# Patient Record
Sex: MALE | Race: WHITE | NOT HISPANIC OR LATINO | Employment: OTHER | URBAN - METROPOLITAN AREA
[De-identification: names, ages, dates, MRNs, and addresses within clinical notes are randomized per-mention and may not be internally consistent; named-entity substitution may affect disease eponyms.]

---

## 2017-03-08 RX ORDER — LISINOPRIL AND HYDROCHLOROTHIAZIDE 20; 12.5 MG/1; MG/1
TABLET ORAL
Qty: 90 TABLET | Refills: 0 | Status: SHIPPED | OUTPATIENT
Start: 2017-03-08 | End: 2017-06-04 | Stop reason: SDUPTHER

## 2017-03-28 DIAGNOSIS — E78.5 HYPERLIPIDEMIA: ICD-10-CM

## 2017-03-28 RX ORDER — PRAVASTATIN SODIUM 40 MG
TABLET ORAL
Qty: 60 TABLET | Refills: 2 | Status: SHIPPED | OUTPATIENT
Start: 2017-03-28 | End: 2017-06-30 | Stop reason: SDUPTHER

## 2017-04-07 DIAGNOSIS — R73.9 HYPERGLYCEMIA: ICD-10-CM

## 2017-04-07 DIAGNOSIS — Z11.59 NEED FOR HEPATITIS C SCREENING TEST: Primary | ICD-10-CM

## 2017-04-07 DIAGNOSIS — E78.5 HYPERLIPIDEMIA, UNSPECIFIED HYPERLIPIDEMIA TYPE: ICD-10-CM

## 2017-04-13 LAB
ALBUMIN SERPL-MCNC: 4.3 G/DL (ref 3.5–5.2)
ALBUMIN/GLOB SERPL: 1.4 G/DL
ALP SERPL-CCNC: 91 U/L (ref 39–117)
ALT SERPL-CCNC: 35 U/L (ref 1–41)
AST SERPL-CCNC: 30 U/L (ref 1–40)
BILIRUB SERPL-MCNC: 1 MG/DL (ref 0.1–1.2)
BUN SERPL-MCNC: 14 MG/DL (ref 8–23)
BUN/CREAT SERPL: 13.9 (ref 7–25)
CALCIUM SERPL-MCNC: 9.7 MG/DL (ref 8.6–10.5)
CHLORIDE SERPL-SCNC: 100 MMOL/L (ref 98–107)
CHOLEST SERPL-MCNC: 142 MG/DL (ref 100–199)
CO2 SERPL-SCNC: 26.1 MMOL/L (ref 22–29)
CREAT SERPL-MCNC: 1.01 MG/DL (ref 0.76–1.27)
GLOBULIN SER CALC-MCNC: 3.1 GM/DL
GLUCOSE SERPL-MCNC: 102 MG/DL (ref 65–99)
HBA1C MFR BLD: 5.7 % (ref 4.8–5.6)
HCV AB S/CO SERPL IA: <0.1 S/CO RATIO (ref 0–0.9)
HCV AB SERPL QL IA: NORMAL
HDL SERPL-SCNC: 29.5 UMOL/L
HDLC SERPL-MCNC: 44 MG/DL
LDL SERPL QN: 20.3 NM
LDL SERPL-SCNC: 1025 NMOL/L
LDL SMALL SERPL-SCNC: 585 NMOL/L
LDLC SERPL CALC-MCNC: 67 MG/DL (ref 0–99)
POTASSIUM SERPL-SCNC: 4.3 MMOL/L (ref 3.5–5.2)
PROT SERPL-MCNC: 7.4 G/DL (ref 6–8.5)
SODIUM SERPL-SCNC: 140 MMOL/L (ref 136–145)
TRIGL SERPL-MCNC: 154 MG/DL (ref 0–149)

## 2017-04-19 ENCOUNTER — OFFICE VISIT (OUTPATIENT)
Dept: FAMILY MEDICINE CLINIC | Facility: CLINIC | Age: 67
End: 2017-04-19

## 2017-04-19 VITALS
HEART RATE: 64 BPM | DIASTOLIC BLOOD PRESSURE: 72 MMHG | SYSTOLIC BLOOD PRESSURE: 130 MMHG | RESPIRATION RATE: 16 BRPM | HEIGHT: 69 IN | WEIGHT: 178 LBS | BODY MASS INDEX: 26.36 KG/M2 | OXYGEN SATURATION: 97 % | TEMPERATURE: 98.5 F

## 2017-04-19 DIAGNOSIS — Z12.11 ENCOUNTER FOR SCREENING COLONOSCOPY: ICD-10-CM

## 2017-04-19 DIAGNOSIS — I10 ESSENTIAL HYPERTENSION: Primary | ICD-10-CM

## 2017-04-19 DIAGNOSIS — R73.9 HYPERGLYCEMIA: ICD-10-CM

## 2017-04-19 DIAGNOSIS — E78.5 HYPERLIPIDEMIA, UNSPECIFIED HYPERLIPIDEMIA TYPE: ICD-10-CM

## 2017-04-19 PROCEDURE — 99214 OFFICE O/P EST MOD 30 MIN: CPT | Performed by: INTERNAL MEDICINE

## 2017-04-19 NOTE — PROGRESS NOTES
Subjective   North Carcamo is a 66 y.o. male. Patient is here today for follow-up on his hyperlipidemia, hypertension and hyperglycemia.  He generally is feeling fine and is had no chest pain, shortness of breath, edema or significant myalgias.    Chief Complaint   Patient presents with   • Hypertension     LAB F/U   • Hyperlipidemia   • Hyperglycemia          Vitals:    04/19/17 0911   BP: 130/72   Pulse: 64   Resp: 16   Temp: 98.5 °F (36.9 °C)   SpO2: 97%     The following portions of the patient's history were reviewed and updated as appropriate: allergies, current medications, past family history, past medical history, past social history, past surgical history and problem list.    Past Medical History:   Diagnosis Date   • Cancer     PROSTATE   • Hyperglycemia    • Hyperlipidemia    • Hypertension       Allergies   Allergen Reactions   • Penicillins       Social History     Social History   • Marital status:      Spouse name: N/A   • Number of children: N/A   • Years of education: N/A     Occupational History   • Not on file.     Social History Main Topics   • Smoking status: Never Smoker   • Smokeless tobacco: Never Used   • Alcohol use Yes      Comment: MINIMUM CONSUMPTION   • Drug use: Defer   • Sexual activity: Defer     Other Topics Concern   • Not on file     Social History Narrative        Current Outpatient Prescriptions:   •  aspirin 81 MG tablet, Take 1 tablet by mouth daily., Disp: , Rfl:   •  lisinopril-hydrochlorothiazide (PRINZIDE,ZESTORETIC) 20-12.5 MG per tablet, TAKE 1 TABLET BY MOUTH EVERY DAY, Disp: 90 tablet, Rfl: 0  •  pravastatin (PRAVACHOL) 40 MG tablet, TAKE 1 TABLET BY MOUTH TWICE DAILY, Disp: 60 tablet, Rfl: 2     Objective     History of Present Illness     Review of Systems   Constitutional: Negative.    HENT: Negative.    Eyes: Negative.    Respiratory: Negative.    Cardiovascular: Negative.    Gastrointestinal: Negative.    Genitourinary: Negative.    Musculoskeletal:  Negative.    Skin: Negative.    Neurological: Negative.    Psychiatric/Behavioral: Negative.        Physical Exam   Constitutional: He appears well-developed and well-nourished.   Pleasant, cooperative and in no distress with a blood pressure of 120/80   HENT:   Head: Normocephalic and atraumatic.   Eyes: Conjunctivae are normal.   Neck: Normal range of motion. Neck supple.   Cardiovascular: Normal rate, regular rhythm and normal heart sounds.    Pulmonary/Chest: Effort normal and breath sounds normal. No respiratory distress. He has no wheezes. He has no rales.   Musculoskeletal: Normal range of motion. He exhibits no edema.   Neurological: He is alert.   Skin: Skin is warm and dry.   Psychiatric: He has a normal mood and affect. His behavior is normal.   Nursing note and vitals reviewed.      ASSESSMENT  overall the patient seems stable with a good blood pressure and heart and lungs sound fine.  His CMP was normal aside from a sugar of 103 and hemoglobin A1c is stable at 5.7.  Lipid panel is stable with a total cholesterol of 142 HDL of 44 LDL of 67 with the particle number of 1025.  Hepatitis C screen was negative.  The patient is due for a routine colonoscopy.     Problem List Items Addressed This Visit        Cardiovascular and Mediastinum    Hyperlipidemia    Hypertension - Primary       Other    Hyperglycemia      Other Visit Diagnoses     Encounter for screening colonoscopy        Relevant Orders    Ambulatory Referral to General Surgery          PLAN  the patient will continue current medicines and I plan on rechecking him in about 4 months with a CMP, NMR lipid panel, hemoglobin A1c.  I have referred him to the general surgery group for routine colonoscopy.      There are no Patient Instructions on file for this visit.  Return in about 4 months (around 8/19/2017) for with labs.

## 2017-06-05 RX ORDER — LISINOPRIL AND HYDROCHLOROTHIAZIDE 20; 12.5 MG/1; MG/1
TABLET ORAL
Qty: 90 TABLET | Refills: 0 | Status: SHIPPED | OUTPATIENT
Start: 2017-06-05 | End: 2017-08-23 | Stop reason: SDUPTHER

## 2017-06-30 DIAGNOSIS — E78.5 HYPERLIPIDEMIA: ICD-10-CM

## 2017-06-30 RX ORDER — PRAVASTATIN SODIUM 40 MG
TABLET ORAL
Qty: 60 TABLET | Refills: 0 | Status: SHIPPED | OUTPATIENT
Start: 2017-06-30 | End: 2017-07-31 | Stop reason: SDUPTHER

## 2017-07-31 DIAGNOSIS — E78.5 HYPERLIPIDEMIA: ICD-10-CM

## 2017-08-01 RX ORDER — PRAVASTATIN SODIUM 40 MG
TABLET ORAL
Qty: 60 TABLET | Refills: 3 | Status: SHIPPED | OUTPATIENT
Start: 2017-08-01 | End: 2017-08-23 | Stop reason: SDUPTHER

## 2017-08-04 DIAGNOSIS — E78.5 HYPERLIPIDEMIA, UNSPECIFIED HYPERLIPIDEMIA TYPE: ICD-10-CM

## 2017-08-04 DIAGNOSIS — R73.9 HYPERGLYCEMIA: ICD-10-CM

## 2017-08-13 LAB
ALBUMIN SERPL-MCNC: 4.1 G/DL (ref 3.5–5.2)
ALBUMIN/GLOB SERPL: 1.3 G/DL
ALP SERPL-CCNC: 91 U/L (ref 39–117)
ALT SERPL-CCNC: 24 U/L (ref 1–41)
AST SERPL-CCNC: 21 U/L (ref 1–40)
BILIRUB SERPL-MCNC: 1.1 MG/DL (ref 0.1–1.2)
BUN SERPL-MCNC: 13 MG/DL (ref 8–23)
BUN/CREAT SERPL: 11.3 (ref 7–25)
CALCIUM SERPL-MCNC: 9.4 MG/DL (ref 8.6–10.5)
CHLORIDE SERPL-SCNC: 100 MMOL/L (ref 98–107)
CHOLEST SERPL-MCNC: 127 MG/DL (ref 100–199)
CO2 SERPL-SCNC: 28.2 MMOL/L (ref 22–29)
CREAT SERPL-MCNC: 1.15 MG/DL (ref 0.76–1.27)
GLOBULIN SER CALC-MCNC: 3.1 GM/DL
GLUCOSE SERPL-MCNC: 93 MG/DL (ref 65–99)
HBA1C MFR BLD: 5.46 % (ref 4.8–5.6)
HDL SERPL-SCNC: 26.4 UMOL/L
HDLC SERPL-MCNC: 36 MG/DL
LDL SERPL QN: 20.1 NM
LDL SERPL-SCNC: 1018 NMOL/L
LDL SMALL SERPL-SCNC: 642 NMOL/L
LDLC SERPL CALC-MCNC: 58 MG/DL (ref 0–99)
POTASSIUM SERPL-SCNC: 4.1 MMOL/L (ref 3.5–5.2)
PROT SERPL-MCNC: 7.2 G/DL (ref 6–8.5)
SODIUM SERPL-SCNC: 141 MMOL/L (ref 136–145)
TRIGL SERPL-MCNC: 166 MG/DL (ref 0–149)

## 2017-08-23 ENCOUNTER — OFFICE VISIT (OUTPATIENT)
Dept: FAMILY MEDICINE CLINIC | Facility: CLINIC | Age: 67
End: 2017-08-23

## 2017-08-23 VITALS
HEART RATE: 62 BPM | BODY MASS INDEX: 25.92 KG/M2 | DIASTOLIC BLOOD PRESSURE: 70 MMHG | OXYGEN SATURATION: 98 % | SYSTOLIC BLOOD PRESSURE: 120 MMHG | WEIGHT: 175 LBS | HEIGHT: 69 IN | TEMPERATURE: 97.9 F

## 2017-08-23 DIAGNOSIS — I10 ESSENTIAL HYPERTENSION: Primary | ICD-10-CM

## 2017-08-23 DIAGNOSIS — E78.5 HYPERLIPIDEMIA, UNSPECIFIED HYPERLIPIDEMIA TYPE: ICD-10-CM

## 2017-08-23 DIAGNOSIS — R73.9 HYPERGLYCEMIA: ICD-10-CM

## 2017-08-23 PROCEDURE — 99214 OFFICE O/P EST MOD 30 MIN: CPT | Performed by: INTERNAL MEDICINE

## 2017-08-23 RX ORDER — LISINOPRIL AND HYDROCHLOROTHIAZIDE 20; 12.5 MG/1; MG/1
1 TABLET ORAL DAILY
Qty: 90 TABLET | Refills: 3 | Status: SHIPPED | OUTPATIENT
Start: 2017-08-23 | End: 2018-05-17 | Stop reason: SDUPTHER

## 2017-08-23 RX ORDER — PRAVASTATIN SODIUM 40 MG
40 TABLET ORAL NIGHTLY
Qty: 90 TABLET | Refills: 3 | Status: SHIPPED | OUTPATIENT
Start: 2017-08-23 | End: 2018-05-17 | Stop reason: SDUPTHER

## 2017-08-23 NOTE — PROGRESS NOTES
Subjective   North Carcamo is a 67 y.o. male. Patient is here today for follow-up on his hypertension, hyperlipidemia and hyperglycemia.  He's feeling well and denies any chest pain, shortness of breath, edema or myalgias.  He's had no new acute problems.    Chief Complaint   Patient presents with   • Hyperlipidemia     HYPERGLYCEMIA, HTN- FOLLOW UP LABS          Vitals:    08/23/17 1028   BP: 120/70   Pulse: 62   Temp: 97.9 °F (36.6 °C)   SpO2: 98%     The following portions of the patient's history were reviewed and updated as appropriate: allergies, current medications, past family history, past medical history, past social history, past surgical history and problem list.    Past Medical History:   Diagnosis Date   • Cancer     PROSTATE   • Hyperglycemia    • Hyperlipidemia    • Hypertension       Allergies   Allergen Reactions   • Penicillins       Social History     Social History   • Marital status:      Spouse name: N/A   • Number of children: N/A   • Years of education: N/A     Occupational History   • Not on file.     Social History Main Topics   • Smoking status: Never Smoker   • Smokeless tobacco: Never Used   • Alcohol use Yes      Comment: MINIMUM CONSUMPTION   • Drug use: Defer   • Sexual activity: Defer     Other Topics Concern   • Not on file     Social History Narrative        Current Outpatient Prescriptions:   •  aspirin 81 MG tablet, Take 1 tablet by mouth daily., Disp: , Rfl:   •  lisinopril-hydrochlorothiazide (PRINZIDE,ZESTORETIC) 20-12.5 MG per tablet, Take 1 tablet by mouth Daily., Disp: 90 tablet, Rfl: 3  •  pravastatin (PRAVACHOL) 40 MG tablet, Take 1 tablet by mouth Every Night., Disp: 90 tablet, Rfl: 3     Objective     History of Present Illness     Review of Systems   Constitutional: Negative.    HENT: Negative.    Eyes: Negative.    Respiratory: Negative.    Cardiovascular: Negative.    Gastrointestinal: Negative.    Genitourinary: Negative.    Musculoskeletal: Negative.     Skin: Negative.    Neurological: Negative.    Psychiatric/Behavioral: Negative.        Physical Exam   Constitutional: He appears well-developed and well-nourished.   Pleasant, cooperative and in no distress with a blood pressure of 120/80   HENT:   Head: Normocephalic and atraumatic.   Eyes: Conjunctivae are normal.   Neck: Normal range of motion. Neck supple.   Cardiovascular: Normal rate, regular rhythm and normal heart sounds.    Pulmonary/Chest: Effort normal and breath sounds normal. No respiratory distress. He has no wheezes. He has no rales.   Musculoskeletal: Normal range of motion.   Neurological: He is alert.   Skin: Skin is warm and dry.   Psychiatric: He has a normal mood and affect. His behavior is normal.   Nursing note and vitals reviewed.      ASSESSMENT  overall the patient generally seems stable.  His CMP was completely normal as well as his hemoglobin A1c.  His NMR lipid panel is stable with a total cholesterol 127, minimally low HDL of 36, LDL of 58 with the particle number of 1018.     Problem List Items Addressed This Visit        Cardiovascular and Mediastinum    Hyperlipidemia    Relevant Medications    pravastatin (PRAVACHOL) 40 MG tablet    Hypertension - Primary    Relevant Medications    lisinopril-hydrochlorothiazide (PRINZIDE,ZESTORETIC) 20-12.5 MG per tablet       Other    Hyperglycemia          PLAN  I'm going to decrease the patient's pravastatin to 40 mg daily and he will continue his other medicines.  I like to recheck him in about 4 months with a CBC, CMP, NMR lipid panel and PSA    There are no Patient Instructions on file for this visit.  Return in about 4 months (around 12/23/2017) for with labs.

## 2017-12-20 DIAGNOSIS — Z12.5 SCREENING PSA (PROSTATE SPECIFIC ANTIGEN): Primary | ICD-10-CM

## 2017-12-20 DIAGNOSIS — E78.5 HYPERLIPIDEMIA, UNSPECIFIED HYPERLIPIDEMIA TYPE: ICD-10-CM

## 2018-01-04 LAB
ALBUMIN SERPL-MCNC: 4 G/DL (ref 3.5–5.2)
ALBUMIN/GLOB SERPL: 1.3 G/DL
ALP SERPL-CCNC: 101 U/L (ref 39–117)
ALT SERPL-CCNC: 30 U/L (ref 1–41)
AST SERPL-CCNC: 23 U/L (ref 1–40)
BASOPHILS # BLD AUTO: 0.04 10*3/MM3 (ref 0–0.2)
BASOPHILS NFR BLD AUTO: 0.4 % (ref 0–1.5)
BILIRUB SERPL-MCNC: 0.6 MG/DL (ref 0.1–1.2)
BUN SERPL-MCNC: 19 MG/DL (ref 8–23)
BUN/CREAT SERPL: 16.1 (ref 7–25)
CALCIUM SERPL-MCNC: 9.1 MG/DL (ref 8.6–10.5)
CHLORIDE SERPL-SCNC: 102 MMOL/L (ref 98–107)
CHOLEST SERPL-MCNC: 151 MG/DL (ref 100–199)
CO2 SERPL-SCNC: 29.3 MMOL/L (ref 22–29)
CREAT SERPL-MCNC: 1.18 MG/DL (ref 0.76–1.27)
EOSINOPHIL # BLD AUTO: 0.34 10*3/MM3 (ref 0–0.7)
EOSINOPHIL NFR BLD AUTO: 3.6 % (ref 0.3–6.2)
ERYTHROCYTE [DISTWIDTH] IN BLOOD BY AUTOMATED COUNT: 12.9 % (ref 11.5–14.5)
GLOBULIN SER CALC-MCNC: 3.2 GM/DL
GLUCOSE SERPL-MCNC: 124 MG/DL (ref 65–99)
HCT VFR BLD AUTO: 45.3 % (ref 40.4–52.2)
HDL SERPL-SCNC: 29.5 UMOL/L
HDLC SERPL-MCNC: 41 MG/DL
HGB BLD-MCNC: 15.6 G/DL (ref 13.7–17.6)
IMM GRANULOCYTES # BLD: 0.03 10*3/MM3 (ref 0–0.03)
IMM GRANULOCYTES NFR BLD: 0.3 % (ref 0–0.5)
LDL SERPL QN: 20.1 NM
LDL SERPL-SCNC: 846 NMOL/L
LDL SMALL SERPL-SCNC: 638 NMOL/L
LDLC SERPL CALC-MCNC: 65 MG/DL (ref 0–99)
LYMPHOCYTES # BLD AUTO: 2.18 10*3/MM3 (ref 0.9–4.8)
LYMPHOCYTES NFR BLD AUTO: 23.3 % (ref 19.6–45.3)
MCH RBC QN AUTO: 32.1 PG (ref 27–32.7)
MCHC RBC AUTO-ENTMCNC: 34.4 G/DL (ref 32.6–36.4)
MCV RBC AUTO: 93.2 FL (ref 79.8–96.2)
MONOCYTES # BLD AUTO: 1.36 10*3/MM3 (ref 0.2–1.2)
MONOCYTES NFR BLD AUTO: 14.6 % (ref 5–12)
NEUTROPHILS # BLD AUTO: 5.39 10*3/MM3 (ref 1.9–8.1)
NEUTROPHILS NFR BLD AUTO: 57.8 % (ref 42.7–76)
PLATELET # BLD AUTO: 214 10*3/MM3 (ref 140–500)
POTASSIUM SERPL-SCNC: 4.2 MMOL/L (ref 3.5–5.2)
PROT SERPL-MCNC: 7.2 G/DL (ref 6–8.5)
PSA SERPL-MCNC: 1.12 NG/ML (ref 0–4)
RBC # BLD AUTO: 4.86 10*6/MM3 (ref 4.6–6)
SODIUM SERPL-SCNC: 143 MMOL/L (ref 136–145)
TRIGL SERPL-MCNC: 226 MG/DL (ref 0–149)
WBC # BLD AUTO: 9.34 10*3/MM3 (ref 4.5–10.7)

## 2018-01-09 ENCOUNTER — OFFICE VISIT (OUTPATIENT)
Dept: FAMILY MEDICINE CLINIC | Facility: CLINIC | Age: 68
End: 2018-01-09

## 2018-01-09 VITALS
DIASTOLIC BLOOD PRESSURE: 74 MMHG | BODY MASS INDEX: 26.48 KG/M2 | OXYGEN SATURATION: 97 % | WEIGHT: 178.8 LBS | TEMPERATURE: 97.9 F | HEIGHT: 69 IN | SYSTOLIC BLOOD PRESSURE: 120 MMHG | HEART RATE: 65 BPM

## 2018-01-09 DIAGNOSIS — E78.5 HYPERLIPIDEMIA, UNSPECIFIED HYPERLIPIDEMIA TYPE: ICD-10-CM

## 2018-01-09 DIAGNOSIS — I10 ESSENTIAL HYPERTENSION: Primary | ICD-10-CM

## 2018-01-09 DIAGNOSIS — R73.9 HYPERGLYCEMIA: ICD-10-CM

## 2018-01-09 PROCEDURE — 99214 OFFICE O/P EST MOD 30 MIN: CPT | Performed by: INTERNAL MEDICINE

## 2018-01-09 NOTE — PROGRESS NOTES
Subjective   North Carcamo is a 67 y.o. male. Patient is here today for follow-up on his hypertension, hyperlipidemia and hyperglycemia.  He's feeling fine and is had no new acute problems and no chest pain, shortness of breath, edema or myalgias.  His weights up 3 pounds.  Chief Complaint   Patient presents with   • Hypertension     lab follow up          Vitals:    01/09/18 1020   BP: 120/74   Pulse: 65   Temp: 97.9 °F (36.6 °C)   SpO2: 97%     The following portions of the patient's history were reviewed and updated as appropriate: allergies, current medications, past family history, past medical history, past social history, past surgical history and problem list.    Past Medical History:   Diagnosis Date   • Cancer     PROSTATE   • Hyperglycemia    • Hyperlipidemia    • Hypertension       Allergies   Allergen Reactions   • Penicillins       Social History     Social History   • Marital status:      Spouse name: N/A   • Number of children: N/A   • Years of education: N/A     Occupational History   • Not on file.     Social History Main Topics   • Smoking status: Never Smoker   • Smokeless tobacco: Never Used   • Alcohol use Yes      Comment: MINIMUM CONSUMPTION   • Drug use: Defer   • Sexual activity: Defer     Other Topics Concern   • Not on file     Social History Narrative        Current Outpatient Prescriptions:   •  aspirin 81 MG tablet, Take 1 tablet by mouth daily., Disp: , Rfl:   •  lisinopril-hydrochlorothiazide (PRINZIDE,ZESTORETIC) 20-12.5 MG per tablet, Take 1 tablet by mouth Daily., Disp: 90 tablet, Rfl: 3  •  pravastatin (PRAVACHOL) 40 MG tablet, Take 1 tablet by mouth Every Night., Disp: 90 tablet, Rfl: 3     Objective     History of Present Illness     Review of Systems   Constitutional: Negative.    HENT: Negative.    Eyes: Negative.    Respiratory: Negative.    Cardiovascular: Negative.    Gastrointestinal: Negative.    Endocrine: Negative.    Genitourinary: Negative.     Musculoskeletal: Negative.    Skin: Negative.    Neurological: Negative.    Psychiatric/Behavioral: Negative.        Physical Exam   Constitutional: He is oriented to person, place, and time. He appears well-developed and well-nourished.   Pleasant, cooperative no distress with a blood pressure 110/70   HENT:   Head: Normocephalic and atraumatic.   Eyes: Conjunctivae are normal. Pupils are equal, round, and reactive to light. No scleral icterus.   Neck: Normal range of motion. Neck supple.   Cardiovascular: Normal rate, regular rhythm and normal heart sounds.    Pulmonary/Chest: Effort normal and breath sounds normal. No respiratory distress. He has no wheezes. He has no rales.   Musculoskeletal: Normal range of motion. He exhibits no edema.   Neurological: He is alert and oriented to person, place, and time.   Skin: Skin is warm and dry.   Psychiatric: He has a normal mood and affect. His behavior is normal.   Nursing note and vitals reviewed.      ASSESSMENT  CBC was essentially normal.  CMP has an elevated but stable sugar of 124 and otherwise was normal.  PSA was quite low normal.  NMR lipid panel was generally good with a total cholesterol 151, HDL of 41, LDL 65 with the particle number in the low range.  #1-hypertension, excellent control  #2-hyperlipidemia, adequate control on medication  #3-hyperglycemia, diet controlled     Problem List Items Addressed This Visit        Cardiovascular and Mediastinum    Hyperlipidemia    Hypertension - Primary       Other    Hyperglycemia          PLAN  The patient will continue his current medicines and a plan on rechecking him in 4 months with a CMP, NMR lipid panel, hemoglobin A1c and TSH    There are no Patient Instructions on file for this visit.  Return in about 4 months (around 5/9/2018) for with labs.

## 2018-05-03 DIAGNOSIS — R73.9 HYPERGLYCEMIA: ICD-10-CM

## 2018-05-03 DIAGNOSIS — I10 ESSENTIAL HYPERTENSION: ICD-10-CM

## 2018-05-03 DIAGNOSIS — E78.5 HYPERLIPIDEMIA, UNSPECIFIED HYPERLIPIDEMIA TYPE: ICD-10-CM

## 2018-05-10 LAB
ALBUMIN SERPL-MCNC: 4.5 G/DL (ref 3.5–5.2)
ALBUMIN/GLOB SERPL: 1.7 G/DL
ALP SERPL-CCNC: 90 U/L (ref 39–117)
ALT SERPL-CCNC: 23 U/L (ref 1–41)
AST SERPL-CCNC: 24 U/L (ref 1–40)
BILIRUB SERPL-MCNC: 0.6 MG/DL (ref 0.1–1.2)
BUN SERPL-MCNC: 18 MG/DL (ref 8–23)
BUN/CREAT SERPL: 17.8 (ref 7–25)
CALCIUM SERPL-MCNC: 9.6 MG/DL (ref 8.6–10.5)
CHLORIDE SERPL-SCNC: 101 MMOL/L (ref 98–107)
CHOLEST SERPL-MCNC: 141 MG/DL (ref 100–199)
CO2 SERPL-SCNC: 26.9 MMOL/L (ref 22–29)
CREAT SERPL-MCNC: 1.01 MG/DL (ref 0.76–1.27)
GFR SERPLBLD CREATININE-BSD FMLA CKD-EPI: 73 ML/MIN/1.73
GFR SERPLBLD CREATININE-BSD FMLA CKD-EPI: 89 ML/MIN/1.73
GLOBULIN SER CALC-MCNC: 2.6 GM/DL
GLUCOSE SERPL-MCNC: 116 MG/DL (ref 65–99)
HBA1C MFR BLD: 5.97 % (ref 4.8–5.6)
HDL SERPL-SCNC: 33 UMOL/L
HDLC SERPL-MCNC: 44 MG/DL
LDL SERPL QN: 20.2 NM
LDL SERPL-SCNC: 1080 NMOL/L
LDL SMALL SERPL-SCNC: 672 NMOL/L
LDLC SERPL CALC-MCNC: 68 MG/DL (ref 0–99)
POTASSIUM SERPL-SCNC: 4.7 MMOL/L (ref 3.5–5.2)
PROT SERPL-MCNC: 7.1 G/DL (ref 6–8.5)
SODIUM SERPL-SCNC: 139 MMOL/L (ref 136–145)
TRIGL SERPL-MCNC: 144 MG/DL (ref 0–149)
TSH SERPL DL<=0.005 MIU/L-ACNC: 1.92 MIU/ML (ref 0.27–4.2)

## 2018-05-17 ENCOUNTER — OFFICE VISIT (OUTPATIENT)
Dept: FAMILY MEDICINE CLINIC | Facility: CLINIC | Age: 68
End: 2018-05-17

## 2018-05-17 VITALS
HEART RATE: 80 BPM | SYSTOLIC BLOOD PRESSURE: 112 MMHG | DIASTOLIC BLOOD PRESSURE: 68 MMHG | OXYGEN SATURATION: 98 % | TEMPERATURE: 98 F | WEIGHT: 180.8 LBS | BODY MASS INDEX: 26.78 KG/M2 | HEIGHT: 69 IN

## 2018-05-17 DIAGNOSIS — R73.9 HYPERGLYCEMIA: ICD-10-CM

## 2018-05-17 DIAGNOSIS — E78.5 HYPERLIPIDEMIA, UNSPECIFIED HYPERLIPIDEMIA TYPE: ICD-10-CM

## 2018-05-17 DIAGNOSIS — I10 ESSENTIAL HYPERTENSION: Primary | ICD-10-CM

## 2018-05-17 PROCEDURE — 99214 OFFICE O/P EST MOD 30 MIN: CPT | Performed by: INTERNAL MEDICINE

## 2018-05-17 RX ORDER — PRAVASTATIN SODIUM 40 MG
40 TABLET ORAL NIGHTLY
Qty: 90 TABLET | Refills: 3 | Status: SHIPPED | OUTPATIENT
Start: 2018-05-17 | End: 2019-05-18 | Stop reason: SDUPTHER

## 2018-05-17 RX ORDER — LISINOPRIL AND HYDROCHLOROTHIAZIDE 20; 12.5 MG/1; MG/1
1 TABLET ORAL DAILY
Qty: 90 TABLET | Refills: 3 | Status: SHIPPED | OUTPATIENT
Start: 2018-05-17 | End: 2019-05-18 | Stop reason: SDUPTHER

## 2018-05-17 NOTE — PROGRESS NOTES
Subjective   North Carcamo is a 68 y.o. male. Patient is here today for follow-up on his hypertension, hyperlipidemia and hyperglycemia.  He generally is feeling well and is had no chest pain, shortness of breath, edema or myalgias.  Since I saw him last he has joined the gym and has been exercising regularly with good results.  He feels fine.  He is scheduling a colonoscopy  Chief Complaint   Patient presents with   • Hyperlipidemia     HYPERGLYCEMIA, HTN- FOLLOW UP LABS          Vitals:    05/17/18 1257   BP: 112/68   Pulse: 80   Temp: 98 °F (36.7 °C)   SpO2: 98%     The following portions of the patient's history were reviewed and updated as appropriate: allergies, current medications, past family history, past medical history, past social history, past surgical history and problem list.    Past Medical History:   Diagnosis Date   • Cancer     PROSTATE   • Hyperglycemia    • Hyperlipidemia    • Hypertension       Allergies   Allergen Reactions   • Penicillins       Social History     Social History   • Marital status:      Spouse name: N/A   • Number of children: N/A   • Years of education: N/A     Occupational History   • Not on file.     Social History Main Topics   • Smoking status: Never Smoker   • Smokeless tobacco: Never Used   • Alcohol use Yes      Comment: MINIMUM CONSUMPTION   • Drug use: Unknown   • Sexual activity: Defer     Other Topics Concern   • Not on file     Social History Narrative   • No narrative on file        Current Outpatient Prescriptions:   •  aspirin 81 MG tablet, Take 1 tablet by mouth daily., Disp: , Rfl:   •  lisinopril-hydrochlorothiazide (PRINZIDE,ZESTORETIC) 20-12.5 MG per tablet, Take 1 tablet by mouth Daily., Disp: 90 tablet, Rfl: 3  •  pravastatin (PRAVACHOL) 40 MG tablet, Take 1 tablet by mouth Every Night., Disp: 90 tablet, Rfl: 3     Objective     History of Present Illness     Review of Systems   Constitutional: Negative.    HENT: Negative.    Eyes: Negative.     Respiratory: Negative.    Cardiovascular: Negative.    Gastrointestinal: Negative.    Endocrine: Negative.    Genitourinary: Negative.    Musculoskeletal: Negative.    Skin: Negative.    Neurological: Negative.    Psychiatric/Behavioral: Negative.        Physical Exam   Constitutional: He is oriented to person, place, and time. He appears well-developed and well-nourished.   Pleasant, cooperative no distress with a blood pressure 100/80   HENT:   Head: Normocephalic and atraumatic.   Eyes: Conjunctivae are normal. Pupils are equal, round, and reactive to light. No scleral icterus.   Neck: Normal range of motion. Neck supple. No thyromegaly present.   Cardiovascular: Normal rate, regular rhythm and normal heart sounds.    Pulmonary/Chest: Effort normal and breath sounds normal. No respiratory distress. He has no wheezes. He has no rales.   Musculoskeletal: Normal range of motion. He exhibits no edema.   Neurological: He is alert and oriented to person, place, and time.   Skin: Skin is warm and dry.   Psychiatric: He has a normal mood and affect. His behavior is normal.   Nursing note and vitals reviewed.      ASSESSMENT  CMP has a sugar of 116 and otherwise is normal and hemoglobin A1c was reasonable at 5.97.  TSH was normal.  NMR lipid panel is stable with a total cholesterol 141, HDL 44 and LDL 68 with the particle number just into the moderate range.  #1-hypertension, well controlled  #2-hyperlipidemia, stable and generally well controlled  #3-hyperglycemia, mild, asymptomatic and diet controlled     Problem List Items Addressed This Visit        Cardiovascular and Mediastinum    Hyperlipidemia    Relevant Medications    pravastatin (PRAVACHOL) 40 MG tablet    Hypertension - Primary    Relevant Medications    lisinopril-hydrochlorothiazide (PRINZIDE,ZESTORETIC) 20-12.5 MG per tablet       Other    Hyperglycemia          PLAN  The patient is scheduling a colonoscopy.  He will continue current medicines as now and  I like to recheck him in about 4 months with a CMP, NMR lipid panel, hemoglobin A1c    There are no Patient Instructions on file for this visit.  Return in about 4 months (around 9/17/2018) for with labs.

## 2018-10-09 DIAGNOSIS — E78.5 HYPERLIPIDEMIA, UNSPECIFIED HYPERLIPIDEMIA TYPE: ICD-10-CM

## 2018-10-09 DIAGNOSIS — R73.9 HYPERGLYCEMIA: ICD-10-CM

## 2018-10-10 LAB
ALBUMIN SERPL-MCNC: 4.4 G/DL (ref 3.5–5.2)
ALBUMIN/GLOB SERPL: 1.5 G/DL
ALP SERPL-CCNC: 97 U/L (ref 39–117)
ALT SERPL-CCNC: 22 U/L (ref 1–41)
AST SERPL-CCNC: 18 U/L (ref 1–40)
BILIRUB SERPL-MCNC: 0.7 MG/DL (ref 0.1–1.2)
BUN SERPL-MCNC: 16 MG/DL (ref 8–23)
BUN/CREAT SERPL: 13.9 (ref 7–25)
CALCIUM SERPL-MCNC: 9.7 MG/DL (ref 8.6–10.5)
CHLORIDE SERPL-SCNC: 102 MMOL/L (ref 98–107)
CHOLEST SERPL-MCNC: 146 MG/DL (ref 0–200)
CO2 SERPL-SCNC: 25.7 MMOL/L (ref 22–29)
CREAT SERPL-MCNC: 1.15 MG/DL (ref 0.76–1.27)
GLOBULIN SER CALC-MCNC: 3 GM/DL
GLUCOSE SERPL-MCNC: 114 MG/DL (ref 65–99)
HBA1C MFR BLD: 5.7 % (ref 4.8–5.6)
HDLC SERPL-MCNC: 46 MG/DL (ref 40–60)
LDLC SERPL CALC-MCNC: 67 MG/DL (ref 0–100)
LDLC/HDLC SERPL: 1.46 {RATIO}
POTASSIUM SERPL-SCNC: 4.5 MMOL/L (ref 3.5–5.2)
PROT SERPL-MCNC: 7.4 G/DL (ref 6–8.5)
SODIUM SERPL-SCNC: 140 MMOL/L (ref 136–145)
TRIGL SERPL-MCNC: 164 MG/DL (ref 0–150)
VLDLC SERPL CALC-MCNC: 32.8 MG/DL (ref 5–40)

## 2018-10-17 ENCOUNTER — OFFICE VISIT (OUTPATIENT)
Dept: FAMILY MEDICINE CLINIC | Facility: CLINIC | Age: 68
End: 2018-10-17

## 2018-10-17 VITALS
TEMPERATURE: 98 F | WEIGHT: 181.8 LBS | DIASTOLIC BLOOD PRESSURE: 74 MMHG | HEIGHT: 69 IN | HEART RATE: 61 BPM | SYSTOLIC BLOOD PRESSURE: 120 MMHG | BODY MASS INDEX: 26.93 KG/M2 | OXYGEN SATURATION: 98 %

## 2018-10-17 DIAGNOSIS — I10 ESSENTIAL HYPERTENSION: Primary | ICD-10-CM

## 2018-10-17 DIAGNOSIS — E78.5 HYPERLIPIDEMIA, UNSPECIFIED HYPERLIPIDEMIA TYPE: ICD-10-CM

## 2018-10-17 DIAGNOSIS — R73.9 HYPERGLYCEMIA: ICD-10-CM

## 2018-10-17 PROCEDURE — 99214 OFFICE O/P EST MOD 30 MIN: CPT | Performed by: INTERNAL MEDICINE

## 2018-10-17 NOTE — PROGRESS NOTES
Subjective   North Carcamo is a 68 y.o. male. Patient is here today for follow-up on his hypertension, hyperlipidemia and hyperglycemia.  He generally is feeling fine and has no new acute complaints.  He has been exercising some.  He did get the shingles shot and a flu shot.  Chief Complaint   Patient presents with   • Hyperlipidemia     lab follow up           Vitals:    10/17/18 0939   BP: 120/74   Pulse: 61   Temp: 98 °F (36.7 °C)   SpO2: 98%     The following portions of the patient's history were reviewed and updated as appropriate: allergies, current medications, past family history, past medical history, past social history, past surgical history and problem list.    Past Medical History:   Diagnosis Date   • Cancer (CMS/HCC)     PROSTATE   • Hyperglycemia    • Hyperlipidemia    • Hypertension       Allergies   Allergen Reactions   • Penicillins       Social History     Social History   • Marital status:      Spouse name: N/A   • Number of children: N/A   • Years of education: N/A     Occupational History   • Not on file.     Social History Main Topics   • Smoking status: Never Smoker   • Smokeless tobacco: Never Used   • Alcohol use Yes      Comment: MINIMUM CONSUMPTION   • Drug use: Unknown   • Sexual activity: Defer     Other Topics Concern   • Not on file     Social History Narrative   • No narrative on file        Current Outpatient Prescriptions:   •  aspirin 81 MG tablet, Take 1 tablet by mouth daily., Disp: , Rfl:   •  lisinopril-hydrochlorothiazide (PRINZIDE,ZESTORETIC) 20-12.5 MG per tablet, Take 1 tablet by mouth Daily., Disp: 90 tablet, Rfl: 3  •  pravastatin (PRAVACHOL) 40 MG tablet, Take 1 tablet by mouth Every Night., Disp: 90 tablet, Rfl: 3     Objective     History of Present Illness     Review of Systems   Constitutional: Negative.    HENT: Negative.    Eyes: Negative.    Respiratory: Negative.    Cardiovascular: Negative.    Gastrointestinal: Negative.    Genitourinary: Negative.     Musculoskeletal: Negative.    Skin: Negative.    Neurological: Negative.    Psychiatric/Behavioral: Negative.        Physical Exam   Constitutional: He is oriented to person, place, and time. He appears well-developed and well-nourished.   Pleasant, cooperative no distress blood pressure 120/80   HENT:   Head: Normocephalic and atraumatic.   Eyes: Pupils are equal, round, and reactive to light. Conjunctivae are normal. No scleral icterus.   Neck: Normal range of motion. Neck supple.   Cardiovascular: Normal rate, regular rhythm and normal heart sounds.    Pulmonary/Chest: Effort normal and breath sounds normal. No respiratory distress. He has no wheezes. He has no rales.   Musculoskeletal: Normal range of motion. He exhibits no edema.   Neurological: He is alert and oriented to person, place, and time.   Skin: Skin is warm and dry.   Psychiatric: He has a normal mood and affect. His behavior is normal.   Nursing note and vitals reviewed.      ASSESSMENT  lipid panels well controlled with total cholesterol 146, HDL 46 and LDL 67.  CMP is stable with a sugar of 114 and otherwise is normal and hemoglobin A1c is improved and acceptable at 5.7.  #1-hypertension, well controlled on medication  #2-hyperlipidemia, excellent control on medication  #3-hyperglycemia, mild with minimally elevated, stable and acceptable hemoglobin A1c, diet controlled     Problem List Items Addressed This Visit        Cardiovascular and Mediastinum    Hyperlipidemia    Hypertension - Primary       Other    Hyperglycemia          PLAN  the patient will continue current medications as now.  I recommended the hepatitis A immunizations.  I plan on rechecking him in 4 months with a CBC, CMP, PSA, lipid panel, hemoglobin A1c    There are no Patient Instructions on file for this visit.  Return in about 4 months (around 2/17/2019) for with labs.

## 2018-12-11 ENCOUNTER — HOSPITAL ENCOUNTER (EMERGENCY)
Facility: HOSPITAL | Age: 68
Discharge: HOME | End: 2018-12-12
Attending: EMERGENCY MEDICINE | Admitting: EMERGENCY MEDICINE

## 2018-12-11 ENCOUNTER — APPOINTMENT (OUTPATIENT)
Dept: GENERAL RADIOLOGY | Facility: HOSPITAL | Age: 68
End: 2018-12-11

## 2018-12-11 DIAGNOSIS — R05.9 COUGH: Primary | ICD-10-CM

## 2018-12-11 DIAGNOSIS — R55 SYNCOPE, UNSPECIFIED SYNCOPE TYPE: ICD-10-CM

## 2018-12-11 PROCEDURE — 93005 ELECTROCARDIOGRAM TRACING: CPT | Performed by: EMERGENCY MEDICINE

## 2018-12-11 PROCEDURE — 93010 ELECTROCARDIOGRAM REPORT: CPT | Performed by: INTERNAL MEDICINE

## 2018-12-11 PROCEDURE — 99284 EMERGENCY DEPT VISIT MOD MDM: CPT

## 2018-12-11 PROCEDURE — 71046 X-RAY EXAM CHEST 2 VIEWS: CPT

## 2018-12-12 VITALS
RESPIRATION RATE: 16 BRPM | SYSTOLIC BLOOD PRESSURE: 118 MMHG | HEART RATE: 70 BPM | HEIGHT: 69 IN | DIASTOLIC BLOOD PRESSURE: 70 MMHG | BODY MASS INDEX: 25.42 KG/M2 | WEIGHT: 171.6 LBS | OXYGEN SATURATION: 95 % | TEMPERATURE: 98.4 F

## 2018-12-12 LAB
ANION GAP SERPL CALCULATED.3IONS-SCNC: 14.4 MMOL/L
BASOPHILS # BLD AUTO: 0.05 10*3/MM3 (ref 0–0.2)
BASOPHILS NFR BLD AUTO: 0.5 % (ref 0–1.5)
BUN BLD-MCNC: 23 MG/DL (ref 8–23)
BUN/CREAT SERPL: 21.3 (ref 7–25)
CALCIUM SPEC-SCNC: 9.1 MG/DL (ref 8.6–10.5)
CHLORIDE SERPL-SCNC: 101 MMOL/L (ref 98–107)
CO2 SERPL-SCNC: 26.6 MMOL/L (ref 22–29)
CREAT BLD-MCNC: 1.08 MG/DL (ref 0.76–1.27)
DEPRECATED RDW RBC AUTO: 43.1 FL (ref 37–54)
EOSINOPHIL # BLD AUTO: 0.46 10*3/MM3 (ref 0–0.7)
EOSINOPHIL NFR BLD AUTO: 4.3 % (ref 0.3–6.2)
ERYTHROCYTE [DISTWIDTH] IN BLOOD BY AUTOMATED COUNT: 12.5 % (ref 11.5–14.5)
GFR SERPL CREATININE-BSD FRML MDRD: 68 ML/MIN/1.73
GLUCOSE BLD-MCNC: 114 MG/DL (ref 65–99)
HCT VFR BLD AUTO: 45.3 % (ref 40.4–52.2)
HGB BLD-MCNC: 15.2 G/DL (ref 13.7–17.6)
IMM GRANULOCYTES # BLD: 0.06 10*3/MM3 (ref 0–0.03)
IMM GRANULOCYTES NFR BLD: 0.6 % (ref 0–0.5)
LYMPHOCYTES # BLD AUTO: 2.34 10*3/MM3 (ref 0.9–4.8)
LYMPHOCYTES NFR BLD AUTO: 21.9 % (ref 19.6–45.3)
MCH RBC QN AUTO: 31.9 PG (ref 27–32.7)
MCHC RBC AUTO-ENTMCNC: 33.6 G/DL (ref 32.6–36.4)
MCV RBC AUTO: 95.2 FL (ref 79.8–96.2)
MONOCYTES # BLD AUTO: 1.45 10*3/MM3 (ref 0.2–1.2)
MONOCYTES NFR BLD AUTO: 13.6 % (ref 5–12)
NEUTROPHILS # BLD AUTO: 6.32 10*3/MM3 (ref 1.9–8.1)
NEUTROPHILS NFR BLD AUTO: 59.1 % (ref 42.7–76)
PLATELET # BLD AUTO: 280 10*3/MM3 (ref 140–500)
PMV BLD AUTO: 11.1 FL (ref 6–12)
POTASSIUM BLD-SCNC: 3.5 MMOL/L (ref 3.5–5.2)
RBC # BLD AUTO: 4.76 10*6/MM3 (ref 4.6–6)
SODIUM BLD-SCNC: 142 MMOL/L (ref 136–145)
WBC NRBC COR # BLD: 10.68 10*3/MM3 (ref 4.5–10.7)

## 2018-12-12 PROCEDURE — 80048 BASIC METABOLIC PNL TOTAL CA: CPT | Performed by: EMERGENCY MEDICINE

## 2018-12-12 PROCEDURE — 85025 COMPLETE CBC W/AUTO DIFF WBC: CPT | Performed by: EMERGENCY MEDICINE

## 2018-12-12 RX ORDER — GUAIFENESIN AND CODEINE PHOSPHATE 100; 10 MG/5ML; MG/5ML
5 SOLUTION ORAL 3 TIMES DAILY PRN
Qty: 118 ML | Refills: 0 | Status: SHIPPED | OUTPATIENT
Start: 2018-12-12 | End: 2019-02-27

## 2018-12-12 RX ORDER — FENTANYL CITRATE 50 UG/ML
100 INJECTION, SOLUTION INTRAMUSCULAR; INTRAVENOUS ONCE
Status: DISCONTINUED | OUTPATIENT
Start: 2018-12-12 | End: 2018-12-12

## 2018-12-12 NOTE — ED PROVIDER NOTES
EMERGENCY DEPARTMENT ENCOUNTER    Room Number:  18/18  Date seen:  12/12/2018  Time seen: 12:13 AM  PCP: Pranav Guzman MD  Historian: patient      HPI:  Chief Complaint: syncope  A complete HPI/ROS/PMH/PSH/SH/FH are unobtainable due to: none  Context: [place] is a 68 y.o. male who presents to the ED c/o severe cough that has resulted in two brief episodes of syncope (one episode yesterday, one earlier today). Pt also has sinus drainage for the past week and has been taking OTC for sx. He denies SOA, CP, black/tarry stool, known fever. Pt has hx of HTN, but reports he is on low dose BP mediations and denies any other medical hx.     Pain Location: n/a  Radiation: n/a  Quality: tussive syncope  Intensity/Severity: moderate  Duration: pta  Onset quality: sudden  Timing: brief  Progression: resolved  Aggravating Factors: cough  Alleviating Factors: none  Previous Episodes: none  Treatment before arrival: EMS care and intervention  Associated Symptoms: cough    PAST MEDICAL HISTORY  Active Ambulatory Problems     Diagnosis Date Noted   • Hyperglycemia 01/20/2016   • Hyperlipidemia 01/20/2016   • Hypertension 01/20/2016     Resolved Ambulatory Problems     Diagnosis Date Noted   • No Resolved Ambulatory Problems     Past Medical History:   Diagnosis Date   • Cancer (CMS/HCC)    • Hyperglycemia    • Hyperlipidemia    • Hypertension          PAST SURGICAL HISTORY  Past Surgical History:   Procedure Laterality Date   • PROSTATE SURGERY     • TONSILLECTOMY     • VASECTOMY           FAMILY HISTORY  Family History   Problem Relation Age of Onset   • Hypertension Mother    • Hypertension Father    • Prostate cancer Father          SOCIAL HISTORY  Social History     Socioeconomic History   • Marital status:      Spouse name: Not on file   • Number of children: Not on file   • Years of education: Not on file   • Highest education level: Not on file   Social Needs   • Financial resource strain: Not on file    • Food insecurity - worry: Not on file   • Food insecurity - inability: Not on file   • Transportation needs - medical: Not on file   • Transportation needs - non-medical: Not on file   Occupational History   • Not on file   Tobacco Use   • Smoking status: Never Smoker   • Smokeless tobacco: Never Used   Substance and Sexual Activity   • Alcohol use: Yes     Comment: MINIMUM CONSUMPTION   • Drug use: Defer   • Sexual activity: Defer   Other Topics Concern   • Not on file   Social History Narrative   • Not on file         ALLERGIES  Penicillins        REVIEW OF SYSTEMS  Review of Systems   Constitutional: Negative for diaphoresis and fever.   HENT: Positive for congestion.    Eyes: Negative for visual disturbance.   Respiratory: Positive for cough. Negative for shortness of breath.    Cardiovascular: Negative for palpitations.   Gastrointestinal: Negative for blood in stool and vomiting.   Endocrine: Negative for polyuria.   Genitourinary: Negative for flank pain.   Musculoskeletal: Negative for joint swelling.   Skin: Negative for wound.   Neurological: Positive for syncope. Negative for seizures.   Hematological: Negative for adenopathy.   Psychiatric/Behavioral: Negative for sleep disturbance.            PHYSICAL EXAM  ED Triage Vitals   Temp Heart Rate Resp BP SpO2   12/11/18 2251 12/11/18 2251 12/11/18 2251 12/11/18 2301 12/11/18 2251   98.4 °F (36.9 °C) 91 16 133/73 96 %      Temp src Heart Rate Source Patient Position BP Location FiO2 (%)   12/11/18 2251 12/11/18 2251 12/11/18 2301 12/11/18 2301 --   Tympanic Monitor Lying Left arm          GENERAL: no acute distress  HENT: nares patent, oropharynx clear, no maxillary sinus tenderness  EYES: no scleral icterus  CV: regular rhythm, normal rate, no murmur  RESPIRATORY: normal effort, CTAB  ABDOMEN: soft, nontender  MUSCULOSKELETAL: no deformity  NEURO: alert, moves all extremities, follows commands  SKIN: warm, dry    Vital signs and nursing notes  reviewed.          LAB RESULTS  Recent Results (from the past 24 hour(s))   Basic Metabolic Panel    Collection Time: 12/12/18 12:23 AM   Result Value Ref Range    Glucose 114 (H) 65 - 99 mg/dL    BUN 23 8 - 23 mg/dL    Creatinine 1.08 0.76 - 1.27 mg/dL    Sodium 142 136 - 145 mmol/L    Potassium 3.5 3.5 - 5.2 mmol/L    Chloride 101 98 - 107 mmol/L    CO2 26.6 22.0 - 29.0 mmol/L    Calcium 9.1 8.6 - 10.5 mg/dL    eGFR Non African Amer 68 >60 mL/min/1.73    BUN/Creatinine Ratio 21.3 7.0 - 25.0    Anion Gap 14.4 mmol/L   CBC Auto Differential    Collection Time: 12/12/18 12:23 AM   Result Value Ref Range    WBC 10.68 4.50 - 10.70 10*3/mm3    RBC 4.76 4.60 - 6.00 10*6/mm3    Hemoglobin 15.2 13.7 - 17.6 g/dL    Hematocrit 45.3 40.4 - 52.2 %    MCV 95.2 79.8 - 96.2 fL    MCH 31.9 27.0 - 32.7 pg    MCHC 33.6 32.6 - 36.4 g/dL    RDW 12.5 11.5 - 14.5 %    RDW-SD 43.1 37.0 - 54.0 fl    MPV 11.1 6.0 - 12.0 fL    Platelets 280 140 - 500 10*3/mm3    Neutrophil % 59.1 42.7 - 76.0 %    Lymphocyte % 21.9 19.6 - 45.3 %    Monocyte % 13.6 (H) 5.0 - 12.0 %    Eosinophil % 4.3 0.3 - 6.2 %    Basophil % 0.5 0.0 - 1.5 %    Immature Grans % 0.6 (H) 0.0 - 0.5 %    Neutrophils, Absolute 6.32 1.90 - 8.10 10*3/mm3    Lymphocytes, Absolute 2.34 0.90 - 4.80 10*3/mm3    Monocytes, Absolute 1.45 (H) 0.20 - 1.20 10*3/mm3    Eosinophils, Absolute 0.46 0.00 - 0.70 10*3/mm3    Basophils, Absolute 0.05 0.00 - 0.20 10*3/mm3    Immature Grans, Absolute 0.06 (H) 0.00 - 0.03 10*3/mm3       Ordered the above labs and reviewed the results.        RADIOLOGY  Xr Chest 2 View    Result Date: 12/11/2018  Narrative: PA AND LATERAL CHEST RADIOGRAPH  HISTORY: Coughing for the past week  COMPARISON: None available.  FINDINGS: The heart size is within normal limits. Lungs appear clear. No pneumothorax or pleural effusion is seen. No acute infiltrates are identified.      Impression: No acute findings.  This report was finalized on 12/11/2018 11:51 PM by Dr. Chris  JOE Choudhury        Ordered the above noted radiological studies. Reviewed by me in PACS.    PROCEDURES  Procedures        EKG:           EKG time: 2320  Rhythm/Rate: NSR 67  P waves and RI: nml  QRS, axis: borderline LAD   ST and T waves: no acute ischemic changes   No Brugada, no delta wave, no QT prolongation.      Interpreted Contemporaneously by me, independently viewed  unchanged compared to prior 06/13/18    MEDICATIONS GIVEN IN ER  Medications - No data to display                PROGRESS AND CONSULTS     0018  Discussed PEx findings, EKG, and CXR being unremarkable and the plan to do labs. Pt understands and agrees with the plan, all questions answered.    0108  BP- 133/73 HR- 91 Temp- 98.4 °F (36.9 °C) (Tympanic) O2 sat- 97%  Rechecked the patient who is in NAD and is resting comfortably. Discussed labs being unremarkable. Offered to write rx for cough medications. Pt agreed. Pt told the plan to d/c w/ him to f/u w/ his PCP. Pt given return to ED instructions. Pt understands and agrees with the plan, all questions answered.    MEDICAL DECISION MAKING    Cough induced syncope in setting of URI.  CXR clear.  EKG, labs reassuring.  Home with antitussives, return precautions, PCP follow up.     MDM  Number of Diagnoses or Management Options     Amount and/or Complexity of Data Reviewed  Clinical lab tests: reviewed and ordered (Unremarkable)  Tests in the radiology section of CPT®: reviewed and ordered  Tests in the medicine section of CPT®: reviewed and ordered (See EKG procedure note.)  Independent visualization of images, tracings, or specimens: yes    Patient Progress  Patient progress: stable             DIAGNOSIS  Final diagnoses:   Cough   Syncope, unspecified syncope type         DISPOSITION  DISCHARGE    Patient discharged in stable condition.    Reviewed implications of results, diagnosis, meds, responsibility to follow up, warning signs and symptoms of possible worsening, potential complications  and reasons to return to ER.    Patient/Family voiced understanding of above instructions.    Discussed plan for discharge, as there is no emergent indication for admission. Patient referred to primary care provider for BP management due to today's BP. Pt/family is agreeable and understands need for follow up and repeat testing.  Pt is aware that discharge does not mean that nothing is wrong but it indicates no emergency is present that requires admission and they must continue care with follow-up as given below or physician of their choice.     FOLLOW-UP  Pranav Guzman MD  [id number] [id number] [phone]               Medication List      New Prescriptions    guaifenesin-codeine 100-10 MG/5ML liquid  Commonly known as:  GUAIFENESIN AC  Take 5 mL by mouth 3 (Three) Times a Day As Needed for Cough.                      Latest Documented Vital Signs:  As of 1:30 AM  BP- 133/73 HR- 91 Temp- 98.4 °F (36.9 °C) (Tympanic) O2 sat- 97%        --  Documentation assistance provided by hans Bennett for Dr. JANAE Scott MD.  Information recorded by the scribe was done at my direction and has been verified and validated by me.               Abby Bennett  12/12/18 [id number]       Rashad Scott MD  12/12/18 [id number]

## 2018-12-12 NOTE — ED NOTES
Pt reports cold and productive cough x 1 week. Pt states there were two times where he was trying to cough and passed out. The first time was yesterday that lasted about 30 seconds the second time was today that lasted only a couple seconds per wife report. Denies hitting hit.      Beth Lomeli, RN  12/11/18 [id number]

## 2019-02-13 DIAGNOSIS — Z12.5 ENCOUNTER FOR SCREENING FOR MALIGNANT NEOPLASM OF PROSTATE: ICD-10-CM

## 2019-02-13 DIAGNOSIS — E78.5 HYPERLIPIDEMIA, UNSPECIFIED HYPERLIPIDEMIA TYPE: ICD-10-CM

## 2019-02-13 DIAGNOSIS — R73.9 HYPERGLYCEMIA: ICD-10-CM

## 2019-02-13 DIAGNOSIS — I10 ESSENTIAL HYPERTENSION: Primary | ICD-10-CM

## 2019-02-21 LAB
ALBUMIN SERPL-MCNC: 4.3 G/DL (ref 3.5–4.8)
ALBUMIN/GLOB SERPL: 1.5 {RATIO} (ref 1.2–2.2)
ALP SERPL-CCNC: 99 IU/L (ref 39–117)
ALT SERPL-CCNC: 30 IU/L (ref 0–44)
AST SERPL-CCNC: 24 IU/L (ref 0–40)
BASOPHILS # BLD AUTO: 0 X10E3/UL (ref 0–0.2)
BASOPHILS NFR BLD AUTO: 0 %
BILIRUB SERPL-MCNC: 0.7 MG/DL (ref 0–1.2)
BUN SERPL-MCNC: 18 MG/DL (ref 8–27)
BUN/CREAT SERPL: 15 (ref 10–24)
CALCIUM SERPL-MCNC: 9.5 MG/DL (ref 8.6–10.2)
CHLORIDE SERPL-SCNC: 102 MMOL/L (ref 96–106)
CHOLEST SERPL-MCNC: 140 MG/DL (ref 100–199)
CO2 SERPL-SCNC: 22 MMOL/L (ref 20–29)
CREAT SERPL-MCNC: 1.23 MG/DL (ref 0.76–1.27)
EOSINOPHIL # BLD AUTO: 0.1 X10E3/UL (ref 0–0.4)
EOSINOPHIL NFR BLD AUTO: 1 %
ERYTHROCYTE [DISTWIDTH] IN BLOOD BY AUTOMATED COUNT: 13.6 % (ref 12.3–15.4)
GLOBULIN SER CALC-MCNC: 2.8 G/DL (ref 1.5–4.5)
GLUCOSE SERPL-MCNC: 119 MG/DL (ref 65–99)
HBA1C MFR BLD: 5.7 % (ref 4.8–5.6)
HCT VFR BLD AUTO: 44.5 % (ref 37.5–51)
HDLC SERPL-MCNC: 40 MG/DL
HGB BLD-MCNC: 15.4 G/DL (ref 13–17.7)
IMM GRANULOCYTES # BLD AUTO: 0 X10E3/UL (ref 0–0.1)
IMM GRANULOCYTES NFR BLD AUTO: 0 %
LDLC SERPL CALC-MCNC: 69 MG/DL (ref 0–99)
LDLC/HDLC SERPL: 1.7 RATIO (ref 0–3.6)
LYMPHOCYTES # BLD AUTO: 1.4 X10E3/UL (ref 0.7–3.1)
LYMPHOCYTES NFR BLD AUTO: 16 %
MCH RBC QN AUTO: 31.6 PG (ref 26.6–33)
MCHC RBC AUTO-ENTMCNC: 34.6 G/DL (ref 31.5–35.7)
MCV RBC AUTO: 91 FL (ref 79–97)
MONOCYTES # BLD AUTO: 1.6 X10E3/UL (ref 0.1–0.9)
MONOCYTES NFR BLD AUTO: 18 %
NEUTROPHILS # BLD AUTO: 5.7 X10E3/UL (ref 1.4–7)
NEUTROPHILS NFR BLD AUTO: 65 %
PLATELET # BLD AUTO: 238 X10E3/UL (ref 150–379)
POTASSIUM SERPL-SCNC: 4.7 MMOL/L (ref 3.5–5.2)
PROT SERPL-MCNC: 7.1 G/DL (ref 6–8.5)
PSA SERPL-MCNC: 2.6 NG/ML (ref 0–4)
RBC # BLD AUTO: 4.88 X10E6/UL (ref 4.14–5.8)
SODIUM SERPL-SCNC: 139 MMOL/L (ref 134–144)
TRIGL SERPL-MCNC: 153 MG/DL (ref 0–149)
VLDLC SERPL CALC-MCNC: 31 MG/DL (ref 5–40)
WBC # BLD AUTO: 8.8 X10E3/UL (ref 3.4–10.8)

## 2019-02-27 ENCOUNTER — OFFICE VISIT (OUTPATIENT)
Dept: FAMILY MEDICINE CLINIC | Facility: CLINIC | Age: 69
End: 2019-02-27

## 2019-02-27 VITALS
DIASTOLIC BLOOD PRESSURE: 70 MMHG | TEMPERATURE: 97 F | WEIGHT: 184 LBS | SYSTOLIC BLOOD PRESSURE: 130 MMHG | HEART RATE: 72 BPM | RESPIRATION RATE: 16 BRPM | HEIGHT: 69 IN | BODY MASS INDEX: 27.25 KG/M2 | OXYGEN SATURATION: 98 %

## 2019-02-27 DIAGNOSIS — I10 ESSENTIAL HYPERTENSION: Primary | ICD-10-CM

## 2019-02-27 DIAGNOSIS — E78.5 HYPERLIPIDEMIA, UNSPECIFIED HYPERLIPIDEMIA TYPE: ICD-10-CM

## 2019-02-27 DIAGNOSIS — C61 PROSTATE CANCER (HCC): ICD-10-CM

## 2019-02-27 DIAGNOSIS — R73.9 HYPERGLYCEMIA: ICD-10-CM

## 2019-02-27 PROBLEM — C80.1 CANCER (HCC): Status: ACTIVE | Noted: 2019-02-27

## 2019-02-27 PROCEDURE — 99214 OFFICE O/P EST MOD 30 MIN: CPT | Performed by: INTERNAL MEDICINE

## 2019-02-27 NOTE — PROGRESS NOTES
Subjective   [place] is a 68 y.o. male. Patient is here today for follow-up on his hypertension, hyperlipidemia and hyperglycemia.  He is generally feeling well and has no acute complaints.  He has had no chest pain, shortness of breath, edema or myalgias.  He does have a history of prostate cancer.  Chief Complaint   Patient presents with   • Hyperlipidemia   • Hypertension          Vitals:    02/27/19 0934   BP: 130/70   Pulse: 72   Resp: 16   Temp: 97 °F (36.1 °C)   SpO2: 98%     The following portions of the patient's history were reviewed and updated as appropriate: allergies, current medications, past family history, past medical history, past social history, past surgical history and problem list.    Past Medical History:   Diagnosis Date   • Cancer (CMS/[facility])     PROSTATE   • Hyperglycemia    • Hyperlipidemia    • Hypertension       Allergies   Allergen Reactions   • Penicillins       Social History     Socioeconomic History   • Marital status:      Spouse name: Not on file   • Number of children: Not on file   • Years of education: Not on file   • Highest education level: Not on file   Social Needs   • Financial resource strain: Not on file   • Food insecurity - worry: Not on file   • Food insecurity - inability: Not on file   • Transportation needs - medical: Not on file   • Transportation needs - non-medical: Not on file   Occupational History   • Not on file   Tobacco Use   • Smoking status: Never Smoker   • Smokeless tobacco: Never Used   Substance and Sexual Activity   • Alcohol use: Yes     Comment: MINIMUM CONSUMPTION   • Drug use: Defer   • Sexual activity: Defer   Other Topics Concern   • Not on file   Social History Narrative   • Not on file        Current Outpatient Medications:   •  aspirin 81 MG tablet, Take 1 tablet by mouth daily., Disp: , Rfl:   •  lisinopril-hydrochlorothiazide (PRINZIDE,ZESTORETIC) 20-12.5 MG per tablet, Take 1 tablet by mouth Daily., Disp: 90 tablet, Rfl:  3  •  pravastatin (PRAVACHOL) 40 MG tablet, Take 1 tablet by mouth Every Night., Disp: 90 tablet, Rfl: 3     Objective     History of Present Illness     Review of Systems   Constitutional: Negative.    HENT: Negative.    Eyes: Negative.    Respiratory: Negative.    Cardiovascular: Negative.    Gastrointestinal: Negative.    Genitourinary: Negative.    Musculoskeletal: Negative.    Skin: Negative.    Neurological: Negative.    Psychiatric/Behavioral: Negative.        Physical Exam   Constitutional: He is oriented to person, place, and time. He appears well-developed and well-nourished.   Pleasant, cooperative no distress   HENT:   Head: Normocephalic and atraumatic.   Eyes: Conjunctivae are normal. Pupils are equal, round, and reactive to light. No scleral icterus.   Neck: Normal range of motion. Neck supple.   Cardiovascular: Normal rate, regular rhythm and normal heart sounds.   Pulmonary/Chest: Effort normal and breath sounds normal. No respiratory distress. He has no wheezes. He has no rales.   Musculoskeletal: Normal range of motion. He exhibits no edema.   Neurological: He is alert and oriented to person, place, and time.   Skin: Skin is warm and dry.   Psychiatric: He has a normal mood and affect. His behavior is normal.   Nursing note and vitals reviewed.      ASSESSMENT CBC is completely normal.  CMP has an elevated but stable sugar of 119 and is otherwise normal and hemoglobin A1c is stable and acceptable at 5.7.  Lipid panel is generally fine with total cholesterol 140, HDL 40 and LDL 69.  PSA was 0.17 in August 2015, 1.12 in January 2018 and on February 20, 2019 had increased to 2.6.  Patient does have a history of prostate cancer  #1-hypertension, adequate control on medication  #2-hyperlipidemia, excellent control on medication  #3-hyperglycemia, mild, asymptomatic and diet controlled  #4-history of prostate cancer with increasing PSA level     Problem List Items Addressed This Visit         Cardiovascular and Mediastinum    Hyperlipidemia    Hypertension - Primary       Other    Hyperglycemia          PLAN the patient will continue current medicines as now.  I am referring him back to the urology group with Dr. Russell that he is seen before because of his increasing PSA.  I would like to recheck him in about 6 months with a CBC, CMP, lipid panel, hemoglobin A1c and TSH    There are no Patient Instructions on file for this visit.  Return in about 6 months (around 8/27/2019) for with labs.

## 2019-05-18 DIAGNOSIS — E78.5 HYPERLIPIDEMIA, UNSPECIFIED HYPERLIPIDEMIA TYPE: ICD-10-CM

## 2019-05-20 RX ORDER — LISINOPRIL AND HYDROCHLOROTHIAZIDE 20; 12.5 MG/1; MG/1
1 TABLET ORAL DAILY
Qty: 90 TABLET | Refills: 0 | Status: SHIPPED | OUTPATIENT
Start: 2019-05-20 | End: 2019-08-14 | Stop reason: SDUPTHER

## 2019-05-20 RX ORDER — PRAVASTATIN SODIUM 40 MG
40 TABLET ORAL NIGHTLY
Qty: 90 TABLET | Refills: 0 | Status: SHIPPED | OUTPATIENT
Start: 2019-05-20 | End: 2019-08-14 | Stop reason: SDUPTHER

## 2019-06-05 ENCOUNTER — APPOINTMENT (OUTPATIENT)
Dept: PREADMISSION TESTING | Facility: HOSPITAL | Age: 69
End: 2019-06-05

## 2019-06-05 VITALS
SYSTOLIC BLOOD PRESSURE: 131 MMHG | HEART RATE: 58 BPM | TEMPERATURE: 97.1 F | DIASTOLIC BLOOD PRESSURE: 73 MMHG | OXYGEN SATURATION: 96 % | HEIGHT: 69 IN | WEIGHT: 180 LBS | RESPIRATION RATE: 18 BRPM | BODY MASS INDEX: 26.66 KG/M2

## 2019-06-05 LAB
ANION GAP SERPL CALCULATED.3IONS-SCNC: 12.5 MMOL/L
BUN BLD-MCNC: 12 MG/DL (ref 8–23)
BUN/CREAT SERPL: 11.8 (ref 7–25)
CALCIUM SPEC-SCNC: 10.2 MG/DL (ref 8.6–10.5)
CHLORIDE SERPL-SCNC: 99 MMOL/L (ref 98–107)
CO2 SERPL-SCNC: 24.5 MMOL/L (ref 22–29)
CREAT BLD-MCNC: 1.02 MG/DL (ref 0.76–1.27)
DEPRECATED RDW RBC AUTO: 40.6 FL (ref 37–54)
ERYTHROCYTE [DISTWIDTH] IN BLOOD BY AUTOMATED COUNT: 12.2 % (ref 12.3–15.4)
GFR SERPL CREATININE-BSD FRML MDRD: 72 ML/MIN/1.73
GLUCOSE BLD-MCNC: 103 MG/DL (ref 65–99)
HCT VFR BLD AUTO: 46.8 % (ref 37.5–51)
HGB BLD-MCNC: 15.8 G/DL (ref 13–17.7)
MCH RBC QN AUTO: 31.2 PG (ref 26.6–33)
MCHC RBC AUTO-ENTMCNC: 33.8 G/DL (ref 31.5–35.7)
MCV RBC AUTO: 92.3 FL (ref 79–97)
PLATELET # BLD AUTO: 265 10*3/MM3 (ref 140–450)
PMV BLD AUTO: 11.5 FL (ref 6–12)
POTASSIUM BLD-SCNC: 4.1 MMOL/L (ref 3.5–5.2)
RBC # BLD AUTO: 5.07 10*6/MM3 (ref 4.14–5.8)
SODIUM BLD-SCNC: 136 MMOL/L (ref 136–145)
WBC NRBC COR # BLD: 9.03 10*3/MM3 (ref 3.4–10.8)

## 2019-06-05 PROCEDURE — 36415 COLL VENOUS BLD VENIPUNCTURE: CPT

## 2019-06-05 PROCEDURE — 80048 BASIC METABOLIC PNL TOTAL CA: CPT | Performed by: UROLOGY

## 2019-06-05 PROCEDURE — 93005 ELECTROCARDIOGRAM TRACING: CPT

## 2019-06-05 PROCEDURE — 85027 COMPLETE CBC AUTOMATED: CPT | Performed by: UROLOGY

## 2019-06-05 PROCEDURE — 93010 ELECTROCARDIOGRAM REPORT: CPT | Performed by: INTERNAL MEDICINE

## 2019-06-05 NOTE — DISCHARGE INSTRUCTIONS
Take the following medications the morning of surgery with a small sip of water: NONE    ARRIVE     General Instructions:  • Do not eat or drink anything after midnight the night before surgery.  • Infants may have breast milk up to four hours before surgery.  • Infants drinking formula may drink formula up to six hours before surgery.   • Patients who avoid smoking, chewing tobacco and alcohol for 4 weeks prior to surgery have a reduced risk of post-operative complications.  Quit smoking as many days before surgery as you can.  • Do not smoke, use chewing tobacco or drink alcohol the day of surgery.   • If applicable bring your C-PAP/ BI-PAP machine.  • Bring any papers given to you in the doctor’s office.  • Wear clean comfortable clothes and socks.  • Do not wear contact lenses, false eyelashes or make-up.  Bring a case for your glasses.   • Bring crutches or walker if applicable.  • Remove all piercings.  Leave jewelry and any other valuables at home.  • Hair extensions with metal clips must be removed prior to surgery.  • The Pre-Admission Testing nurse will instruct you to bring medications if unable to obtain an accurate list in Pre-Admission Testing.          Preventing a Surgical Site Infection:  • For 2 to 3 days before surgery, avoid shaving with a razor because the razor can irritate skin and make it easier to develop an infection.    • Any areas of open skin can increase the risk of a post-operative wound infection by allowing bacteria to enter and travel throughout the body.  Notify your surgeon if you have any skin wounds / rashes even if it is not near the expected surgical site.  The area will need assessed to determine if surgery should be delayed until it is healed.  • The night prior to surgery sleep in a clean bed with clean clothing.  Do not allow pets to sleep with you.  • Shower on the morning of surgery using a fresh bar of anti-bacterial soap (such as Dial) and clean washcloth.  Dry  with a clean towel and dress in clean clothing.  • Ask your surgeon if you will be receiving antibiotics prior to surgery.  • Make sure you, your family, and all healthcare providers clean their hands with soap and water or an alcohol based hand  before caring for you or your wound.    Day of surgery:  Upon arrival, a Pre-op nurse and Anesthesiologist will review your health history, obtain vital signs, and answer questions you may have.  The only belongings needed at this time will be your home medications and if applicable your C-PAP/BI-PAP machine.  If you are staying overnight your family can leave the rest of your belongings in the car and bring them to your room later.  A Pre-op nurse will start an IV and you may receive medication in preparation for surgery, including something to help you relax.  Your family will be able to see you in the Pre-op area.  While you are in surgery your family should notify the waiting room  if they leave the waiting room area and provide a contact phone number.    Please be aware that surgery does come with discomfort.  We want to make every effort to control your discomfort so please discuss any uncontrolled symptoms with your nurse.   Your doctor will most likely have prescribed pain medications.      If you are going home after surgery you will receive individualized written care instructions before being discharged.  A responsible adult must drive you to and from the hospital on the day of your surgery and stay with you for 24 hours.    If you are staying overnight following surgery, you will be transported to your hospital room following the recovery period.  [facility] has all private rooms.    You have received a list of surgical assistants for your reference.  If you have any questions please call Pre-Admission Testing at [phone].  Deductibles and co-payments are collected on the day of service. Please be prepared to pay the required  co-pay, deductible or deposit on the day of service as defined by your plan.

## 2019-06-10 NOTE — H&P
[facility] History and Physical    Patient Care Team:  Pranav Guzman MD as PCP - General  Pranav Guzman MD as PCP - Claims Attributed    Chief complaint BLADDER TUMOR     Subjective     Patient is a 69 y.o. male presents with HX TCC BLADDER CA PROSTATE   POST BT RESECTION CONTINENT URINE CLEAR SURVEILLANCE REVEALED SUPERFICIAL APPEARING RECURRENCE       Review of Systems   Pertinent items are noted in HPI    Past Medical History:   Diagnosis Date   • Cancer (CMS/HCC)     PROSTATE   • Hyperglycemia    • Hyperlipidemia    • Hypertension      Past Surgical History:   Procedure Laterality Date   • ELBOW PROCEDURE     • PROSTATE SURGERY     • TONSILLECTOMY     • VASECTOMY       Family History   Problem Relation Age of Onset   • Hypertension Mother    • Hypertension Father    • Prostate cancer Father    • Malig Hyperthermia Neg Hx      Social History     Tobacco Use   • Smoking status: Never Smoker   • Smokeless tobacco: Never Used   Substance Use Topics   • Alcohol use: Yes     Comment: MINIMUM CONSUMPTION   • Drug use: No       Meds:  No medications prior to admission.       Allergies:  Penicillins    Debilities:  NONE    Objective     Vital Signs     No intake or output data in the 24 hours ending 06/09/19 2128       Physical Exam:      General Appearance:    Alert, cooperative, in no acute distress   Head:    Normocephalic, without obvious abnormality, atraumatic   Eyes:            Lids and lashes normal, conjunctivae and sclerae normal, no   icterus, no pallor, corneas clear,   Ears:    Ears appear intact with no abnormalities noted   Throat:   No oral lesions, no thrush, oral mucosa moist   Neck:   No adenopathy, supple, trachea midline, no thyromegaly, no   no JVD   Back:     No kyphosis present, no scoliosis present, no skin lesions,      erythema or scars, no tenderness to percussion or                   palpation,   range of motion normal   Lungs:   ,respirations regular, even and                   unlabored    Heart:    Regular rhythm and normal rate,k   Chest Wall:    No abnormalities observed   Abdomen:     Normal bowel sounds, no masses, no organomegaly, soft        non-tender, non-distended, no guarding, no rebound                tenderness   Rectal:     Deferred   Extremities:   Moves all extremities well, no edema, no cyanosis, no             redness   Pulses:   Pulses palpable and equal bilaterally   Skin:   No bleeding, bruising or rash   Lymph nodes:   No palpable adenopathy         Results Review:    I reviewed the patient's new clinical results.  Results for orders placed or performed in visit on 06/05/19   Basic Metabolic Panel   Result Value Ref Range    Glucose 103 (H) 65 - 99 mg/dL    BUN 12 8 - 23 mg/dL    Creatinine 1.02 0.76 - 1.27 mg/dL    Sodium 136 136 - 145 mmol/L    Potassium 4.1 3.5 - 5.2 mmol/L    Chloride 99 98 - 107 mmol/L    CO2 24.5 22.0 - 29.0 mmol/L    Calcium 10.2 8.6 - 10.5 mg/dL    eGFR Non African Amer 72 >60 mL/min/1.73    BUN/Creatinine Ratio 11.8 7.0 - 25.0    Anion Gap 12.5 mmol/L   CBC (No Diff)   Result Value Ref Range    WBC 9.03 3.40 - 10.80 10*3/mm3    RBC 5.07 4.14 - 5.80 10*6/mm3    Hemoglobin 15.8 13.0 - 17.7 g/dL    Hematocrit 46.8 37.5 - 51.0 %    MCV 92.3 79.0 - 97.0 fL    MCH 31.2 26.6 - 33.0 pg    MCHC 33.8 31.5 - 35.7 g/dL    RDW 12.2 (L) 12.3 - 15.4 %    RDW-SD 40.6 37.0 - 54.0 fl    MPV 11.5 6.0 - 12.0 fL    Platelets 265 140 - 450 10*3/mm3        Assessment:   REC TCC BLADDER  Plan:   CYSTO TUR BLADDER TUMOR  PROCEDURE R/B/O  D/W PT NOT LIMITESD TO INFECTION BLEEDING PERFORATION RECURRENCE [place] MITOMYCIN C POST OP  PT UNDERSTANDS AGREES TO PROCEED     I discussed the patients findings and my recommendations with patient.     Joel Russell MD  06/09/19  9:28 PM

## 2019-06-12 ENCOUNTER — HOSPITAL ENCOUNTER (OUTPATIENT)
Facility: HOSPITAL | Age: 69
Setting detail: HOSPITAL OUTPATIENT SURGERY
Discharge: HOME | End: 2019-06-12
Attending: UROLOGY | Admitting: UROLOGY

## 2019-06-12 ENCOUNTER — ANESTHESIA (OUTPATIENT)
Dept: PERIOP | Facility: HOSPITAL | Age: 69
End: 2019-06-12

## 2019-06-12 ENCOUNTER — ANESTHESIA EVENT (OUTPATIENT)
Dept: PERIOP | Facility: HOSPITAL | Age: 69
End: 2019-06-12

## 2019-06-12 VITALS
BODY MASS INDEX: 27.41 KG/M2 | TEMPERATURE: 97.8 F | WEIGHT: 185.06 LBS | HEART RATE: 65 BPM | DIASTOLIC BLOOD PRESSURE: 84 MMHG | HEIGHT: 69 IN | RESPIRATION RATE: 16 BRPM | SYSTOLIC BLOOD PRESSURE: 120 MMHG | OXYGEN SATURATION: 96 %

## 2019-06-12 DIAGNOSIS — D49.4 BLADDER TUMOR: ICD-10-CM

## 2019-06-12 PROBLEM — C67.9 BLADDER CANCER (HCC): Status: ACTIVE | Noted: 2019-06-12

## 2019-06-12 PROCEDURE — 25010000002 PROPOFOL 10 MG/ML EMULSION: Performed by: NURSE ANESTHETIST, CERTIFIED REGISTERED

## 2019-06-12 PROCEDURE — 25010000002 LEVOFLOXACIN PER 250 MG: Performed by: UROLOGY

## 2019-06-12 PROCEDURE — 25010000002 ONDANSETRON PER 1 MG: Performed by: NURSE ANESTHETIST, CERTIFIED REGISTERED

## 2019-06-12 PROCEDURE — 25010000002 FENTANYL CITRATE (PF) 100 MCG/2ML SOLUTION: Performed by: NURSE ANESTHETIST, CERTIFIED REGISTERED

## 2019-06-12 PROCEDURE — 25010000002 MIDAZOLAM PER 1 MG

## 2019-06-12 PROCEDURE — 25010000002 DEXAMETHASONE PER 1 MG: Performed by: NURSE ANESTHETIST, CERTIFIED REGISTERED

## 2019-06-12 PROCEDURE — 88305 TISSUE EXAM BY PATHOLOGIST: CPT | Performed by: UROLOGY

## 2019-06-12 RX ORDER — MAGNESIUM HYDROXIDE 1200 MG/15ML
LIQUID ORAL AS NEEDED
Status: DISCONTINUED | OUTPATIENT
Start: 2019-06-12 | End: 2019-06-12 | Stop reason: HOSPADM

## 2019-06-12 RX ORDER — MIDAZOLAM HYDROCHLORIDE 1 MG/ML
INJECTION INTRAMUSCULAR; INTRAVENOUS
Status: COMPLETED
Start: 2019-06-12 | End: 2019-06-12

## 2019-06-12 RX ORDER — HYDRALAZINE HYDROCHLORIDE 20 MG/ML
5 INJECTION INTRAMUSCULAR; INTRAVENOUS
Status: DISCONTINUED | OUTPATIENT
Start: 2019-06-12 | End: 2019-06-12 | Stop reason: HOSPADM

## 2019-06-12 RX ORDER — SODIUM CHLORIDE, SODIUM LACTATE, POTASSIUM CHLORIDE, CALCIUM CHLORIDE 600; 310; 30; 20 MG/100ML; MG/100ML; MG/100ML; MG/100ML
9 INJECTION, SOLUTION INTRAVENOUS CONTINUOUS
Status: DISCONTINUED | OUTPATIENT
Start: 2019-06-12 | End: 2019-06-12 | Stop reason: HOSPADM

## 2019-06-12 RX ORDER — EPHEDRINE SULFATE 50 MG/ML
5 INJECTION, SOLUTION INTRAVENOUS ONCE AS NEEDED
Status: DISCONTINUED | OUTPATIENT
Start: 2019-06-12 | End: 2019-06-12 | Stop reason: HOSPADM

## 2019-06-12 RX ORDER — PROMETHAZINE HYDROCHLORIDE 25 MG/1
25 TABLET ORAL ONCE AS NEEDED
Status: DISCONTINUED | OUTPATIENT
Start: 2019-06-12 | End: 2019-06-12 | Stop reason: HOSPADM

## 2019-06-12 RX ORDER — SODIUM CHLORIDE 0.9 % (FLUSH) 0.9 %
1-10 SYRINGE (ML) INJECTION AS NEEDED
Status: DISCONTINUED | OUTPATIENT
Start: 2019-06-12 | End: 2019-06-12 | Stop reason: HOSPADM

## 2019-06-12 RX ORDER — ONDANSETRON 2 MG/ML
4 INJECTION INTRAMUSCULAR; INTRAVENOUS ONCE AS NEEDED
Status: DISCONTINUED | OUTPATIENT
Start: 2019-06-12 | End: 2019-06-12 | Stop reason: HOSPADM

## 2019-06-12 RX ORDER — FLUMAZENIL 0.1 MG/ML
0.2 INJECTION INTRAVENOUS AS NEEDED
Status: DISCONTINUED | OUTPATIENT
Start: 2019-06-12 | End: 2019-06-12 | Stop reason: HOSPADM

## 2019-06-12 RX ORDER — ONDANSETRON 2 MG/ML
INJECTION INTRAMUSCULAR; INTRAVENOUS AS NEEDED
Status: DISCONTINUED | OUTPATIENT
Start: 2019-06-12 | End: 2019-06-12 | Stop reason: SURG

## 2019-06-12 RX ORDER — DEXAMETHASONE SODIUM PHOSPHATE 10 MG/ML
INJECTION INTRAMUSCULAR; INTRAVENOUS AS NEEDED
Status: DISCONTINUED | OUTPATIENT
Start: 2019-06-12 | End: 2019-06-12 | Stop reason: SURG

## 2019-06-12 RX ORDER — LABETALOL HYDROCHLORIDE 5 MG/ML
5 INJECTION, SOLUTION INTRAVENOUS
Status: DISCONTINUED | OUTPATIENT
Start: 2019-06-12 | End: 2019-06-12 | Stop reason: HOSPADM

## 2019-06-12 RX ORDER — HYDROMORPHONE HYDROCHLORIDE 1 MG/ML
0.5 INJECTION, SOLUTION INTRAMUSCULAR; INTRAVENOUS; SUBCUTANEOUS
Status: DISCONTINUED | OUTPATIENT
Start: 2019-06-12 | End: 2019-06-12 | Stop reason: HOSPADM

## 2019-06-12 RX ORDER — ACETAMINOPHEN 325 MG/1
650 TABLET ORAL ONCE AS NEEDED
Status: DISCONTINUED | OUTPATIENT
Start: 2019-06-12 | End: 2019-06-12 | Stop reason: HOSPADM

## 2019-06-12 RX ORDER — FENTANYL CITRATE 50 UG/ML
50 INJECTION, SOLUTION INTRAMUSCULAR; INTRAVENOUS
Status: DISCONTINUED | OUTPATIENT
Start: 2019-06-12 | End: 2019-06-12 | Stop reason: HOSPADM

## 2019-06-12 RX ORDER — FENTANYL CITRATE 50 UG/ML
INJECTION, SOLUTION INTRAMUSCULAR; INTRAVENOUS AS NEEDED
Status: DISCONTINUED | OUTPATIENT
Start: 2019-06-12 | End: 2019-06-12 | Stop reason: SURG

## 2019-06-12 RX ORDER — LIDOCAINE HYDROCHLORIDE 10 MG/ML
0.5 INJECTION, SOLUTION EPIDURAL; INFILTRATION; INTRACAUDAL; PERINEURAL ONCE AS NEEDED
Status: DISCONTINUED | OUTPATIENT
Start: 2019-06-12 | End: 2019-06-12 | Stop reason: HOSPADM

## 2019-06-12 RX ORDER — OXYCODONE AND ACETAMINOPHEN 7.5; 325 MG/1; MG/1
1 TABLET ORAL ONCE AS NEEDED
Status: DISCONTINUED | OUTPATIENT
Start: 2019-06-12 | End: 2019-06-12 | Stop reason: HOSPADM

## 2019-06-12 RX ORDER — FAMOTIDINE 10 MG/ML
20 INJECTION, SOLUTION INTRAVENOUS ONCE
Status: COMPLETED | OUTPATIENT
Start: 2019-06-12 | End: 2019-06-12

## 2019-06-12 RX ORDER — METHENAMINE, SODIUM PHOSPHATE, MONOBASIC, MONOHYDRATE, PHENYL SALICYLATE, METHYLENE BLUE, AND HYOSCYAMINE SULFATE 120; 40.8; 36; 10; .12 MG/1; MG/1; MG/1; MG/1; MG/1
1 CAPSULE ORAL ONCE
Status: COMPLETED | OUTPATIENT
Start: 2019-06-12 | End: 2019-06-12

## 2019-06-12 RX ORDER — NALOXONE HCL 0.4 MG/ML
0.2 VIAL (ML) INJECTION AS NEEDED
Status: DISCONTINUED | OUTPATIENT
Start: 2019-06-12 | End: 2019-06-12 | Stop reason: HOSPADM

## 2019-06-12 RX ORDER — FAMOTIDINE 10 MG/ML
INJECTION, SOLUTION INTRAVENOUS
Status: COMPLETED
Start: 2019-06-12 | End: 2019-06-12

## 2019-06-12 RX ORDER — LEVOFLOXACIN 5 MG/ML
500 INJECTION, SOLUTION INTRAVENOUS ONCE
Status: COMPLETED | OUTPATIENT
Start: 2019-06-12 | End: 2019-06-12

## 2019-06-12 RX ORDER — PROMETHAZINE HYDROCHLORIDE 25 MG/ML
6.25 INJECTION, SOLUTION INTRAMUSCULAR; INTRAVENOUS
Status: DISCONTINUED | OUTPATIENT
Start: 2019-06-12 | End: 2019-06-12 | Stop reason: HOSPADM

## 2019-06-12 RX ORDER — DIPHENHYDRAMINE HCL 25 MG
25 CAPSULE ORAL
Status: DISCONTINUED | OUTPATIENT
Start: 2019-06-12 | End: 2019-06-12 | Stop reason: HOSPADM

## 2019-06-12 RX ORDER — PROMETHAZINE HYDROCHLORIDE 25 MG/ML
12.5 INJECTION, SOLUTION INTRAMUSCULAR; INTRAVENOUS ONCE AS NEEDED
Status: DISCONTINUED | OUTPATIENT
Start: 2019-06-12 | End: 2019-06-12 | Stop reason: HOSPADM

## 2019-06-12 RX ORDER — DIPHENHYDRAMINE HYDROCHLORIDE 50 MG/ML
12.5 INJECTION INTRAMUSCULAR; INTRAVENOUS
Status: DISCONTINUED | OUTPATIENT
Start: 2019-06-12 | End: 2019-06-12 | Stop reason: HOSPADM

## 2019-06-12 RX ORDER — MIDAZOLAM HYDROCHLORIDE 1 MG/ML
1 INJECTION INTRAMUSCULAR; INTRAVENOUS
Status: DISCONTINUED | OUTPATIENT
Start: 2019-06-12 | End: 2019-06-12 | Stop reason: HOSPADM

## 2019-06-12 RX ORDER — MIDAZOLAM HYDROCHLORIDE 1 MG/ML
2 INJECTION INTRAMUSCULAR; INTRAVENOUS
Status: DISCONTINUED | OUTPATIENT
Start: 2019-06-12 | End: 2019-06-12 | Stop reason: HOSPADM

## 2019-06-12 RX ORDER — LIDOCAINE HYDROCHLORIDE 20 MG/ML
INJECTION, SOLUTION INFILTRATION; PERINEURAL AS NEEDED
Status: DISCONTINUED | OUTPATIENT
Start: 2019-06-12 | End: 2019-06-12 | Stop reason: SURG

## 2019-06-12 RX ORDER — METHENAMINE, SODIUM PHOSPHATE, MONOBASIC, MONOHYDRATE, PHENYL SALICYLATE, METHYLENE BLUE, AND HYOSCYAMINE SULFATE 120; 40.8; 36; 10; .12 MG/1; MG/1; MG/1; MG/1; MG/1
1 CAPSULE ORAL 4 TIMES DAILY
Status: DISCONTINUED | OUTPATIENT
Start: 2019-06-12 | End: 2019-06-12 | Stop reason: HOSPADM

## 2019-06-12 RX ORDER — HYDROCODONE BITARTRATE AND ACETAMINOPHEN 7.5; 325 MG/1; MG/1
1 TABLET ORAL ONCE AS NEEDED
Status: DISCONTINUED | OUTPATIENT
Start: 2019-06-12 | End: 2019-06-12 | Stop reason: HOSPADM

## 2019-06-12 RX ORDER — PROPOFOL 10 MG/ML
VIAL (ML) INTRAVENOUS AS NEEDED
Status: DISCONTINUED | OUTPATIENT
Start: 2019-06-12 | End: 2019-06-12 | Stop reason: SURG

## 2019-06-12 RX ORDER — PROMETHAZINE HYDROCHLORIDE 25 MG/1
25 SUPPOSITORY RECTAL ONCE AS NEEDED
Status: DISCONTINUED | OUTPATIENT
Start: 2019-06-12 | End: 2019-06-12 | Stop reason: HOSPADM

## 2019-06-12 RX ADMIN — FENTANYL CITRATE 100 MCG: 50 INJECTION INTRAMUSCULAR; INTRAVENOUS at 09:46

## 2019-06-12 RX ADMIN — LEVOFLOXACIN 500 MG: 5 INJECTION, SOLUTION INTRAVENOUS at 08:55

## 2019-06-12 RX ADMIN — METHENAMINE, SODIUM PHOSPHATE, MONOBASIC, MONOHYDRATE, PHENYL SALICYLATE, METHYLENE BLUE, AND HYOSCYAMINE SULFATE 118 MG: 120; 40.8; 36; 10; .12 CAPSULE ORAL at 10:48

## 2019-06-12 RX ADMIN — FAMOTIDINE 20 MG: 10 INJECTION INTRAVENOUS at 08:53

## 2019-06-12 RX ADMIN — FAMOTIDINE 20 MG: 10 INJECTION, SOLUTION INTRAVENOUS at 08:53

## 2019-06-12 RX ADMIN — MIDAZOLAM HYDROCHLORIDE 2 MG: 1 INJECTION INTRAMUSCULAR; INTRAVENOUS at 08:54

## 2019-06-12 RX ADMIN — PROPOFOL 150 MG: 10 INJECTION, EMULSION INTRAVENOUS at 09:51

## 2019-06-12 RX ADMIN — LIDOCAINE HYDROCHLORIDE 100 MG: 20 INJECTION, SOLUTION INFILTRATION; PERINEURAL at 09:51

## 2019-06-12 RX ADMIN — ONDANSETRON 4 MG: 2 INJECTION INTRAMUSCULAR; INTRAVENOUS at 10:02

## 2019-06-12 RX ADMIN — DEXAMETHASONE SODIUM PHOSPHATE 8 MG: 10 INJECTION INTRAMUSCULAR; INTRAVENOUS at 09:57

## 2019-06-12 RX ADMIN — MIDAZOLAM 2 MG: 1 INJECTION INTRAMUSCULAR; INTRAVENOUS at 08:54

## 2019-06-12 RX ADMIN — SODIUM CHLORIDE, POTASSIUM CHLORIDE, SODIUM LACTATE AND CALCIUM CHLORIDE 9 ML/HR: 600; 310; 30; 20 INJECTION, SOLUTION INTRAVENOUS at 08:21

## 2019-06-12 RX ADMIN — SODIUM CHLORIDE, POTASSIUM CHLORIDE, SODIUM LACTATE AND CALCIUM CHLORIDE: 600; 310; 30; 20 INJECTION, SOLUTION INTRAVENOUS at 09:45

## 2019-06-12 NOTE — ANESTHESIA PROCEDURE NOTES
Airway  Urgency: elective    Date/Time: 6/12/2019 9:52 AM  Airway not difficult    General Information and Staff    Patient location during procedure: OR  Anesthesiologist: Robbi Hanks MD  CRNA: Destiney Appiah CRNA    Indications and Patient Condition  Indications for airway management: airway protection    Preoxygenated: yes  MILS not maintained throughout  Mask difficulty assessment: 0 - not attempted    Final Airway Details  Final airway type: supraglottic airway      Successful airway: unique  Size 5    Number of attempts at approach: 1    Additional Comments  LMA inserted with ease, assist to SV

## 2019-06-12 NOTE — OP NOTE
Preop diagnosis recurrent superficial bladder cancer right posterior bladder wall    Postop diagnosis same small superficial appearing adjacent to previous resection in 2008    Operative procedure cystoscopy TUR small bladder tumor and fulguration    Surgeon Drew    Anesthesia General    Procedure note below presented himself with a 2008 with hematuria and findings of a superficial bladder tumor with no clinical recurrence until over the past last visit a small superficial recurrence adjacent to the previous resection site and right posterior bladder wall is undergo the above-mentioned procedure    Site was marked antibodies were given timeout was taken after general anesthesia he was then cystoscoped with 2% intraurethral Xylocaine jelly after prepped and draped in sterile fashion his urethra was normal prostate was absent the bladder neck anastomosis was nicely patent trigone was normal orifices were patent with clear reflux normal position configuration the bladder was normal except for the area of superficial appearing bladder tumor Dale dilation of this area and then placement of a recess scope in the bladder with resection of superficial muscle taken no perforations there is been cauterized hemostasis checked and verified curettings was sent to pathology bladder is a partially filled patient's procedure well was sent to the recovery room in satisfactory condition.  Findings discussed with his wife Nanette    Jose no prescriptions written for me my office will call for follow-up in 7 to 10 days progressive diet and activities as outlined on the cystoscopy instruction sheet was given concerns or questions to contact us accordingly.

## 2019-06-12 NOTE — INTERVAL H&P NOTE
"H&P reviewed. The patient was examined and there are no changes to the H&P.      /79 (BP Location: Right arm, Patient Position: Lying)   Pulse 62   Temp 98.2 °F (36.8 °C) (Oral)   Resp 16   Ht 175.3 cm (69\")   Wt 83.9 kg (185 lb 1 oz)   SpO2 95%   BMI 27.33 kg/m²   "

## 2019-06-12 NOTE — DISCHARGE INSTRUCTIONS
Outpatient Surgery Guidelines, Adult  Outpatient procedures are those for which the person having the procedure is allowed to go home the same day as the procedure. Various procedures are done on an outpatient basis. You should follow some general guidelines if you will be having an outpatient procedure.  AFTER THE  PROCEDURE  After surgery, you will be taken to a recovery area, where your progress will be monitored. If there are no complications, you will be allowed to go home when you are awake, stable, and taking fluids well. You may have numbness around the surgical site. Healing will take some time. You will have tenderness at the surgical site and may have some swelling and bruising. You may also have some nausea.  HOME CARE INSTRUCTIONS  · Do not drive for 24 hours, or as directed by your health care provider. Do not drive while taking prescription pain medicines.  · Do not drink alcohol for 24 hours.  · Do not make important decisions or sign legal documents for 24 hours.  · Plan on having a responsible adult stay with your for 24 hours following your procedure.  · You may resume a normal diet and activities as directed.  · Do not lift anything heavier than 10 pounds (4.5 kg) or play contact sports until your health care provider says it is okay.  · Only take over-the-counter or prescription medicines as directed by your health care provider.  · Follow up with your health care provider as directed.  · If you have sleep apnea, surgery and certain medicines can increase your risk for breathing problems. Follow instructions from your health care provider about wearing your sleep device:  ? Anytime you are sleeping, including during daytime naps.  ? While taking prescription pain medicines, sleeping medicines, or medicines that make you drowsy.  ·   SEEK MEDICAL CARE IF:  · You have increased bleeding (more than a small spot) from the surgical site.  · You have redness, swelling, or increasing pain in the  wound.  · You see pus coming from the wound.  · You have a fever > 101.  · You notice a bad smell coming from the wound or dressing.  · You feel lightheaded or faint.  · You develop a rash.  · You have trouble breathing.  · You develop allergies.  MAKE SURE YOU:  · Understand these instructions.  · Will watch your condition.  Will get help right away if you are not doing well or get worse.See Dr Russell's instruction sheet

## 2019-06-12 NOTE — ANESTHESIA PREPROCEDURE EVALUATION
Anesthesia Evaluation     Patient summary reviewed and Nursing notes reviewed                Airway   Mallampati: II  Dental      Pulmonary - negative pulmonary ROS   Cardiovascular     ECG reviewed  Rhythm: regular  Rate: normal    (+) hypertension, hyperlipidemia,       Neuro/Psych- negative ROS  GI/Hepatic/Renal/Endo - negative ROS     Musculoskeletal (-) negative ROS    Abdominal    Substance History - negative use     OB/GYN negative ob/gyn ROS         Other      history of cancer                    Anesthesia Plan    ASA 2     general     intravenous induction   Anesthetic plan, all risks, benefits, and alternatives have been provided, discussed and informed consent has been obtained with: patient.

## 2019-06-13 LAB
CYTO UR: NORMAL
LAB AP CASE REPORT: NORMAL
LAB AP DIAGNOSIS COMMENT: NORMAL
PATH REPORT.FINAL DX SPEC: NORMAL
PATH REPORT.GROSS SPEC: NORMAL

## 2019-08-14 DIAGNOSIS — E78.5 HYPERLIPIDEMIA, UNSPECIFIED HYPERLIPIDEMIA TYPE: ICD-10-CM

## 2019-08-14 RX ORDER — LISINOPRIL AND HYDROCHLOROTHIAZIDE 20; 12.5 MG/1; MG/1
1 TABLET ORAL DAILY
Qty: 90 TABLET | Refills: 1 | Status: SHIPPED | OUTPATIENT
Start: 2019-08-14 | End: 2019-09-05 | Stop reason: SDUPTHER

## 2019-08-14 RX ORDER — PRAVASTATIN SODIUM 40 MG
40 TABLET ORAL NIGHTLY
Qty: 90 TABLET | Refills: 1 | Status: SHIPPED | OUTPATIENT
Start: 2019-08-14 | End: 2019-09-05 | Stop reason: SDUPTHER

## 2019-08-23 ENCOUNTER — TELEPHONE (OUTPATIENT)
Dept: FAMILY MEDICINE CLINIC | Facility: CLINIC | Age: 69
End: 2019-08-23

## 2019-08-26 DIAGNOSIS — E78.5 HYPERLIPIDEMIA, UNSPECIFIED HYPERLIPIDEMIA TYPE: ICD-10-CM

## 2019-08-26 DIAGNOSIS — R73.9 HYPERGLYCEMIA: ICD-10-CM

## 2019-08-26 DIAGNOSIS — I10 ESSENTIAL HYPERTENSION: ICD-10-CM

## 2019-08-28 LAB
ALBUMIN SERPL-MCNC: 4.3 G/DL (ref 3.5–5.2)
ALBUMIN/GLOB SERPL: 1.6 G/DL
ALP SERPL-CCNC: 94 U/L (ref 39–117)
ALT SERPL-CCNC: 36 U/L (ref 1–41)
AST SERPL-CCNC: 27 U/L (ref 1–40)
BASOPHILS # BLD AUTO: 0.03 10*3/MM3 (ref 0–0.2)
BASOPHILS NFR BLD AUTO: 0.5 % (ref 0–1.5)
BILIRUB SERPL-MCNC: 0.6 MG/DL (ref 0.2–1.2)
BUN SERPL-MCNC: 16 MG/DL (ref 8–23)
BUN/CREAT SERPL: 15.5 (ref 7–25)
CALCIUM SERPL-MCNC: 9.6 MG/DL (ref 8.6–10.5)
CHLORIDE SERPL-SCNC: 100 MMOL/L (ref 98–107)
CHOLEST SERPL-MCNC: 153 MG/DL (ref 0–200)
CO2 SERPL-SCNC: 27.1 MMOL/L (ref 22–29)
CREAT SERPL-MCNC: 1.03 MG/DL (ref 0.76–1.27)
EOSINOPHIL # BLD AUTO: 0.17 10*3/MM3 (ref 0–0.4)
EOSINOPHIL NFR BLD AUTO: 3 % (ref 0.3–6.2)
ERYTHROCYTE [DISTWIDTH] IN BLOOD BY AUTOMATED COUNT: 13.2 % (ref 12.3–15.4)
GLOBULIN SER CALC-MCNC: 2.7 GM/DL
GLUCOSE SERPL-MCNC: 109 MG/DL (ref 65–99)
HBA1C MFR BLD: 6.1 % (ref 4.8–5.6)
HCT VFR BLD AUTO: 39.6 % (ref 37.5–51)
HDLC SERPL-MCNC: 48 MG/DL (ref 40–60)
HGB BLD-MCNC: 13.2 G/DL (ref 13–17.7)
IMM GRANULOCYTES # BLD AUTO: 0.03 10*3/MM3 (ref 0–0.05)
IMM GRANULOCYTES NFR BLD AUTO: 0.5 % (ref 0–0.5)
LDLC SERPL CALC-MCNC: 64 MG/DL (ref 0–100)
LDLC/HDLC SERPL: 1.33 {RATIO}
LYMPHOCYTES # BLD AUTO: 0.53 10*3/MM3 (ref 0.7–3.1)
LYMPHOCYTES NFR BLD AUTO: 9.4 % (ref 19.6–45.3)
MCH RBC QN AUTO: 31.4 PG (ref 26.6–33)
MCHC RBC AUTO-ENTMCNC: 33.3 G/DL (ref 31.5–35.7)
MCV RBC AUTO: 94.1 FL (ref 79–97)
MONOCYTES # BLD AUTO: 1 10*3/MM3 (ref 0.1–0.9)
MONOCYTES NFR BLD AUTO: 17.7 % (ref 5–12)
NEUTROPHILS # BLD AUTO: 3.89 10*3/MM3 (ref 1.7–7)
NEUTROPHILS NFR BLD AUTO: 68.9 % (ref 42.7–76)
NRBC BLD AUTO-RTO: 0 /100 WBC (ref 0–0.2)
PLATELET # BLD AUTO: 204 10*3/MM3 (ref 140–450)
POTASSIUM SERPL-SCNC: 4.2 MMOL/L (ref 3.5–5.2)
PROT SERPL-MCNC: 7 G/DL (ref 6–8.5)
RBC # BLD AUTO: 4.21 10*6/MM3 (ref 4.14–5.8)
SODIUM SERPL-SCNC: 139 MMOL/L (ref 136–145)
TRIGL SERPL-MCNC: 205 MG/DL (ref 0–150)
TSH SERPL DL<=0.005 MIU/L-ACNC: 2.41 UIU/ML (ref 0.27–4.2)
VLDLC SERPL CALC-MCNC: 41 MG/DL
WBC # BLD AUTO: 5.65 10*3/MM3 (ref 3.4–10.8)

## 2019-09-05 ENCOUNTER — OFFICE VISIT (OUTPATIENT)
Dept: FAMILY MEDICINE CLINIC | Facility: CLINIC | Age: 69
End: 2019-09-05

## 2019-09-05 VITALS
BODY MASS INDEX: 26.66 KG/M2 | HEIGHT: 69 IN | DIASTOLIC BLOOD PRESSURE: 72 MMHG | SYSTOLIC BLOOD PRESSURE: 110 MMHG | OXYGEN SATURATION: 98 % | HEART RATE: 55 BPM | WEIGHT: 180 LBS | TEMPERATURE: 98.1 F | RESPIRATION RATE: 16 BRPM

## 2019-09-05 DIAGNOSIS — I10 ESSENTIAL HYPERTENSION: ICD-10-CM

## 2019-09-05 DIAGNOSIS — E78.5 HYPERLIPIDEMIA, UNSPECIFIED HYPERLIPIDEMIA TYPE: ICD-10-CM

## 2019-09-05 DIAGNOSIS — R73.9 HYPERGLYCEMIA: Primary | ICD-10-CM

## 2019-09-05 PROCEDURE — 99214 OFFICE O/P EST MOD 30 MIN: CPT | Performed by: INTERNAL MEDICINE

## 2019-09-05 RX ORDER — LISINOPRIL AND HYDROCHLOROTHIAZIDE 20; 12.5 MG/1; MG/1
1 TABLET ORAL DAILY
Qty: 90 TABLET | Refills: 3 | Status: SHIPPED | OUTPATIENT
Start: 2019-09-05 | End: 2020-11-06

## 2019-09-05 RX ORDER — PRAVASTATIN SODIUM 40 MG
40 TABLET ORAL NIGHTLY
Qty: 90 TABLET | Refills: 3 | Status: SHIPPED | OUTPATIENT
Start: 2019-09-05 | End: 2020-11-06

## 2019-09-05 NOTE — PROGRESS NOTES
Subjective   North Carcamo is a 69 y.o. male. Patient is here today for follow-up on his hypertension, hyperlipidemia and hyperglycemia.  Patient also has a history of prostate cancer and bladder cancer and is seeing the urologist regularly.  He just completed a course of radiation therapy.  He denies any chest pain, shortness of breath, edema or myalgias and generally feels well.  He had no side effects with the radiation.  Chief Complaint   Patient presents with   • Hypertension     HLD, HYPERGLYCEMIA- FOLLOW UP LABS          Vitals:    09/05/19 0945   BP: 110/72   Pulse: 55   Resp: 16   Temp: 98.1 °F (36.7 °C)   SpO2: 98%     The following portions of the patient's history were reviewed and updated as appropriate: allergies, current medications, past family history, past medical history, past social history, past surgical history and problem list.    Past Medical History:   Diagnosis Date   • Cancer (CMS/HCC)     PROSTATE   • Hyperglycemia    • Hyperlipidemia    • Hypertension       Allergies   Allergen Reactions   • Penicillins Rash      Social History     Socioeconomic History   • Marital status:      Spouse name: Not on file   • Number of children: Not on file   • Years of education: Not on file   • Highest education level: Not on file   Tobacco Use   • Smoking status: Never Smoker   • Smokeless tobacco: Never Used   Substance and Sexual Activity   • Alcohol use: Yes     Comment: MINIMUM CONSUMPTION   • Drug use: No   • Sexual activity: Defer        Current Outpatient Medications:   •  Calcium Carbonate-Vitamin D (CALCIUM 600+D PO), Take 1 tablet by mouth Daily., Disp: , Rfl:   •  lisinopril-hydrochlorothiazide (PRINZIDE,ZESTORETIC) 20-12.5 MG per tablet, Take 1 tablet by mouth Daily., Disp: 90 tablet, Rfl: 3  •  pravastatin (PRAVACHOL) 40 MG tablet, Take 1 tablet by mouth Every Night., Disp: 90 tablet, Rfl: 3     Objective     History of Present Illness     Review of Systems   Constitutional:  Negative.    HENT: Negative.    Respiratory: Negative.    Cardiovascular: Negative.    Gastrointestinal: Negative.    Genitourinary: Negative.    Musculoskeletal: Negative.    Skin: Negative.    Neurological: Negative.    Psychiatric/Behavioral: Negative.        Physical Exam   Constitutional: He is oriented to person, place, and time. He appears well-developed and well-nourished.   Pleasant, cooperative no acute distress, blood pressure 120/80   HENT:   Head: Normocephalic and atraumatic.   Eyes: Conjunctivae are normal. Pupils are equal, round, and reactive to light. No scleral icterus.   Neck: Normal range of motion. Neck supple.   Cardiovascular: Normal rate, regular rhythm and normal heart sounds.   Pulmonary/Chest: Effort normal and breath sounds normal. No respiratory distress. He has no wheezes. He has no rales.   Musculoskeletal: Normal range of motion. He exhibits no edema.   Neurological: He is alert and oriented to person, place, and time.   Skin: Skin is warm and dry.   Psychiatric: He has a normal mood and affect. His behavior is normal.   Nursing note and vitals reviewed.      ASSESSMENT CMP has a sugar of 109 and is otherwise completely normal and hemoglobin A1c is acceptable at 6.1.  TSH was quite normal.  CBC is normal.  Lipid panel remains well controlled with total cholesterol 153, HDL 48 and LDL 64.  #1-hypertension, well controlled on medication  #2-hyperlipidemia, excellent control on medication  #3-hyperglycemia, mild and asymptomatic with acceptable hemoglobin A1c, diet controlled  #4-history of prostate and bladder cancer, status post recent radiation treatments with upcoming urology follow-up     Problem List Items Addressed This Visit        Cardiovascular and Mediastinum    Hyperlipidemia    Relevant Medications    pravastatin (PRAVACHOL) 40 MG tablet    Hypertension    Relevant Medications    lisinopril-hydrochlorothiazide (PRINZIDE,ZESTORETIC) 20-12.5 MG per tablet       Other     Hyperglycemia - Primary          PLAN I recommended the flu shot in October for the patient and he will continue current medicines as now.  I would like to recheck him in 6 months with a CMP, lipid panel, hemoglobin A1c    There are no Patient Instructions on file for this visit.  Return in about 6 months (around 3/5/2020) for with labs.

## 2020-03-06 DIAGNOSIS — E78.5 HYPERLIPIDEMIA, UNSPECIFIED HYPERLIPIDEMIA TYPE: ICD-10-CM

## 2020-03-06 DIAGNOSIS — R73.9 HYPERGLYCEMIA: ICD-10-CM

## 2020-03-12 ENCOUNTER — RESULTS ENCOUNTER (OUTPATIENT)
Dept: FAMILY MEDICINE CLINIC | Facility: CLINIC | Age: 70
End: 2020-03-12

## 2020-03-12 DIAGNOSIS — R73.9 HYPERGLYCEMIA: ICD-10-CM

## 2020-03-12 DIAGNOSIS — E78.5 HYPERLIPIDEMIA, UNSPECIFIED HYPERLIPIDEMIA TYPE: ICD-10-CM

## 2020-03-13 LAB
ALBUMIN SERPL-MCNC: 4.1 G/DL (ref 3.5–5.2)
ALBUMIN/GLOB SERPL: 1.4 G/DL
ALP SERPL-CCNC: 87 U/L (ref 39–117)
ALT SERPL-CCNC: 20 U/L (ref 1–41)
AST SERPL-CCNC: 19 U/L (ref 1–40)
BILIRUB SERPL-MCNC: 0.9 MG/DL (ref 0.2–1.2)
BUN SERPL-MCNC: 21 MG/DL (ref 8–23)
BUN/CREAT SERPL: 18.8 (ref 7–25)
CALCIUM SERPL-MCNC: 9.2 MG/DL (ref 8.6–10.5)
CHLORIDE SERPL-SCNC: 100 MMOL/L (ref 98–107)
CHOLEST SERPL-MCNC: 137 MG/DL (ref 0–200)
CO2 SERPL-SCNC: 26.1 MMOL/L (ref 22–29)
CREAT SERPL-MCNC: 1.12 MG/DL (ref 0.76–1.27)
GLOBULIN SER CALC-MCNC: 2.9 GM/DL
GLUCOSE SERPL-MCNC: 116 MG/DL (ref 65–99)
HBA1C MFR BLD: 5.8 % (ref 4.8–5.6)
HDLC SERPL-MCNC: 44 MG/DL (ref 40–60)
LDLC SERPL CALC-MCNC: 67 MG/DL (ref 0–100)
LDLC/HDLC SERPL: 1.53 {RATIO}
POTASSIUM SERPL-SCNC: 4.3 MMOL/L (ref 3.5–5.2)
PROT SERPL-MCNC: 7 G/DL (ref 6–8.5)
SODIUM SERPL-SCNC: 139 MMOL/L (ref 136–145)
TRIGL SERPL-MCNC: 129 MG/DL (ref 0–150)
VLDLC SERPL CALC-MCNC: 25.8 MG/DL

## 2020-04-04 NOTE — TELEPHONE ENCOUNTER
NOTED.       ----- Message from Denton Zuniga MA sent at 8/23/2019  1:33 PM EDT -----  Contact: please be advised   Please be advised do not do PSA should not be done when has labs done due to urology is  is doing it.       
walking/toileting/standing

## 2020-06-11 ENCOUNTER — OFFICE VISIT (OUTPATIENT)
Dept: FAMILY MEDICINE CLINIC | Facility: CLINIC | Age: 70
End: 2020-06-11

## 2020-06-11 VITALS
TEMPERATURE: 97.5 F | SYSTOLIC BLOOD PRESSURE: 120 MMHG | RESPIRATION RATE: 16 BRPM | OXYGEN SATURATION: 98 % | WEIGHT: 181 LBS | HEART RATE: 75 BPM | DIASTOLIC BLOOD PRESSURE: 76 MMHG | HEIGHT: 69 IN | BODY MASS INDEX: 26.81 KG/M2

## 2020-06-11 DIAGNOSIS — R73.9 HYPERGLYCEMIA: ICD-10-CM

## 2020-06-11 DIAGNOSIS — I10 ESSENTIAL HYPERTENSION: Primary | ICD-10-CM

## 2020-06-11 DIAGNOSIS — E78.5 HYPERLIPIDEMIA, UNSPECIFIED HYPERLIPIDEMIA TYPE: ICD-10-CM

## 2020-06-11 PROCEDURE — 99214 OFFICE O/P EST MOD 30 MIN: CPT | Performed by: INTERNAL MEDICINE

## 2020-06-11 NOTE — PROGRESS NOTES
Subjective   North Carcamo is a 70 y.o. male. Patient is here today for follow-up on his hypertension, hyperlipidemia, hyperglycemia.  He also has a history of prostate cancer and is finished treatments and just saw the urologist with a good report.  He has had no chest pain, shortness of breath, edema or myalgias and has no real acute complaints  Chief Complaint   Patient presents with   • Hypertension   • Hyperlipidemia          Vitals:    06/11/20 1436   BP: 120/76   Pulse: 75   Resp: 16   Temp: 97.5 °F (36.4 °C)   SpO2: 98%     Body mass index is 26.73 kg/m².  The following portions of the patient's history were reviewed and updated as appropriate: allergies, current medications, past family history, past medical history, past social history, past surgical history and problem list.    Past Medical History:   Diagnosis Date   • Cancer (CMS/HCC)     PROSTATE   • Hyperglycemia    • Hyperlipidemia    • Hypertension       Allergies   Allergen Reactions   • Penicillins Rash      Social History     Socioeconomic History   • Marital status:      Spouse name: Not on file   • Number of children: Not on file   • Years of education: Not on file   • Highest education level: Not on file   Tobacco Use   • Smoking status: Never Smoker   • Smokeless tobacco: Never Used   Substance and Sexual Activity   • Alcohol use: Yes     Comment: MINIMUM CONSUMPTION   • Drug use: No   • Sexual activity: Defer        Current Outpatient Medications:   •  Calcium Carbonate-Vitamin D (CALCIUM 600+D PO), Take 1 tablet by mouth Daily., Disp: , Rfl:   •  lisinopril-hydrochlorothiazide (PRINZIDE,ZESTORETIC) 20-12.5 MG per tablet, Take 1 tablet by mouth Daily., Disp: 90 tablet, Rfl: 3  •  pravastatin (PRAVACHOL) 40 MG tablet, Take 1 tablet by mouth Every Night., Disp: 90 tablet, Rfl: 3     Objective     History of Present Illness     Review of Systems   Constitutional: Negative.    HENT: Negative.    Respiratory: Negative.     Cardiovascular: Negative.    Gastrointestinal: Negative.    Genitourinary: Negative.    Musculoskeletal: Negative.    Skin: Negative.    Neurological: Negative.    Psychiatric/Behavioral: Negative.        Physical Exam   Constitutional: He is oriented to person, place, and time. He appears well-developed and well-nourished.   Pleasant, cooperative no acute distress, blood pressure 120/80   HENT:   Head: Normocephalic and atraumatic.   Eyes: Conjunctivae are normal. No scleral icterus.   Neck: Normal range of motion. Neck supple.   Cardiovascular: Normal rate, regular rhythm and normal heart sounds.   Pulmonary/Chest: Effort normal and breath sounds normal. No respiratory distress. He has no wheezes. He has no rales.   Musculoskeletal: Normal range of motion. He exhibits no edema.   Neurological: He is alert and oriented to person, place, and time.   Skin: Skin is warm and dry.   Psychiatric: He has a normal mood and affect. His behavior is normal.   Nursing note and vitals reviewed.      ASSESSMENT CMP had a sugar of 116 and hemoglobin A1c was improved at 5.8.  Lipid panel is stable with total cholesterol 137, HDL 44, LDL 67  #1-hypertension, controlled on medication  #2-hyperlipidemia, well controlled on medication  #3-hyperglycemia, mild stable and asymptomatic with acceptable hemoglobin A1c  #4-history of prostate cancer, in remission     Problem List Items Addressed This Visit        Cardiovascular and Mediastinum    Hyperlipidemia    Hypertension - Primary       Other    Hyperglycemia          PLAN the patient will continue current medicines as now and I would like to recheck him in about 6 months with a CBC, CMP, lipid panel, hemoglobin A1c and TSH    There are no Patient Instructions on file for this visit.  Return in about 6 months (around 12/11/2020) for with labs.

## 2020-11-06 DIAGNOSIS — E78.5 HYPERLIPIDEMIA, UNSPECIFIED HYPERLIPIDEMIA TYPE: ICD-10-CM

## 2020-11-06 RX ORDER — LISINOPRIL AND HYDROCHLOROTHIAZIDE 20; 12.5 MG/1; MG/1
1 TABLET ORAL DAILY
Qty: 90 TABLET | Refills: 3 | Status: SHIPPED | OUTPATIENT
Start: 2020-11-06 | End: 2021-11-01

## 2020-11-06 RX ORDER — PRAVASTATIN SODIUM 40 MG
40 TABLET ORAL NIGHTLY
Qty: 90 TABLET | Refills: 3 | Status: SHIPPED | OUTPATIENT
Start: 2020-11-06 | End: 2021-11-01

## 2020-12-03 DIAGNOSIS — R73.9 HYPERGLYCEMIA: ICD-10-CM

## 2020-12-03 DIAGNOSIS — E78.5 HYPERLIPIDEMIA, UNSPECIFIED HYPERLIPIDEMIA TYPE: ICD-10-CM

## 2020-12-07 ENCOUNTER — RESULTS ENCOUNTER (OUTPATIENT)
Dept: FAMILY MEDICINE CLINIC | Facility: CLINIC | Age: 70
End: 2020-12-07

## 2020-12-07 DIAGNOSIS — E78.5 HYPERLIPIDEMIA, UNSPECIFIED HYPERLIPIDEMIA TYPE: ICD-10-CM

## 2020-12-07 DIAGNOSIS — R73.9 HYPERGLYCEMIA: ICD-10-CM

## 2020-12-08 LAB
ALBUMIN SERPL-MCNC: 4.3 G/DL (ref 3.5–5.2)
ALBUMIN/GLOB SERPL: 1.5 G/DL
ALP SERPL-CCNC: 92 U/L (ref 39–117)
ALT SERPL-CCNC: 21 U/L (ref 1–41)
AST SERPL-CCNC: 19 U/L (ref 1–40)
BASOPHILS # BLD AUTO: 0.04 10*3/MM3 (ref 0–0.2)
BASOPHILS NFR BLD AUTO: 0.6 % (ref 0–1.5)
BILIRUB SERPL-MCNC: 0.4 MG/DL (ref 0–1.2)
BUN SERPL-MCNC: 19 MG/DL (ref 8–23)
BUN/CREAT SERPL: 17.8 (ref 7–25)
CALCIUM SERPL-MCNC: 9.8 MG/DL (ref 8.6–10.5)
CHLORIDE SERPL-SCNC: 99 MMOL/L (ref 98–107)
CHOLEST SERPL-MCNC: 168 MG/DL (ref 0–200)
CO2 SERPL-SCNC: 29.3 MMOL/L (ref 22–29)
CREAT SERPL-MCNC: 1.07 MG/DL (ref 0.76–1.27)
EOSINOPHIL # BLD AUTO: 0.19 10*3/MM3 (ref 0–0.4)
EOSINOPHIL NFR BLD AUTO: 2.9 % (ref 0.3–6.2)
ERYTHROCYTE [DISTWIDTH] IN BLOOD BY AUTOMATED COUNT: 12.8 % (ref 12.3–15.4)
GLOBULIN SER CALC-MCNC: 2.8 GM/DL
GLUCOSE SERPL-MCNC: 120 MG/DL (ref 65–99)
HBA1C MFR BLD: 6 % (ref 4.8–5.6)
HCT VFR BLD AUTO: 40.1 % (ref 37.5–51)
HDLC SERPL-MCNC: 48 MG/DL (ref 40–60)
HGB BLD-MCNC: 13.9 G/DL (ref 13–17.7)
IMM GRANULOCYTES # BLD AUTO: 0.02 10*3/MM3 (ref 0–0.05)
IMM GRANULOCYTES NFR BLD AUTO: 0.3 % (ref 0–0.5)
LDLC SERPL CALC-MCNC: 77 MG/DL (ref 0–100)
LDLC/HDLC SERPL: 1.39 {RATIO}
LYMPHOCYTES # BLD AUTO: 1.29 10*3/MM3 (ref 0.7–3.1)
LYMPHOCYTES NFR BLD AUTO: 19.5 % (ref 19.6–45.3)
MCH RBC QN AUTO: 31.7 PG (ref 26.6–33)
MCHC RBC AUTO-ENTMCNC: 34.7 G/DL (ref 31.5–35.7)
MCV RBC AUTO: 91.3 FL (ref 79–97)
MONOCYTES # BLD AUTO: 0.83 10*3/MM3 (ref 0.1–0.9)
MONOCYTES NFR BLD AUTO: 12.5 % (ref 5–12)
NEUTROPHILS # BLD AUTO: 4.26 10*3/MM3 (ref 1.7–7)
NEUTROPHILS NFR BLD AUTO: 64.2 % (ref 42.7–76)
NRBC BLD AUTO-RTO: 0 /100 WBC (ref 0–0.2)
PLATELET # BLD AUTO: 245 10*3/MM3 (ref 140–450)
POTASSIUM SERPL-SCNC: 4.5 MMOL/L (ref 3.5–5.2)
PROT SERPL-MCNC: 7.1 G/DL (ref 6–8.5)
RBC # BLD AUTO: 4.39 10*6/MM3 (ref 4.14–5.8)
SODIUM SERPL-SCNC: 137 MMOL/L (ref 136–145)
TRIGL SERPL-MCNC: 267 MG/DL (ref 0–150)
TSH SERPL DL<=0.005 MIU/L-ACNC: 3.01 UIU/ML (ref 0.27–4.2)
VLDLC SERPL CALC-MCNC: 43 MG/DL (ref 5–40)
WBC # BLD AUTO: 6.63 10*3/MM3 (ref 3.4–10.8)

## 2020-12-11 ENCOUNTER — OFFICE VISIT (OUTPATIENT)
Dept: FAMILY MEDICINE CLINIC | Facility: CLINIC | Age: 70
End: 2020-12-11

## 2020-12-11 VITALS
HEIGHT: 69 IN | OXYGEN SATURATION: 96 % | RESPIRATION RATE: 16 BRPM | HEART RATE: 53 BPM | WEIGHT: 185.4 LBS | TEMPERATURE: 96.9 F | SYSTOLIC BLOOD PRESSURE: 131 MMHG | BODY MASS INDEX: 27.46 KG/M2 | DIASTOLIC BLOOD PRESSURE: 75 MMHG

## 2020-12-11 DIAGNOSIS — C61 PROSTATE CANCER (HCC): ICD-10-CM

## 2020-12-11 DIAGNOSIS — C67.9 MALIGNANT NEOPLASM OF URINARY BLADDER, UNSPECIFIED SITE (HCC): ICD-10-CM

## 2020-12-11 DIAGNOSIS — E78.5 HYPERLIPIDEMIA, UNSPECIFIED HYPERLIPIDEMIA TYPE: ICD-10-CM

## 2020-12-11 DIAGNOSIS — R73.9 HYPERGLYCEMIA: ICD-10-CM

## 2020-12-11 DIAGNOSIS — I10 ESSENTIAL HYPERTENSION: Primary | ICD-10-CM

## 2020-12-11 PROCEDURE — 90732 PPSV23 VACC 2 YRS+ SUBQ/IM: CPT | Performed by: INTERNAL MEDICINE

## 2020-12-11 PROCEDURE — 99214 OFFICE O/P EST MOD 30 MIN: CPT | Performed by: INTERNAL MEDICINE

## 2020-12-11 PROCEDURE — G0009 ADMIN PNEUMOCOCCAL VACCINE: HCPCS | Performed by: INTERNAL MEDICINE

## 2020-12-11 NOTE — PROGRESS NOTES
Subjective   North Carcamo is a 70 y.o. male. Patient is here today for follow-up on his hypertension, hyperlipidemia and hyperglycemia.  He is followed by urology for bladder cancer and prostate cancer.  He is feeling well and denies any chest pain, shortness of breath, edema or myalgias.  Chief Complaint   Patient presents with   • Hyperlipidemia     Lab F/U   • Hypertension   • Hyperglycemia          Vitals:    12/11/20 1032   BP: 131/75   Pulse: 53   Resp: 16   Temp: 96.9 °F (36.1 °C)   SpO2: 96%     Body mass index is 27.38 kg/m².  The following portions of the patient's history were reviewed and updated as appropriate: allergies, current medications, past family history, past medical history, past social history, past surgical history and problem list.    Past Medical History:   Diagnosis Date   • Cancer (CMS/HCC)     PROSTATE   • Hyperglycemia    • Hyperlipidemia    • Hypertension       Allergies   Allergen Reactions   • Penicillins Rash      Social History     Socioeconomic History   • Marital status:      Spouse name: Not on file   • Number of children: Not on file   • Years of education: Not on file   • Highest education level: Not on file   Tobacco Use   • Smoking status: Never Smoker   • Smokeless tobacco: Never Used   Substance and Sexual Activity   • Alcohol use: Yes     Comment: MINIMUM CONSUMPTION   • Drug use: No   • Sexual activity: Defer        Current Outpatient Medications:   •  Calcium Carbonate-Vitamin D (CALCIUM 600+D PO), Take 1 tablet by mouth Daily., Disp: , Rfl:   •  lisinopril-hydrochlorothiazide (PRINZIDE,ZESTORETIC) 20-12.5 MG per tablet, TAKE 1 TABLET BY MOUTH DAILY, Disp: 90 tablet, Rfl: 3  •  pravastatin (PRAVACHOL) 40 MG tablet, TAKE 1 TABLET BY MOUTH EVERY NIGHT, Disp: 90 tablet, Rfl: 3     Objective     History of Present Illness     Review of Systems   Constitutional: Negative.    HENT: Negative.    Respiratory: Negative.    Cardiovascular: Negative.     Gastrointestinal: Negative.    Genitourinary: Negative.    Musculoskeletal: Negative.    Skin: Negative.    Neurological: Negative.    Hematological: Negative.    Psychiatric/Behavioral: Negative.        Physical Exam  Vitals signs (130/80) and nursing note reviewed.   Constitutional:       General: He is not in acute distress.     Appearance: Normal appearance. He is not ill-appearing.   HENT:      Head: Normocephalic and atraumatic.   Eyes:      General: No scleral icterus.     Conjunctiva/sclera: Conjunctivae normal.   Neck:      Musculoskeletal: Normal range of motion and neck supple.   Cardiovascular:      Rate and Rhythm: Normal rate and regular rhythm.      Heart sounds: Normal heart sounds.   Pulmonary:      Effort: Pulmonary effort is normal. No respiratory distress.      Breath sounds: Normal breath sounds. No wheezing, rhonchi or rales.   Musculoskeletal: Normal range of motion.      Right lower leg: No edema.      Left lower leg: No edema.   Skin:     General: Skin is warm and dry.   Neurological:      General: No focal deficit present.      Mental Status: He is alert and oriented to person, place, and time.   Psychiatric:         Mood and Affect: Mood normal.         Behavior: Behavior normal.         ASSESSMENT CBC is normal.  CMP has a sugar of 120 and is otherwise normal and hemoglobin A1c is stable at 6.0.  TSH was normal.  Lipid panel is stable and controlled with total cholesterol 168, HDL 48 and LDL 77  #1-hypertension controlled on medication  #2-hyperlipidemia, controlled on medication  #3-hyperglycemia, stable with acceptable stable hemoglobin A1c, diet controlled  #4-prostate cancer and bladder cancer, followed by urology     Problems Addressed this Visit        Cardiovascular and Mediastinum    Hyperlipidemia    Hypertension - Primary       Genitourinary    Prostate cancer (CMS/HCC)    Bladder cancer (CMS/HCC)       Other    Hyperglycemia      Diagnoses       Codes Comments    Essential  hypertension    -  Primary ICD-10-CM: I10  ICD-9-CM: 401.9     Hyperlipidemia, unspecified hyperlipidemia type     ICD-10-CM: E78.5  ICD-9-CM: 272.4     Malignant neoplasm of urinary bladder, unspecified site (CMS/HCC)     ICD-10-CM: C67.9  ICD-9-CM: 188.9     Prostate cancer (CMS/HCC)     ICD-10-CM: C61  ICD-9-CM: 185     Hyperglycemia     ICD-10-CM: R73.9  ICD-9-CM: 790.29           PLAN the patient will continue current medicines as now and is doing a Cologuard test at home.  I plan on rechecking him in 6 months with a CMP, lipid panel, hemoglobin A1c    There are no Patient Instructions on file for this visit.  No follow-ups on file.

## 2021-06-09 DIAGNOSIS — E78.5 HYPERLIPIDEMIA, UNSPECIFIED HYPERLIPIDEMIA TYPE: ICD-10-CM

## 2021-06-09 DIAGNOSIS — R73.9 HYPERGLYCEMIA: ICD-10-CM

## 2021-06-18 ENCOUNTER — OFFICE VISIT (OUTPATIENT)
Dept: FAMILY MEDICINE CLINIC | Facility: CLINIC | Age: 71
End: 2021-06-18

## 2021-06-18 VITALS
HEIGHT: 69 IN | HEART RATE: 60 BPM | SYSTOLIC BLOOD PRESSURE: 120 MMHG | WEIGHT: 181 LBS | TEMPERATURE: 97.5 F | DIASTOLIC BLOOD PRESSURE: 80 MMHG | OXYGEN SATURATION: 97 % | BODY MASS INDEX: 26.81 KG/M2 | RESPIRATION RATE: 16 BRPM

## 2021-06-18 DIAGNOSIS — I10 ESSENTIAL HYPERTENSION: Primary | ICD-10-CM

## 2021-06-18 DIAGNOSIS — R73.9 HYPERGLYCEMIA: ICD-10-CM

## 2021-06-18 DIAGNOSIS — E78.5 HYPERLIPIDEMIA, UNSPECIFIED HYPERLIPIDEMIA TYPE: ICD-10-CM

## 2021-06-18 PROCEDURE — 99214 OFFICE O/P EST MOD 30 MIN: CPT | Performed by: INTERNAL MEDICINE

## 2021-06-18 NOTE — PROGRESS NOTES
Subjective   North Carcamo is a 71 y.o. male. Patient is here today for follow-up on his hypertension, hyperlipidemia and hyperglycemia.  He is feeling well and has no acute complaints and has had no chest pain, shortness of breath, edema or myalgias  Chief Complaint   Patient presents with   • Hypertension     HYPERLIPIDEMIA, HYPERGLYCEMIA- F/U LABS          Vitals:    06/18/21 1024   BP: 120/80   Pulse: 60   Resp: 16   Temp: 97.5 °F (36.4 °C)   SpO2: 97%     Body mass index is 26.72 kg/m².  The following portions of the patient's history were reviewed and updated as appropriate: allergies, current medications, past family history, past medical history, past social history, past surgical history and problem list.    Past Medical History:   Diagnosis Date   • Cancer (CMS/[facility])     PROSTATE   • Hyperglycemia    • Hyperlipidemia    • Hypertension       Allergies   Allergen Reactions   • Penicillins Rash      Social History     Socioeconomic History   • Marital status:      Spouse name: Not on file   • Number of children: Not on file   • Years of education: Not on file   • Highest education level: Not on file   Tobacco Use   • Smoking status: Never Smoker   • Smokeless tobacco: Never Used   Substance and Sexual Activity   • Alcohol use: Yes     Comment: MINIMUM CONSUMPTION   • Drug use: No   • Sexual activity: Defer        Current Outpatient Medications:   •  Calcium Carbonate-Vitamin D (CALCIUM 600+D PO), Take 1 tablet by mouth Daily., Disp: , Rfl:   •  lisinopril-hydrochlorothiazide (PRINZIDE,ZESTORETIC) 20-12.5 MG per tablet, TAKE 1 TABLET BY MOUTH DAILY, Disp: 90 tablet, Rfl: 3  •  pravastatin (PRAVACHOL) 40 MG tablet, TAKE 1 TABLET BY MOUTH EVERY NIGHT, Disp: 90 tablet, Rfl: 3     Objective     History of Present Illness     Review of Systems   Constitutional: Negative.    HENT: Negative.    Respiratory: Negative.    Cardiovascular: Negative.    Gastrointestinal: Negative.    Genitourinary: Negative.     Musculoskeletal: Negative.    Skin: Negative.    Allergic/Immunologic: Positive for environmental allergies.   Neurological: Negative.    Hematological: Negative.    Psychiatric/Behavioral: Negative.        Physical Exam  Vitals and nursing note reviewed.   Constitutional:       General: He is not in acute distress.     Appearance: Normal appearance. He is not ill-appearing.   HENT:      Head: Normocephalic and atraumatic.   Eyes:      General: No scleral icterus.     Conjunctiva/sclera: Conjunctivae normal.   Cardiovascular:      Rate and Rhythm: Normal rate and regular rhythm.      Heart sounds: Normal heart sounds.   Pulmonary:      Effort: Pulmonary effort is normal. No respiratory distress.      Breath sounds: Normal breath sounds. No wheezing or rales.   Musculoskeletal:         General: Normal range of motion.      Cervical back: Normal range of motion and neck supple.   Skin:     General: Skin is warm and dry.   Neurological:      General: No focal deficit present.      Mental Status: He is alert and oriented to person, place, and time.   Psychiatric:         Mood and Affect: Mood normal.         Behavior: Behavior normal.         ASSESSMENT CMP had a mildly elevated but stable sugar was otherwise normal and hemoglobin A1c was acceptable at 6.0.  Lipid panel is well controlled  #1-hypertension, stable on current medication  #2-hyperlipidemia, controlled on medication  #3-hyperglycemia with stable acceptable hemoglobin A1c, diet controlled     Problems Addressed this Visit        Cardiac and Vasculature    Hyperlipidemia    Hypertension - Primary       Endocrine and Metabolic    Hyperglycemia      Diagnoses       Codes Comments    Essential hypertension    -  Primary ICD-10-CM: I10  ICD-9-CM: 401.9     Hyperlipidemia, unspecified hyperlipidemia type     ICD-10-CM: E78.5  ICD-9-CM: 272.4     Hyperglycemia     ICD-10-CM: R73.9  ICD-9-CM: 790.29           PLAN the patient will be doing a Cologuard test at  home.  He will continue current medicines as now and I plan on rechecking him in 6 months with a CBC, CMP, lipid panel, hemoglobin A1c and TSH and free T4    There are no Patient Instructions on file for this visit.  Return in about 6 months (around 12/18/2021) for with labs.

## 2021-06-25 LAB
ALBUMIN SERPL-MCNC: 4.2 G/DL (ref 3.7–4.7)
ALBUMIN/GLOB SERPL: 1.5 {RATIO} (ref 1.2–2.2)
ALP SERPL-CCNC: 102 IU/L (ref 48–121)
ALT SERPL-CCNC: 17 IU/L (ref 0–44)
AST SERPL-CCNC: 18 IU/L (ref 0–40)
BILIRUB SERPL-MCNC: 0.6 MG/DL (ref 0–1.2)
BUN SERPL-MCNC: 23 MG/DL (ref 8–27)
BUN/CREAT SERPL: 22 (ref 10–24)
CALCIUM SERPL-MCNC: 9.6 MG/DL (ref 8.6–10.2)
CHLORIDE SERPL-SCNC: 103 MMOL/L (ref 96–106)
CHOLEST SERPL-MCNC: 158 MG/DL (ref 100–199)
CO2 SERPL-SCNC: 25 MMOL/L (ref 20–29)
CREAT SERPL-MCNC: 1.05 MG/DL (ref 0.76–1.27)
GLOBULIN SER CALC-MCNC: 2.8 G/DL (ref 1.5–4.5)
GLUCOSE SERPL-MCNC: 112 MG/DL (ref 65–99)
HBA1C MFR BLD: 6 % (ref 4.8–5.6)
HDLC SERPL-MCNC: 45 MG/DL
LDLC SERPL CALC-MCNC: 81 MG/DL (ref 0–99)
LDLC/HDLC SERPL: 1.8 RATIO (ref 0–3.6)
POTASSIUM SERPL-SCNC: 4.4 MMOL/L (ref 3.5–5.2)
PROT SERPL-MCNC: 7 G/DL (ref 6–8.5)
SODIUM SERPL-SCNC: 142 MMOL/L (ref 134–144)
TRIGL SERPL-MCNC: 189 MG/DL (ref 0–149)
VLDLC SERPL CALC-MCNC: 32 MG/DL (ref 5–40)

## 2021-11-01 DIAGNOSIS — E78.5 HYPERLIPIDEMIA, UNSPECIFIED HYPERLIPIDEMIA TYPE: ICD-10-CM

## 2021-11-01 RX ORDER — LISINOPRIL AND HYDROCHLOROTHIAZIDE 20; 12.5 MG/1; MG/1
1 TABLET ORAL DAILY
Qty: 90 TABLET | Refills: 3 | Status: SHIPPED | OUTPATIENT
Start: 2021-11-01 | End: 2022-10-27

## 2021-11-01 RX ORDER — PRAVASTATIN SODIUM 40 MG
40 TABLET ORAL NIGHTLY
Qty: 90 TABLET | Refills: 3 | Status: SHIPPED | OUTPATIENT
Start: 2021-11-01 | End: 2022-10-27

## 2021-12-29 DIAGNOSIS — E78.5 HYPERLIPIDEMIA, UNSPECIFIED HYPERLIPIDEMIA TYPE: ICD-10-CM

## 2021-12-29 DIAGNOSIS — R73.9 HYPERGLYCEMIA: ICD-10-CM

## 2022-01-13 ENCOUNTER — OFFICE VISIT (OUTPATIENT)
Dept: FAMILY MEDICINE CLINIC | Facility: CLINIC | Age: 72
End: 2022-01-13

## 2022-01-13 VITALS
RESPIRATION RATE: 20 BRPM | HEART RATE: 88 BPM | TEMPERATURE: 97.5 F | WEIGHT: 189.6 LBS | SYSTOLIC BLOOD PRESSURE: 142 MMHG | DIASTOLIC BLOOD PRESSURE: 71 MMHG | OXYGEN SATURATION: 98 % | HEIGHT: 69 IN | BODY MASS INDEX: 28.08 KG/M2

## 2022-01-13 DIAGNOSIS — E78.5 HYPERLIPIDEMIA, UNSPECIFIED HYPERLIPIDEMIA TYPE: ICD-10-CM

## 2022-01-13 DIAGNOSIS — I10 PRIMARY HYPERTENSION: Primary | ICD-10-CM

## 2022-01-13 DIAGNOSIS — R73.9 HYPERGLYCEMIA: ICD-10-CM

## 2022-01-13 PROCEDURE — 99214 OFFICE O/P EST MOD 30 MIN: CPT | Performed by: INTERNAL MEDICINE

## 2022-01-13 PROCEDURE — 1159F MED LIST DOCD IN RCRD: CPT | Performed by: INTERNAL MEDICINE

## 2022-01-13 PROCEDURE — 96160 PT-FOCUSED HLTH RISK ASSMT: CPT | Performed by: INTERNAL MEDICINE

## 2022-01-13 PROCEDURE — G0439 PPPS, SUBSEQ VISIT: HCPCS | Performed by: INTERNAL MEDICINE

## 2022-01-13 PROCEDURE — 1170F FXNL STATUS ASSESSED: CPT | Performed by: INTERNAL MEDICINE

## 2022-01-13 NOTE — PROGRESS NOTES
The ABCs of the Annual Wellness Visit  Subsequent Medicare Wellness Visit    Chief Complaint   Patient presents with   • Medicare Wellness-subsequent     yearly wellness review labs c/o rt ear hearing issues   • Hypertension   • Hearing Problem      Subjective    History of Present Illness:  North Carcamo is a 71 y.o. male who presents for a Subsequent Medicare Wellness Visit.  He is also here for follow-up on his hypertension, hyperlipidemia and hyperglycemia.  He is also having some mild decrease in hearing and fullness in his right ear but no pain.    The following portions of the patient's history were reviewed and   updated as appropriate: allergies, current medications, past family history, past medical history, past social history, past surgical history and problem list.    Compared to one year ago, the patient feels his physical   health is better.    Compared to one year ago, the patient feels his mental   health is the same.    Recent Hospitalizations:  He was not admitted to the hospital during the last year.       Current Medical Providers:  Patient Care Team:  Pranav Guzman MD as PCP - General    Outpatient Medications Prior to Visit   Medication Sig Dispense Refill   • Calcium Carbonate-Vitamin D (CALCIUM 600+D PO) Take 1 tablet by mouth Daily.     • lisinopril-hydrochlorothiazide (PRINZIDE,ZESTORETIC) 20-12.5 MG per tablet TAKE 1 TABLET BY MOUTH DAILY 90 tablet 3   • pravastatin (PRAVACHOL) 40 MG tablet TAKE 1 TABLET BY MOUTH EVERY NIGHT 90 tablet 3     No facility-administered medications prior to visit.       No opioid medication identified on active medication list. I have reviewed chart for other potential  high risk medication/s and harmful drug interactions in the elderly.          Aspirin is not on active medication list.  Aspirin use is not indicated based on review of current medical condition/s. Risk of harm outweighs potential benefits.  .    Patient Active Problem List   Diagnosis  "  • Hyperglycemia   • Hyperlipidemia   • Hypertension   • Prostate cancer (HCC)   • Bladder cancer (HCC)     Advance Care Planning  Advance Directive is on file.  ACP discussion was held with the patient during this visit. Patient has an advance directive in EMR which is still valid.     Review of Systems   HENT: Positive for hearing loss.    All other systems reviewed and are negative.       Objective    Vitals:    01/13/22 1026   BP: 142/71   BP Location: Left arm   Patient Position: Sitting   Cuff Size: Adult   Pulse: 88   Resp: 20   Temp: 97.5 °F (36.4 °C)   TempSrc: Temporal   SpO2: 98%   Weight: 86 kg (189 lb 9.6 oz)   Height: 175.3 cm (69\")   PainSc: 0-No pain     BMI Readings from Last 1 Encounters:   01/13/22 28.00 kg/m²   BMI is above normal parameters. Recommendations include: exercise counseling    Does the patient have evidence of cognitive impairment? No    Physical Exam  Vitals (130/80) and nursing note reviewed.   Constitutional:       General: He is not in acute distress.     Appearance: Normal appearance. He is not ill-appearing.   HENT:      Head: Normocephalic and atraumatic.      Right Ear: Tympanic membrane, ear canal and external ear normal. There is no impacted cerumen.      Left Ear: Tympanic membrane, ear canal and external ear normal. There is no impacted cerumen.   Eyes:      General: No scleral icterus.     Conjunctiva/sclera: Conjunctivae normal.   Cardiovascular:      Rate and Rhythm: Normal rate and regular rhythm.      Heart sounds: Normal heart sounds.   Pulmonary:      Effort: Pulmonary effort is normal. No respiratory distress.      Breath sounds: Normal breath sounds. No wheezing or rales.   Musculoskeletal:         General: Normal range of motion.   Skin:     General: Skin is warm and dry.   Neurological:      General: No focal deficit present.      Mental Status: He is alert and oriented to person, place, and time.   Psychiatric:         Mood and Affect: Mood normal.         " Behavior: Behavior normal.       Lab Results   Component Value Date    CHLPL 150 01/06/2022    TRIG 199 (H) 01/06/2022    HDL 40 01/06/2022    LDL 77 01/06/2022    VLDL 33 01/06/2022    HGBA1C 6.0 (H) 01/06/2022            HEALTH RISK ASSESSMENT    Smoking Status:  Social History     Tobacco Use   Smoking Status Never Smoker   Smokeless Tobacco Never Used     Alcohol Consumption:  Social History     Substance and Sexual Activity   Alcohol Use Yes    Comment: MINIMUM CONSUMPTION     Fall Risk Screen:    CANDICE Fall Risk Assessment was completed, and patient is at LOW risk for falls.Assessment completed on:1/13/2022    Depression Screening:  PHQ-2/PHQ-9 Depression Screening 1/13/2022   Little interest or pleasure in doing things 0   Feeling down, depressed, or hopeless 0   Trouble falling or staying asleep, or sleeping too much 0   Feeling tired or having little energy 0   Poor appetite or overeating 0   Feeling bad about yourself - or that you are a failure or have let yourself or your family down 0   Trouble concentrating on things, such as reading the newspaper or watching television 0   Moving or speaking so slowly that other people could have noticed. Or the opposite - being so fidgety or restless that you have been moving around a lot more than usual 0   Thoughts that you would be better off dead, or of hurting yourself in some way 0   Total Score 0   If you checked off any problems, how difficult have these problems made it for you to do your work, take care of things at home, or get along with other people? Not difficult at all       Health Habits and Functional and Cognitive Screening:  Functional & Cognitive Status 1/13/2022   Do you have difficulty preparing food and eating? No   Do you have difficulty bathing yourself, getting dressed or grooming yourself? No   Do you have difficulty using the toilet? No   Do you have difficulty moving around from place to place? No   Do you have trouble with steps or  getting out of a bed or a chair? No   Current Diet Well Balanced Diet   Dental Exam Up to date   Eye Exam Up to date   Exercise (times per week) 4 times per week   Current Exercises Include Home Fitness Gym;Walking   Do you need help using the phone?  No   Are you deaf or do you have serious difficulty hearing?  No   Do you need help with transportation? No   Do you need help shopping? No   Do you need help preparing meals?  No   Do you need help with housework?  No   Do you need help with laundry? No   Do you need help taking your medications? No   Do you need help managing money? No   Do you ever drive or ride in a car without wearing a seat belt? No   Have you felt unusual stress, anger or loneliness in the last month? No   Who do you live with? Spouse   If you need help, do you have trouble finding someone available to you? No   Have you been bothered in the last four weeks by sexual problems? No   Do you have difficulty concentrating, remembering or making decisions? No       Age-appropriate Screening Schedule:  Refer to the list below for future screening recommendations based on patient's age, sex and/or medical conditions. Orders for these recommended tests are listed in the plan section. The patient has been provided with a written plan.    Health Maintenance   Topic Date Due   • LIPID PANEL  01/06/2023   • TDAP/TD VACCINES (2 - Td or Tdap) 08/26/2025   • INFLUENZA VACCINE  Completed   • ZOSTER VACCINE  Completed              Assessment/Plan CBC is normal.  CMP has an elevated but stable sugar of 107 and is otherwise normal and hemoglobin A1c remains stable and acceptable at 6.0.  Lipid panel is controlled with total cholesterol 150, HDL 40 and LDL 77.  TSH and free T4 were normal.  #1-hypertension controlled on medication  #2-hyperlipidemia controlled on medication  #3-hyperglycemia, mild and asymptomatic with stable acceptable hemoglobin A1c, diet controlled  #4-history of prostate and bladder cancer,  asymptomatic  #5-probable mild eustachian tube dysfunction in the right ear    CMS Preventative Services Quick Reference  Risk Factors Identified During Encounter  Obesity/Overweight   The above risks/problems have been discussed with the patient.  Follow up actions/plans if indicated are seen below in the Assessment/Plan Section.  Pertinent information has been shared with the patient in the After Visit Summary.    There are no diagnoses linked to this encounter.    Follow Up: The patient can try some Flonase spray and an allergy pill if necessary for his eustachian tube dysfunction.  He is otherwise stable and will continue current medications as now and I will plan on rechecking him in 6 months with a CMP, lipid panel, hemoglobin A1c    No follow-ups on file.     An After Visit Summary and PPPS were made available to the patient.

## 2022-07-11 DIAGNOSIS — R73.9 HYPERGLYCEMIA: ICD-10-CM

## 2022-07-11 DIAGNOSIS — E78.5 HYPERLIPIDEMIA, UNSPECIFIED HYPERLIPIDEMIA TYPE: Primary | ICD-10-CM

## 2022-07-15 LAB
ALBUMIN SERPL-MCNC: 4.1 G/DL (ref 3.7–4.7)
ALBUMIN/GLOB SERPL: 1.5 {RATIO} (ref 1.2–2.2)
ALP SERPL-CCNC: 97 IU/L (ref 44–121)
ALT SERPL-CCNC: 20 IU/L (ref 0–44)
AST SERPL-CCNC: 25 IU/L (ref 0–40)
BILIRUB SERPL-MCNC: 0.6 MG/DL (ref 0–1.2)
BUN SERPL-MCNC: 17 MG/DL (ref 8–27)
BUN/CREAT SERPL: 15 (ref 10–24)
CALCIUM SERPL-MCNC: 9.3 MG/DL (ref 8.6–10.2)
CHLORIDE SERPL-SCNC: 102 MMOL/L (ref 96–106)
CHOLEST SERPL-MCNC: 134 MG/DL (ref 100–199)
CO2 SERPL-SCNC: 24 MMOL/L (ref 20–29)
CREAT SERPL-MCNC: 1.1 MG/DL (ref 0.76–1.27)
EGFRCR SERPLBLD CKD-EPI 2021: 70 ML/MIN/1.73
GLOBULIN SER CALC-MCNC: 2.8 G/DL (ref 1.5–4.5)
GLUCOSE SERPL-MCNC: 107 MG/DL (ref 65–99)
HBA1C MFR BLD: 6.1 % (ref 4.8–5.6)
HDLC SERPL-MCNC: 38 MG/DL
LDLC SERPL CALC-MCNC: 71 MG/DL (ref 0–99)
LDLC/HDLC SERPL: 1.9 RATIO (ref 0–3.6)
POTASSIUM SERPL-SCNC: 4.5 MMOL/L (ref 3.5–5.2)
PROT SERPL-MCNC: 6.9 G/DL (ref 6–8.5)
SODIUM SERPL-SCNC: 139 MMOL/L (ref 134–144)
TRIGL SERPL-MCNC: 141 MG/DL (ref 0–149)
VLDLC SERPL CALC-MCNC: 25 MG/DL (ref 5–40)

## 2022-07-19 ENCOUNTER — OFFICE VISIT (OUTPATIENT)
Dept: FAMILY MEDICINE CLINIC | Facility: CLINIC | Age: 72
End: 2022-07-19

## 2022-07-19 VITALS
HEART RATE: 48 BPM | WEIGHT: 186.4 LBS | BODY MASS INDEX: 27.61 KG/M2 | OXYGEN SATURATION: 99 % | DIASTOLIC BLOOD PRESSURE: 68 MMHG | TEMPERATURE: 97.5 F | HEIGHT: 69 IN | SYSTOLIC BLOOD PRESSURE: 126 MMHG

## 2022-07-19 DIAGNOSIS — I10 PRIMARY HYPERTENSION: ICD-10-CM

## 2022-07-19 DIAGNOSIS — R73.9 HYPERGLYCEMIA: ICD-10-CM

## 2022-07-19 DIAGNOSIS — E78.5 HYPERLIPIDEMIA, UNSPECIFIED HYPERLIPIDEMIA TYPE: Primary | ICD-10-CM

## 2022-07-19 PROCEDURE — 99214 OFFICE O/P EST MOD 30 MIN: CPT | Performed by: INTERNAL MEDICINE

## 2022-07-19 NOTE — PROGRESS NOTES
Subjective   North Carcamo is a 72 y.o. male. Patient is here today for follow-up on his hypertension, hyperlipidemia, hyperglycemia and history of prostate and bladder cancer.  He is followed by urology and has been stable.  He feels well and has no acute complaints.  He does get some intermittent noises in his right ear but no pain.  Hearing overall is okay.    Chief Complaint   Patient presents with   • Hypertension          Vitals:    07/19/22 1004   BP: 126/68   Pulse: (!) 48   Temp: 97.5 °F (36.4 °C)   SpO2: 99%     Body mass index is 27.53 kg/m².  The following portions of the patient's history were reviewed and updated as appropriate: allergies, current medications, past family history, past medical history, past social history, past surgical history and problem list.    Past Medical History:   Diagnosis Date   • Cancer (HCC)     PROSTATE   • Hyperglycemia    • Hyperlipidemia    • Hypertension       Allergies   Allergen Reactions   • Penicillins Rash      Social History     Socioeconomic History   • Marital status:    Tobacco Use   • Smoking status: Never Smoker   • Smokeless tobacco: Never Used   Vaping Use   • Vaping Use: Never used   Substance and Sexual Activity   • Alcohol use: Yes     Comment: MINIMUM CONSUMPTION   • Drug use: No   • Sexual activity: Defer        Current Outpatient Medications:   •  Calcium Carbonate-Vitamin D (CALCIUM 600+D PO), Take 1 tablet by mouth Daily., Disp: , Rfl:   •  lisinopril-hydrochlorothiazide (PRINZIDE,ZESTORETIC) 20-12.5 MG per tablet, TAKE 1 TABLET BY MOUTH DAILY, Disp: 90 tablet, Rfl: 3  •  pravastatin (PRAVACHOL) 40 MG tablet, TAKE 1 TABLET BY MOUTH EVERY NIGHT, Disp: 90 tablet, Rfl: 3     Objective     History of Present Illness     Review of Systems    Physical Exam  Vitals and nursing note reviewed.   Constitutional:       General: He is not in acute distress.     Appearance: Normal appearance. He is not ill-appearing.   HENT:      Head:      Comments:  Some cerumen deep in the right auditory canal     Right Ear: Ear canal and external ear normal.   Cardiovascular:      Rate and Rhythm: Normal rate and regular rhythm.      Heart sounds: Normal heart sounds.   Pulmonary:      Effort: Pulmonary effort is normal. No respiratory distress.      Breath sounds: Normal breath sounds. No wheezing or rales.   Musculoskeletal:         General: Normal range of motion.   Skin:     General: Skin is warm and dry.   Neurological:      General: No focal deficit present.      Mental Status: He is alert and oriented to person, place, and time.   Psychiatric:         Mood and Affect: Mood normal.         Behavior: Behavior normal.         ASSESSMENT CMP has a sugar of 107 and is otherwise normal and hemoglobin A1c continues stable at 6.1.  Lipid panel has total cholesterol 134, HDL 38, LDL 71.  #1-hypertension controlled on medication  #2-hyperlipidemia, controlled on medication  #3-hyperglycemia, stable with stable acceptable hemoglobin A1c, diet controlled  #4-mild cerumen in the right auditory canal we will try over-the-counter earwax kit  #5-history of prostate and bladder cancer, followed by urology and stable       Problems Addressed this Visit        Cardiac and Vasculature    Hyperlipidemia - Primary    Hypertension       Endocrine and Metabolic    Hyperglycemia      Diagnoses       Codes Comments    Hyperlipidemia, unspecified hyperlipidemia type    -  Primary ICD-10-CM: E78.5  ICD-9-CM: 272.4     Primary hypertension     ICD-10-CM: I10  ICD-9-CM: 401.9     Hyperglycemia     ICD-10-CM: R73.9  ICD-9-CM: 790.29           PLAN patient will continue current medicines as now.  I plan on rechecking him in 6 months with laboratory studies    There are no Patient Instructions on file for this visit.  No follow-ups on file.

## 2022-10-27 DIAGNOSIS — E78.5 HYPERLIPIDEMIA, UNSPECIFIED HYPERLIPIDEMIA TYPE: ICD-10-CM

## 2022-10-27 RX ORDER — PRAVASTATIN SODIUM 40 MG
40 TABLET ORAL NIGHTLY
Qty: 90 TABLET | Refills: 3 | Status: SHIPPED | OUTPATIENT
Start: 2022-10-27

## 2022-10-27 RX ORDER — LISINOPRIL AND HYDROCHLOROTHIAZIDE 20; 12.5 MG/1; MG/1
1 TABLET ORAL DAILY
Qty: 90 TABLET | Refills: 3 | Status: SHIPPED | OUTPATIENT
Start: 2022-10-27

## 2023-01-31 ENCOUNTER — OFFICE VISIT (OUTPATIENT)
Dept: FAMILY MEDICINE CLINIC | Facility: CLINIC | Age: 73
End: 2023-01-31
Payer: MEDICARE

## 2023-01-31 VITALS
SYSTOLIC BLOOD PRESSURE: 112 MMHG | DIASTOLIC BLOOD PRESSURE: 78 MMHG | WEIGHT: 188 LBS | TEMPERATURE: 97.5 F | BODY MASS INDEX: 27.85 KG/M2 | OXYGEN SATURATION: 97 % | RESPIRATION RATE: 16 BRPM | HEIGHT: 69 IN | HEART RATE: 105 BPM

## 2023-01-31 DIAGNOSIS — E78.5 HYPERLIPIDEMIA, UNSPECIFIED HYPERLIPIDEMIA TYPE: Primary | ICD-10-CM

## 2023-01-31 DIAGNOSIS — I10 PRIMARY HYPERTENSION: ICD-10-CM

## 2023-01-31 DIAGNOSIS — R73.9 HYPERGLYCEMIA: ICD-10-CM

## 2023-01-31 PROCEDURE — 99214 OFFICE O/P EST MOD 30 MIN: CPT | Performed by: INTERNAL MEDICINE

## 2023-01-31 RX ORDER — A/SINGAPORE/GP1908/2015 IVR-180 (AN A/MICHIGAN/45/2015 (H1N1)PDM09-LIKE VIRUS, A/HONG KONG/4801/2014, NYMC X-263B (H3N2) (AN A/HONG KONG/4801/2014-LIKE VIRUS), AND B/BRISBANE/60/2008, WILD TYPE (A B/BRISBANE/60/2008-LIKE VIRUS) 15; 15; 15 UG/.5ML; UG/.5ML; UG/.5ML
INJECTION, SUSPENSION INTRAMUSCULAR
COMMUNITY
Start: 2022-12-16

## 2023-01-31 RX ORDER — BNT162B2 ORIGINAL AND OMICRON BA.4/BA.5 .1125; .1125 MG/2.25ML; MG/2.25ML
INJECTION, SUSPENSION INTRAMUSCULAR
COMMUNITY
Start: 2022-12-16

## 2023-01-31 NOTE — PROGRESS NOTES
Subjective   North Carcamo is a 72 y.o. male. Patient is here today for follow-up on his hypertension, hyperlipidemia, hyperglycemia and history of prostate and bladder cancer.  He is stable and recently saw the urologist.  He is generally feeling well  Chief Complaint   Patient presents with   • Hyperlipidemia          Vitals:    01/31/23 0855   BP: 112/78   Pulse: 105   Resp: 16   Temp: 97.5 °F (36.4 °C)   SpO2: 97%     Body mass index is 27.75 kg/m².  The following portions of the patient's history were reviewed and updated as appropriate: allergies, current medications, past family history, past medical history, past social history, past surgical history and problem list.    Past Medical History:   Diagnosis Date   • Cancer (HCC)     PROSTATE   • Hyperglycemia    • Hyperlipidemia    • Hypertension       Allergies   Allergen Reactions   • Penicillins Rash      Social History     Socioeconomic History   • Marital status:    Tobacco Use   • Smoking status: Never   • Smokeless tobacco: Never   Vaping Use   • Vaping Use: Never used   Substance and Sexual Activity   • Alcohol use: Yes     Comment: MINIMUM CONSUMPTION   • Drug use: No   • Sexual activity: Defer        Current Outpatient Medications:   •  Calcium Carbonate-Vitamin D (CALCIUM 600+D PO), Take 1 tablet by mouth Daily., Disp: , Rfl:   •  lisinopril-hydrochlorothiazide (PRINZIDE,ZESTORETIC) 20-12.5 MG per tablet, TAKE 1 TABLET BY MOUTH DAILY, Disp: 90 tablet, Rfl: 3  •  pravastatin (PRAVACHOL) 40 MG tablet, TAKE 1 TABLET BY MOUTH EVERY NIGHT, Disp: 90 tablet, Rfl: 3  •  Fluad Quadrivalent 0.5 ML prefilled syringe injection, , Disp: , Rfl:   •  Pfizer COVID-19 Vac Bivalent 30 MCG/0.3ML suspension, , Disp: , Rfl:      Objective     History of Present Illness     Review of Systems    Physical Exam  Vitals and nursing note reviewed.   Constitutional:       General: He is not in acute distress.     Appearance: Normal appearance. He is not ill-appearing.    HENT:      Head: Normocephalic and atraumatic.   Cardiovascular:      Rate and Rhythm: Normal rate and regular rhythm.      Heart sounds: Normal heart sounds.   Pulmonary:      Effort: Pulmonary effort is normal. No respiratory distress.      Breath sounds: Normal breath sounds. No wheezing or rales.   Skin:     General: Skin is warm and dry.   Neurological:      General: No focal deficit present.      Mental Status: He is alert and oriented to person, place, and time.   Psychiatric:         Mood and Affect: Mood normal.         Behavior: Behavior normal.         ASSESSMENT CBC is normal.  CMP had a sugar of 108 and was otherwise normal and hemoglobin A1c continues stable at 6.0.  Lipid panel has total cholesterol 154, HDL 50, LDL 81 and TSH is normal.  #1-hypertension controlled on medication  #2-hyperlipidemia controlled on medication  #3-hyperglycemia with stable acceptable hemoglobin A1c, diet controlled  #4-history of prostate and bladder cancer, asymptomatic and followed by urology         Problems Addressed this Visit        Cardiac and Vasculature    Hyperlipidemia - Primary    Hypertension       Endocrine and Metabolic    Hyperglycemia   Diagnoses       Codes Comments    Hyperlipidemia, unspecified hyperlipidemia type    -  Primary ICD-10-CM: E78.5  ICD-9-CM: 272.4     Primary hypertension     ICD-10-CM: I10  ICD-9-CM: 401.9     Hyperglycemia     ICD-10-CM: R73.9  ICD-9-CM: 790.29           PLAN I want the patient to complete the Cologuard test that he has.  He will continue current medicines as now.  I plan on rechecking him in 6 months with a wellness visit and a CMP, lipid panel, hemoglobin A1c    There are no Patient Instructions on file for this visit.  Return in about 6 months (around 7/31/2023) for with labs.

## 2023-08-01 ENCOUNTER — APPOINTMENT (OUTPATIENT)
Dept: GENERAL RADIOLOGY | Facility: HOSPITAL | Age: 73
DRG: 871 | End: 2023-08-01
Payer: MEDICARE

## 2023-08-01 ENCOUNTER — HOSPITAL ENCOUNTER (EMERGENCY)
Facility: HOSPITAL | Age: 73
Discharge: HOME | DRG: 871 | End: 2023-08-01
Attending: EMERGENCY MEDICINE | Admitting: EMERGENCY MEDICINE
Payer: MEDICARE

## 2023-08-01 VITALS
TEMPERATURE: 99.3 F | SYSTOLIC BLOOD PRESSURE: 101 MMHG | WEIGHT: 180 LBS | HEART RATE: 60 BPM | HEIGHT: 69 IN | BODY MASS INDEX: 26.66 KG/M2 | RESPIRATION RATE: 18 BRPM | OXYGEN SATURATION: 93 % | DIASTOLIC BLOOD PRESSURE: 59 MMHG

## 2023-08-01 DIAGNOSIS — R50.9 FEVER AND CHILLS: Primary | ICD-10-CM

## 2023-08-01 DIAGNOSIS — D72.829 LEUKOCYTOSIS, UNSPECIFIED TYPE: ICD-10-CM

## 2023-08-01 LAB
ALBUMIN SERPL-MCNC: 4.1 G/DL (ref 3.5–5.2)
ALBUMIN/GLOB SERPL: 1.8 G/DL
ALP SERPL-CCNC: 81 U/L (ref 39–117)
ALT SERPL W P-5'-P-CCNC: 17 U/L (ref 1–41)
ANION GAP SERPL CALCULATED.3IONS-SCNC: 11 MMOL/L (ref 5–15)
AST SERPL-CCNC: 16 U/L (ref 1–40)
B PARAPERT DNA SPEC QL NAA+PROBE: NOT DETECTED
B PERT DNA SPEC QL NAA+PROBE: NOT DETECTED
BASOPHILS # BLD AUTO: 0.02 10*3/MM3 (ref 0–0.2)
BASOPHILS # BLD AUTO: 0.03 10*3/MM3 (ref 0–0.2)
BASOPHILS NFR BLD AUTO: 0.1 % (ref 0–1.5)
BASOPHILS NFR BLD AUTO: 0.2 % (ref 0–1.5)
BILIRUB SERPL-MCNC: 1 MG/DL (ref 0–1.2)
BILIRUB UR QL STRIP: NEGATIVE
BUN SERPL-MCNC: 13 MG/DL (ref 8–23)
BUN/CREAT SERPL: 10.7 (ref 7–25)
C PNEUM DNA NPH QL NAA+NON-PROBE: NOT DETECTED
CALCIUM SPEC-SCNC: 9.3 MG/DL (ref 8.6–10.5)
CHLORIDE SERPL-SCNC: 101 MMOL/L (ref 98–107)
CK SERPL-CCNC: 64 U/L (ref 20–200)
CLARITY UR: ABNORMAL
CO2 SERPL-SCNC: 25 MMOL/L (ref 22–29)
COLOR UR: YELLOW
CREAT SERPL-MCNC: 1.21 MG/DL (ref 0.76–1.27)
D-LACTATE SERPL-SCNC: 1.1 MMOL/L (ref 0.5–2)
D-LACTATE SERPL-SCNC: 2.3 MMOL/L (ref 0.5–2)
DEPRECATED RDW RBC AUTO: 42.4 FL (ref 37–54)
DEPRECATED RDW RBC AUTO: 43.2 FL (ref 37–54)
EGFRCR SERPLBLD CKD-EPI 2021: 62.4 ML/MIN/1.73
EOSINOPHIL # BLD AUTO: 0.01 10*3/MM3 (ref 0–0.4)
EOSINOPHIL # BLD AUTO: 0.03 10*3/MM3 (ref 0–0.4)
EOSINOPHIL NFR BLD AUTO: 0.1 % (ref 0.3–6.2)
EOSINOPHIL NFR BLD AUTO: 0.2 % (ref 0.3–6.2)
ERYTHROCYTE [DISTWIDTH] IN BLOOD BY AUTOMATED COUNT: 12.8 % (ref 12.3–15.4)
ERYTHROCYTE [DISTWIDTH] IN BLOOD BY AUTOMATED COUNT: 13 % (ref 12.3–15.4)
FLUAV SUBTYP SPEC NAA+PROBE: NOT DETECTED
FLUBV RNA ISLT QL NAA+PROBE: NOT DETECTED
GLOBULIN UR ELPH-MCNC: 2.3 GM/DL
GLUCOSE SERPL-MCNC: 142 MG/DL (ref 65–99)
GLUCOSE UR STRIP-MCNC: NEGATIVE MG/DL
HADV DNA SPEC NAA+PROBE: NOT DETECTED
HCOV 229E RNA SPEC QL NAA+PROBE: NOT DETECTED
HCOV HKU1 RNA SPEC QL NAA+PROBE: NOT DETECTED
HCOV NL63 RNA SPEC QL NAA+PROBE: NOT DETECTED
HCOV OC43 RNA SPEC QL NAA+PROBE: NOT DETECTED
HCT VFR BLD AUTO: 40.6 % (ref 37.5–51)
HCT VFR BLD AUTO: 40.7 % (ref 37.5–51)
HGB BLD-MCNC: 14.3 G/DL (ref 13–17.7)
HGB BLD-MCNC: 14.3 G/DL (ref 13–17.7)
HGB UR QL STRIP.AUTO: NEGATIVE
HMPV RNA NPH QL NAA+NON-PROBE: NOT DETECTED
HPIV1 RNA ISLT QL NAA+PROBE: NOT DETECTED
HPIV2 RNA SPEC QL NAA+PROBE: NOT DETECTED
HPIV3 RNA NPH QL NAA+PROBE: NOT DETECTED
HPIV4 P GENE NPH QL NAA+PROBE: NOT DETECTED
IMM GRANULOCYTES # BLD AUTO: 0.08 10*3/MM3 (ref 0–0.05)
IMM GRANULOCYTES # BLD AUTO: 0.09 10*3/MM3 (ref 0–0.05)
IMM GRANULOCYTES NFR BLD AUTO: 0.5 % (ref 0–0.5)
IMM GRANULOCYTES NFR BLD AUTO: 0.5 % (ref 0–0.5)
KETONES UR QL STRIP: NEGATIVE
LEUKOCYTE ESTERASE UR QL STRIP.AUTO: NEGATIVE
LYMPHOCYTES # BLD AUTO: 0.32 10*3/MM3 (ref 0.7–3.1)
LYMPHOCYTES # BLD AUTO: 0.53 10*3/MM3 (ref 0.7–3.1)
LYMPHOCYTES NFR BLD AUTO: 1.9 % (ref 19.6–45.3)
LYMPHOCYTES NFR BLD AUTO: 3.1 % (ref 19.6–45.3)
M PNEUMO IGG SER IA-ACNC: NOT DETECTED
MCH RBC QN AUTO: 32.1 PG (ref 26.6–33)
MCH RBC QN AUTO: 32.1 PG (ref 26.6–33)
MCHC RBC AUTO-ENTMCNC: 35.1 G/DL (ref 31.5–35.7)
MCHC RBC AUTO-ENTMCNC: 35.2 G/DL (ref 31.5–35.7)
MCV RBC AUTO: 91.2 FL (ref 79–97)
MCV RBC AUTO: 91.5 FL (ref 79–97)
MONOCYTES # BLD AUTO: 0.87 10*3/MM3 (ref 0.1–0.9)
MONOCYTES # BLD AUTO: 1.39 10*3/MM3 (ref 0.1–0.9)
MONOCYTES NFR BLD AUTO: 5 % (ref 5–12)
MONOCYTES NFR BLD AUTO: 8.2 % (ref 5–12)
NEUTROPHILS NFR BLD AUTO: 14.91 10*3/MM3 (ref 1.7–7)
NEUTROPHILS NFR BLD AUTO: 15.94 10*3/MM3 (ref 1.7–7)
NEUTROPHILS NFR BLD AUTO: 87.9 % (ref 42.7–76)
NEUTROPHILS NFR BLD AUTO: 92.3 % (ref 42.7–76)
NITRITE UR QL STRIP: NEGATIVE
NRBC BLD AUTO-RTO: 0 /100 WBC (ref 0–0.2)
NRBC BLD AUTO-RTO: 0 /100 WBC (ref 0–0.2)
PH UR STRIP.AUTO: 6 [PH] (ref 5–8)
PLATELET # BLD AUTO: 211 10*3/MM3 (ref 140–450)
PLATELET # BLD AUTO: 213 10*3/MM3 (ref 140–450)
PMV BLD AUTO: 10.5 FL (ref 6–12)
PMV BLD AUTO: 10.8 FL (ref 6–12)
POTASSIUM SERPL-SCNC: 3.9 MMOL/L (ref 3.5–5.2)
PROCALCITONIN SERPL-MCNC: 0.29 NG/ML (ref 0–0.25)
PROT SERPL-MCNC: 6.4 G/DL (ref 6–8.5)
PROT UR QL STRIP: ABNORMAL
RBC # BLD AUTO: 4.45 10*6/MM3 (ref 4.14–5.8)
RBC # BLD AUTO: 4.45 10*6/MM3 (ref 4.14–5.8)
RHINOVIRUS RNA SPEC NAA+PROBE: NOT DETECTED
RSV RNA NPH QL NAA+NON-PROBE: NOT DETECTED
SARS-COV-2 RNA NPH QL NAA+NON-PROBE: NOT DETECTED
SODIUM SERPL-SCNC: 137 MMOL/L (ref 136–145)
SP GR UR STRIP: 1.02 (ref 1–1.03)
UROBILINOGEN UR QL STRIP: ABNORMAL
WBC NRBC COR # BLD: 16.95 10*3/MM3 (ref 3.4–10.8)
WBC NRBC COR # BLD: 17.27 10*3/MM3 (ref 3.4–10.8)

## 2023-08-01 PROCEDURE — 25010000002 ONDANSETRON PER 1 MG: Performed by: EMERGENCY MEDICINE

## 2023-08-01 PROCEDURE — 99283 EMERGENCY DEPT VISIT LOW MDM: CPT

## 2023-08-01 PROCEDURE — 87186 SC STD MICRODIL/AGAR DIL: CPT | Performed by: EMERGENCY MEDICINE

## 2023-08-01 PROCEDURE — 36415 COLL VENOUS BLD VENIPUNCTURE: CPT

## 2023-08-01 PROCEDURE — 71045 X-RAY EXAM CHEST 1 VIEW: CPT

## 2023-08-01 PROCEDURE — 87076 CULTURE ANAEROBE IDENT EACH: CPT | Performed by: EMERGENCY MEDICINE

## 2023-08-01 PROCEDURE — 0202U NFCT DS 22 TRGT SARS-COV-2: CPT | Performed by: EMERGENCY MEDICINE

## 2023-08-01 PROCEDURE — 87185 SC STD ENZYME DETCJ PER NZM: CPT | Performed by: EMERGENCY MEDICINE

## 2023-08-01 PROCEDURE — 81003 URINALYSIS AUTO W/O SCOPE: CPT | Performed by: EMERGENCY MEDICINE

## 2023-08-01 PROCEDURE — 96374 THER/PROPH/DIAG INJ IV PUSH: CPT

## 2023-08-01 PROCEDURE — 87150 DNA/RNA AMPLIFIED PROBE: CPT | Performed by: EMERGENCY MEDICINE

## 2023-08-01 PROCEDURE — 85025 COMPLETE CBC W/AUTO DIFF WBC: CPT | Performed by: EMERGENCY MEDICINE

## 2023-08-01 PROCEDURE — 84145 PROCALCITONIN (PCT): CPT | Performed by: EMERGENCY MEDICINE

## 2023-08-01 PROCEDURE — 87077 CULTURE AEROBIC IDENTIFY: CPT | Performed by: EMERGENCY MEDICINE

## 2023-08-01 PROCEDURE — 83605 ASSAY OF LACTIC ACID: CPT | Performed by: EMERGENCY MEDICINE

## 2023-08-01 PROCEDURE — 82550 ASSAY OF CK (CPK): CPT | Performed by: EMERGENCY MEDICINE

## 2023-08-01 PROCEDURE — 87040 BLOOD CULTURE FOR BACTERIA: CPT | Performed by: EMERGENCY MEDICINE

## 2023-08-01 PROCEDURE — 80053 COMPREHEN METABOLIC PANEL: CPT | Performed by: EMERGENCY MEDICINE

## 2023-08-01 RX ORDER — ONDANSETRON 2 MG/ML
4 INJECTION INTRAMUSCULAR; INTRAVENOUS ONCE
Status: COMPLETED | OUTPATIENT
Start: 2023-08-01 | End: 2023-08-01

## 2023-08-01 RX ORDER — LEVOFLOXACIN 750 MG/1
750 TABLET ORAL DAILY
Qty: 7 TABLET | Refills: 0 | Status: ON HOLD | OUTPATIENT
Start: 2023-08-01 | End: 2023-08-02

## 2023-08-01 RX ORDER — ACETAMINOPHEN 500 MG
1000 TABLET ORAL ONCE
Status: COMPLETED | OUTPATIENT
Start: 2023-08-01 | End: 2023-08-01

## 2023-08-01 RX ADMIN — SODIUM CHLORIDE 1000 ML: 9 INJECTION, SOLUTION INTRAVENOUS at 21:19

## 2023-08-01 RX ADMIN — ONDANSETRON 4 MG: 2 INJECTION INTRAMUSCULAR; INTRAVENOUS at 17:59

## 2023-08-01 RX ADMIN — SODIUM CHLORIDE 1000 ML: 9 INJECTION, SOLUTION INTRAVENOUS at 17:54

## 2023-08-01 RX ADMIN — ACETAMINOPHEN 1000 MG: 500 TABLET, FILM COATED ORAL at 17:58

## 2023-08-01 NOTE — ED PROVIDER NOTES
EMERGENCY DEPARTMENT ENCOUNTER    Room Number:  32/32  Date seen:  8/2/2023  PCP: Pranav Guzman MD  Historian: Patient  Relevant information provided by sources other than the patient, review of external records, and social determinants of health may be included in the HPI section.      HPI:  Chief Complaint: Fever and chills  Additional historical features obtained directly from: No one  Context: [place] is a 73 y.o. male who presents to the ED c/o fever and chills today with a temperature of 103 along with shaking chills.  Patient had been outside playing top golf, but it was climate controlled and not particularly hot.  There was some discussion that perhaps heat exposure was part of this, but I really do not think it was.  Patient was also around someone about 3 days ago that had 103 fever and upper respiratory symptoms.  He does have a little bit of a cough, but otherwise no symptoms.  He did vomit 1        Initial impression including social determinants which will impact the assessment patient is well-appearing, and is not clear if this was a primary fever or if that was heat related or possibly combination of the 2.  He already feels much better after sitting down          PAST MEDICAL HISTORY  Active Ambulatory Problems     Diagnosis Date Noted    Hyperglycemia 01/20/2016    Hyperlipidemia 01/20/2016    Hypertension 01/20/2016    Prostate cancer 02/27/2019    Bladder cancer 06/12/2019     Resolved Ambulatory Problems     Diagnosis Date Noted    No Resolved Ambulatory Problems     Past Medical History:   Diagnosis Date    Cancer          PAST SURGICAL HISTORY  Past Surgical History:   Procedure Laterality Date    CYSTOSCOPY BLADDER BIOPSY N/A 6/12/2019    Procedure: CYSTOSCOPY TUR BLADDER TUMOR;  Surgeon: Joel Russell MD;  Location: [facility];  Service: Urology    ELBOW PROCEDURE      PROSTATE SURGERY      TONSILLECTOMY      VASECTOMY           FAMILY HISTORY  Family History    Problem Relation Age of Onset    Hypertension Mother     Hypertension Father     Prostate cancer Father     Malig Hyperthermia Neg Hx          SOCIAL HISTORY  Social History     Socioeconomic History    Marital status:    Tobacco Use    Smoking status: Never    Smokeless tobacco: Never   Vaping Use    Vaping Use: Never used   Substance and Sexual Activity    Alcohol use: Yes     Comment: MINIMUM CONSUMPTION    Drug use: No    Sexual activity: Defer         ALLERGIES  Penicillins          PHYSICAL EXAM  ED Triage Vitals [08/01/23 1638]   Temp Heart Rate Resp BP SpO2   (!) 101.3 øF (38.5 øC) 82 18 127/64 96 %      Temp src Heart Rate Source Patient Position BP Location FiO2 (%)   Oral Monitor -- -- --         Physical Exam      GENERAL: Awake and alert, nontoxic-appearing  HENT: nares patent  EYES: no scleral icterus, PERRL, EOMI  CV: regular rhythm, normal rate, distal pulses symmetric and palpable  RESPIRATORY: normal effort, CTA bilaterally with no distress  ABDOMEN: soft, nondistended nontender with normal bowel sound  MUSCULOSKELETAL: no deformity  NEURO: alert, moves all extremities, follows commands  PSYCH:  calm, cooperative  SKIN: warm, dry with no rash        LAB RESULTS  Recent Results (from the past 24 hour(s))   Comprehensive Metabolic Panel    Collection Time: 08/01/23  5:30 PM    Specimen: Arm, Right; Blood   Result Value Ref Range    Glucose 142 (H) 65 - 99 mg/dL    BUN 13 8 - 23 mg/dL    Creatinine 1.21 0.76 - 1.27 mg/dL    Sodium 137 136 - 145 mmol/L    Potassium 3.9 3.5 - 5.2 mmol/L    Chloride 101 98 - 107 mmol/L    CO2 25.0 22.0 - 29.0 mmol/L    Calcium 9.3 8.6 - 10.5 mg/dL    Total Protein 6.4 6.0 - 8.5 g/dL    Albumin 4.1 3.5 - 5.2 g/dL    ALT (SGPT) 17 1 - 41 U/L    AST (SGOT) 16 1 - 40 U/L    Alkaline Phosphatase 81 39 - 117 U/L    Total Bilirubin 1.0 0.0 - 1.2 mg/dL    Globulin 2.3 gm/dL    A/G Ratio 1.8 g/dL    BUN/Creatinine Ratio 10.7 7.0 - 25.0    Anion Gap 11.0 5.0 - 15.0 mmol/L     eGFR 62.4 >60.0 mL/min/1.73   CK    Collection Time: 08/01/23  5:30 PM    Specimen: Arm, Right; Blood   Result Value Ref Range    Creatine Kinase 64 20 - 200 U/L   Lactic Acid, Plasma    Collection Time: 08/01/23  5:30 PM    Specimen: Arm, Right; Blood   Result Value Ref Range    Lactate 2.3 (C) 0.5 - 2.0 mmol/L   Procalcitonin    Collection Time: 08/01/23  5:30 PM    Specimen: Arm, Right; Blood   Result Value Ref Range    Procalcitonin 0.29 (H) 0.00 - 0.25 ng/mL   CBC Auto Differential    Collection Time: 08/01/23  5:30 PM    Specimen: Arm, Right; Blood   Result Value Ref Range    WBC 17.27 (H) 3.40 - 10.80 10*3/mm3    RBC 4.45 4.14 - 5.80 10*6/mm3    Hemoglobin 14.3 13.0 - 17.7 g/dL    Hematocrit 40.7 37.5 - 51.0 %    MCV 91.5 79.0 - 97.0 fL    MCH 32.1 26.6 - 33.0 pg    MCHC 35.1 31.5 - 35.7 g/dL    RDW 12.8 12.3 - 15.4 %    RDW-SD 42.4 37.0 - 54.0 fl    MPV 10.5 6.0 - 12.0 fL    Platelets 213 140 - 450 10*3/mm3    Neutrophil % 92.3 (H) 42.7 - 76.0 %    Lymphocyte % 1.9 (L) 19.6 - 45.3 %    Monocyte % 5.0 5.0 - 12.0 %    Eosinophil % 0.2 (L) 0.3 - 6.2 %    Basophil % 0.1 0.0 - 1.5 %    Immature Grans % 0.5 0.0 - 0.5 %    Neutrophils, Absolute 15.94 (H) 1.70 - 7.00 10*3/mm3    Lymphocytes, Absolute 0.32 (L) 0.70 - 3.10 10*3/mm3    Monocytes, Absolute 0.87 0.10 - 0.90 10*3/mm3    Eosinophils, Absolute 0.03 0.00 - 0.40 10*3/mm3    Basophils, Absolute 0.02 0.00 - 0.20 10*3/mm3    Immature Grans, Absolute 0.09 (H) 0.00 - 0.05 10*3/mm3    nRBC 0.0 0.0 - 0.2 /100 WBC   Urinalysis With Microscopic If Indicated (No Culture) - Urine, Clean Catch    Collection Time: 08/01/23  5:45 PM    Specimen: Urine, Clean Catch   Result Value Ref Range    Color, UA Yellow Yellow, Straw    Appearance, UA Cloudy (A) Clear    pH, UA 6.0 5.0 - 8.0    Specific Gravity, UA 1.016 1.005 - 1.030    Glucose, UA Negative Negative    Ketones, UA Negative Negative    Bilirubin, UA Negative Negative    Blood, UA Negative Negative    Protein, UA  Trace (A) Negative    Leuk Esterase, UA Negative Negative    Nitrite, UA Negative Negative    Urobilinogen, UA 0.2 E.U./dL 0.2 - 1.0 E.U./dL   Blood Culture - Blood, Arm, Right    Collection Time: 08/01/23  5:46 PM    Specimen: Arm, Right; Blood   Result Value Ref Range    Blood Culture Abnormal Stain (C)     Gram Stain Aerobic Bottle Gram negative bacilli (C)    Blood Culture ID, PCR - Blood, Arm, Right    Collection Time: 08/01/23  5:46 PM    Specimen: Arm, Right; Blood   Result Value Ref Range    BCID, PCR Escherichia coli. Identification by BCID2 PCR. (A) Negative by BCID PCR. Culture to Follow.    BOTTLE TYPE Aerobic Bottle    Respiratory Panel PCR w/COVID-19(SARS-CoV-2) ANUP/STEPHAN/YOSEPH/PAD/COR/MAD/ELEUTERIO In-House, NP Swab in UTM/VTM, 3-4 HR TAT - Swab, Nasopharynx    Collection Time: 08/01/23  6:27 PM    Specimen: Nasopharynx; Swab   Result Value Ref Range    ADENOVIRUS, PCR Not Detected Not Detected    Coronavirus 229E Not Detected Not Detected    Coronavirus HKU1 Not Detected Not Detected    Coronavirus NL63 Not Detected Not Detected    Coronavirus OC43 Not Detected Not Detected    COVID19 Not Detected Not Detected - Ref. Range    Human Metapneumovirus Not Detected Not Detected    Human Rhinovirus/Enterovirus Not Detected Not Detected    Influenza A PCR Not Detected Not Detected    Influenza B PCR Not Detected Not Detected    Parainfluenza Virus 1 Not Detected Not Detected    Parainfluenza Virus 2 Not Detected Not Detected    Parainfluenza Virus 3 Not Detected Not Detected    Parainfluenza Virus 4 Not Detected Not Detected    RSV, PCR Not Detected Not Detected    Bordetella pertussis pcr Not Detected Not Detected    Bordetella parapertussis PCR Not Detected Not Detected    Chlamydophila pneumoniae PCR Not Detected Not Detected    Mycoplasma pneumo by PCR Not Detected Not Detected   STAT Lactic Acid, Reflex    Collection Time: 08/01/23  8:36 PM    Specimen: Blood   Result Value Ref Range    Lactate 1.1 0.5 - 2.0  "mmol/L   CBC Auto Differential    Collection Time: 08/01/23  9:16 PM    Specimen: Blood   Result Value Ref Range    WBC 16.95 (H) 3.40 - 10.80 10*3/mm3    RBC 4.45 4.14 - 5.80 10*6/mm3    Hemoglobin 14.3 13.0 - 17.7 g/dL    Hematocrit 40.6 37.5 - 51.0 %    MCV 91.2 79.0 - 97.0 fL    MCH 32.1 26.6 - 33.0 pg    MCHC 35.2 31.5 - 35.7 g/dL    RDW 13.0 12.3 - 15.4 %    RDW-SD 43.2 37.0 - 54.0 fl    MPV 10.8 6.0 - 12.0 fL    Platelets 211 140 - 450 10*3/mm3    Neutrophil % 87.9 (H) 42.7 - 76.0 %    Lymphocyte % 3.1 (L) 19.6 - 45.3 %    Monocyte % 8.2 5.0 - 12.0 %    Eosinophil % 0.1 (L) 0.3 - 6.2 %    Basophil % 0.2 0.0 - 1.5 %    Immature Grans % 0.5 0.0 - 0.5 %    Neutrophils, Absolute 14.91 (H) 1.70 - 7.00 10*3/mm3    Lymphocytes, Absolute 0.53 (L) 0.70 - 3.10 10*3/mm3    Monocytes, Absolute 1.39 (H) 0.10 - 0.90 10*3/mm3    Eosinophils, Absolute 0.01 0.00 - 0.40 10*3/mm3    Basophils, Absolute 0.03 0.00 - 0.20 10*3/mm3    Immature Grans, Absolute 0.08 (H) 0.00 - 0.05 10*3/mm3    nRBC 0.0 0.0 - 0.2 /100 WBC       Ordered the above labs and my independent interpretation of these results are discussed in the section labeled \"ED course\" below        RADIOLOGY  XR Chest 1 View    Result Date: 8/1/2023  XR CHEST 1 VW-  08/01/2023  HISTORY: Fever.  Heart size is within normal limits. Lungs appear clear. There are minor degenerative changes in the spine. Bony structures appear otherwise unremarkable.      1. No acute process.  This report was finalized on 8/1/2023 5:52 PM by Dr. Ghassan Dobson M.D.       Ordered the above noted radiological studies.  Independently interpreted by me in PACS.  My independent interpretation of these results are discussed in the section below labeled \"ED course\"        PROCEDURES  Procedures          MEDICATIONS GIVEN IN ER  Medications   sodium chloride 0.9 % bolus 1,000 mL (0 mL Intravenous Stopped 8/1/23 1824)   acetaminophen (TYLENOL) tablet 1,000 mg (1,000 mg Oral Given 8/1/23 1758) " "  ondansetron (ZOFRAN) injection 4 mg (4 mg Intravenous Given 8/1/23 8579)   sodium chloride 0.9 % bolus 1,000 mL (0 mL Intravenous Stopped 8/1/23 2145)                   MEDICAL DECISION MAKING and INDEPENDENT INTERPRETATIONS      Everything documented below this statement is the \"ED course\" section which I have referred to above.  Everything discussed in the following section constitutes MY INDEPENDENT INTERPRETATIONS of the lab work, EKGs, and radiology studies, and all of my personal conversations with other providers, and all of my independent decision making regarding this patient are discussed below.      ED Course as of 08/02/23 1641   Wed Aug 02, 2023   1640 Moderate leukocytosis and chemistry is normal [DP]   1640 Lactate is initially 2.3 but is down to 1.1 on recollect after hydration [DP]   1641 Respiratory viral panel is negative [DP]   1641 Procalcitonin slightly elevated [DP]   1641 CK is normal and do not see rhabdomyolysis [DP]   1641 Chest x-ray was unremarkable [DP]   1641 Patient got some IV fluids and said that he felt much better afterwards.  His fever and tachycardia completely resolved, he was hemodynamically stable, eating and drinking is sitting up in bed saying he felt great [DP]   1641 I discussed with him that I do not have a source for the fever and he does still have leukocytosis after hydration but his lactic acidosis resolved and vital signs have normalized.  We agreed that he would be discharged home on antibiotics and follow-up closely with PCP [DP]      ED Course User Index  [DP] Tramaine Mancini MD         Everything documented above with this statement, in the ED course section constitutes my independent interpretation of lab work EKG and radiology studies along with my personal conversations with other providers and my independent medical decision making.  This statement will not be repeated before each data point, but they are all my independent interpretation needs contained " "within the \"ED course\" section.             All appropriate hygiene and PPE requirements were satisfied with this patient encounter      FINAL DIAGNOSES  Final diagnoses:   Fever and chills   Leukocytosis, unspecified type           DISPOSITION  Discharge          Latest Documented Vital Signs:  As of 16:41 EDT  BP- 101/59 HR- 60 Temp- 99.3 øF (37.4 øC) (Oral) O2 sat- 93%        --    Please note that portions of this were completed with a voice recognition program.       Note Disclaimer: At [facility], we believe that sharing information builds trust and better relationships. You are receiving this note because you are receiving care at [facility] or recently visited. It is possible you will see health information before a provider has talked with you about it. This kind of information can be easy to misunderstand. To help you fully understand what it      Tramaine Mancini MD  08/02/23 6873    "

## 2023-08-01 NOTE — ED NOTES
"Pt to ED from [facility] via EMS. Pt reports he had been in the shaded area [place] [employer] for about 2 hours. Pt states they went to leave when he suddenly felt very cold, developed \"the shakes\" and felt dizzy/nauseous. EMS reports on scene pt had temperature of 102.5, pt temp has gone down to 101.3 since arrival.     Pt family also told EMS that pt went to visit a family member a couple days ago who has an \"unknown infectious disease\" and the family member had an unexplained fever as well.     Pt reports all symptoms have resolved at this point and he feels \"much better.\"  "

## 2023-08-02 ENCOUNTER — HOSPITAL ENCOUNTER (INPATIENT)
Facility: HOSPITAL | Age: 73
LOS: 3 days | Discharge: HOME HEALTH | DRG: 871 | End: 2023-08-05
Attending: EMERGENCY MEDICINE | Admitting: INTERNAL MEDICINE
Payer: MEDICARE

## 2023-08-02 DIAGNOSIS — D80.4 IGM DEFICIENCY: ICD-10-CM

## 2023-08-02 DIAGNOSIS — A49.8 BACTEROIDES INFECTION: ICD-10-CM

## 2023-08-02 DIAGNOSIS — R78.81 BACTEREMIA: Primary | ICD-10-CM

## 2023-08-02 LAB
ALBUMIN SERPL-MCNC: 3.9 G/DL (ref 3.5–5.2)
ALBUMIN/GLOB SERPL: 1.4 G/DL
ALP SERPL-CCNC: 73 U/L (ref 39–117)
ALT SERPL W P-5'-P-CCNC: 24 U/L (ref 1–41)
ANION GAP SERPL CALCULATED.3IONS-SCNC: 11.6 MMOL/L (ref 5–15)
AST SERPL-CCNC: 21 U/L (ref 1–40)
BACTERIA BLD CULT: ABNORMAL
BASOPHILS # BLD AUTO: 0.02 10*3/MM3 (ref 0–0.2)
BASOPHILS NFR BLD AUTO: 0.2 % (ref 0–1.5)
BILIRUB SERPL-MCNC: 1 MG/DL (ref 0–1.2)
BOTTLE TYPE: ABNORMAL
BUN SERPL-MCNC: 13 MG/DL (ref 8–23)
BUN/CREAT SERPL: 9.8 (ref 7–25)
CALCIUM SPEC-SCNC: 8.9 MG/DL (ref 8.6–10.5)
CHLORIDE SERPL-SCNC: 100 MMOL/L (ref 98–107)
CO2 SERPL-SCNC: 26.4 MMOL/L (ref 22–29)
CREAT SERPL-MCNC: 1.32 MG/DL (ref 0.76–1.27)
D-LACTATE SERPL-SCNC: 1 MMOL/L (ref 0.5–2)
DEPRECATED RDW RBC AUTO: 41 FL (ref 37–54)
EGFRCR SERPLBLD CKD-EPI 2021: 56.2 ML/MIN/1.73
EOSINOPHIL # BLD AUTO: 0 10*3/MM3 (ref 0–0.4)
EOSINOPHIL NFR BLD AUTO: 0 % (ref 0.3–6.2)
ERYTHROCYTE [DISTWIDTH] IN BLOOD BY AUTOMATED COUNT: 12.6 % (ref 12.3–15.4)
GLOBULIN UR ELPH-MCNC: 2.7 GM/DL
GLUCOSE SERPL-MCNC: 108 MG/DL (ref 65–99)
HCT VFR BLD AUTO: 38.7 % (ref 37.5–51)
HGB BLD-MCNC: 13.7 G/DL (ref 13–17.7)
IMM GRANULOCYTES # BLD AUTO: 0.04 10*3/MM3 (ref 0–0.05)
IMM GRANULOCYTES NFR BLD AUTO: 0.5 % (ref 0–0.5)
LYMPHOCYTES # BLD AUTO: 0.54 10*3/MM3 (ref 0.7–3.1)
LYMPHOCYTES NFR BLD AUTO: 6.6 % (ref 19.6–45.3)
MAGNESIUM SERPL-MCNC: 1.7 MG/DL (ref 1.6–2.4)
MCH RBC QN AUTO: 32.1 PG (ref 26.6–33)
MCHC RBC AUTO-ENTMCNC: 35.4 G/DL (ref 31.5–35.7)
MCV RBC AUTO: 90.6 FL (ref 79–97)
MONOCYTES # BLD AUTO: 0.93 10*3/MM3 (ref 0.1–0.9)
MONOCYTES NFR BLD AUTO: 11.4 % (ref 5–12)
NEUTROPHILS NFR BLD AUTO: 6.62 10*3/MM3 (ref 1.7–7)
NEUTROPHILS NFR BLD AUTO: 81.3 % (ref 42.7–76)
NRBC BLD AUTO-RTO: 0 /100 WBC (ref 0–0.2)
PLATELET # BLD AUTO: 180 10*3/MM3 (ref 140–450)
PMV BLD AUTO: 10.6 FL (ref 6–12)
POTASSIUM SERPL-SCNC: 4 MMOL/L (ref 3.5–5.2)
PROCALCITONIN SERPL-MCNC: 3.38 NG/ML (ref 0–0.25)
PROT SERPL-MCNC: 6.6 G/DL (ref 6–8.5)
RBC # BLD AUTO: 4.27 10*6/MM3 (ref 4.14–5.8)
SODIUM SERPL-SCNC: 138 MMOL/L (ref 136–145)
WBC NRBC COR # BLD: 8.15 10*3/MM3 (ref 3.4–10.8)

## 2023-08-02 PROCEDURE — 80053 COMPREHEN METABOLIC PANEL: CPT | Performed by: EMERGENCY MEDICINE

## 2023-08-02 PROCEDURE — 25010000002 CEFTRIAXONE PER 250 MG: Performed by: EMERGENCY MEDICINE

## 2023-08-02 PROCEDURE — 99285 EMERGENCY DEPT VISIT HI MDM: CPT

## 2023-08-02 PROCEDURE — 83605 ASSAY OF LACTIC ACID: CPT | Performed by: EMERGENCY MEDICINE

## 2023-08-02 PROCEDURE — 83735 ASSAY OF MAGNESIUM: CPT | Performed by: EMERGENCY MEDICINE

## 2023-08-02 PROCEDURE — 84145 PROCALCITONIN (PCT): CPT | Performed by: EMERGENCY MEDICINE

## 2023-08-02 PROCEDURE — 85025 COMPLETE CBC W/AUTO DIFF WBC: CPT | Performed by: EMERGENCY MEDICINE

## 2023-08-02 PROCEDURE — 87040 BLOOD CULTURE FOR BACTERIA: CPT | Performed by: EMERGENCY MEDICINE

## 2023-08-02 RX ORDER — ACETAMINOPHEN 500 MG
1000 TABLET ORAL ONCE
Status: COMPLETED | OUTPATIENT
Start: 2023-08-02 | End: 2023-08-02

## 2023-08-02 RX ORDER — AMOXICILLIN 250 MG
2 CAPSULE ORAL 2 TIMES DAILY
Status: DISCONTINUED | OUTPATIENT
Start: 2023-08-02 | End: 2023-08-05 | Stop reason: HOSPADM

## 2023-08-02 RX ORDER — ONDANSETRON 4 MG/1
4 TABLET, FILM COATED ORAL EVERY 6 HOURS PRN
Status: DISCONTINUED | OUTPATIENT
Start: 2023-08-02 | End: 2023-08-05 | Stop reason: HOSPADM

## 2023-08-02 RX ORDER — HYDROCHLOROTHIAZIDE 12.5 MG/1
12.5 TABLET ORAL
Status: DISCONTINUED | OUTPATIENT
Start: 2023-08-03 | End: 2023-08-03

## 2023-08-02 RX ORDER — BISACODYL 10 MG
10 SUPPOSITORY, RECTAL RECTAL DAILY PRN
Status: DISCONTINUED | OUTPATIENT
Start: 2023-08-02 | End: 2023-08-05 | Stop reason: HOSPADM

## 2023-08-02 RX ORDER — BISACODYL 5 MG/1
5 TABLET, DELAYED RELEASE ORAL DAILY PRN
Status: DISCONTINUED | OUTPATIENT
Start: 2023-08-02 | End: 2023-08-05 | Stop reason: HOSPADM

## 2023-08-02 RX ORDER — ACETAMINOPHEN 325 MG/1
650 TABLET ORAL EVERY 4 HOURS PRN
Status: DISCONTINUED | OUTPATIENT
Start: 2023-08-02 | End: 2023-08-05 | Stop reason: HOSPADM

## 2023-08-02 RX ORDER — UREA 10 %
3 LOTION (ML) TOPICAL NIGHTLY PRN
Status: DISCONTINUED | OUTPATIENT
Start: 2023-08-02 | End: 2023-08-05 | Stop reason: HOSPADM

## 2023-08-02 RX ORDER — LISINOPRIL 20 MG/1
20 TABLET ORAL
Status: DISCONTINUED | OUTPATIENT
Start: 2023-08-03 | End: 2023-08-03

## 2023-08-02 RX ORDER — SODIUM CHLORIDE 0.9 % (FLUSH) 0.9 %
10 SYRINGE (ML) INJECTION AS NEEDED
Status: DISCONTINUED | OUTPATIENT
Start: 2023-08-02 | End: 2023-08-05 | Stop reason: HOSPADM

## 2023-08-02 RX ORDER — ONDANSETRON 2 MG/ML
4 INJECTION INTRAMUSCULAR; INTRAVENOUS EVERY 6 HOURS PRN
Status: DISCONTINUED | OUTPATIENT
Start: 2023-08-02 | End: 2023-08-05 | Stop reason: HOSPADM

## 2023-08-02 RX ORDER — PRAVASTATIN SODIUM 40 MG
40 TABLET ORAL NIGHTLY
Status: DISCONTINUED | OUTPATIENT
Start: 2023-08-03 | End: 2023-08-05 | Stop reason: HOSPADM

## 2023-08-02 RX ORDER — SODIUM CHLORIDE 9 MG/ML
125 INJECTION, SOLUTION INTRAVENOUS CONTINUOUS
Status: DISCONTINUED | OUTPATIENT
Start: 2023-08-02 | End: 2023-08-03

## 2023-08-02 RX ORDER — POLYETHYLENE GLYCOL 3350 17 G/17G
17 POWDER, FOR SOLUTION ORAL DAILY PRN
Status: DISCONTINUED | OUTPATIENT
Start: 2023-08-02 | End: 2023-08-05 | Stop reason: HOSPADM

## 2023-08-02 RX ADMIN — ACETAMINOPHEN 1000 MG: 500 TABLET ORAL at 18:39

## 2023-08-02 RX ADMIN — SODIUM CHLORIDE 2000 MG: 9 INJECTION, SOLUTION INTRAVENOUS at 18:36

## 2023-08-02 RX ADMIN — SODIUM CHLORIDE 500 ML: 9 INJECTION, SOLUTION INTRAVENOUS at 18:41

## 2023-08-02 RX ADMIN — SODIUM CHLORIDE 125 ML/HR: 9 INJECTION, SOLUTION INTRAVENOUS at 20:00

## 2023-08-02 NOTE — ED PROVIDER NOTES
EMERGENCY DEPARTMENT ENCOUNTER    Room Number:  18/18  Date of encounter:  8/2/2023  PCP: Pranav Guzman MD  Historian: Patient and spouse  Relevant information and history provided by sources other than the patient will be included below and in the ED Course.  Review of pertinent past medical records may also be included in record below and ED Course.    HPI:  Chief Complaint: Sent back to the emergency department because of a positive blood culture  A complete HPI/ROS/PMH/PSH/SH/FH are unobtainable due to: Not applicable  Context: [place] is a 73 y.o. male who presents to the ED c/o symptoms started yesterday when the patient had fevers and chills.  It started yesterday afternoon.  He came to the emergency department and was seen.  He denies any coughs or colds.  Still has felt like he has had a mild low-grade temperature since that time.  He denies any pain.  No headache, chest pain.  He denies any coughs or colds.  Denies any burning with urination or frequent urination.  He did have 1 episode of vomiting yesterday when the first episode started.  He has had no vomiting since that he has had no diarrhea.  He is not immunocompromise.  He is does not have cancer, liver disease or kidney disease and is not on any chemotherapeutic medicine.  He overall is feeling better today than when he fell yesterday.  He does have a generalized weakness and decreased energy level and decreased appetite        Previous Episodes: No  Current Symptoms: See above    MEDICAL HISTORY REVIEWED  Patient was sent to the emergency department because he had a positive blood culture that grew out E. coli this blood culture was on 8/1/2023.  I do not see the sensitivity of this blood culture.  It is very likely sensitive to Rocephin.  He also had a white blood cell count on the first of 17,000 with a left shift.  He had chemistry panel on the first which was essentially unremarkable.  He did have a lactic of 2.3 on 1 August.   ICU also a repeat lactate of 1.1.  Patient was admitted to the observation unit and discharged on 1 August came in with fever and chills.  He was discharged home.      PAST MEDICAL HISTORY  Active Ambulatory Problems     Diagnosis Date Noted    Hyperglycemia 01/20/2016    Hyperlipidemia 01/20/2016    Hypertension 01/20/2016    Prostate cancer 02/27/2019    Bladder cancer 06/12/2019     Resolved Ambulatory Problems     Diagnosis Date Noted    No Resolved Ambulatory Problems     Past Medical History:   Diagnosis Date    Cancer          PAST SURGICAL HISTORY  Past Surgical History:   Procedure Laterality Date    CYSTOSCOPY BLADDER BIOPSY N/A 6/12/2019    Procedure: CYSTOSCOPY TUR BLADDER TUMOR;  Surgeon: Joel Russell MD;  Location: [facility];  Service: Urology    ELBOW PROCEDURE      PROSTATE SURGERY      TONSILLECTOMY      VASECTOMY           FAMILY HISTORY  Family History   Problem Relation Age of Onset    Hypertension Mother     Hypertension Father     Prostate cancer Father     Malig Hyperthermia Neg Hx          SOCIAL HISTORY  Social History     Socioeconomic History    Marital status:    Tobacco Use    Smoking status: Never    Smokeless tobacco: Never   Vaping Use    Vaping Use: Never used   Substance and Sexual Activity    Alcohol use: Yes     Comment: MINIMUM CONSUMPTION    Drug use: No    Sexual activity: Defer         ALLERGIES  Penicillins        REVIEW OF SYSTEMS  Review of Systems     All systems reviewed and negative except for those discussed in HPI.       PHYSICAL EXAM    I have reviewed the triage vital signs and nursing notes.    ED Triage Vitals   Temp Heart Rate Resp BP SpO2   08/02/23 1625 08/02/23 1625 08/02/23 1625 08/02/23 1659 08/02/23 1625   99.9 øF (37.7 øC) 70 16 117/68 96 %      Temp src Heart Rate Source Patient Position BP Location FiO2 (%)   -- -- -- -- --              GENERAL: Elderly male.  Does not appear septic or toxic no acute distress.Vital signs on my initial  evaluation temperature is 99.9 I his blood pressure is normal and his heart rate is normal  HENT: nares patent  Head/neck/ face are symmetric without gross deformity, signs of trauma, or swelling  EYES: no scleral icterus, no conjunctival pallor.  NECK: Supple, no meningismus  CV: regular rhythm, regular rate with intact distal pulses.  No murmur rub  RESPIRATORY: normal effort and no respiratory distress.  Clear to auscultation bilateral  ABDOMEN: soft and nontender.  MUSCULOSKELETAL: no deformity.  No rash.  Intact distal pulses to upper and lower extremities are equal strong and symmetric  NEURO: alert and appropriate, moves all extremities, follows commands.  No focal motor or sensory changes  SKIN: warm, dry    Vital signs and nursing notes reviewed.        LAB RESULTS  Recent Results (from the past 24 hour(s))   STAT Lactic Acid, Reflex    Collection Time: 08/01/23  8:36 PM    Specimen: Blood   Result Value Ref Range    Lactate 1.1 0.5 - 2.0 mmol/L   CBC Auto Differential    Collection Time: 08/01/23  9:16 PM    Specimen: Blood   Result Value Ref Range    WBC 16.95 (H) 3.40 - 10.80 10*3/mm3    RBC 4.45 4.14 - 5.80 10*6/mm3    Hemoglobin 14.3 13.0 - 17.7 g/dL    Hematocrit 40.6 37.5 - 51.0 %    MCV 91.2 79.0 - 97.0 fL    MCH 32.1 26.6 - 33.0 pg    MCHC 35.2 31.5 - 35.7 g/dL    RDW 13.0 12.3 - 15.4 %    RDW-SD 43.2 37.0 - 54.0 fl    MPV 10.8 6.0 - 12.0 fL    Platelets 211 140 - 450 10*3/mm3    Neutrophil % 87.9 (H) 42.7 - 76.0 %    Lymphocyte % 3.1 (L) 19.6 - 45.3 %    Monocyte % 8.2 5.0 - 12.0 %    Eosinophil % 0.1 (L) 0.3 - 6.2 %    Basophil % 0.2 0.0 - 1.5 %    Immature Grans % 0.5 0.0 - 0.5 %    Neutrophils, Absolute 14.91 (H) 1.70 - 7.00 10*3/mm3    Lymphocytes, Absolute 0.53 (L) 0.70 - 3.10 10*3/mm3    Monocytes, Absolute 1.39 (H) 0.10 - 0.90 10*3/mm3    Eosinophils, Absolute 0.01 0.00 - 0.40 10*3/mm3    Basophils, Absolute 0.03 0.00 - 0.20 10*3/mm3    Immature Grans, Absolute 0.08 (H) 0.00 - 0.05 10*3/mm3     nRBC 0.0 0.0 - 0.2 /100 WBC       Ordered the above labs and independently reviewed the results.        RADIOLOGY  No Radiology Exams Resulted Within Past 24 Hours    I ordered the above noted radiological studies. Reviewed by me and discussed with radiologist.  See dictation for official radiology interpretation.      PROCEDURES    Procedures      MEDICATIONS GIVEN IN ER    Medications   cefTRIAXone (ROCEPHIN) 2,000 mg in sodium chloride 0.9 % 100 mL IVPB-VTB (2,000 mg Intravenous New Bag 8/2/23 1836)   sodium chloride 0.9 % flush 10 mL (has no administration in time range)   sodium chloride 0.9 % bolus 500 mL (500 mL Intravenous New Bag 8/2/23 1841)   sodium chloride 0.9 % infusion (has no administration in time range)   acetaminophen (TYLENOL) tablet 650 mg (has no administration in time range)   sennosides-docusate (PERICOLACE) 8.6-50 MG per tablet 2 tablet (has no administration in time range)     And   polyethylene glycol (MIRALAX) packet 17 g (has no administration in time range)     And   bisacodyl (DULCOLAX) EC tablet 5 mg (has no administration in time range)     And   bisacodyl (DULCOLAX) suppository 10 mg (has no administration in time range)   ondansetron (ZOFRAN) tablet 4 mg (has no administration in time range)     Or   ondansetron (ZOFRAN) injection 4 mg (has no administration in time range)   melatonin tablet 3 mg (has no administration in time range)   acetaminophen (TYLENOL) tablet 1,000 mg (1,000 mg Oral Given 8/2/23 1839)         All labs have been independently reviewed by me.  All radiology studies have been reviewed by me and I discussed with radiologist dictating the report when indicated below.  All EKG's independently viewed and interpreted by me.  Discussion below represents my analysis of pertinent findings related to patient's condition, differential diagnosis, treatment plan and final disposition.        PROGRESS, DATA ANALYSIS, CONSULTS, AND MEDICAL DECISION MAKING    This is a  gentleman that has a positive blood culture for E. coli.  This will was drawn yesterday.  He will need to be admitted and we will start him on 2 g of Rocephin.  I did consult with the clinical pharmacist Argenis here in the emergency department and agrees with that plan.  I talked with the patient and spouse at length.  I will recheck blood cultures.  I reviewed lab work from yesterday.  All questions answered      ED Course as of 08/02/23 1846   Wed Aug 02, 2023   1748 I did discuss the case with Dr. Dumont.  I informed her of the patient's presenting symptoms and the fact that this patient has a positive blood culture for E. coli.  I reviewed the lab work from yesterday.  I have recollected some lab work here.  We will get a give him some IV Rocephin.  She agrees to admit him to the hospital.  She will follow-up with repeat lab work.  He is hemodynamically stable and in no distress. [MM]      ED Course User Index  [MM] Jose Vogel MD       AS OF 18:46 EDT VITALS:    BP - 117/68  HR - 70  TEMP - 99.9 øF (37.7 øC)  02 SATS - 96%    SOCIAL DETERMINANTS OF HEALTH THAT IMPACT OR LIMIT CARE (For example..Homelessness,safe discharge, inability to obtain care, follow up, or prescriptions):      DIAGNOSIS  Final diagnoses:   Bacteremia         DISPOSITION  I have reviewed the test results with my patient and explained the current treatment plan.  I answered all of the patient's questions.  The patient will be admitted to monitor bed at this time.  The patient is not hypotensive and is tolerating their current disease condition well enough for a monitored bed at this time.  The patient's current condition does not require intensive care treatment at this time.            DICTATED UTILIZING DRAGON DICTATION    Note Disclaimer: At [facility], we believe that sharing information builds trust and better relationships. You are receiving this note because you recently visited [facility]. It is possible you will see  health information before a provider has talked with you about it. This kind of information can be easy to misunderstand. To help you fully understand what it means for your health, we urge you to discuss this note with your provider.       Jose Vogel MD  08/02/23 2706

## 2023-08-02 NOTE — ED NOTES
"Nursing report ED to floor  North Carcamo  73 y.o.  male    HPI :   Chief Complaint   Patient presents with    Abnormal Lab       Admitting doctor:   Eunice Dumont MD    Admitting diagnosis:   The encounter diagnosis was Bacteremia.    Code status:   Current Code Status       Date Active Code Status Order ID Comments User Context       8/2/2023 1825 CPR (Attempt to Resuscitate) [id number]  Eunice Dumont MD ED        Question Answer    Code Status (Patient has no pulse and is not breathing) CPR (Attempt to Resuscitate)    Medical Interventions (Patient has pulse or is breathing) Full    Release to patient Routine Release                    Allergies:   Penicillins    Isolation:   No active isolations    Intake and Output  No intake or output data in the 24 hours ending 08/02/23 1830    Weight:       08/02/23  1659   Weight: 81.6 kg (180 lb)       Most recent vitals:   Vitals:    08/02/23 1625 08/02/23 1659   BP:  117/68   Pulse: 70    Resp: 16    Temp: 99.9 øF (37.7 øC)    SpO2: 96%    Weight:  81.6 kg (180 lb)   Height:  175.3 cm (69\")       Active LDAs/IV Access:   Lines, Drains & Airways       Active LDAs       None                    Labs (abnormal labs have a star):   Labs Reviewed   BLOOD CULTURE   BLOOD CULTURE   COMPREHENSIVE METABOLIC PANEL   URINALYSIS W/ MICROSCOPIC IF INDICATED (NO CULTURE)   PROCALCITONIN   LACTIC ACID, PLASMA   MAGNESIUM   CBC WITH AUTO DIFFERENTIAL   CBC AND DIFFERENTIAL    Narrative:     The following orders were created for panel order CBC & Differential.  Procedure                               Abnormality         Status                     ---------                               -----------         ------                     CBC Auto Differential[[id number]]                                                         Please view results for these tests on the individual orders.       EKG:   No orders to display       Meds given in ED:   Medications   cefTRIAXone " (ROCEPHIN) 2,000 mg in sodium chloride 0.9 % 100 mL IVPB-VTB (has no administration in time range)   sodium chloride 0.9 % flush 10 mL (has no administration in time range)   sodium chloride 0.9 % bolus 500 mL (has no administration in time range)   sodium chloride 0.9 % infusion (has no administration in time range)   acetaminophen (TYLENOL) tablet 1,000 mg (has no administration in time range)   acetaminophen (TYLENOL) tablet 650 mg (has no administration in time range)   sennosides-docusate (PERICOLACE) 8.6-50 MG per tablet 2 tablet (has no administration in time range)     And   polyethylene glycol (MIRALAX) packet 17 g (has no administration in time range)     And   bisacodyl (DULCOLAX) EC tablet 5 mg (has no administration in time range)     And   bisacodyl (DULCOLAX) suppository 10 mg (has no administration in time range)   ondansetron (ZOFRAN) tablet 4 mg (has no administration in time range)     Or   ondansetron (ZOFRAN) injection 4 mg (has no administration in time range)   melatonin tablet 3 mg (has no administration in time range)       Imaging results:  XR Chest 1 View    Result Date: 8/1/2023  1. No acute process.  This report was finalized on 8/1/2023 5:52 PM by Dr. Ghassan Dobson M.D.       Ambulatory status:   - independent     Social issues:   Social History     Socioeconomic History    Marital status:    Tobacco Use    Smoking status: Never    Smokeless tobacco: Never   Vaping Use    Vaping Use: Never used   Substance and Sexual Activity    Alcohol use: Yes     Comment: MINIMUM CONSUMPTION    Drug use: No    Sexual activity: Defer       NIH Stroke Scale:       Ashley Elias RN  08/02/23 18:30 EDT

## 2023-08-02 NOTE — ED NOTES
Pt states that he was seen here in the ED last night and got a call today that he might have a blood infection and that he should come back to get abx.

## 2023-08-03 ENCOUNTER — APPOINTMENT (OUTPATIENT)
Dept: CT IMAGING | Facility: HOSPITAL | Age: 73
DRG: 871 | End: 2023-08-03
Payer: MEDICARE

## 2023-08-03 PROBLEM — B96.20 E COLI BACTEREMIA: Status: ACTIVE | Noted: 2023-08-02

## 2023-08-03 LAB
ANION GAP SERPL CALCULATED.3IONS-SCNC: 8 MMOL/L (ref 5–15)
BILIRUB UR QL STRIP: NEGATIVE
BUN SERPL-MCNC: 12 MG/DL (ref 8–23)
BUN/CREAT SERPL: 11.7 (ref 7–25)
CALCIUM SPEC-SCNC: 8.3 MG/DL (ref 8.6–10.5)
CHLORIDE SERPL-SCNC: 104 MMOL/L (ref 98–107)
CLARITY UR: CLEAR
CO2 SERPL-SCNC: 26 MMOL/L (ref 22–29)
COLOR UR: YELLOW
CREAT SERPL-MCNC: 1.03 MG/DL (ref 0.76–1.27)
DEPRECATED RDW RBC AUTO: 41.5 FL (ref 37–54)
EGFRCR SERPLBLD CKD-EPI 2021: 75.8 ML/MIN/1.73
ERYTHROCYTE [DISTWIDTH] IN BLOOD BY AUTOMATED COUNT: 12.6 % (ref 12.3–15.4)
GLUCOSE SERPL-MCNC: 124 MG/DL (ref 65–99)
GLUCOSE UR STRIP-MCNC: NEGATIVE MG/DL
HCT VFR BLD AUTO: 39.1 % (ref 37.5–51)
HGB BLD-MCNC: 13.9 G/DL (ref 13–17.7)
HGB UR QL STRIP.AUTO: NEGATIVE
KETONES UR QL STRIP: NEGATIVE
LEUKOCYTE ESTERASE UR QL STRIP.AUTO: NEGATIVE
MCH RBC QN AUTO: 32.4 PG (ref 26.6–33)
MCHC RBC AUTO-ENTMCNC: 35.5 G/DL (ref 31.5–35.7)
MCV RBC AUTO: 91.1 FL (ref 79–97)
NITRITE UR QL STRIP: NEGATIVE
PH UR STRIP.AUTO: 5.5 [PH] (ref 5–8)
PLATELET # BLD AUTO: 164 10*3/MM3 (ref 140–450)
PMV BLD AUTO: 11 FL (ref 6–12)
POTASSIUM SERPL-SCNC: 3.7 MMOL/L (ref 3.5–5.2)
PROT UR QL STRIP: NEGATIVE
RBC # BLD AUTO: 4.29 10*6/MM3 (ref 4.14–5.8)
SODIUM SERPL-SCNC: 138 MMOL/L (ref 136–145)
SP GR UR STRIP: 1.02 (ref 1–1.03)
UROBILINOGEN UR QL STRIP: NORMAL
WBC NRBC COR # BLD: 6.68 10*3/MM3 (ref 3.4–10.8)

## 2023-08-03 PROCEDURE — 85027 COMPLETE CBC AUTOMATED: CPT | Performed by: INTERNAL MEDICINE

## 2023-08-03 PROCEDURE — 25010000002 CEFTRIAXONE PER 250 MG: Performed by: INTERNAL MEDICINE

## 2023-08-03 PROCEDURE — 36415 COLL VENOUS BLD VENIPUNCTURE: CPT | Performed by: INTERNAL MEDICINE

## 2023-08-03 PROCEDURE — 80048 BASIC METABOLIC PNL TOTAL CA: CPT | Performed by: INTERNAL MEDICINE

## 2023-08-03 PROCEDURE — 81003 URINALYSIS AUTO W/O SCOPE: CPT | Performed by: EMERGENCY MEDICINE

## 2023-08-03 PROCEDURE — 74176 CT ABD & PELVIS W/O CONTRAST: CPT

## 2023-08-03 RX ORDER — LISINOPRIL 10 MG/1
10 TABLET ORAL
Status: DISCONTINUED | OUTPATIENT
Start: 2023-08-04 | End: 2023-08-04

## 2023-08-03 RX ORDER — LEVOFLOXACIN 750 MG/1
750 TABLET ORAL DAILY
COMMUNITY
Start: 2023-08-02 | End: 2023-08-05 | Stop reason: HOSPADM

## 2023-08-03 RX ADMIN — HYDROCHLOROTHIAZIDE 12.5 MG: 12.5 TABLET ORAL at 08:06

## 2023-08-03 RX ADMIN — SENNOSIDES AND DOCUSATE SODIUM 2 TABLET: 50; 8.6 TABLET ORAL at 08:06

## 2023-08-03 RX ADMIN — SENNOSIDES AND DOCUSATE SODIUM 2 TABLET: 50; 8.6 TABLET ORAL at 20:39

## 2023-08-03 RX ADMIN — LISINOPRIL 20 MG: 20 TABLET ORAL at 08:06

## 2023-08-03 RX ADMIN — CEFTRIAXONE SODIUM 2000 MG: 2 INJECTION, POWDER, FOR SOLUTION INTRAMUSCULAR; INTRAVENOUS at 18:11

## 2023-08-03 RX ADMIN — SODIUM CHLORIDE 125 ML/HR: 9 INJECTION, SOLUTION INTRAVENOUS at 04:13

## 2023-08-03 RX ADMIN — PRAVASTATIN SODIUM 40 MG: 40 TABLET ORAL at 20:39

## 2023-08-03 RX ADMIN — SODIUM CHLORIDE 125 ML/HR: 9 INJECTION, SOLUTION INTRAVENOUS at 13:23

## 2023-08-03 RX ADMIN — BISACODYL 5 MG: 5 TABLET, COATED ORAL at 15:17

## 2023-08-03 RX ADMIN — ACETAMINOPHEN 650 MG: 325 TABLET, FILM COATED ORAL at 20:39

## 2023-08-03 RX ADMIN — POLYETHYLENE GLYCOL 3350 17 G: 17 POWDER, FOR SOLUTION ORAL at 15:17

## 2023-08-03 RX ADMIN — PRAVASTATIN SODIUM 40 MG: 40 TABLET ORAL at 01:14

## 2023-08-03 NOTE — H&P
HISTORY AND PHYSICAL   [facility]        Date of Admission: 2023  Patient Identification:  Name: North Carcamo  Age: 73 y.o.  Sex: male  :  1950  MRN: [id number]                     Primary Care Physician: Pranav Guzman MD    Chief Complaint:  73 year old gentleman who presented to the emergency room after he was asked to return; he was seen and placed in the observation unit with chills, malaise and went home after his workup; cultures were positive for e coli today so he was called to come back; he denies cough, congestion or burning with urination    History of Present Illness:   As above    Past Medical History:  Past Medical History:   Diagnosis Date    Cancer     PROSTATE    Hyperglycemia     Hyperlipidemia     Hypertension      Past Surgical History:  Past Surgical History:   Procedure Laterality Date    CYSTOSCOPY BLADDER BIOPSY N/A 2019    Procedure: CYSTOSCOPY TUR BLADDER TUMOR;  Surgeon: Joel Russell MD;  Location: [facility];  Service: Urology    ELBOW PROCEDURE      PROSTATE SURGERY      TONSILLECTOMY      VASECTOMY        Home Meds:  Medications Prior to Admission   Medication Sig Dispense Refill Last Dose    Calcium Carbonate-Vitamin D (CALCIUM 600+D PO) Take 1 tablet by mouth 2 (Two) Times a Day.   2023    lisinopril-hydrochlorothiazide (PRINZIDE,ZESTORETIC) 20-12.5 MG per tablet TAKE 1 TABLET BY MOUTH DAILY 90 tablet 3 2023    pravastatin (PRAVACHOL) 40 MG tablet TAKE 1 TABLET BY MOUTH EVERY NIGHT 90 tablet 3 2023       Allergies:  Allergies   Allergen Reactions    Penicillins Rash     Immunizations:  Immunization History   Administered Date(s) Administered    COVID-19 (PFIZER) BIVALENT 12+YRS 2022    COVID-19 (PFIZER) Purple Cap Monovalent 2021, 2021, 10/08/2021    FLUAD TRI 65YR+ 2019    Fluad Quad 65+ 10/08/2020, 10/08/2021, 2022    Fluzone High Dose =>65 Years ([employer] ONLY) 10/12/2016, 2017,  10/08/2021    Hepatitis A 2018, 2018    Pneumococcal Conjugate 13-Valent (PCV13) 2014    Pneumococcal Polysaccharide (PPSV23) 2020    Pneumococcal, Unspecified 2013    Shingrix 2018, 2018    Tdap 2015     Social History:   Social History     Social History Narrative    Not on file     Social History     Socioeconomic History    Marital status:    Tobacco Use    Smoking status: Never    Smokeless tobacco: Never   Vaping Use    Vaping Use: Never used   Substance and Sexual Activity    Alcohol use: Yes     Comment: MINIMUM CONSUMPTION    Drug use: No    Sexual activity: Defer       Family History:  Family History   Problem Relation Age of Onset    Hypertension Mother     Hypertension Father     Prostate cancer Father     Malig Hyperthermia Neg Hx         Review of Systems  See history of present illness and past medical history.  Patient denies headache, dizziness, syncope, falls, trauma, change in vision, change in hearing, change in taste, changes in weight, changes in appetite, focal weakness, numbness, or paresthesia.  Patient denies chest pain, palpitations, dyspnea, orthopnea, PND, cough, sinus pressure, rhinorrhea, epistaxis, hemoptysis, nausea, vomiting,hematemesis, diarrhea, constipation or hematochezia.  Denies cold or heat intolerance, polydipsia, polyuria, polyphagia. Denies hematuria, pyuria, dysuria, hesitancy, frequency or urgency. Denies consumption of raw and under cooked meats foods or change in water source.  Denies fever, chills, sweats, night sweats.  Denies missing any routine medications. Remainder of ROS is negative.    Objective:  T Max 24 hrs: Temp (24hrs), Av.2 øF (37.3 øC), Min:98.5 øF (36.9 øC), Max:99.9 øF (37.7 øC)    Vitals Ranges:   Temp:  [98.5 øF (36.9 øC)-99.9 øF (37.7 øC)] 98.5 øF (36.9 øC)  Heart Rate:  [55-70] 55  Resp:  [16-18] 18  BP: (104-129)/(60-69) 104/60      Exam:  /60 (BP Location: Right arm, Patient  "Position: Standing)   Pulse 55   Temp 98.5 øF (36.9 øC) (Oral)   Resp 18   Ht 175.3 cm (69\")   Wt 85.5 kg (188 lb 9.6 oz)   SpO2 98%   BMI 27.85 kg/mý     General Appearance:    Alert, cooperative, no distress, appears stated age   Head:    Normocephalic, without obvious abnormality, atraumatic   Eyes:    PERRL, conjunctivae/corneas clear, EOM's intact, both eyes   Ears:    Normal external ear canals, both ears   Nose:   Nares normal, septum midline, mucosa normal, no drainage    or sinus tenderness   Throat:   Lips, mucosa, and tongue normal   Neck:   Supple, symmetrical, trachea midline, no adenopathy;     thyroid:  no enlargement/tenderness/nodules; no carotid    bruit or JVD   Back:     Symmetric, no curvature, ROM normal, no CVA tenderness   Lungs:     Clear to auscultation bilaterally, respirations unlabored   Chest Wall:    No tenderness or deformity    Heart:    Regular rate and rhythm, S1 and S2 normal, no murmur, rub   or gallop   Abdomen:     Soft, nontender, bowel sounds active all four quadrants,     no masses, no hepatomegaly, no splenomegaly   Extremities:   Extremities normal, atraumatic, no cyanosis or edema   Pulses:   2+ and symmetric all extremities   Skin:   Skin color, texture, turgor normal, no rashes or lesions      Data Review:  Labs in chart were reviewed.  WBC   Date Value Ref Range Status   08/02/2023 8.15 3.40 - 10.80 10*3/mm3 Final     Hemoglobin   Date Value Ref Range Status   08/02/2023 13.7 13.0 - 17.7 g/dL Final     Hematocrit   Date Value Ref Range Status   08/02/2023 38.7 37.5 - 51.0 % Final     Platelets   Date Value Ref Range Status   08/02/2023 180 140 - 450 10*3/mm3 Final     Sodium   Date Value Ref Range Status   08/02/2023 138 136 - 145 mmol/L Final     Potassium   Date Value Ref Range Status   08/02/2023 4.0 3.5 - 5.2 mmol/L Final     Chloride   Date Value Ref Range Status   08/02/2023 100 98 - 107 mmol/L Final     CO2   Date Value Ref Range Status   08/02/2023 26.4 " 22.0 - 29.0 mmol/L Final     BUN   Date Value Ref Range Status   08/02/2023 13 8 - 23 mg/dL Final     Creatinine   Date Value Ref Range Status   08/02/2023 1.32 (H) 0.76 - 1.27 mg/dL Final     Glucose   Date Value Ref Range Status   08/02/2023 108 (H) 65 - 99 mg/dL Final     Calcium   Date Value Ref Range Status   08/02/2023 8.9 8.6 - 10.5 mg/dL Final     Magnesium   Date Value Ref Range Status   08/02/2023 1.7 1.6 - 2.4 mg/dL Final                Imaging Results (All)       None              Assessment:  Active Hospital Problems    Diagnosis  POA    **Bacteremia [R78.81]  Yes      Resolved Hospital Problems   No resolved problems to display.   Hyperglycemia  Hypertension  ckd3    Plan:  Continue rocephin  Wait for sensitivities  Trend labs  Dw patient and ed provider as well as family     Eunice Rodrigo Dumont MD  8/2/2023  22:36 EDT

## 2023-08-03 NOTE — PLAN OF CARE
Goal Outcome Evaluation:  Plan of Care Reviewed With: patient           Outcome Evaluation: pt is a/ox4. VSS. Sinus sasha. On RA. continuous Normal saline and antibiotic in place. CT of abd and pelvis possibly today. carefully updating plan of care.

## 2023-08-03 NOTE — PROGRESS NOTES
Name: North Carcamo ADMIT: 2023   : 1950  PCP: Pranav Guzman MD    MRN: [id number] LOS: 1 days   AGE/SEX: 73 y.o. male  ROOM: [facility]     Subjective   Subjective   Patient laying in bed.  Negative Somnite.  States he is feeling better, denies fever or chills.  Denies urinary complaints, no nausea vomiting abdominal pain.    Review of Systems   As above  Objective   Objective   Vital Signs  Temp:  [98.1 øF (36.7 øC)-99.9 øF (37.7 øC)] 99.3 øF (37.4 øC)  Heart Rate:  [55-70] 66  Resp:  [16-18] 18  BP: (104-135)/(60-81) 135/68  SpO2:  [96 %-98 %] 98 %  on   ;   Device (Oxygen Therapy): room air  Body mass index is 27.84 kg/mý.  Physical Exam      General: Alert and oriented x3, no acute distress  HEENT: Normocephalic, atraumatic  CV: Regular rate and rhythm, no murmurs rubs or gallops  Lungs: Clear to auscultation bilaterally, no crackles or wheezes  Abdomen: Soft, nontender, nondistended  Extremities: No significant peripheral edema , no cyanosis       Results Review     I reviewed the patient's new clinical results.  Results from last 7 days   Lab Units 23  0340 23  18223  1730   WBC 10*3/mm3 6.68 8.15 16.95* 17.27*   HEMOGLOBIN g/dL 13.9 13.7 14.3 14.3   PLATELETS 10*3/mm3 164 180 211 213     Results from last 7 days   Lab Units 23  0340 23  1826 23  1730   SODIUM mmol/L 138 138 137   POTASSIUM mmol/L 3.7 4.0 3.9   CHLORIDE mmol/L 104 100 101   CO2 mmol/L 26.0 26.4 25.0   BUN mg/dL 12 13 13   CREATININE mg/dL 1.03 1.32* 1.21   GLUCOSE mg/dL 124* 108* 142*   Estimated Creatinine Clearance: 67.1 mL/min (by C-G formula based on SCr of 1.03 mg/dL).  Results from last 7 days   Lab Units 23  1826 23  1730   ALBUMIN g/dL 3.9 4.1   BILIRUBIN mg/dL 1.0 1.0   ALK PHOS U/L 73 81   AST (SGOT) U/L 21 16   ALT (SGPT) U/L 24 17     Results from last 7 days   Lab Units 23  0340 23  1826 23  1730   CALCIUM mg/dL 8.3* 8.9 9.3    ALBUMIN g/dL  --  3.9 4.1   MAGNESIUM mg/dL  --  1.7  --      Results from last 7 days   Lab Units 08/02/23  1826 08/01/23  2036 08/01/23  1730   PROCALCITONIN ng/mL 3.38*  --  0.29*   LACTATE mmol/L 1.0 1.1 2.3*     COVID19   Date Value Ref Range Status   08/01/2023 Not Detected Not Detected - Ref. Range Final     No results found for: HGBA1C, POCGLU        CT Abdomen Pelvis Without Contrast  Narrative: CT ABDOMEN AND PELVIS WITHOUT IV CONTRAST     HISTORY: Sepsis, prostate cancer status post surgery, vasectomy, bladder  cystoscopy.     TECHNIQUE: Radiation dose reduction techniques were utilized, including  automated exposure control and exposure modulation based on body size.   3 mm images were obtained through the abdomen and pelvis without the  administration of IV contrast. Lack of IV contrast limits evaluation of  mediastinal, hilar, and vascular structures. Sensitivity for underlying  lesions and infection decreased.     COMPARISON: PET/CT report 04/23/2019.     FINDINGS: Dependent bibasilar atelectasis left greater than right.  Cardiomegaly.     LOWER CHEST: Within normal limits.     ABDOMEN:  Liver/Biliary Tract: Normal morphology. Gallbladder within normal  limits.     Spleen: Within normal limits.     Pancreas: Within normal limits.     Adrenals: Within normal limits.     Kidneys: 8 mm stone in the left upper pole and 5 mm stone in the  interpolar region. No hydroureteronephrosis. 4 mm bladder stone just  beyond the right ureterovesical junction. The bladder is incompletely  distended.     Bowel:  No obstruction. Normal appendix. The colon is stool-filled.  Extensive diverticulosis particularly about the sigmoid colon with  mildly prominent subjacent vessels. No significant pericolonic  inflammation. Ensure up-to-date with endoscopy.     Vasculature:    Scattered calcific atherosclerosis.     Lymph Nodes:  Scattered subcentimeter nodes not enlarged by CT  criteria..                                  PELVIS:                                   Pelvic organs: Prostatectomy with multiple surgical clips in the pelvis.  No free fluid or focal mass. Recommend correlation with prior imaging,  PSA levels, and continued oncologic surveillance as clinically  indicated.           Abdominal/Pelvic Wall: Within normal limits.     BONES: Multilevel degenerative changes thoracolumbar spine and pelvis.  If there is focal bone pain or concern for infection, consider MRI.        Impression: 1. No definitive acute intra-abdominal or pelvic process on this limited  noncontrast exam. Recommend close follow-up if symptoms persist/worsen.  2. Bibasilar airspace disease favoring atelectasis, developing pneumonia  not excluded.  3. Nonobstructing left renal stones (measuring up to 8 mm) and a 4 mm  stone in the urinary bladder just beyond the right ureterovesical  junction.  4. Extensive diverticulosis without acute diverticulitis. Consider  correlation with endoscopy if not up-to-date and follow-up as indicated.  5. Please see above for additional incidental findings/recommendations.     This report was finalized on 8/3/2023 12:45 PM by Dr. Emerald Cunningham M.D.       Scheduled Medications  cefTRIAXone, 2,000 mg, Intravenous, Q24H  lisinopril, 20 mg, Oral, Q24H   And  hydroCHLOROthiazide, 12.5 mg, Oral, Q24H  pravastatin, 40 mg, Oral, Nightly  senna-docusate sodium, 2 tablet, Oral, BID    Infusions  sodium chloride, 125 mL/hr, Last Rate: 125 mL/hr (08/03/23 1323)    Diet  Diet: Cardiac Diets; Healthy Heart (2-3 Na+); Texture: Regular Texture (IDDSI 7); Fluid Consistency: Thin (IDDSI 0)    I have personally reviewed     [x]  Laboratory   [x]  Microbiology   [x]  Radiology   []  EKG/Telemetry  []  Cardiology/Vascular   []  Pathology    []  Records       Assessment/Plan     Active Hospital Problems    Diagnosis  POA    E coli bacteremia [R78.81, B96.20]  Yes    Hyperlipidemia [E78.5]  Yes    Hypertension [I10]  Yes      Resolved Hospital  Problems   No resolved problems to display.       E. coli bacteremia-blood cultures 08/21/2023 growing E. coli.  Unclear source.  CT abdomen and pelvis was obtained, however limited due to noncontrast CT, and no definitive acute intra-abdominal or pelvic process was seen.  Continue with ceftriaxone, ID consulted.  Symptoms improving.      Essential hypertension: Continue lisinopril, HCTZ.  BP stable.    History of prostate and bladder cancer status post resection-asymptomatic, follows with outpatient urology      Nonobstructive left renal stones: CT scan showed left renal stones measuring up to 8 mm.  Asymptomatic.  Monitor.      Diverticulosis without diverticulitis.  Extensive diverticulosis seen on CT.          SCDs for DVT prophylaxis.  Full code.  Discussed with patient and care team on multidisciplinary rounds.  Discussed with infection disease  Anticipate discharge home timing yet to be determined.      Copied text in this note has been reviewed and is accurate as of 08/03/23.         Dictated utilizing Dragon dictation        Coreen Douglas MD  [facility]  08/03/23  13:57 EDT

## 2023-08-03 NOTE — CASE MANAGEMENT/SOCIAL WORK
Discharge Planning Assessment  [facility]     Patient Name: North Carcamo  MRN: [id number]  Today's Date: 8/3/2023    Admit Date: 8/2/2023    Plan: Home via spouse   Discharge Needs Assessment       Row Name 08/03/23 1425       Living Environment    People in Home alone    Current Living Arrangements home    Potentially Unsafe Housing Conditions none    Primary Care Provided by self    Family Caregiver if Needed spouse    Quality of Family Relationships involved;helpful;supportive    Able to Return to Prior Arrangements yes       Transition Planning    Patient/Family Anticipates Transition to home with family    Patient/Family Anticipated Services at Transition none    Transportation Anticipated family or friend will provide       Discharge Needs Assessment    Equipment Currently Used at Home none    Concerns to be Addressed discharge planning    Equipment Needed After Discharge none                   Discharge Plan       Row Name 08/03/23 1426       Plan    Plan Home via spouse    Patient/Family in Agreement with Plan yes    Plan Comments CCP met with pt and spouse Nanette at bedside, introduced self and role of CCP. Face sheet information and pharmacy verified. Pt lives in a single-story home with 1STE and a basement with his wife and grandson. Pt still drives, IADL's and denies DME at home. Pt has a living will. Pt denies trouble affording or managing her medications and declines meds to beds. Pt denies HH or SNF history. DC plan is to return home, spouse to transport. Trang TEE/CCP                  Continued Care and Services - Admitted Since 8/2/2023    Coordination has not been started for this encounter.          Demographic Summary    No documentation.                  Functional Status       Row Name 08/03/23 1425       Functional Status    Usual Activity Tolerance excellent    Current Activity Tolerance good       Assessment of Health Literacy    How often do you have someone help you read hospital  materials? Never    How often do you have problems learning about your medical condition because of difficulty understanding written information? Never    How often do you have a problem understanding what is told to you about your medical condition? Never    How confident are you filling out medical forms by yourself? Extremely    Health Literacy Excellent       Functional Status, IADL    Medications independent    Meal Preparation independent    Housekeeping independent    Laundry independent    Shopping independent                               Katie Nelson RN

## 2023-08-03 NOTE — PROGRESS NOTES
[facility] Clinical Pharmacy Services: Medication Reconciliation     North Carcamo is a 73 y.o. male presenting to [facility] for Bacteremia [R78.81]       He  has a past medical history of Cancer, Hyperglycemia, Hyperlipidemia, and Hypertension.       Allergies as of 08/02/2023 - Reviewed 08/02/2023   Allergen Reaction Noted    Penicillins Rash 01/13/2016          Medication information was obtained from: patient    Pharmacy and Phone Number:    Preferred Pharmacy:  [id number] - [place], [place] - [address] AT [facility] [address] - [phone] PH - [phone] FX  [address] [place] [address]  Phone: [phone] Fax: [phone]    [facility]  [address] [facility] [address]  Phone: [phone] Fax: [phone]       Prior to Admission Medications       Prescriptions Last Dose Informant Patient Reported? Taking?    Calcium Carbonate-Vitamin D (CALCIUM 600+D PO) 8/2/2023 Self Yes Yes    Take 1 tablet by mouth 2 (Two) Times a Day.    levoFLOXacin (LEVAQUIN) 750 MG tablet 8/2/2023 Self Yes Yes    Take 1 tablet by mouth Daily.    lisinopril-hydrochlorothiazide (PRINZIDE,ZESTORETIC) 20-12.5 MG per tablet 8/2/2023 Self No Yes    TAKE 1 TABLET BY MOUTH DAILY    pravastatin (PRAVACHOL) 40 MG tablet 8/1/2023 Self No Yes    TAKE 1 TABLET BY MOUTH EVERY NIGHT           Medication notes: Pt took one dose of levofloxacin prior to admission       This medication list is complete to the best of my knowledge as of 8/3/2023       Please call if questions.     Bonita Dominguez [facility]  Clinical Pharmacist

## 2023-08-04 ENCOUNTER — TELEPHONE (OUTPATIENT)
Dept: FAMILY MEDICINE CLINIC | Facility: CLINIC | Age: 73
End: 2023-08-04

## 2023-08-04 ENCOUNTER — TELEPHONE (OUTPATIENT)
Dept: PEDIATRICS | Facility: OTHER | Age: 73
End: 2023-08-04

## 2023-08-04 LAB
ANION GAP SERPL CALCULATED.3IONS-SCNC: 8.5 MMOL/L (ref 5–15)
BACTERIA SPEC AEROBE CULT: ABNORMAL
BUN SERPL-MCNC: 11 MG/DL (ref 8–23)
BUN/CREAT SERPL: 12 (ref 7–25)
CALCIUM SPEC-SCNC: 8.6 MG/DL (ref 8.6–10.5)
CHLORIDE SERPL-SCNC: 104 MMOL/L (ref 98–107)
CO2 SERPL-SCNC: 24.5 MMOL/L (ref 22–29)
CREAT SERPL-MCNC: 0.92 MG/DL (ref 0.76–1.27)
DEPRECATED RDW RBC AUTO: 41.5 FL (ref 37–54)
EGFRCR SERPLBLD CKD-EPI 2021: 86.7 ML/MIN/1.73
ERYTHROCYTE [DISTWIDTH] IN BLOOD BY AUTOMATED COUNT: 12.5 % (ref 12.3–15.4)
GLUCOSE SERPL-MCNC: 109 MG/DL (ref 65–99)
GRAM STN SPEC: ABNORMAL
HBA1C MFR BLD: 6 % (ref 4.8–5.6)
HCT VFR BLD AUTO: 37.3 % (ref 37.5–51)
HGB BLD-MCNC: 13.2 G/DL (ref 13–17.7)
IGA1 MFR SER: 234 MG/DL (ref 70–400)
IGG1 SER-MCNC: 995 MG/DL (ref 700–1600)
IGM SERPL-MCNC: 37 MG/DL (ref 40–230)
ISOLATED FROM: ABNORMAL
MCH RBC QN AUTO: 32.4 PG (ref 26.6–33)
MCHC RBC AUTO-ENTMCNC: 35.4 G/DL (ref 31.5–35.7)
MCV RBC AUTO: 91.4 FL (ref 79–97)
PLATELET # BLD AUTO: 162 10*3/MM3 (ref 140–450)
PMV BLD AUTO: 11 FL (ref 6–12)
POTASSIUM SERPL-SCNC: 3.6 MMOL/L (ref 3.5–5.2)
RBC # BLD AUTO: 4.08 10*6/MM3 (ref 4.14–5.8)
SODIUM SERPL-SCNC: 137 MMOL/L (ref 136–145)
WBC NRBC COR # BLD: 6.32 10*3/MM3 (ref 3.4–10.8)

## 2023-08-04 PROCEDURE — 85027 COMPLETE CBC AUTOMATED: CPT | Performed by: STUDENT IN AN ORGANIZED HEALTH CARE EDUCATION/TRAINING PROGRAM

## 2023-08-04 PROCEDURE — 25010000002 CEFTRIAXONE PER 250 MG: Performed by: INTERNAL MEDICINE

## 2023-08-04 PROCEDURE — 83036 HEMOGLOBIN GLYCOSYLATED A1C: CPT | Performed by: STUDENT IN AN ORGANIZED HEALTH CARE EDUCATION/TRAINING PROGRAM

## 2023-08-04 PROCEDURE — 80048 BASIC METABOLIC PNL TOTAL CA: CPT | Performed by: STUDENT IN AN ORGANIZED HEALTH CARE EDUCATION/TRAINING PROGRAM

## 2023-08-04 PROCEDURE — 82784 ASSAY IGA/IGD/IGG/IGM EACH: CPT | Performed by: INTERNAL MEDICINE

## 2023-08-04 RX ORDER — LISINOPRIL 20 MG/1
20 TABLET ORAL
Status: DISCONTINUED | OUTPATIENT
Start: 2023-08-05 | End: 2023-08-05 | Stop reason: HOSPADM

## 2023-08-04 RX ADMIN — PRAVASTATIN SODIUM 40 MG: 40 TABLET ORAL at 22:07

## 2023-08-04 RX ADMIN — SENNOSIDES AND DOCUSATE SODIUM 2 TABLET: 50; 8.6 TABLET ORAL at 08:05

## 2023-08-04 RX ADMIN — CEFTRIAXONE SODIUM 2000 MG: 2 INJECTION, POWDER, FOR SOLUTION INTRAMUSCULAR; INTRAVENOUS at 17:55

## 2023-08-04 RX ADMIN — LISINOPRIL 10 MG: 10 TABLET ORAL at 08:05

## 2023-08-04 NOTE — CASE MANAGEMENT/SOCIAL WORK
Continued Stay Note  [facility]     Patient Name: North Carcamo  MRN: [id number]  Today's Date: 8/4/2023    Admit Date: 8/2/2023    Plan: Home with spouse, [facility] referrals pending   Discharge Plan       Row Name 08/04/23 1204       Plan    Plan Home with spouse, [facility] and [facility] referrals pending    Patient/Family in Agreement with Plan yes    Plan Comments Pt discussed in AM huddle and needs IV antibiotics at DC. CCP met with pt and spouse at bedside to discuss. CCP explained pt needed IV antibiotics thru PICC line at [place]. CCP explained HH agency and infusion company referrals. Pt and spouse verbalized understanding, are agreeable to HH/Infusion Company and denied preference agency. [facility] and [facility] notified of referral. They will review pt and let CCP know if they can accept. Trang TEE/CCP                   Discharge Codes    No documentation.                 Expected Discharge Date and Time       Expected Discharge Date Expected Discharge Time    Aug 6, 2023               Katie Nelson RN

## 2023-08-04 NOTE — CASE MANAGEMENT/SOCIAL WORK
Continued Stay Note  [facility]     Patient Name: North Carcamo  MRN: [id number]  Today's Date: 8/4/2023    Admit Date: 8/2/2023    Plan: Home via spouse, [facility], [facility] referral pending   Discharge Plan       Row Name 08/04/23 1543       Plan    Plan Home via spouse, [facility], [facility] referral pending    Patient/Family in Agreement with Plan yes    Plan Comments Tosha/Lev CHRIS notified [facility] able to accept pt but [facility] in out of network with pts insurance. Tosha/Lev CHRIS asked that dose of IV antibiotics be given tomorrow d/t staffing unable to visit pt until Sunday. Felicia/Edgar notified of referral, she will review pt and let [facility] know if they can accept. Trang RN/CCP                   Discharge Codes    No documentation.                 Expected Discharge Date and Time       Expected Discharge Date Expected Discharge Time    Aug 5, 2023               Ktaie Nelson RN

## 2023-08-04 NOTE — TELEPHONE ENCOUNTER
Caller: North Carcamo    Relationship to patient: Self    Best call back number: [phone]    Patient is needing: HE HAS BEEN IN THE HOSPITAL AND THEY HAVE BEEN DOING LABS ON HIM. DOES HE STILL NEED TO COME IN ON MONDAY AND HAVE HIS LABS DONE OR CAN YOU USE THE HOSPITAL LABS?

## 2023-08-04 NOTE — PROGRESS NOTES
Name: North Carcamo ADMIT: 2023   : 1950  PCP: Pranav Guzman MD    MRN: [id number] LOS: 2 days   AGE/SEX: 73 y.o. male  ROOM: [facility]     Subjective   Subjective   Continues to feel better.  No complaints.  No fevers no chills.  No abdominal symptoms.  Tolerating IV antibiotics.    Review of Systems   As above  Objective   Objective   Vital Signs  Temp:  [98.1 øF (36.7 øC)-100.6 øF (38.1 øC)] 98.1 øF (36.7 øC)  Heart Rate:  [53-63] 53  Resp:  [18-20] 18  BP: (114-134)/(58-76) 134/76  SpO2:  [97 %-99 %] 99 %  on   ;   Device (Oxygen Therapy): room air  Body mass index is 28.07 kg/mý.  Physical Exam      General: Alert and oriented x3, no acute distress  HEENT: Normocephalic, atraumatic  CV: Regular rate and rhythm, no murmurs rubs or gallops  Lungs: Clear to auscultation bilaterally, no crackles or wheezes  Abdomen: Soft, nontender, nondistended  Extremities: No significant peripheral edema , no cyanosis       Results Review     I reviewed the patient's new clinical results.  Results from last 7 days   Lab Units 23  2116   WBC 10*3/mm3 6.32 6.68 8.15 16.95*   HEMOGLOBIN g/dL 13.2 13.9 13.7 14.3   PLATELETS 10*3/mm3 162 164 180 211     Results from last 7 days   Lab Units 23  0340 23  1730   SODIUM mmol/L 137 138 138 137   POTASSIUM mmol/L 3.6 3.7 4.0 3.9   CHLORIDE mmol/L 104 104 100 101   CO2 mmol/L 24.5 26.0 26.4 25.0   BUN mg/dL 11 12 13 13   CREATININE mg/dL 0.92 1.03 1.32* 1.21   GLUCOSE mg/dL 109* 124* 108* 142*   Estimated Creatinine Clearance: 75.5 mL/min (by C-G formula based on SCr of 0.92 mg/dL).  Results from last 7 days   Lab Units 23  1826 23  1730   ALBUMIN g/dL 3.9 4.1   BILIRUBIN mg/dL 1.0 1.0   ALK PHOS U/L 73 81   AST (SGOT) U/L 21 16   ALT (SGPT) U/L 24 17     Results from last 7 days   Lab Units 23  0417 23  0340 23  1826 23  1730   CALCIUM mg/dL  8.6 8.3* 8.9 9.3   ALBUMIN g/dL  --   --  3.9 4.1   MAGNESIUM mg/dL  --   --  1.7  --      Results from last 7 days   Lab Units 08/02/23  1826 08/01/23  2036 08/01/23  1730   PROCALCITONIN ng/mL 3.38*  --  0.29*   LACTATE mmol/L 1.0 1.1 2.3*     COVID19   Date Value Ref Range Status   08/01/2023 Not Detected Not Detected - Ref. Range Final     Hemoglobin A1C   Date/Time Value Ref Range Status   08/04/2023 0417 6.00 (H) 4.80 - 5.60 % Final           CT Abdomen Pelvis Without Contrast  Narrative: CT ABDOMEN AND PELVIS WITHOUT IV CONTRAST     HISTORY: Sepsis, prostate cancer status post surgery, vasectomy, bladder  cystoscopy.     TECHNIQUE: Radiation dose reduction techniques were utilized, including  automated exposure control and exposure modulation based on body size.   3 mm images were obtained through the abdomen and pelvis without the  administration of IV contrast. Lack of IV contrast limits evaluation of  mediastinal, hilar, and vascular structures. Sensitivity for underlying  lesions and infection decreased.     COMPARISON: PET/CT report 04/23/2019.     FINDINGS: Dependent bibasilar atelectasis left greater than right.  Cardiomegaly.     LOWER CHEST: Within normal limits.     ABDOMEN:  Liver/Biliary Tract: Normal morphology. Gallbladder within normal  limits.     Spleen: Within normal limits.     Pancreas: Within normal limits.     Adrenals: Within normal limits.     Kidneys: 8 mm stone in the left upper pole and 5 mm stone in the  interpolar region. No hydroureteronephrosis. 4 mm bladder stone just  beyond the right ureterovesical junction. The bladder is incompletely  distended.     Bowel:  No obstruction. Normal appendix. The colon is stool-filled.  Extensive diverticulosis particularly about the sigmoid colon with  mildly prominent subjacent vessels. No significant pericolonic  inflammation. Ensure up-to-date with endoscopy.     Vasculature:    Scattered calcific atherosclerosis.     Lymph Nodes:   Scattered subcentimeter nodes not enlarged by CT  criteria..                                 PELVIS:                                   Pelvic organs: Prostatectomy with multiple surgical clips in the pelvis.  No free fluid or focal mass. Recommend correlation with prior imaging,  PSA levels, and continued oncologic surveillance as clinically  indicated.           Abdominal/Pelvic Wall: Within normal limits.     BONES: Multilevel degenerative changes thoracolumbar spine and pelvis.  If there is focal bone pain or concern for infection, consider MRI.        Impression: 1. No definitive acute intra-abdominal or pelvic process on this limited  noncontrast exam. Recommend close follow-up if symptoms persist/worsen.  2. Bibasilar airspace disease favoring atelectasis, developing pneumonia  not excluded.  3. Nonobstructing left renal stones (measuring up to 8 mm) and a 4 mm  stone in the urinary bladder just beyond the right ureterovesical  junction.  4. Extensive diverticulosis without acute diverticulitis. Consider  correlation with endoscopy if not up-to-date and follow-up as indicated.  5. Please see above for additional incidental findings/recommendations.     This report was finalized on 8/3/2023 12:45 PM by Dr. Emerald Cunningham M.D.       Scheduled Medications  cefTRIAXone, 2,000 mg, Intravenous, Q24H  [START ON 8/5/2023] lisinopril, 20 mg, Oral, Q24H  pravastatin, 40 mg, Oral, Nightly  senna-docusate sodium, 2 tablet, Oral, BID    Infusions     Diet  Diet: Cardiac Diets; Healthy Heart (2-3 Na+); Texture: Regular Texture (IDDSI 7); Fluid Consistency: Thin (IDDSI 0)    I have personally reviewed     [x]  Laboratory   [x]  Microbiology   []  Radiology   []  EKG/Telemetry  []  Cardiology/Vascular   []  Pathology    []  Records       Assessment/Plan     Active Hospital Problems    Diagnosis  POA    E coli bacteremia [R78.81, B96.20]  Yes    Hyperlipidemia [E78.5]  Yes    Hypertension [I10]  Yes      Resolved Hospital  Problems   No resolved problems to display.       E. coli bacteremia-blood cultures 08/21/2023 growing E. coli.  Unclear source.  CT abdomen and pelvis was obtained, however limited due to noncontrast CT, and no definitive acute intra-abdominal or pelvic process was seen.    ID evaluated, repeat cultures pending, negative to date.,  ID recommend IV Rocephin 2 g every 24 hours for 14 days if repeat cultures negative.    Essential hypertension continue lisinopril, HCTZ on hold.  BP stable.    History of prostate and bladder cancer status post resection-asymptomatic, follows with outpatient urology      Nonobstructive left renal stones: CT scan showed left renal stones measuring up to 8 mm.  Asymptomatic.  Monitor.      Diverticulosis without diverticulitis.  Extensive diverticulosis seen on CT. recommended outpatient follow-up with GI and colonoscopy if indicated.        SCDs for DVT prophylaxis.  Full code.  Discussed with patient and care team on multidisciplinary rounds.    Anticipate discharge home, tomorrow with home health for IV antibiotics      Copied text in this note has been reviewed and is accurate as of 08/04/23.         Dictated utilizing Dragon dictation        Coreen Douglas MD  [facility]  08/04/23  17:44 EDT

## 2023-08-04 NOTE — CONSULTS
CONSULT NOTE    Infectious Diseases - Layla Caceres MD  [facility]       Patient Identification:  Name: North Carcamo  Age: 73 y.o.  Sex: male  :  1950  MRN: [id number]             Date of Consultation: 8/3/2023      Primary Care Physician: Pranav Guzman MD                               Requesting Physician:   Reason for Consultation: E. coli bacteremia    Impression: Patient is a 73-year-old male with past medical history remarkable for prostate cancer and prior prostate surgery and currently follows with urology service for some sort of bladder wall tumor for which he gets cystoscopy and tumor resections, history of hypertension and dyslipidemia was in his usual state of his health until the afternoon of 2023 when after having some activity at [employer] patient developed sudden onset of chills shakes rigors and dizziness and nausea.  EMS was called and patient was brought to the emergency room and was noted to have temperature of 101.3 upon arrival but EMS recorded a temperature of 102.5.  Extensive work-up was done in the emergency room including blood cultures were drawn but patient was already feeling better and work-up was essentially unremarkable.  Patient has been feeling better since and denies any localizing symptoms and surgical abdominal pain burning in urination frequency urgency back pain neck pain or hip pain and also has improved appetite.  Patient was discharged on oral Levaquin with instructions to follow-up with the primary care provider.  Patient denies any persistent nausea and vomiting.  Blood cultures drawn at the time of admission not reported as gram-negative rods with presumptive ID of E. coli.  Patient has been recalled last night and is admitted.  Patient has been started on IV Rocephin and repeat blood cultures are drawn.  Patient currently feels better.  CT scan of the abdomen and pelvis has been ordered and is currently pending.  1-acute E.  coli bacteremic sepsis likely underlying etiology would be  tract given his prior  interventions and procedures, biliary tract or bowels.  2-possible evolving underlying pneumonia  3-hypertension  4-history of hyperglycemia  5-possible evolving cellulitis look at present no clinical evidence of it at this time.    Recommendations/Discussions:  At this juncture I agree with the care plan consisting of repeat blood cultures and IV Rocephin.  Follow-up on the CT scan of the abdomen pelvis and check PSA level.  If he continues to do well and does not have any specific localizing findings on the work-up or if develop any localizing symptoms then recommend treating him with couple weeks of antibiotic treatment directed by the sensitivity of the E. coli.  Check bladder scan to rule out urinary retention.  Patient would need work-up for common variable immunodeficiency if patient does not have any specific localizing infectious syndrome to explain this bacteremic presentation.  Overall concept of care discussed with the patient and with Dr. Haskins.        History of Present Illness:   Patient is a 73-year-old male with past medical history remarkable for prostate cancer and prior prostate surgery and currently follows with urology service for some sort of bladder wall tumor for which he gets cystoscopy and tumor resections, history of hypertension and dyslipidemia was in his usual state of his health until the afternoon of 8/1/2023 when after having some activity at [employer] patient developed sudden onset of chills shakes rigors and dizziness and nausea.  EMS was called and patient was brought to the emergency room and was noted to have temperature of 101.3 upon arrival but EMS recorded a temperature of 102.5.  Extensive work-up was done in the emergency room including blood cultures were drawn but patient was already feeling better and work-up was essentially unremarkable.  Patient has been feeling better since and  denies any localizing symptoms and surgical abdominal pain burning in urination frequency urgency back pain neck pain or hip pain and also has improved appetite.  Patient was discharged on oral Levaquin with instructions to follow-up with the primary care provider.  Patient denies any persistent nausea and vomiting.  Blood cultures drawn at the time of admission not reported as gram-negative rods with presumptive ID of E. coli.  Patient has been recalled last night and is admitted.  Patient has been started on IV Rocephin and repeat blood cultures are drawn.  Patient currently feels better.  CT scan of the abdomen and pelvis has been ordered and is currently pending.      Past Medical History:  Past Medical History:   Diagnosis Date    Cancer     PROSTATE    Hyperglycemia     Hyperlipidemia     Hypertension      Past Surgical History:  Past Surgical History:   Procedure Laterality Date    CYSTOSCOPY BLADDER BIOPSY N/A 6/12/2019    Procedure: CYSTOSCOPY TUR BLADDER TUMOR;  Surgeon: Joel Russell MD;  Location: [facility];  Service: Urology    ELBOW PROCEDURE      PROSTATE SURGERY      TONSILLECTOMY      VASECTOMY        Home Meds:  Medications Prior to Admission   Medication Sig Dispense Refill Last Dose    Calcium Carbonate-Vitamin D (CALCIUM 600+D PO) Take 1 tablet by mouth 2 (Two) Times a Day.   8/2/2023    levoFLOXacin (LEVAQUIN) 750 MG tablet Take 1 tablet by mouth Daily.   8/2/2023    lisinopril-hydrochlorothiazide (PRINZIDE,ZESTORETIC) 20-12.5 MG per tablet TAKE 1 TABLET BY MOUTH DAILY 90 tablet 3 8/2/2023    pravastatin (PRAVACHOL) 40 MG tablet TAKE 1 TABLET BY MOUTH EVERY NIGHT 90 tablet 3 8/1/2023     Current Meds:     Current Facility-Administered Medications:     acetaminophen (TYLENOL) tablet 650 mg, 650 mg, Oral, Q4H PRN, Stingl, Eunice Wallace MD, 650 mg at 08/03/23 2039    sennosides-docusate (PERICOLACE) 8.6-50 MG per tablet 2 tablet, 2 tablet, Oral, BID, 2 tablet at 08/03/23 2039 **AND**  "polyethylene glycol (MIRALAX) packet 17 g, 17 g, Oral, Daily PRN, 17 g at 08/03/23 1517 **AND** bisacodyl (DULCOLAX) EC tablet 5 mg, 5 mg, Oral, Daily PRN, 5 mg at 08/03/23 1517 **AND** bisacodyl (DULCOLAX) suppository 10 mg, 10 mg, Rectal, Daily PRN, Eunice Dumont MD    cefTRIAXone (ROCEPHIN) 2,000 mg in sodium chloride 0.9 % 100 mL IVPB-VTB, 2,000 mg, Intravenous, Q24H, Eunice Dumont MD, Last Rate: 200 mL/hr at 08/03/23 1811, 2,000 mg at 08/03/23 1811    [START ON 8/4/2023] lisinopril (PRINIVIL,ZESTRIL) tablet 10 mg, 10 mg, Oral, Q24H **AND** [DISCONTINUED] hydroCHLOROthiazide (HYDRODIURIL) tablet 12.5 mg, 12.5 mg, Oral, Q24H, Eunice Dumont MD, 12.5 mg at 08/03/23 0806    melatonin tablet 3 mg, 3 mg, Oral, Nightly PRN, Eunice Dumont MD    ondansetron (ZOFRAN) tablet 4 mg, 4 mg, Oral, Q6H PRN **OR** ondansetron (ZOFRAN) injection 4 mg, 4 mg, Intravenous, Q6H PRN, Eunice Dumont MD    pravastatin (PRAVACHOL) tablet 40 mg, 40 mg, Oral, Nightly, Eunice Dumont MD, 40 mg at 08/03/23 2039    Insert Peripheral IV, , , Once **AND** sodium chloride 0.9 % flush 10 mL, 10 mL, Intravenous, PRN, Jose Vogel MD  Allergies:  Allergies   Allergen Reactions    Penicillins Rash     Thinks childhood reaction of rash      Social History:   Social History     Tobacco Use    Smoking status: Never    Smokeless tobacco: Never   Substance Use Topics    Alcohol use: Yes     Comment: MINIMUM CONSUMPTION      Family History:  Family History   Problem Relation Age of Onset    Hypertension Mother     Hypertension Father     Prostate cancer Father     Malig Hyperthermia Neg Hx           Review of Systems  See history of present illness and past medical history.  As described in history of presenting illness.      Vitals:   /58 (BP Location: Right arm, Patient Position: Lying)   Pulse 63   Temp 99.4 øF (37.4 øC) (Oral)   Resp 18   Ht 175.3 cm (69\")   Wt 85.5 kg (188 lb 7.9 oz)   " SpO2 97%   BMI 27.84 kg/mý   I/O:   Intake/Output Summary (Last 24 hours) at 8/3/2023 2200  Last data filed at 8/3/2023 0118  Gross per 24 hour   Intake 120 ml   Output --   Net 120 ml     Exam:  Patient is examined using the personal protective equipment as per guidelines from infection control for this particular patient as enacted.  Hand washing was performed before and after patient interaction.  General Appearance:    Alert, cooperative, no distress, appears stated age   Head:    Normocephalic, without obvious abnormality, atraumatic   Eyes:    PERRL, conjunctivae/corneas clear, EOM's intact, both eyes   Ears:    Normal external ear canals, both ears   Nose:   Nares normal, septum midline, mucosa normal, no drainage    or sinus tenderness   Throat:   Lips, tongue, gums normal; oral mucosa pink and moist   Neck:   Supple, symmetrical, trachea midline, no adenopathy;     thyroid:  no enlargement/tenderness/nodules; no carotid    bruit or JVD   Back:     Symmetric, no curvature, ROM normal, no CVA tenderness   Lungs:     Clear to auscultation bilaterally, respirations unlabored   Chest Wall:    No tenderness or deformity    Heart:    Regular rate and rhythm, S1 and S2 normal, no murmur, rub   or gallop   Abdomen:     Soft, non-tender, bowel sounds active all four quadrants,     no masses, no hepatomegaly, no splenomegaly   Extremities:   Extremities normal, atraumatic, no cyanosis or edema   Pulses:   Pulses palpable in all extremities; symmetric all extremities   Skin:   Skin color normal, Skin is warm and dry,  no rashes or palpable lesions   Neurologic:   CNII-XII intact, motor strength grossly intact, sensation grossly intact to light touch, no focal deficits noted       Data Review:    I reviewed the patient's new clinical results.  Results from last 7 days   Lab Units 08/03/23  0340 08/02/23  1826 08/01/23  2116 08/01/23  1730   WBC 10*3/mm3 6.68 8.15 16.95* 17.27*   HEMOGLOBIN g/dL 13.9 13.7 14.3 14.3    PLATELETS 10*3/mm3 164 180 211 213     Results from last 7 days   Lab Units 08/03/23  0340 08/02/23  1826 08/01/23  1730   SODIUM mmol/L 138 138 137   POTASSIUM mmol/L 3.7 4.0 3.9   CHLORIDE mmol/L 104 100 101   CO2 mmol/L 26.0 26.4 25.0   BUN mg/dL 12 13 13   CREATININE mg/dL 1.03 1.32* 1.21   CALCIUM mg/dL 8.3* 8.9 9.3   GLUCOSE mg/dL 124* 108* 142*     Microbiology Results (last 10 days)       Procedure Component Value - Date/Time    Blood Culture - Blood, Arm, Right [[id number]]  (Normal) Collected: 08/02/23 1833    Lab Status: Preliminary result Specimen: Blood from Arm, Right Updated: 08/03/23 2001     Blood Culture No growth at 24 hours    Narrative:      Less than seven (7) mL's of blood was collected.  Insufficient quantity may yield false negative results.    Blood Culture - Blood, Hand, Left [[id number]]  (Normal) Collected: 08/02/23 1827    Lab Status: Preliminary result Specimen: Blood from Hand, Left Updated: 08/03/23 1845     Blood Culture No growth at 24 hours    Respiratory Panel PCR w/COVID-19(SARS-CoV-2) ANUP/STEPHAN/YOSEPH/PAD/COR/MAD/ELEUTERIO In-House, NP Swab in UTM/VTM, 3-4 HR TAT - Swab, Nasopharynx [[id number]]  (Normal) Collected: 08/01/23 1827    Lab Status: Final result Specimen: Swab from Nasopharynx Updated: 08/01/23 1928     ADENOVIRUS, PCR Not Detected     Coronavirus 229E Not Detected     Coronavirus HKU1 Not Detected     Coronavirus NL63 Not Detected     Coronavirus OC43 Not Detected     COVID19 Not Detected     Human Metapneumovirus Not Detected     Human Rhinovirus/Enterovirus Not Detected     Influenza A PCR Not Detected     Influenza B PCR Not Detected     Parainfluenza Virus 1 Not Detected     Parainfluenza Virus 2 Not Detected     Parainfluenza Virus 3 Not Detected     Parainfluenza Virus 4 Not Detected     RSV, PCR Not Detected     Bordetella pertussis pcr Not Detected     Bordetella parapertussis PCR Not Detected     Chlamydophila pneumoniae PCR Not Detected     Mycoplasma pneumo by PCR  Not Detected    Narrative:      In the setting of a positive respiratory panel with a viral infection PLUS a negative procalcitonin without other underlying concern for bacterial infection, consider observing off antibiotics or discontinuation of antibiotics and continue supportive care. If the respiratory panel is positive for atypical bacterial infection (Bordetella pertussis, Chlamydophila pneumoniae, or Mycoplasma pneumoniae), consider antibiotic de-escalation to target atypical bacterial infection.    Blood Culture - Blood, Arm, Right [[id number]]  (Abnormal) Collected: 08/01/23 1746    Lab Status: Preliminary result Specimen: Blood from Arm, Right Updated: 08/03/23 0705     Blood Culture Gram Negative Bacilli     Isolated from Aerobic Bottle     Gram Stain Aerobic Bottle Gram negative bacilli    Blood Culture ID, PCR - Blood, Arm, Right [[id number]]  (Abnormal) Collected: 08/01/23 1746    Lab Status: Final result Specimen: Blood from Arm, Right Updated: 08/02/23 1509     BCID, PCR Escherichia coli. Identification by BCID2 PCR.     BOTTLE TYPE Aerobic Bottle    Narrative:      No resistance genes detected.           Assessment:  Active Hospital Problems    Diagnosis  POA    E coli bacteremia [R78.81, B96.20]  Yes    Hyperlipidemia [E78.5]  Yes    Hypertension [I10]  Yes      Resolved Hospital Problems   No resolved problems to display.         Plan:  See above  Layla Sandoval MD   8/3/2023  22:00 EDT    Parts of this note may be an electronic transcription/translation of spoken language to printed text using the Dragon dictation system.

## 2023-08-04 NOTE — PROGRESS NOTES
"  Infectious Diseases Progress Note    Layla Sandoval MD     [facility]  [place]: 2 days  Patient Identification:  Name: North Carcamo  Age: 73 y.o.  Sex: male  :  1950  MRN: [id number]         Primary Care Physician: Pranav Guzman MD        Subjective: Feeling better denies any shortness of breath cough localized pain and discomfort.  Appetite is better.  Interval History: See consultation note.  Blood cultures drawn at the time of admission come back positive for E. coli resistant to quinolones which he was prescribed 2 days ago    Objective:    Scheduled Meds:cefTRIAXone, 2,000 mg, Intravenous, Q24H  lisinopril, 10 mg, Oral, Q24H  pravastatin, 40 mg, Oral, Nightly  senna-docusate sodium, 2 tablet, Oral, BID      Continuous Infusions:     Vital signs in last 24 hours:  Temp:  [98.2 øF (36.8 øC)-100.6 øF (38.1 øC)] 98.2 øF (36.8 øC)  Heart Rate:  [53-66] 53  Resp:  [18-20] 20  BP: (114-135)/(58-81) 115/61    Intake/Output:    Intake/Output Summary (Last 24 hours) at 2023 0720  Last data filed at 2023 0422  Gross per 24 hour   Intake 600 ml   Output --   Net 600 ml       Exam:  /61 (BP Location: Right arm, Patient Position: Lying)   Pulse 53   Temp 98.2 øF (36.8 øC) (Oral)   Resp 20   Ht 175.3 cm (69\")   Wt 86.2 kg (190 lb 1.6 oz)   SpO2 99%   BMI 28.07 kg/mý   Patient is examined using the personal protective equipment as per guidelines from infection control for this particular patient as enacted.  Hand washing was performed before and after patient interaction.  General Appearance:    Alert, cooperative, no distress, AAOx3                          Head:    Normocephalic, without obvious abnormality, atraumatic                           Eyes:    PERRL, conjunctivae/corneas clear, EOM's intact, both eyes                         Throat:   Lips, tongue, gums normal; oral mucosa pink and moist                           Neck:   Supple, symmetrical, trachea midline, no JVD   "                       Lungs:    Bibasilar crackles                 Chest Wall:    No tenderness or deformity                          Heart:  S1-S2 regular                  Abdomen:   Soft nontender                 Extremities:   Extremities normal, atraumatic, no cyanosis or edema                        Pulses:   Pulses palpable in all extremities                            Skin:   Skin is warm and dry,  no rashes or palpable lesions                  Neurologic: Awake oriented does not appear to be in any acute distress       Data Review:    I reviewed the patient's new clinical results.  Results from last 7 days   Lab Units 08/04/23 0417 08/03/23 0340 08/02/23 1826 08/01/23 2116 08/01/23  1730   WBC 10*3/mm3 6.32 6.68 8.15 16.95* 17.27*   HEMOGLOBIN g/dL 13.2 13.9 13.7 14.3 14.3   PLATELETS 10*3/mm3 162 164 180 211 213     Results from last 7 days   Lab Units 08/04/23 0417 08/03/23 0340 08/02/23 1826 08/01/23  1730   SODIUM mmol/L 137 138 138 137   POTASSIUM mmol/L 3.6 3.7 4.0 3.9   CHLORIDE mmol/L 104 104 100 101   CO2 mmol/L 24.5 26.0 26.4 25.0   BUN mg/dL 11 12 13 13   CREATININE mg/dL 0.92 1.03 1.32* 1.21   CALCIUM mg/dL 8.6 8.3* 8.9 9.3   GLUCOSE mg/dL 109* 124* 108* 142*     Brief Urine Lab Results  (Last result in the past 365 days)        Color   Clarity   Blood   Leuk Est   Nitrite   Protein   CREAT   Urine HCG        08/03/23 0926 Yellow   Clear   Negative   Negative   Negative   Negative                 Microbiology Results (last 10 days)       Procedure Component Value - Date/Time    Blood Culture - Blood, Arm, Right [[id number]]  (Normal) Collected: 08/02/23 1833    Lab Status: Preliminary result Specimen: Blood from Arm, Right Updated: 08/03/23 2001     Blood Culture No growth at 24 hours    Narrative:      Less than seven (7) mL's of blood was collected.  Insufficient quantity may yield false negative results.    Blood Culture - Blood, Hand, Left [[id number]]  (Normal) Collected: 08/02/23 1827     Lab Status: Preliminary result Specimen: Blood from Hand, Left Updated: 08/03/23 1845     Blood Culture No growth at 24 hours    Respiratory Panel PCR w/COVID-19(SARS-CoV-2) ANUP/STEPHAN/YOSEPH/PAD/COR/MAD/ELEUTERIO In-House, NP Swab in UTM/VTM, 3-4 HR TAT - Swab, Nasopharynx [[id number]]  (Normal) Collected: 08/01/23 1827    Lab Status: Final result Specimen: Swab from Nasopharynx Updated: 08/01/23 1928     ADENOVIRUS, PCR Not Detected     Coronavirus 229E Not Detected     Coronavirus HKU1 Not Detected     Coronavirus NL63 Not Detected     Coronavirus OC43 Not Detected     COVID19 Not Detected     Human Metapneumovirus Not Detected     Human Rhinovirus/Enterovirus Not Detected     Influenza A PCR Not Detected     Influenza B PCR Not Detected     Parainfluenza Virus 1 Not Detected     Parainfluenza Virus 2 Not Detected     Parainfluenza Virus 3 Not Detected     Parainfluenza Virus 4 Not Detected     RSV, PCR Not Detected     Bordetella pertussis pcr Not Detected     Bordetella parapertussis PCR Not Detected     Chlamydophila pneumoniae PCR Not Detected     Mycoplasma pneumo by PCR Not Detected    Narrative:      In the setting of a positive respiratory panel with a viral infection PLUS a negative procalcitonin without other underlying concern for bacterial infection, consider observing off antibiotics or discontinuation of antibiotics and continue supportive care. If the respiratory panel is positive for atypical bacterial infection (Bordetella pertussis, Chlamydophila pneumoniae, or Mycoplasma pneumoniae), consider antibiotic de-escalation to target atypical bacterial infection.    Blood Culture - Blood, Arm, Right [[id number]]  (Abnormal)  (Susceptibility) Collected: 08/01/23 1746    Lab Status: Final result Specimen: Blood from Arm, Right Updated: 08/04/23 0620     Blood Culture Escherichia coli     Isolated from Aerobic Bottle     Gram Stain Aerobic Bottle Gram negative bacilli    Susceptibility        Escherichia coli       ALICIA      Ampicillin Susceptible      Ampicillin + Sulbactam Susceptible      Cefepime Susceptible      Ceftazidime Susceptible      Ceftriaxone Susceptible      Gentamicin Susceptible      Levofloxacin Resistant      Piperacillin + Tazobactam Susceptible      Trimethoprim + Sulfamethoxazole Susceptible                       Susceptibility Comments       Escherichia coli    Cefazolin sensitivity will not be reported for Enterobacteriaceae in non-urine isolates. If cefazolin is preferred, please call the microbiology lab to request an E-test.  With the exception of urinary-sourced infections, aminoglycosides should not be used as monotherapy.               Blood Culture ID, PCR - Blood, Arm, Right [[id number]]  (Abnormal) Collected: 08/01/23 1746    Lab Status: Final result Specimen: Blood from Arm, Right Updated: 08/02/23 1509     BCID, PCR Escherichia coli. Identification by BCID2 PCR.     BOTTLE TYPE Aerobic Bottle    Narrative:      No resistance genes detected.    Blood Culture - Blood, Arm, Right [[id number]]  (Abnormal) Collected: 08/01/23 1730    Lab Status: Preliminary result Specimen: Blood from Arm, Right Updated: 08/04/23 0549     Blood Culture Abnormal Stain     Gram Stain Anaerobic Bottle Gram negative bacilli          CT Abdomen Pelvis Without Contrast    Result Date: 8/3/2023  1. No definitive acute intra-abdominal or pelvic process on this limited noncontrast exam. Recommend close follow-up if symptoms persist/worsen. 2. Bibasilar airspace disease favoring atelectasis, developing pneumonia not excluded. 3. Nonobstructing left renal stones (measuring up to 8 mm) and a 4 mm stone in the urinary bladder just beyond the right ureterovesical junction. 4. Extensive diverticulosis without acute diverticulitis. Consider correlation with endoscopy if not up-to-date and follow-up as indicated. 5. Please see above for additional incidental findings/recommendations.  This report was finalized on 8/3/2023 12:45 PM  by Dr. Emerald Cunningham M.D.      XR Chest 1 View    Result Date: 8/1/2023  1. No acute process.  This report was finalized on 8/1/2023 5:52 PM by Dr. Ghassan Dobson M.D.         Assessment:    Hyperlipidemia    Hypertension    E coli bacteremia  1-acute quinolone resistant E. coli bacteremic sepsis likely due to  2-underlying pneumonia  3-hypertension  4-history of hyperglycemia  5-possible evolving cellulitis look at present no clinical evidence of it at this time.     Recommendations/Discussions:  Would recommend continue IV Rocephin follow-up on repeat blood culture results and if the repeat blood cultures are negative then would recommend IV Rocephin 2 g every 24 for 14 days.  If he continues to do well hopefully discharge on 8/5/2023.  We will get immunoglobulin levels to rule out common variable deficiency as his presentation can only be explained based on the imaging studies and work-up as bacteremic pneumonia.  This could explain shortness of breath and fatigue and weakness that he was experiencing besides fever and chills.  Options of treatment with IV Rocephin discussed with the patient and it could range from him coming to [facility]U to receive daily Rocephin either after having peripheral IV placed on a daily basis or him having a PICC line or midline to accomplish this.  Other option would be for him to go home with home health providing IV antibiotics at home.  Regardless patient would need weekly labs consisting of CBC CMP and based on his immunoglobin level decision can be made whether he would need further work-up for underlying common variable immunodeficiency causing him to have bacteremic sepsis.  His normal urinary formula or urinalysis argues against UTI as a cause of his bacteremic sepsis as there is no structural abnormalities except for nonobstructing kidney stones in the bladder and  tract.  Layla Sandoval MD  8/4/2023  07:20 EDT    Parts of this note may be an electronic transcription/translation  of spoken language to printed text using the Dragon dictation system.

## 2023-08-04 NOTE — DISCHARGE PLACEMENT REQUEST
"North Carcamo (73 y.o. Male)       Date of Birth   1950    Social Security Number       Address   [address] [place] [address]    Home Phone   [phone]    MRN   [id number]       Druze   None    Marital Status                               Admission Date   8/2/23    Admission Type   Emergency    Admitting Provider   Eunice Dumont MD    Attending Provider   Coreen Douglas MD    Department, Room/Bed   [address], S413/1       Discharge Date       Discharge Disposition       Discharge Destination                                 Attending Provider: Coreen Douglas MD    Allergies: Penicillins    Isolation: None   Infection: None   Code Status: CPR    Ht: 175.3 cm (69\")   Wt: 86.2 kg (190 lb 1.6 oz)    Admission Cmt: None   Principal Problem: None                  Active Insurance as of 8/2/2023       Primary Coverage       Payor Plan Insurance Group Employer/Plan Group    HUMANA MEDICARE REPLACEMENT HUMANA MEDICARE REPLACEMENT [id number]       Payor Plan Address Payor Plan Phone Number Payor Plan Fax Number Effective Dates    [address] [address] [phone]  1/1/2023 - None Entered    [facility] [address]         Subscriber Name Subscriber Birth Date Member ID       NORTH CARCAMO 1950 [id number]                     Emergency Contacts        (Rel.) Home Phone Work Phone Mobile Phone    Nanette Carcamo (Spouse) [phone] -- [phone]    Eleni Carcamo (Daughter) -- -- --                "

## 2023-08-04 NOTE — TELEPHONE ENCOUNTER
Caller: SUSU    Relationship: [facility]    Best call back number: [phone]     What orders are you requesting (i.e. lab or imaging): HOME HEALTH NURSING AND THERAPY    Where will you receive your lab/imaging services: IN HOME    Additional notes: SUSU STATES PATIENT IS EXPECTED TO DISCHARGE OVER THE WEEKEND AND THEY ARE REQUESTING VERBAL ORDERS.     PLEASE ADVISE

## 2023-08-05 ENCOUNTER — DOCUMENTATION (OUTPATIENT)
Dept: HOME HEALTH SERVICES | Facility: HOME HEALTHCARE | Age: 73
End: 2023-08-05
Payer: MEDICARE

## 2023-08-05 ENCOUNTER — HOME HEALTH ADMISSION (OUTPATIENT)
Dept: HOME HEALTH SERVICES | Facility: HOME HEALTHCARE | Age: 73
End: 2023-08-05
Payer: MEDICARE

## 2023-08-05 ENCOUNTER — READMISSION MANAGEMENT (OUTPATIENT)
Dept: CALL CENTER | Facility: HOSPITAL | Age: 73
End: 2023-08-05
Payer: MEDICARE

## 2023-08-05 VITALS
DIASTOLIC BLOOD PRESSURE: 66 MMHG | WEIGHT: 189.1 LBS | OXYGEN SATURATION: 98 % | SYSTOLIC BLOOD PRESSURE: 119 MMHG | RESPIRATION RATE: 18 BRPM | HEIGHT: 69 IN | BODY MASS INDEX: 28.01 KG/M2 | HEART RATE: 76 BPM | TEMPERATURE: 98.8 F

## 2023-08-05 PROBLEM — I48.0 PAROXYSMAL ATRIAL FIBRILLATION: Status: ACTIVE | Noted: 2023-08-05

## 2023-08-05 PROBLEM — D80.4 IGM DEFICIENCY: Status: ACTIVE | Noted: 2023-08-05

## 2023-08-05 LAB
ANION GAP SERPL CALCULATED.3IONS-SCNC: 9 MMOL/L (ref 5–15)
BUN SERPL-MCNC: 13 MG/DL (ref 8–23)
BUN/CREAT SERPL: 14.4 (ref 7–25)
CALCIUM SPEC-SCNC: 8.5 MG/DL (ref 8.6–10.5)
CHLORIDE SERPL-SCNC: 103 MMOL/L (ref 98–107)
CO2 SERPL-SCNC: 25 MMOL/L (ref 22–29)
CREAT SERPL-MCNC: 0.9 MG/DL (ref 0.76–1.27)
DEPRECATED RDW RBC AUTO: 42.9 FL (ref 37–54)
EGFRCR SERPLBLD CKD-EPI 2021: 89.1 ML/MIN/1.73
ERYTHROCYTE [DISTWIDTH] IN BLOOD BY AUTOMATED COUNT: 12.9 % (ref 12.3–15.4)
GLUCOSE SERPL-MCNC: 110 MG/DL (ref 65–99)
HCT VFR BLD AUTO: 39.9 % (ref 37.5–51)
HGB BLD-MCNC: 14 G/DL (ref 13–17.7)
MAGNESIUM SERPL-MCNC: 1.8 MG/DL (ref 1.6–2.4)
MCH RBC QN AUTO: 32.2 PG (ref 26.6–33)
MCHC RBC AUTO-ENTMCNC: 35.1 G/DL (ref 31.5–35.7)
MCV RBC AUTO: 91.7 FL (ref 79–97)
PLATELET # BLD AUTO: 178 10*3/MM3 (ref 140–450)
PMV BLD AUTO: 10.5 FL (ref 6–12)
POTASSIUM SERPL-SCNC: 3.4 MMOL/L (ref 3.5–5.2)
POTASSIUM SERPL-SCNC: 3.4 MMOL/L (ref 3.5–5.2)
POTASSIUM SERPL-SCNC: 4.2 MMOL/L (ref 3.5–5.2)
QT INTERVAL: 362 MS
RBC # BLD AUTO: 4.35 10*6/MM3 (ref 4.14–5.8)
SODIUM SERPL-SCNC: 137 MMOL/L (ref 136–145)
TSH SERPL DL<=0.05 MIU/L-ACNC: 3.38 UIU/ML (ref 0.27–4.2)
WBC NRBC COR # BLD: 5.5 10*3/MM3 (ref 3.4–10.8)

## 2023-08-05 PROCEDURE — 99222 1ST HOSP IP/OBS MODERATE 55: CPT | Performed by: INTERNAL MEDICINE

## 2023-08-05 PROCEDURE — 80048 BASIC METABOLIC PNL TOTAL CA: CPT | Performed by: STUDENT IN AN ORGANIZED HEALTH CARE EDUCATION/TRAINING PROGRAM

## 2023-08-05 PROCEDURE — 25010000002 CEFTRIAXONE PER 250 MG: Performed by: STUDENT IN AN ORGANIZED HEALTH CARE EDUCATION/TRAINING PROGRAM

## 2023-08-05 PROCEDURE — 83735 ASSAY OF MAGNESIUM: CPT | Performed by: NURSE PRACTITIONER

## 2023-08-05 PROCEDURE — 93005 ELECTROCARDIOGRAM TRACING: CPT | Performed by: STUDENT IN AN ORGANIZED HEALTH CARE EDUCATION/TRAINING PROGRAM

## 2023-08-05 PROCEDURE — C1751 CATH, INF, PER/CENT/MIDLINE: HCPCS

## 2023-08-05 PROCEDURE — 85027 COMPLETE CBC AUTOMATED: CPT | Performed by: STUDENT IN AN ORGANIZED HEALTH CARE EDUCATION/TRAINING PROGRAM

## 2023-08-05 PROCEDURE — 93010 ELECTROCARDIOGRAM REPORT: CPT | Performed by: INTERNAL MEDICINE

## 2023-08-05 PROCEDURE — 84132 ASSAY OF SERUM POTASSIUM: CPT | Performed by: STUDENT IN AN ORGANIZED HEALTH CARE EDUCATION/TRAINING PROGRAM

## 2023-08-05 PROCEDURE — 84443 ASSAY THYROID STIM HORMONE: CPT | Performed by: STUDENT IN AN ORGANIZED HEALTH CARE EDUCATION/TRAINING PROGRAM

## 2023-08-05 PROCEDURE — 36410 VNPNXR 3YR/> PHY/QHP DX/THER: CPT

## 2023-08-05 RX ORDER — SODIUM CHLORIDE 9 MG/ML
40 INJECTION, SOLUTION INTRAVENOUS AS NEEDED
Status: DISCONTINUED | OUTPATIENT
Start: 2023-08-05 | End: 2023-08-05 | Stop reason: HOSPADM

## 2023-08-05 RX ORDER — LISINOPRIL 20 MG/1
20 TABLET ORAL
Qty: 30 TABLET | Refills: 0 | Status: SHIPPED | OUTPATIENT
Start: 2023-08-06 | End: 2023-08-17 | Stop reason: SDUPTHER

## 2023-08-05 RX ORDER — SODIUM CHLORIDE 0.9 % (FLUSH) 0.9 %
10 SYRINGE (ML) INJECTION EVERY 12 HOURS SCHEDULED
Status: DISCONTINUED | OUTPATIENT
Start: 2023-08-05 | End: 2023-08-05 | Stop reason: HOSPADM

## 2023-08-05 RX ORDER — SODIUM CHLORIDE 0.9 % (FLUSH) 0.9 %
10 SYRINGE (ML) INJECTION AS NEEDED
Status: DISCONTINUED | OUTPATIENT
Start: 2023-08-05 | End: 2023-08-05 | Stop reason: HOSPADM

## 2023-08-05 RX ORDER — POTASSIUM CHLORIDE 750 MG/1
40 TABLET, FILM COATED, EXTENDED RELEASE ORAL EVERY 4 HOURS
Status: COMPLETED | OUTPATIENT
Start: 2023-08-05 | End: 2023-08-05

## 2023-08-05 RX ORDER — CEFTRIAXONE 1 G/1
2 INJECTION, POWDER, FOR SOLUTION INTRAMUSCULAR; INTRAVENOUS EVERY 24 HOURS
Qty: 28 G | Refills: 0 | Status: SHIPPED | OUTPATIENT
Start: 2023-08-02 | End: 2023-08-17

## 2023-08-05 RX ADMIN — LISINOPRIL 20 MG: 20 TABLET ORAL at 08:33

## 2023-08-05 RX ADMIN — APIXABAN 5 MG: 5 TABLET, FILM COATED ORAL at 11:39

## 2023-08-05 RX ADMIN — MAGNESIUM OXIDE 400 MG (241.3 MG MAGNESIUM) TABLET 400 MG: TABLET at 11:39

## 2023-08-05 RX ADMIN — CEFTRIAXONE SODIUM 2000 MG: 2 INJECTION, POWDER, FOR SOLUTION INTRAMUSCULAR; INTRAVENOUS at 17:38

## 2023-08-05 RX ADMIN — SENNOSIDES AND DOCUSATE SODIUM 2 TABLET: 50; 8.6 TABLET ORAL at 08:33

## 2023-08-05 RX ADMIN — POTASSIUM CHLORIDE 40 MEQ: 750 TABLET, EXTENDED RELEASE ORAL at 05:46

## 2023-08-05 RX ADMIN — POTASSIUM CHLORIDE 40 MEQ: 750 TABLET, EXTENDED RELEASE ORAL at 10:07

## 2023-08-05 RX ADMIN — METOPROLOL TARTRATE 12.5 MG: 25 TABLET ORAL at 10:08

## 2023-08-05 RX ADMIN — METOPROLOL TARTRATE 2.5 MG: 1 INJECTION, SOLUTION INTRAVENOUS at 10:07

## 2023-08-05 NOTE — PLAN OF CARE
Goal Outcome Evaluation:  Plan of Care Reviewed With: patient        Progress: improving  Outcome Evaluation: vss, a, afib, a/o x4. Midline placed. Will DC after evening abx.

## 2023-08-05 NOTE — PROGRESS NOTES
Name: North Carcamo ADMIT: 2023   : 1950  PCP: Pranav Guzman MD    MRN: [id number] LOS: 3 days   AGE/SEX: 73 y.o. male  ROOM: [facility]     Subjective   Subjective   Patient has converted to A-fib overnight, asymptomatic per report.  This morning remains in A-fib, heart rate 110s while in the room.  Denies chest pain palpitations.  No known history of A-fib.    Review of Systems   As above  Objective   Objective   Vital Signs  Temp:  [98.1 øF (36.7 øC)-99.3 øF (37.4 øC)] 98.8 øF (37.1 øC)  Heart Rate:  [] 141  Resp:  [18] 18  BP: (117-148)/(64-99) 126/72  SpO2:  [96 %-99 %] 99 %  on   ;   Device (Oxygen Therapy): room air  Body mass index is 27.93 kg/mý.  Physical Exam      General: Alert, sitting up in the bed, not in distress,  HEENT: Normocephalic, atraumatic  CV: Irregular rhythm, tachycardic  Lungs: Clear to auscultation bilaterally, no crackles or wheezes  Abdomen: Soft, nontender, nondistended  Extremities: No significant peripheral edema , no cyanosis       Results Review     I reviewed the patient's new clinical results.  Results from last 7 days   Lab Units 23  03423  1826   WBC 10*3/mm3 5.50 6.32 6.68 8.15   HEMOGLOBIN g/dL 14.0 13.2 13.9 13.7   PLATELETS 10*3/mm3 178 162 164 180     Results from last 7 days   Lab Units 23  03423  0417 23  0340 23  1826   SODIUM mmol/L 137 137 138 138   POTASSIUM mmol/L 3.4*  3.4* 3.6 3.7 4.0   CHLORIDE mmol/L 103 104 104 100   CO2 mmol/L 25.0 24.5 26.0 26.4   BUN mg/dL 13 11 12 13   CREATININE mg/dL 0.90 0.92 1.03 1.32*   GLUCOSE mg/dL 110* 109* 124* 108*   Estimated Creatinine Clearance: 76.9 mL/min (by C-G formula based on SCr of 0.9 mg/dL).  Results from last 7 days   Lab Units 23  1826 23  1730   ALBUMIN g/dL 3.9 4.1   BILIRUBIN mg/dL 1.0 1.0   ALK PHOS U/L 73 81   AST (SGOT) U/L 21 16   ALT (SGPT) U/L 24 17     Results from last 7 days   Lab Units  08/05/23  0342 08/04/23  0417 08/03/23  0340 08/02/23  1826 08/01/23  1730   CALCIUM mg/dL 8.5* 8.6 8.3* 8.9 9.3   ALBUMIN g/dL  --   --   --  3.9 4.1   MAGNESIUM mg/dL 1.8  --   --  1.7  --      Results from last 7 days   Lab Units 08/02/23  1826 08/01/23 2036 08/01/23  1730   PROCALCITONIN ng/mL 3.38*  --  0.29*   LACTATE mmol/L 1.0 1.1 2.3*     COVID19   Date Value Ref Range Status   08/01/2023 Not Detected Not Detected - Ref. Range Final     Hemoglobin A1C   Date/Time Value Ref Range Status   08/04/2023 0417 6.00 (H) 4.80 - 5.60 % Final           CT Abdomen Pelvis Without Contrast  Narrative: CT ABDOMEN AND PELVIS WITHOUT IV CONTRAST     HISTORY: Sepsis, prostate cancer status post surgery, vasectomy, bladder  cystoscopy.     TECHNIQUE: Radiation dose reduction techniques were utilized, including  automated exposure control and exposure modulation based on body size.   3 mm images were obtained through the abdomen and pelvis without the  administration of IV contrast. Lack of IV contrast limits evaluation of  mediastinal, hilar, and vascular structures. Sensitivity for underlying  lesions and infection decreased.     COMPARISON: PET/CT report 04/23/2019.     FINDINGS: Dependent bibasilar atelectasis left greater than right.  Cardiomegaly.     LOWER CHEST: Within normal limits.     ABDOMEN:  Liver/Biliary Tract: Normal morphology. Gallbladder within normal  limits.     Spleen: Within normal limits.     Pancreas: Within normal limits.     Adrenals: Within normal limits.     Kidneys: 8 mm stone in the left upper pole and 5 mm stone in the  interpolar region. No hydroureteronephrosis. 4 mm bladder stone just  beyond the right ureterovesical junction. The bladder is incompletely  distended.     Bowel:  No obstruction. Normal appendix. The colon is stool-filled.  Extensive diverticulosis particularly about the sigmoid colon with  mildly prominent subjacent vessels. No significant pericolonic  inflammation. Ensure  up-to-date with endoscopy.     Vasculature:    Scattered calcific atherosclerosis.     Lymph Nodes:  Scattered subcentimeter nodes not enlarged by CT  criteria..                                 PELVIS:                                   Pelvic organs: Prostatectomy with multiple surgical clips in the pelvis.  No free fluid or focal mass. Recommend correlation with prior imaging,  PSA levels, and continued oncologic surveillance as clinically  indicated.           Abdominal/Pelvic Wall: Within normal limits.     BONES: Multilevel degenerative changes thoracolumbar spine and pelvis.  If there is focal bone pain or concern for infection, consider MRI.        Impression: 1. No definitive acute intra-abdominal or pelvic process on this limited  noncontrast exam. Recommend close follow-up if symptoms persist/worsen.  2. Bibasilar airspace disease favoring atelectasis, developing pneumonia  not excluded.  3. Nonobstructing left renal stones (measuring up to 8 mm) and a 4 mm  stone in the urinary bladder just beyond the right ureterovesical  junction.  4. Extensive diverticulosis without acute diverticulitis. Consider  correlation with endoscopy if not up-to-date and follow-up as indicated.  5. Please see above for additional incidental findings/recommendations.     This report was finalized on 8/3/2023 12:45 PM by Dr. Emerald Cunningham M.D.       Scheduled Medications  cefTRIAXone, 2,000 mg, Intravenous, Q24H  lisinopril, 20 mg, Oral, Q24H  metoprolol tartrate, 2.5 mg, Intravenous, Once  metoprolol tartrate, 12.5 mg, Oral, Q12H  potassium chloride ER, 40 mEq, Oral, Q4H  pravastatin, 40 mg, Oral, Nightly  senna-docusate sodium, 2 tablet, Oral, BID    Infusions     Diet  Diet: Cardiac Diets; Healthy Heart (2-3 Na+); Texture: Regular Texture (IDDSI 7); Fluid Consistency: Thin (IDDSI 0)    I have personally reviewed     [x]  Laboratory   [x]  Microbiology   []  Radiology   []  EKG/Telemetry  []  Cardiology/Vascular   []  Pathology     []  Records       Assessment/Plan     Active Hospital Problems    Diagnosis  POA    Paroxysmal atrial fibrillation [I48.0]  Unknown    E coli bacteremia [R78.81, B96.20]  Yes    Hyperlipidemia [E78.5]  Yes    Hypertension [I10]  Yes      Resolved Hospital Problems   No resolved problems to display.       New onset atrial fibrillation.  No history of A-fib per patient.  TSH normal.  Ordered IV metoprolol 2.5 mg, initiated on metoprolol titrate 12.5 mg twice daily.  Echocardiogram ordered.  Initiated on apixaban 5 mg twice daily.  Cardiology consulted.    Hypokalemia: Ordered replacement.  Magnesium normal.          E. coli bacteremia-blood cultures 08/21/2023 growing E. coli.  Unclear source.  CT abdomen and pelvis was obtained, however limited due to noncontrast CT, and no definitive acute intra-abdominal or pelvic process was seen.    ID evaluated, repeat cultures pending, negative to date.,    ID recommend IV Rocephin 2 g every 24 hours for 14 days       Essential hypertension continue lisinopril, HCTZ on hold.  BP stable.    History of prostate and bladder cancer status post resection-asymptomatic, follows with outpatient urology      Nonobstructive left renal stones: CT scan showed left renal stones measuring up to 8 mm.  Asymptomatic.  Monitor.      Diverticulosis without diverticulitis.  Extensive diverticulosis seen on CT. recommended outpatient follow-up with GI and colonoscopy if indicated.        SCDs for DVT prophylaxis.  Full code.  Discussed with patient, RN    Anticipate discharge home, tomorrow with home health for IV antibiotics and pending cardiology recommendations      Copied text in this note has been reviewed and is accurate as of 08/05/23.         Dictated utilizing Dragon dictation        Coreen Douglas MD  [facility]  08/05/23  10:06 EDT

## 2023-08-05 NOTE — CONSULTS
Date of Hospital Visit: [unfilled]ENC@  Encounter Provider: Brennen Burroughs MD  Place of Service: [facility] CARDIOLOGY  Patient Name: North Carcamo  :1950  Referral Provider: No ref. provider found    Chief complaint: fever/chills    History of Present Illness     North Carcamo is a 73 y.o. with hypertension.     Patient presented to the ED on 23 with complaints of fever and chills. He had labs and blood cultures drawn and then was sent home. He was called back on 23 and told that his blood culture showed e.coli and that he should come in and get antibiotics. His WBC was 17,000 and his lactic was 2.3.    This morning, patient converted into afib. Her rate is low 100s and he is asymptomatic.     He has no awareness of atrial fibrillation at this time.      He recalls no history of tachycardia or palpitations.          Past Medical History:   Diagnosis Date    Cancer     PROSTATE    Hyperglycemia     Hyperlipidemia     Hypertension        Past Surgical History:   Procedure Laterality Date    CYSTOSCOPY BLADDER BIOPSY N/A 2019    Procedure: CYSTOSCOPY TUR BLADDER TUMOR;  Surgeon: Jeol Russell MD;  Location: [facility];  Service: Urology    ELBOW PROCEDURE      PROSTATE SURGERY      TONSILLECTOMY      VASECTOMY         Medications Prior to Admission   Medication Sig Dispense Refill Last Dose    Calcium Carbonate-Vitamin D (CALCIUM 600+D PO) Take 1 tablet by mouth 2 (Two) Times a Day.   2023    levoFLOXacin (LEVAQUIN) 750 MG tablet Take 1 tablet by mouth Daily.   2023    lisinopril-hydrochlorothiazide (PRINZIDE,ZESTORETIC) 20-12.5 MG per tablet TAKE 1 TABLET BY MOUTH DAILY 90 tablet 3 2023    pravastatin (PRAVACHOL) 40 MG tablet TAKE 1 TABLET BY MOUTH EVERY NIGHT 90 tablet 3 2023       Current Meds  Scheduled Meds:apixaban, 5 mg, Oral, Q12H  cefTRIAXone, 2,000 mg, Intravenous, Q24H  lisinopril, 20 mg, Oral, Q24H  metoprolol tartrate, 12.5 mg,  "Oral, Q12H  pravastatin, 40 mg, Oral, Nightly  senna-docusate sodium, 2 tablet, Oral, BID      Continuous Infusions:   PRN Meds:.  acetaminophen    senna-docusate sodium **AND** polyethylene glycol **AND** bisacodyl **AND** bisacodyl    melatonin    ondansetron **OR** ondansetron    Potassium Replacement - Follow Nurse / BPA Driven Protocol    Insert Peripheral IV **AND** sodium chloride    Allergies as of 08/02/2023 - Reviewed 08/02/2023   Allergen Reaction Noted    Penicillins Rash 01/13/2016       Social History     Socioeconomic History    Marital status:    Tobacco Use    Smoking status: Never    Smokeless tobacco: Never   Vaping Use    Vaping Use: Never used   Substance and Sexual Activity    Alcohol use: Yes     Comment: MINIMUM CONSUMPTION    Drug use: No    Sexual activity: Defer       Family History   Problem Relation Age of Onset    Hypertension Mother     Hypertension Father     Prostate cancer Father     Malig Hyperthermia Neg Hx        REVIEW OF SYSTEMS:   12 point ROS was performed and is negative except as outlined in HPI          Objective:   Temp:  [98.1 øF (36.7 øC)-99.3 øF (37.4 øC)] 98.8 øF (37.1 øC)  Heart Rate:  [] 117  Resp:  [18] 18  BP: (117-148)/(64-99) 126/72  Body mass index is 27.93 kg/mý.  Flowsheet Rows      Flowsheet Row First Filed Value   Admission Height 175.3 cm (69\") Documented at 08/02/2023 1659   Admission Weight 81.6 kg (180 lb) Documented at 08/02/2023 1659          Vitals:    08/05/23 1008   BP:    Pulse: 117   Resp:    Temp:    SpO2:        Exam    Appears comfortable lying in bed, no distress  Irregular rate and rhythm, normal rate, no mumurs  Respiration and even and unlabored  No Edema, and skin is warm  Abdomen is soft  His is alert and oriented requiring no oxygen              Lab Review:      Results from last 7 days   Lab Units 08/05/23  0342 08/03/23  0340 08/02/23  1826   SODIUM mmol/L 137   < > 138   POTASSIUM mmol/L 3.4*  3.4*   < > 4.0   CHLORIDE " mmol/L 103   < > 100   CO2 mmol/L 25.0   < > 26.4   BUN mg/dL 13   < > 13   CREATININE mg/dL 0.90   < > 1.32*   CALCIUM mg/dL 8.5*   < > 8.9   BILIRUBIN mg/dL  --   --  1.0   ALK PHOS U/L  --   --  73   ALT (SGPT) U/L  --   --  24   AST (SGOT) U/L  --   --  21   GLUCOSE mg/dL 110*   < > 108*    < > = values in this interval not displayed.     Results from last 7 days   Lab Units 08/01/23  1730   CK TOTAL U/L 64     @LABRCNTbnp@  Results from last 7 days   Lab Units 08/05/23  0342 08/04/23  0417 08/03/23  0340   WBC 10*3/mm3 5.50 6.32 6.68   HEMOGLOBIN g/dL 14.0 13.2 13.9   HEMATOCRIT % 39.9 37.3* 39.1   PLATELETS 10*3/mm3 178 162 164         Results from last 7 days   Lab Units 08/05/23  0342   MAGNESIUM mg/dL 1.8     @LABRCNTIP(chol,trig,hdl,ldl)    8/5/23 6/5/19    I personally viewed and interpreted the patient's EKG/Telemetry data      Assessment and Plan:    New onset atrial fibrillation - It is not clear if we have captured and episode of asymptomatic PAF or if this is actually new onset persistent AF.   He is completely asymptomatic.  He has been started on beta blocker and apixaban.  I understand plan is for discharge today.  I'm fine with this, he does not need to stay for echocardiogram.  We can follow him up in clinic in a few weeks to assess if he has stayed in atrial fibrillation and consider efforts at rhythm control.  Message sent to my clinic for follow-up.       Brennen Burroughs MD  08/05/23  11:55 EDT.  Time spent in reviewing chart, discussion and examination:

## 2023-08-05 NOTE — NURSING NOTE
Pt is normally at sinus sasha, around 0030 converted into afib. Pt is asymptomatic. No hx of afib. EKG ordered;shows afib. Waiting on K+ result. Notified parker DÍAZ.  Waiting for orders.

## 2023-08-05 NOTE — PLAN OF CARE
Goal Outcome Evaluation:  Plan of Care Reviewed With: patient           Outcome Evaluation: pt is a/ox4. VSS. on RA. was on sinus sasha, new onsent of afib, asymptamatic. Cardiology consulted. recent k+ was 3.4, replaced potassium as per protocal. Carefully updating plan of care.

## 2023-08-05 NOTE — DISCHARGE SUMMARY
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        Hospital Course   Patient is a 73-year-old male with a history of hypertension, hyperlipidemia, prostate cancer status post surgery/bladder tumor for which patient follows with urology, initially presented to the ED on2023 when after having some activity at [employer] patient developed sudden onset of chills shakes rigors and dizziness and nausea. EMS was called and patient was brought to the emergency room and was noted to have temperature of 101.3 upon arrival but EMS recorded a temperature of 102.5. Extensive work-up was done in the emergency room including blood cultures were drawn, and patient patient was discharged on oral Levaquin as he was feeling better with instructions to follow-up with the primary care provider.  Blood cultures drawn at the time of admission came back positive for E. coli and  Patient has been recalled to present back to the ED and was admitted for further evaluation management.        E. coli bacteremia-blood cultures 2023 growing E. coli.  Unclear source. CT abdomen and pelvis was obtained, however limited due to noncontrast CT, and no definitive acute intra-abdominal or pelvic process was seen.  Patient was initiated on IV ceftriaxone.  Repeat cultures were obtained were negative to date.. ID recommend IV Rocephin 2 g every 24 hours for 14 days with stop date of .  Addendum: Patient's final culture results came back positive  Please help get her set up for a visit either video or next available in person   for Bacteroides thetaiotaomicron.  Prescribed Flagyl for 2 weeks started 08/06/2023.  Discussed with patient that he will need to follow-up with gastroenterology for colonoscopy to evaluate for intra-abdominal source of infection and to rule out underlying mass.  Referral to GI placed.        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 likely possible cause of E. coli bacteremia as above due to immunodeficiency as no other cause was determined and IgM was low.  Referred to  immunology outpatient for evaluation.      New onset paroxysmal atrial fibrillation: Cardiology evaluated, patient stable to be discharged home cardiology standpoint.  Heart rate improved.  Discharged home on Eliquis 5 mg twice daily, metoprolol 12.5 mg twice daily..  Will need to  Follow-up outpatient cardiology and outpatient echocardiogram.  Was notified by RN that patient's heart rate improved to 60 susceptible, patient cleared to be discharged       Essential hypertension: Patient's BP remained stable on lisinopril alone, home HCTZ was held.  Since patient was initiated on metoprolol tartrate for A-fib, I discontinued HCTZ-lisinopril at discharge, and prescribed lisinopril alone.     History of prostate and bladder cancer status post resection-asymptomatic, follows with outpatient urology        Nonobstructive left renal stones: CT scan showed left renal stones measuring up to 8 mm.  Asymptomatic.  Monitor.        Diverticulosis without diverticulitis.  Extensive diverticulosis seen on CT. recommended outpatient follow-up with GI and colonoscopy if indicated.    At the time of discharge patient was told to take all medications as prescribed, keep all follow-up appointments, and call their doctor or return to the hospital with any worsening or concerning symptoms.                Day of Discharge     Subjective:  Was notified by RN that patient's heart rate improved to 60s, and patient cleared to be  discharged from cardiology standpoint.  Patient remains asymptomatic.    Physical Exam:  Temp:  [98.7 øF (37.1 øC)-99.3 øF (37.4 øC)] 98.8 øF (37.1 øC)  Heart Rate:  [] 76  Resp:  [18] 18  BP: (117-148)/(64-99) 119/66  Body mass index is 27.93 kg/mý.  Physical Exam  Exam performed in the morning  General: Alert, sitting up in the bed, not in distress,  HEENT: Normocephalic, atraumatic  CV: Irregular rhythm, tachycardic  Lungs: Clear to auscultation bilaterally, no crackles or wheezes  Abdomen: Soft, nontender, nondistended  Extremities: No significant peripheral edema , no cyanosis        Consultants     Consult Orders (all) (From admission, onward)       Start     Ordered    08/05/23 1219  IV TEAM - Consult for Midline Placement (IV Team to Determine Number of Lumens)  Once        Provider:  (Not yet assigned)    08/05/23 1218    08/05/23 1134  IV TEAM - Consult for PICC Line (IV Team to Determine Number of Lumens)  Once,   Status:  Canceled        Provider:  (Not yet assigned)    08/05/23 1134    08/05/23 0934  Inpatient Case Management  Consult  Once        Comments: Please arrange for 2 weeks of IV Rocephin at 2 g every 24 starting 8/2/2023.  Check weekly CBC CMP and CRP and call abnormal results to Dr. Sandoval at [phone].    Diagnosis: E. coli bacteremic sepsis likely due to pneumonia.   Provider:  (Not yet assigned)    08/05/23 0934    08/05/23 0200  Inpatient Cardiology Consult  Once        Specialty:  Cardiology  Provider:  Chau Melchor MD    08/05/23 0200 08/03/23 0836  Inpatient Infectious Diseases Consult  Once        Specialty:  Infectious Diseases  Provider:  Layla Sandoval MD    08/03/23 0836    08/02/23 1739  [facility] (on-call MD unless specified) Details  Once        Specialty:  Hospitalist  Provider:  (Not yet assigned)    08/02/23 1739                  Procedures     * Surgery not found *      Imaging Results (All)       Procedure Component Value Units Date/Time    CT  Abdomen Pelvis Without Contrast [[id number]] Collected: 08/03/23 1156     Updated: 08/03/23 1249    Narrative:      CT ABDOMEN AND PELVIS WITHOUT IV CONTRAST     HISTORY: Sepsis, prostate cancer status post surgery, vasectomy, bladder  cystoscopy.     TECHNIQUE: Radiation dose reduction techniques were utilized, including  automated exposure control and exposure modulation based on body size.   3 mm images were obtained through the abdomen and pelvis without the  administration of IV contrast. Lack of IV contrast limits evaluation of  mediastinal, hilar, and vascular structures. Sensitivity for underlying  lesions and infection decreased.     COMPARISON: PET/CT report 04/23/2019.     FINDINGS: Dependent bibasilar atelectasis left greater than right.  Cardiomegaly.     LOWER CHEST: Within normal limits.     ABDOMEN:  Liver/Biliary Tract: Normal morphology. Gallbladder within normal  limits.     Spleen: Within normal limits.     Pancreas: Within normal limits.     Adrenals: Within normal limits.     Kidneys: 8 mm stone in the left upper pole and 5 mm stone in the  interpolar region. No hydroureteronephrosis. 4 mm bladder stone just  beyond the right ureterovesical junction. The bladder is incompletely  distended.     Bowel:  No obstruction. Normal appendix. The colon is stool-filled.  Extensive diverticulosis particularly about the sigmoid colon with  mildly prominent subjacent vessels. No significant pericolonic  inflammation. Ensure up-to-date with endoscopy.     Vasculature:    Scattered calcific atherosclerosis.     Lymph Nodes:  Scattered subcentimeter nodes not enlarged by CT  criteria..                                 PELVIS:                                   Pelvic organs: Prostatectomy with multiple surgical clips in the pelvis.  No free fluid or focal mass. Recommend correlation with prior imaging,  PSA levels, and continued oncologic surveillance as clinically  indicated.           Abdominal/Pelvic Wall:  Within normal limits.     BONES: Multilevel degenerative changes thoracolumbar spine and pelvis.  If there is focal bone pain or concern for infection, consider MRI.          Impression:      1. No definitive acute intra-abdominal or pelvic process on this limited  noncontrast exam. Recommend close follow-up if symptoms persist/worsen.  2. Bibasilar airspace disease favoring atelectasis, developing pneumonia  not excluded.  3. Nonobstructing left renal stones (measuring up to 8 mm) and a 4 mm  stone in the urinary bladder just beyond the right ureterovesical  junction.  4. Extensive diverticulosis without acute diverticulitis. Consider  correlation with endoscopy if not up-to-date and follow-up as indicated.  5. Please see above for additional incidental findings/recommendations.     This report was finalized on 8/3/2023 12:45 PM by Dr. Emerald Cunningham M.D.                 Pertinent Labs     Results from last 7 days   Lab Units 08/05/23  0342 08/04/23 0417 08/03/23  0340 08/02/23  1826   WBC 10*3/mm3 5.50 6.32 6.68 8.15   HEMOGLOBIN g/dL 14.0 13.2 13.9 13.7   PLATELETS 10*3/mm3 178 162 164 180     Results from last 7 days   Lab Units 08/05/23  0342 08/04/23 0417 08/03/23  0340 08/02/23  1826   SODIUM mmol/L 137 137 138 138   POTASSIUM mmol/L 3.4*  3.4* 3.6 3.7 4.0   CHLORIDE mmol/L 103 104 104 100   CO2 mmol/L 25.0 24.5 26.0 26.4   BUN mg/dL 13 11 12 13   CREATININE mg/dL 0.90 0.92 1.03 1.32*   GLUCOSE mg/dL 110* 109* 124* 108*   Estimated Creatinine Clearance: 76.9 mL/min (by C-G formula based on SCr of 0.9 mg/dL).  Results from last 7 days   Lab Units 08/02/23 1826 08/01/23  1730   ALBUMIN g/dL 3.9 4.1   BILIRUBIN mg/dL 1.0 1.0   ALK PHOS U/L 73 81   AST (SGOT) U/L 21 16   ALT (SGPT) U/L 24 17     Results from last 7 days   Lab Units 08/05/23  0342 08/04/23  0417 08/03/23  0340 08/02/23  1826 08/01/23  1730   CALCIUM mg/dL 8.5* 8.6 8.3* 8.9 9.3   ALBUMIN g/dL  --   --   --  3.9 4.1   MAGNESIUM mg/dL 1.8  --   --   1.7  --        Results from last 7 days   Lab Units 08/01/23  1730   CK TOTAL U/L 64           Invalid input(s): LDLCALC  Results from last 7 days   Lab Units 08/02/23  1833 08/02/23  1827 08/01/23  1746 08/01/23  1730   BLOODCX  No growth at 2 days No growth at 2 days Escherichia coli* Abnormal Stain*   BCIDPCR   --   --  Escherichia coli. Identification by BCID2 PCR.*  --      Results from last 7 days   Lab Units 08/01/23  1827   COVID19  Not Detected       Test Results Pending at Discharge     Pending Labs       Order Current Status    Potassium Collected (08/05/23 1433)    Blood Culture - Blood, Arm, Right Preliminary result    Blood Culture - Blood, Hand, Left Preliminary result            Discharge Details        Discharge Medications        New Medications        Instructions Start Date   apixaban 5 MG tablet tablet  Commonly known as: ELIQUIS   5 mg, Oral, Every 12 Hours Scheduled      cefTRIAXone 1 g injection  Commonly known as: ROCEPHIN   2 g, Intravenous, Every 24 Hours      lisinopril 20 MG tablet  Commonly known as: PRINIVIL,ZESTRIL  Replaces: lisinopril-hydrochlorothiazide 20-12.5 MG per tablet   20 mg, Oral, Every 24 Hours Scheduled   Start Date: August 6, 2023     metoprolol tartrate 25 MG tablet  Commonly known as: LOPRESSOR   12.5 mg, Oral, Every 12 Hours Scheduled             Continue These Medications        Instructions Start Date   CALCIUM 600+D PO   1 tablet, Oral, 2 Times Daily      pravastatin 40 MG tablet  Commonly known as: PRAVACHOL   40 mg, Oral, Nightly             Stop These Medications      levoFLOXacin 750 MG tablet  Commonly known as: LEVAQUIN     lisinopril-hydrochlorothiazide 20-12.5 MG per tablet  Commonly known as: PRINZIDE,ZESTORETIC  Replaced by: lisinopril 20 MG tablet              Allergies   Allergen Reactions    Penicillins Rash     Thinks childhood reaction of rash        Discharge Disposition:  Home-Health Care Svc      Discharge Diet:  Diet Order   Procedures    Diet:  Cardiac Diets; Healthy Heart (2-3 Na+); Texture: Regular Texture (IDDSI 7); Fluid Consistency: Thin (IDDSI 0)       Discharge Activity:       CODE STATUS:    Code Status and Medical Interventions:   Ordered at: 08/02/23 1825     Code Status (Patient has no pulse and is not breathing):    CPR (Attempt to Resuscitate)     Medical Interventions (Patient has pulse or is breathing):    Full     Release to patient:    Routine Release       Future Appointments   Date Time Provider Department Center   8/7/2023  8:45 AM [facility]   8/10/2023  9:15 AM Pranav Garza MD [facility]     Additional Instructions for the Follow-ups that You Need to Schedule       Ambulatory Ref[facility])   As directed      Face to Face Visit Date: 8/5/2023   Follow-up provider for Plan of Care?: I treated the patient in an acute care facility and will not continue treatment after discharge.   Follow-up provider: PRANAV GARZA [[id number]]   Reason/Clinical Findings: E. coli bacteremia   Describe mobility limitations that make leaving home difficult: No mobility difficulties   Nursing/Therapeutic Services Requested: Skilled Nursing   Skilled nursing orders: PICC line care/instruction Infusion therapy   Frequency: 1 Week 1               Follow-up Information       Pranav Garza MD Follow up in 1 week(s).    Specialty: Internal Medicine  Contact information:  [id number] [place], [place] [id number] [phone]                             Additional Instructions for the Follow-ups that You Need to Schedule       Ambulatory Referral to [facility] ([facility])   As directed      Face to Face Visit Date: 8/5/2023   Follow-up provider for Plan of Care?: I treated the patient in an acute care facility and will not continue treatment after discharge.   Follow-up provider: PRANAV GARZA [[id number]]   Reason/Clinical Findings: E. coli bacteremia   Describe mobility limitations that make leaving home  difficult: No mobility difficulties   Nursing/Therapeutic Services Requested: Skilled Nursing   Skilled nursing orders: PICC line care/instruction Infusion therapy   Frequency: 1 Week 1            Time Spent on Discharge:  Greater than 30 minutes      Coreen Douglas MD  [facility]  08/05/23  15:09 EDT       Addendum: 08/06/2023  Notified by pharmacy that patient's blood culture from  8/1/23 @ 1730 resulted for this patient as Bacteroides thetaiotaomicron.  Discussed with Dr. Sandoval, recommended prescription of Flagyl 500 mg every 8 hours for 2 weeks and follow-up with GI for colonoscopy.  Prescribed Flagyl 500 mg every 8 hours for 2 weeks, called patient and sent to his preferred pharmacy.  Discussed about the findings and that he will need to follow-up with GI for colonoscopy.

## 2023-08-05 NOTE — CASE MANAGEMENT/SOCIAL WORK
Continued Stay Note  [facility]     Patient Name: North Carcamo  MRN: [id number]  Today's Date: 8/5/2023    Admit Date: 8/2/2023    [facility]   Discharge Plan       Row Name 08/05/23 1439       Plan    Plan [facility]    Patient/Family in Agreement with Plan yes    Plan Comments CCP spoke with Kinga/[facility] who confirms patient is accepted. Kinga requested outpatient antibiotic order to process delivery. CCP obtained order from Dr. Sandoval for antibiotic and updated Kinga who states antibiotic can be delivered tomorrow afternoon. Patient needs midline and evening dose today prior to discharge so as there is no missed dose. RN updated. CCP spoke with Svetlana/[facility] who confirms they can accept and following for  orders. Seema CORLEY RN CCP                   Discharge Codes    No documentation.                 Expected Discharge Date and Time       Expected Discharge Date Expected Discharge Time    Aug 5, 2023               Seema Johnson RN

## 2023-08-05 NOTE — SIGNIFICANT NOTE
"   08/05/23 1541   Midline Catheter - Single Lumen 08/05/23 Left Basilic   Placement date: If unknown, DO NOT use \"Add Comment\" note/Placement time: If unknown, DO NOT use \"Add Comment\" note: 08/05/23 1541   Hand Hygiene Completed: Yes  Site Prep: Chlorhexidine isopropyl alcohol  All 5 Sterile Barriers Used (Gloves, Gown, Ca...   Site Assessment Clean;Dry;Intact   Line Status Blood return noted;Capped;Flushed;Saline locked   Length gavin (cm) 11 cm   Extremity Circumference (cm) 30 cm   Dressing Type Border Dressing;Securing device;Antimicrobial dressing/disc   Dressing Status Clean;Dry;Intact   Liquid Adhesive Applied   Dressing Change Due 08/12/23   Indication/Daily Review of Necessity long-term IV access >7 days     3 needles, 2 guidewires and 1 scalpel accounted for.  "

## 2023-08-05 NOTE — PROGRESS NOTES
"  Infectious Diseases Progress Note    Layla Sandoval MD     [facility]  [place]: 3 days  Patient Identification:  Name: North Carcamo  Age: 73 y.o.  Sex: male  :  1950  MRN: [id number]         Primary Care Physician: Pranav Guzman MD        Subjective: Overall feeling better appetite is improved denies any fever and chills.  Had some issues with irregular heartbeat for which cardiology has been consulted.  Interval History: See consultation note.  Blood cultures drawn at the time of admission come back positive for E. coli resistant to quinolones which he was prescribed 2 days ago    Objective:    Scheduled Meds:cefTRIAXone, 2,000 mg, Intravenous, Q24H  lisinopril, 20 mg, Oral, Q24H  metoprolol tartrate, 2.5 mg, Intravenous, Once  metoprolol tartrate, 12.5 mg, Oral, Q12H  potassium chloride ER, 40 mEq, Oral, Q4H  pravastatin, 40 mg, Oral, Nightly  senna-docusate sodium, 2 tablet, Oral, BID      Continuous Infusions:     Vital signs in last 24 hours:  Temp:  [98.1 øF (36.7 øC)-99.3 øF (37.4 øC)] 98.8 øF (37.1 øC)  Heart Rate:  [] 141  Resp:  [18] 18  BP: (117-148)/(64-99) 126/72    Intake/Output:    Intake/Output Summary (Last 24 hours) at 2023 0932  Last data filed at 2023 2210  Gross per 24 hour   Intake 120 ml   Output --   Net 120 ml       Exam:  /72 (BP Location: Right arm, Patient Position: Lying)   Pulse (!) 141   Temp 98.8 øF (37.1 øC) (Oral)   Resp 18   Ht 175.3 cm (69\")   Wt 85.8 kg (189 lb 1.6 oz)   SpO2 99%   BMI 27.93 kg/mý   Patient is examined using the personal protective equipment as per guidelines from infection control for this particular patient as enacted.  Hand washing was performed before and after patient interaction.  General Appearance:    Alert, cooperative, no distress, AAOx3                          Head:    Normocephalic, without obvious abnormality, atraumatic                           Eyes:    PERRL, conjunctivae/corneas clear, EOM's " intact, both eyes                         Throat:   Lips, tongue, gums normal; oral mucosa pink and moist                           Neck:   Supple, symmetrical, trachea midline, no JVD                         Lungs:    Bibasilar crackles                 Chest Wall:    No tenderness or deformity                          Heart:  S1-S2 regular                  Abdomen:   Soft nontender                 Extremities:   Extremities normal, atraumatic, no cyanosis or edema                        Pulses:   Pulses palpable in all extremities                            Skin:   Skin is warm and dry,  no rashes or palpable lesions                  Neurologic: Awake oriented does not appear to be in any acute distress       Data Review:    I reviewed the patient's new clinical results.  Results from last 7 days   Lab Units 08/05/23  0342 08/04/23  0417 08/03/23  0340 08/02/23  1826 08/01/23  2116 08/01/23  1730   WBC 10*3/mm3 5.50 6.32 6.68 8.15 16.95* 17.27*   HEMOGLOBIN g/dL 14.0 13.2 13.9 13.7 14.3 14.3   PLATELETS 10*3/mm3 178 162 164 180 211 213     Results from last 7 days   Lab Units 08/05/23  0342 08/04/23  0417 08/03/23  0340 08/02/23  1826 08/01/23  1730   SODIUM mmol/L 137 137 138 138 137   POTASSIUM mmol/L 3.4*  3.4* 3.6 3.7 4.0 3.9   CHLORIDE mmol/L 103 104 104 100 101   CO2 mmol/L 25.0 24.5 26.0 26.4 25.0   BUN mg/dL 13 11 12 13 13   CREATININE mg/dL 0.90 0.92 1.03 1.32* 1.21   CALCIUM mg/dL 8.5* 8.6 8.3* 8.9 9.3   GLUCOSE mg/dL 110* 109* 124* 108* 142*     Brief Urine Lab Results  (Last result in the past 365 days)        Color   Clarity   Blood   Leuk Est   Nitrite   Protein   CREAT   Urine HCG        08/03/23 0926 Yellow   Clear   Negative   Negative   Negative   Negative                 Microbiology Results (last 10 days)       Procedure Component Value - Date/Time    Blood Culture - Blood, Arm, Right [[id number]]  (Normal) Collected: 08/02/23 1833    Lab Status: Preliminary result Specimen: Blood from Arm,  Right Updated: 08/04/23 2000     Blood Culture No growth at 2 days    Narrative:      Less than seven (7) mL's of blood was collected.  Insufficient quantity may yield false negative results.    Blood Culture - Blood, Hand, Left [[id number]]  (Normal) Collected: 08/02/23 1827    Lab Status: Preliminary result Specimen: Blood from Hand, Left Updated: 08/04/23 1845     Blood Culture No growth at 2 days    Respiratory Panel PCR w/COVID-19(SARS-CoV-2) ANUP/STEPHAN/YOSEPH/PAD/COR/MAD/ELEUTERIO In-House, NP Swab in UTM/VTM, 3-4 HR TAT - Swab, Nasopharynx [[id number]]  (Normal) Collected: 08/01/23 1827    Lab Status: Final result Specimen: Swab from Nasopharynx Updated: 08/01/23 1928     ADENOVIRUS, PCR Not Detected     Coronavirus 229E Not Detected     Coronavirus HKU1 Not Detected     Coronavirus NL63 Not Detected     Coronavirus OC43 Not Detected     COVID19 Not Detected     Human Metapneumovirus Not Detected     Human Rhinovirus/Enterovirus Not Detected     Influenza A PCR Not Detected     Influenza B PCR Not Detected     Parainfluenza Virus 1 Not Detected     Parainfluenza Virus 2 Not Detected     Parainfluenza Virus 3 Not Detected     Parainfluenza Virus 4 Not Detected     RSV, PCR Not Detected     Bordetella pertussis pcr Not Detected     Bordetella parapertussis PCR Not Detected     Chlamydophila pneumoniae PCR Not Detected     Mycoplasma pneumo by PCR Not Detected    Narrative:      In the setting of a positive respiratory panel with a viral infection PLUS a negative procalcitonin without other underlying concern for bacterial infection, consider observing off antibiotics or discontinuation of antibiotics and continue supportive care. If the respiratory panel is positive for atypical bacterial infection (Bordetella pertussis, Chlamydophila pneumoniae, or Mycoplasma pneumoniae), consider antibiotic de-escalation to target atypical bacterial infection.    Blood Culture - Blood, Arm, Right [[id number]]  (Abnormal)  (Susceptibility)  Collected: 08/01/23 1746    Lab Status: Final result Specimen: Blood from Arm, Right Updated: 08/04/23 0620     Blood Culture Escherichia coli     Isolated from Aerobic Bottle     Gram Stain Aerobic Bottle Gram negative bacilli    Susceptibility        Escherichia coli      ALICIA      Ampicillin Susceptible      Ampicillin + Sulbactam Susceptible      Cefepime Susceptible      Ceftazidime Susceptible      Ceftriaxone Susceptible      Gentamicin Susceptible      Levofloxacin Resistant      Piperacillin + Tazobactam Susceptible      Trimethoprim + Sulfamethoxazole Susceptible                       Susceptibility Comments       Escherichia coli    Cefazolin sensitivity will not be reported for Enterobacteriaceae in non-urine isolates. If cefazolin is preferred, please call the microbiology lab to request an E-test.  With the exception of urinary-sourced infections, aminoglycosides should not be used as monotherapy.               Blood Culture ID, PCR - Blood, Arm, Right [[id number]]  (Abnormal) Collected: 08/01/23 1746    Lab Status: Final result Specimen: Blood from Arm, Right Updated: 08/02/23 1509     BCID, PCR Escherichia coli. Identification by BCID2 PCR.     BOTTLE TYPE Aerobic Bottle    Narrative:      No resistance genes detected.    Blood Culture - Blood, Arm, Right [[id number]]  (Abnormal) Collected: 08/01/23 1730    Lab Status: Preliminary result Specimen: Blood from Arm, Right Updated: 08/05/23 0812     Blood Culture Abnormal Stain     Gram Stain Anaerobic Bottle Gram negative bacilli          CT Abdomen Pelvis Without Contrast    Result Date: 8/3/2023  1. No definitive acute intra-abdominal or pelvic process on this limited noncontrast exam. Recommend close follow-up if symptoms persist/worsen. 2. Bibasilar airspace disease favoring atelectasis, developing pneumonia not excluded. 3. Nonobstructing left renal stones (measuring up to 8 mm) and a 4 mm stone in the urinary bladder just beyond the right  ureterovesical junction. 4. Extensive diverticulosis without acute diverticulitis. Consider correlation with endoscopy if not up-to-date and follow-up as indicated. 5. Please see above for additional incidental findings/recommendations.  This report was finalized on 8/3/2023 12:45 PM by Dr. Emerald Cunningham M.D.      XR Chest 1 View    Result Date: 8/1/2023  1. No acute process.  This report was finalized on 8/1/2023 5:52 PM by Dr. Ghassan Dobson M.D.         Assessment:    Hyperlipidemia    Hypertension    E coli bacteremia  1-acute quinolone resistant E. coli bacteremic sepsis likely due to  2-underlying pneumonia  3-hypertension  4-history of hyperglycemia  5-possible evolving cellulitis - at present no clinical evidence of it at this time.  6-slightly low IgM level with normal IgA and IgG.     Recommendations/Discussions:  Would recommend continue IV Rocephin follow-up on repeat blood culture results and if the repeat blood cultures are negative then would recommend IV Rocephin 2 g every 24 for 14 days.  If he continues to do well hopefully discharge on 8/5/2023.  We will get immunoglobulin levels to rule out common variable deficiency as his presentation can only be explained based on the imaging studies and work-up as bacteremic pneumonia.  This could explain shortness of breath and fatigue and weakness that he was experiencing besides fever and chills.  Options of treatment with IV Rocephin discussed with the patient and it could range from him coming to ACU to receive daily Rocephin either after having peripheral IV placed on a daily basis or him having a PICC line or midline to accomplish this.  Other option would be for him to go home with home health providing IV antibiotics at home.  Regardless patient would need weekly labs consisting of CBC CMP and based on his immunoglobin level decision can be made whether he would need further work-up for underlying common variable immunodeficiency causing him to have  bacteremic sepsis.  His normal urinary formula or urinalysis argues against UTI as a cause of his bacteremic sepsis as there is no structural abnormalities except for nonobstructing kidney stones in the bladder and  tract.  Layla Sandoval MD  8/5/2023  09:32 EDT    Parts of this note may be an electronic transcription/translation of spoken language to printed text using the Dragon dictation system.

## 2023-08-06 ENCOUNTER — HOME CARE VISIT (OUTPATIENT)
Dept: HOME HEALTH SERVICES | Facility: HOME HEALTHCARE | Age: 73
End: 2023-08-06
Payer: MEDICARE

## 2023-08-06 VITALS
RESPIRATION RATE: 16 BRPM | HEART RATE: 60 BPM | TEMPERATURE: 97.3 F | SYSTOLIC BLOOD PRESSURE: 118 MMHG | DIASTOLIC BLOOD PRESSURE: 66 MMHG | OXYGEN SATURATION: 100 %

## 2023-08-06 PROBLEM — A49.8 BACTEROIDES INFECTION: Status: ACTIVE | Noted: 2023-08-06

## 2023-08-06 LAB
BACTERIA SPEC AEROBE CULT: ABNORMAL
GRAM STN SPEC: ABNORMAL
ISOLATED FROM: ABNORMAL

## 2023-08-06 PROCEDURE — G0299 HHS/HOSPICE OF RN EA 15 MIN: HCPCS

## 2023-08-06 RX ORDER — METRONIDAZOLE 500 MG/1
500 TABLET ORAL EVERY 8 HOURS
Qty: 42 TABLET | Refills: 0 | Status: SHIPPED | OUTPATIENT
Start: 2023-08-06 | End: 2023-08-20

## 2023-08-06 NOTE — Clinical Note
Dear Dr Pranav Carcamo    46    The above named patient was admitted into home health services as of 23.  We will be doing nursing visits once per week for infection teaching, IV therapy teaching, and medication teaching.  If you have any other needs or any questions, please feel free to contact our agency at [phone].     Thank you so much,     Rain ELLERN RN   [facility]  [phone]

## 2023-08-07 ENCOUNTER — LAB REQUISITION (OUTPATIENT)
Dept: LAB | Facility: HOSPITAL | Age: 73
End: 2023-08-07
Payer: MEDICARE

## 2023-08-07 ENCOUNTER — HOME CARE VISIT (OUTPATIENT)
Dept: HOME HEALTH SERVICES | Facility: HOME HEALTHCARE | Age: 73
End: 2023-08-07
Payer: MEDICARE

## 2023-08-07 ENCOUNTER — TRANSITIONAL CARE MANAGEMENT TELEPHONE ENCOUNTER (OUTPATIENT)
Dept: CALL CENTER | Facility: HOSPITAL | Age: 73
End: 2023-08-07
Payer: MEDICARE

## 2023-08-07 VITALS
OXYGEN SATURATION: 99 % | TEMPERATURE: 97.2 F | DIASTOLIC BLOOD PRESSURE: 68 MMHG | SYSTOLIC BLOOD PRESSURE: 122 MMHG | RESPIRATION RATE: 18 BRPM | HEART RATE: 50 BPM

## 2023-08-07 DIAGNOSIS — A41.51 SEPSIS DUE TO ESCHERICHIA COLI (E. COLI): ICD-10-CM

## 2023-08-07 DIAGNOSIS — Z85.46 PERSONAL HISTORY OF MALIGNANT NEOPLASM OF PROSTATE: ICD-10-CM

## 2023-08-07 LAB
ALBUMIN SERPL-MCNC: 3.5 G/DL (ref 3.5–5.2)
ALBUMIN/GLOB SERPL: 1.1 G/DL
ALP SERPL-CCNC: 107 U/L (ref 39–117)
ALT SERPL W P-5'-P-CCNC: 34 U/L (ref 1–41)
ANION GAP SERPL CALCULATED.3IONS-SCNC: 11.1 MMOL/L (ref 5–15)
AST SERPL-CCNC: 29 U/L (ref 1–40)
BACTERIA SPEC AEROBE CULT: NORMAL
BACTERIA SPEC AEROBE CULT: NORMAL
BASOPHILS # BLD AUTO: 0.04 10*3/MM3 (ref 0–0.2)
BASOPHILS NFR BLD AUTO: 0.6 % (ref 0–1.5)
BILIRUB SERPL-MCNC: 0.6 MG/DL (ref 0–1.2)
BUN SERPL-MCNC: 15 MG/DL (ref 8–23)
BUN/CREAT SERPL: 15.3 (ref 7–25)
CALCIUM SPEC-SCNC: 9.2 MG/DL (ref 8.6–10.5)
CHLORIDE SERPL-SCNC: 101 MMOL/L (ref 98–107)
CO2 SERPL-SCNC: 24.9 MMOL/L (ref 22–29)
CREAT SERPL-MCNC: 0.98 MG/DL (ref 0.76–1.27)
CRP SERPL-MCNC: 1.89 MG/DL (ref 0–0.5)
DEPRECATED RDW RBC AUTO: 41.9 FL (ref 37–54)
EGFRCR SERPLBLD CKD-EPI 2021: 80.4 ML/MIN/1.73
EOSINOPHIL # BLD AUTO: 0.17 10*3/MM3 (ref 0–0.4)
EOSINOPHIL NFR BLD AUTO: 2.5 % (ref 0.3–6.2)
ERYTHROCYTE [DISTWIDTH] IN BLOOD BY AUTOMATED COUNT: 12.8 % (ref 12.3–15.4)
GLOBULIN UR ELPH-MCNC: 3.2 GM/DL
GLUCOSE SERPL-MCNC: 74 MG/DL (ref 65–99)
HCT VFR BLD AUTO: 37.8 % (ref 37.5–51)
HGB BLD-MCNC: 13.3 G/DL (ref 13–17.7)
IMM GRANULOCYTES # BLD AUTO: 0.02 10*3/MM3 (ref 0–0.05)
IMM GRANULOCYTES NFR BLD AUTO: 0.3 % (ref 0–0.5)
LDH SERPL-CCNC: 242 U/L (ref 135–225)
LYMPHOCYTES # BLD AUTO: 1.23 10*3/MM3 (ref 0.7–3.1)
LYMPHOCYTES NFR BLD AUTO: 18.4 % (ref 19.6–45.3)
MCH RBC QN AUTO: 31.8 PG (ref 26.6–33)
MCHC RBC AUTO-ENTMCNC: 35.2 G/DL (ref 31.5–35.7)
MCV RBC AUTO: 90.4 FL (ref 79–97)
MONOCYTES # BLD AUTO: 0.89 10*3/MM3 (ref 0.1–0.9)
MONOCYTES NFR BLD AUTO: 13.3 % (ref 5–12)
NEUTROPHILS NFR BLD AUTO: 4.33 10*3/MM3 (ref 1.7–7)
NEUTROPHILS NFR BLD AUTO: 64.9 % (ref 42.7–76)
NRBC BLD AUTO-RTO: 0 /100 WBC (ref 0–0.2)
PLATELET # BLD AUTO: 233 10*3/MM3 (ref 140–450)
PMV BLD AUTO: 11.6 FL (ref 6–12)
POTASSIUM SERPL-SCNC: 4.1 MMOL/L (ref 3.5–5.2)
PROT SERPL-MCNC: 6.7 G/DL (ref 6–8.5)
PSA SERPL-MCNC: 0.02 NG/ML (ref 0–4)
RBC # BLD AUTO: 4.18 10*6/MM3 (ref 4.14–5.8)
SODIUM SERPL-SCNC: 137 MMOL/L (ref 136–145)
TESTOST SERPL-MCNC: 389 NG/DL (ref 193–740)
WBC NRBC COR # BLD: 6.68 10*3/MM3 (ref 3.4–10.8)

## 2023-08-07 PROCEDURE — 85025 COMPLETE CBC W/AUTO DIFF WBC: CPT | Performed by: INTERNAL MEDICINE

## 2023-08-07 PROCEDURE — 80053 COMPREHEN METABOLIC PANEL: CPT | Performed by: INTERNAL MEDICINE

## 2023-08-07 PROCEDURE — G0299 HHS/HOSPICE OF RN EA 15 MIN: HCPCS

## 2023-08-07 PROCEDURE — 86140 C-REACTIVE PROTEIN: CPT | Performed by: INTERNAL MEDICINE

## 2023-08-07 PROCEDURE — 83615 LACTATE (LD) (LDH) ENZYME: CPT | Performed by: UROLOGY

## 2023-08-07 PROCEDURE — 84403 ASSAY OF TOTAL TESTOSTERONE: CPT | Performed by: UROLOGY

## 2023-08-07 PROCEDURE — 84153 ASSAY OF PSA TOTAL: CPT | Performed by: UROLOGY

## 2023-08-07 NOTE — CASE MANAGEMENT/SOCIAL WORK
Case Management Discharge Note      Final Note: Home with [facility] for IV antibiotics. No additional CCP needs.         Selected Continued Care - Discharged on 8/5/2023 Admission date: 8/2/2023 - Discharge disposition: Home-Health Care Svc      Destination    No services have been selected for the patient.                Durable Medical Equipment    No services have been selected for the patient.                Dialysis/Infusion    No services have been selected for the patient.                Home Medical Care    No services have been selected for the patient.                Therapy    No services have been selected for the patient.                Community Resources    No services have been selected for the patient.                Community & DME    No services have been selected for the patient.                         Final Discharge Disposition Code: 06 - home with home health care

## 2023-08-07 NOTE — OUTREACH NOTE
Call Center TCM Note      Flowsheet Row Responses   [facility], [place] patient discharged from? [place]   Does the patient have one of the following disease processes/diagnoses(primary or secondary)? Other   TCM attempt successful? Yes   Call start time 0930   Call end time 0933   Discharge diagnosis E coli Bacteremia   Medication alerts for this patient PICC Line--IV abx at home   Meds reviewed with patient/caregiver? Yes   Is the patient having any side effects they believe may be caused by any medication additions or changes? No   Does the patient have all medications ordered at discharge? Yes   Is the patient taking all medications as directed (includes completed medication regime)? Yes   Comments Appt on 8/10 but is only 15 min and will message office to see if they want to keep or change.   Does the patient have an appointment with their PCP within 7-14 days of discharge? Yes   What is the Home health agency?  [facility] (IV abx) and [facility]   Has home health visited the patient within 72 hours of discharge? Yes   Psychosocial issues? No   Did the patient receive a copy of their discharge instructions? Yes   Nursing interventions Reviewed instructions with patient   What is the patient's perception of their health status since discharge? Improving   Is the patient/caregiver able to teach back signs and symptoms related to disease process for when to call PCP? Yes   Is the patient/caregiver able to teach back signs and symptoms related to disease process for when to call 911? Yes   Is the patient/caregiver able to teach back the hierarchy of who to call/visit for symptoms/problems? PCP, Specialist, Home health nurse, Urgent Care, ED, 911 Yes   If the patient is a current smoker, are they able to teach back resources for cessation? Not a smoker   Additional teach back comments States he is doing better. Daughter is a RN and helping him.   TCM call completed? Yes   Wrap up  additional comments Denies questions or needs at this time.  Will message office to follow up appt is only 15 min that was previously made with Dr. Guzman.   Call end time 0979            Maria G Rinaldi LPN    8/7/2023, 09:35 EDT

## 2023-08-10 ENCOUNTER — OFFICE VISIT (OUTPATIENT)
Dept: GASTROENTEROLOGY | Facility: CLINIC | Age: 73
End: 2023-08-10
Payer: MEDICARE

## 2023-08-10 ENCOUNTER — OFFICE VISIT (OUTPATIENT)
Dept: FAMILY MEDICINE CLINIC | Facility: CLINIC | Age: 73
End: 2023-08-10
Payer: MEDICARE

## 2023-08-10 VITALS
HEART RATE: 85 BPM | WEIGHT: 187 LBS | OXYGEN SATURATION: 97 % | RESPIRATION RATE: 16 BRPM | SYSTOLIC BLOOD PRESSURE: 140 MMHG | HEIGHT: 69 IN | TEMPERATURE: 96.9 F | BODY MASS INDEX: 27.7 KG/M2 | DIASTOLIC BLOOD PRESSURE: 80 MMHG

## 2023-08-10 VITALS
HEART RATE: 44 BPM | SYSTOLIC BLOOD PRESSURE: 145 MMHG | DIASTOLIC BLOOD PRESSURE: 71 MMHG | WEIGHT: 184.6 LBS | TEMPERATURE: 96.7 F | BODY MASS INDEX: 27.34 KG/M2 | HEIGHT: 69 IN

## 2023-08-10 DIAGNOSIS — D80.4 IGM DEFICIENCY: ICD-10-CM

## 2023-08-10 DIAGNOSIS — A49.8 BACTEROIDES INFECTION: ICD-10-CM

## 2023-08-10 DIAGNOSIS — R78.81 E COLI BACTEREMIA: ICD-10-CM

## 2023-08-10 DIAGNOSIS — E78.5 HYPERLIPIDEMIA, UNSPECIFIED HYPERLIPIDEMIA TYPE: ICD-10-CM

## 2023-08-10 DIAGNOSIS — I10 PRIMARY HYPERTENSION: ICD-10-CM

## 2023-08-10 DIAGNOSIS — B96.20 E COLI BACTEREMIA: ICD-10-CM

## 2023-08-10 DIAGNOSIS — B96.20 E COLI BACTEREMIA: Primary | ICD-10-CM

## 2023-08-10 DIAGNOSIS — H61.21 IMPACTED CERUMEN OF RIGHT EAR: ICD-10-CM

## 2023-08-10 DIAGNOSIS — R73.9 HYPERGLYCEMIA: ICD-10-CM

## 2023-08-10 DIAGNOSIS — R78.81 E COLI BACTEREMIA: Primary | ICD-10-CM

## 2023-08-10 DIAGNOSIS — I48.0 PAROXYSMAL ATRIAL FIBRILLATION: Primary | ICD-10-CM

## 2023-08-10 RX ORDER — CEFTRIAXONE 2 G/1
INJECTION, POWDER, FOR SOLUTION INTRAMUSCULAR; INTRAVENOUS
COMMUNITY
Start: 2023-08-05

## 2023-08-10 NOTE — PROGRESS NOTES
Chief Complaint   Patient presents with    Diverticulitis     North Carcamo is a 73 y.o. male who presents with a history of E. coli bacteremia  HPI    73-year-old male with history hypertension, hyperlipidemia as well as hyperglycemia presented with fever and chills and no specific complaints otherwise found with E. coli bacteremia evaluation with no evident signs of primary infection patient referred here for possible colonoscopy.  Patient never had a colonoscopy performed though no evident diverticulitis was seen there was diverticulosis.  Patient currently with no complaints.    Past Medical History:   Diagnosis Date    Cancer     PROSTATE    Hyperglycemia     Hyperlipidemia     Hypertension        Current Outpatient Medications:     apixaban (ELIQUIS) 5 MG tablet tablet, Take 1 tablet by mouth Every 12 (Twelve) Hours for 30 days. Indications: Atrial Fibrillation, Disp: 60 tablet, Rfl: 0    Calcium Carbonate-Vitamin D (CALCIUM 600+D PO), Take 1 tablet by mouth 2 (Two) Times a Day. Indications: Calcium Deficiency, Disp: , Rfl:     cefTRIAXone (ROCEPHIN) 1 g injection, Infuse 2 g into a venous catheter Daily for 14 days. Indications: Infection with Dysregulated Inflammatory Response, Pneumonia, Please arrange for 2 weeks of IV Rocephin at 2 g every 24 starting 8/2/2023.  Check weekly CBC CMP and CRP and call abnormal results to Dr. Sandoval at [phone].  Diagnosis: E. coli bacteremic sepsis likely due to pneumonia., Disp: 28 g, Rfl: 0    cefTRIAXone (ROCEPHIN) 2 g injection, , Disp: , Rfl:     lisinopril (PRINIVIL,ZESTRIL) 20 MG tablet, Take 1 tablet by mouth Daily for 30 days., Disp: 30 tablet, Rfl: 0    metoprolol tartrate (LOPRESSOR) 25 MG tablet, Take 0.5 tablets by mouth Every 12 (Twelve) Hours for 30 days., Disp: 30 tablet, Rfl: 0    metroNIDAZOLE (Flagyl) 500 MG tablet, Take 1 tablet by mouth Every 8 (Eight) Hours for 14 days., Disp: 42 tablet, Rfl: 0    pravastatin (PRAVACHOL) 40 MG tablet,  TAKE 1 TABLET BY MOUTH EVERY NIGHT, Disp: 90 tablet, Rfl: 3    Sodium Chloride, PF, 0.9 % injection, Infuse 10 mL into a venous catheter Daily. before and after IV push of rocephin, Disp: , Rfl:   Allergies   Allergen Reactions    Penicillins Rash     Thinks childhood reaction of rash      Social History     Socioeconomic History    Marital status:    Tobacco Use    Smoking status: Never    Smokeless tobacco: Never   Vaping Use    Vaping Use: Never used   Substance and Sexual Activity    Alcohol use: Yes     Comment: MINIMUM CONSUMPTION    Drug use: No    Sexual activity: Defer     Family History   Problem Relation Age of Onset    Hypertension Mother     Hypertension Father     Prostate cancer Father     Malig Hyperthermia Neg Hx      Review of Systems   Constitutional: Negative.    HENT: Negative.     Eyes: Negative.    Respiratory: Negative.     Cardiovascular: Negative.    Gastrointestinal: Negative.    Endocrine: Negative.    Musculoskeletal: Negative.    Skin: Negative.    Allergic/Immunologic: Negative.    Hematological: Negative.    Vitals:    08/10/23 1223   BP: 145/71   Pulse: (!) 44   Temp: 96.7 øF (35.9 øC)     Physical Exam  Vitals and nursing note reviewed.   Constitutional:       Appearance: Normal appearance. He is well-developed and normal weight.   HENT:      Head: Normocephalic and atraumatic.   Eyes:      General: No scleral icterus.     Conjunctiva/sclera: Conjunctivae normal.   Neck:      Trachea: No tracheal deviation.   Cardiovascular:      Rate and Rhythm: Normal rate and regular rhythm.      Heart sounds: Normal heart sounds.   Pulmonary:      Effort: Pulmonary effort is normal. No respiratory distress.      Breath sounds: Normal breath sounds.   Abdominal:      General: Bowel sounds are normal. There is no distension.      Palpations: Abdomen is soft. There is no mass.      Tenderness: There is no abdominal tenderness. There is no guarding or rebound.   Musculoskeletal:          General: No swelling or tenderness. Normal range of motion.      Cervical back: Normal range of motion and neck supple. No rigidity.   Skin:     General: Skin is warm and dry.      Coloration: Skin is not jaundiced.      Findings: No rash.   Neurological:      General: No focal deficit present.      Mental Status: He is alert and oriented to person, place, and time.      Cranial Nerves: No cranial nerve deficit.   Psychiatric:         Behavior: Behavior normal.         Thought Content: Thought content normal.         Judgment: Judgment normal.   Diagnoses and all orders for this visit:    E coli bacteremia  -     Case Request; Standing  -     Implement Anesthesia orders day of procedure.; Standing  -     Obtain informed consent; Standing  -     Case Request    Patient 73-year-old male with history of hypertension, hyperlipidemia and hyperglycemia as well as a history of prostate cancer who was recently admitted for E. coli bacteremia.  Patient treated with IV antibiotics but no source was found.  Mention on discharge of positive Bacteroides infection though review of his lab results show no evidence of this.  Patient did have diverticulosis on CAT scan but no evidence diverticulitis referred for colonoscopy for source of the infection.  Will arrange colonoscopy as he has never had one before.  We will follow-up clinically.

## 2023-08-10 NOTE — HOME HEALTH
soc note     Home Health need continues for: IV therapy and teaching for sepsis due to e coli    Primary diagnoses/co-morbidities/recent procedures in past 60 days that impact current episode: sepsis due to e coli infection    Current level of functional ability: ambulate with standby assist     Homebound status and living arrangements: considerable effort due to infection, weakness     Skilled need:  SN for infection teaching, IV teaching, PICC line dressing changes, labs     Focus of care for next 60 days for each discipline ordered: sepsis due to e coli     Skin integrity/wound status: no akin issues at this itme     Code status: Full code     Most recent fall risk: low     Estimated date when home care services will end 10/4/23    Plan of Care confirmed with Dr Guzman via in basket

## 2023-08-10 NOTE — PROGRESS NOTES
Subjective   North Carcamo is a 73 y.o. male. Patient is here today for follow-up from recent hospitalization for bacteremia.  No obvious source was found but the patient's been doing fine on antibiotics and is still on them.  He denies any fevers chills or other symptoms.  He also had an episode of paroxysmal atrial fibrillation and is on Eliquis and is doing fine.  His only other complaint is of feeling of obstruction in his right ear  Chief Complaint   Patient presents with    bacterimia      8/2/23 hospital follow up     Ear Cerumen Removal    Date/Time: 8/10/2023 9:18 AM  Performed by: Pranav Guzman MD  Authorized by: Pranav Guzman MD   Consent: Verbal consent obtained.  Patient understanding: patient states understanding of the procedure being performed    Anesthesia:  Local Anesthetic: none  Location details: right ear  Patient tolerance: patient tolerated the procedure well with no immediate complications  Comments: Patient had a cerumen impaction obstructing his right auditory canal.  It was irrigated but all could only be partially removed at this time.  Patient tolerated the procedure well and there was no trauma.  He will apply some sweet oil 2-3 times a day in the right ear for several days and return for repeat procedure  Procedure type: irrigation      (Not on file)-  Risk for Readmission (LACE) Score: 9 (8/5/2023  6:01 AM)           Vitals:    08/10/23 0841   BP: 140/80   Pulse: 85   Resp: 16   Temp: 96.9 øF (36.1 øC)   SpO2: 97%     The following portions of the patient's history were reviewed and updated as appropriate: allergies, current medications, past family history, past medical history, past social history, past surgical history and problem list.    Past Medical History:   Diagnosis Date    Cancer     PROSTATE    Hyperglycemia     Hyperlipidemia     Hypertension       Allergies   Allergen Reactions    Penicillins Rash     Thinks childhood reaction of rash       Social  History     Socioeconomic History    Marital status:    Tobacco Use    Smoking status: Never    Smokeless tobacco: Never   Vaping Use    Vaping Use: Never used   Substance and Sexual Activity    Alcohol use: Yes     Comment: MINIMUM CONSUMPTION    Drug use: No    Sexual activity: Defer        Current Outpatient Medications:     apixaban (ELIQUIS) 5 MG tablet tablet, Take 1 tablet by mouth Every 12 (Twelve) Hours for 30 days. Indications: Atrial Fibrillation, Disp: 60 tablet, Rfl: 0    Calcium Carbonate-Vitamin D (CALCIUM 600+D PO), Take 1 tablet by mouth 2 (Two) Times a Day. Indications: Calcium Deficiency, Disp: , Rfl:     lisinopril (PRINIVIL,ZESTRIL) 20 MG tablet, Take 1 tablet by mouth Daily for 30 days., Disp: 30 tablet, Rfl: 0    metoprolol tartrate (LOPRESSOR) 25 MG tablet, Take 0.5 tablets by mouth Every 12 (Twelve) Hours for 30 days., Disp: 30 tablet, Rfl: 0    metroNIDAZOLE (Flagyl) 500 MG tablet, Take 1 tablet by mouth Every 8 (Eight) Hours for 14 days., Disp: 42 tablet, Rfl: 0    pravastatin (PRAVACHOL) 40 MG tablet, TAKE 1 TABLET BY MOUTH EVERY NIGHT, Disp: 90 tablet, Rfl: 3    Sodium Chloride, PF, 0.9 % injection, Infuse 10 mL into a venous catheter Daily. before and after IV push of rocephin, Disp: , Rfl:     cefTRIAXone (ROCEPHIN) 1 g injection, Infuse 2 g into a venous catheter Daily for 14 days. Indications: Infection with Dysregulated Inflammatory Response, Pneumonia, Please arrange for 2 weeks of IV Rocephin at 2 g every 24 starting 8/2/2023.  Check weekly CBC CMP and CRP and call abnormal results to Dr. Sandoval at [phone].  Diagnosis: E. coli bacteremic sepsis likely due to pneumonia., Disp: 28 g, Rfl: 0    cefTRIAXone (ROCEPHIN) 2 g injection, , Disp: , Rfl:      Objective     History of Present Illness     Review of Systems    Physical Exam  Vitals and nursing note reviewed.   Constitutional:       General: He is not in acute distress.     Appearance: Normal appearance. He is not  ill-appearing.   HENT:      Head: Normocephalic and atraumatic.      Right Ear: There is impacted cerumen.      Left Ear: There is no impacted cerumen.   Cardiovascular:      Rate and Rhythm: Normal rate and regular rhythm.      Heart sounds: Normal heart sounds.   Pulmonary:      Effort: Pulmonary effort is normal. No respiratory distress.      Breath sounds: Normal breath sounds. No wheezing or rales.   Skin:     General: Skin is warm and dry.   Neurological:      General: No focal deficit present.      Mental Status: He is alert and oriented to person, place, and time.   Psychiatric:         Mood and Affect: Mood normal.         Behavior: Behavior normal.       ASSESSMENT CBC had a completely normal white cell count and was normal.  CMP was normal.  PSA was low at 0.022.  CRP was a bit high at 1.89.  #1-recent hospitalization for bacteremia, correcting on antibiotics  #2-paroxysmal atrial fibrillation, regular rhythm today  #3-hypertension controlled   #4-hyperglycemia controlled  #5-right cerumen impaction, only partially cleared       Problems Addressed this Visit          Cardiac and Vasculature    Hyperlipidemia    Hypertension    Paroxysmal atrial fibrillation - Primary       Endocrine and Metabolic    Hyperglycemia       Infectious Diseases    E coli bacteremia    Bacteroides infection    Relevant Medications    cefTRIAXone (ROCEPHIN) 2 g injection       Multi-system (Lupus, Sarcoid...)    IgM deficiency     Diagnoses         Codes Comments    Paroxysmal atrial fibrillation    -  Primary ICD-10-CM: I48.0  ICD-9-CM: 427.31     Primary hypertension     ICD-10-CM: I10  ICD-9-CM: 401.9     Hyperlipidemia, unspecified hyperlipidemia type     ICD-10-CM: E78.5  ICD-9-CM: 272.4     Hyperglycemia     ICD-10-CM: R73.9  ICD-9-CM: 790.29     E coli bacteremia     ICD-10-CM: R78.81, B96.20  ICD-9-CM: 790.7, 041.49     Bacteroides infection     ICD-10-CM: A49.8  ICD-9-CM: 041.82     IgM deficiency     ICD-10-CM:  D80.4  ICD-9-CM: 279.02             Current outpatient and discharge medications have been reconciled for the patient.  Reviewed by: Pranav Guzman MD      PLAN the patient will complete his antibiotic course.  He has gastroenterology follow-up looking for a source of his septicemia and also has cardiology follow-up.  He will continue his Eliquis.  He will continue current medicines.  He will schedule a follow-up appointment with me for his right ear and will use some olive oil or sweet oil in it for 2 to 3 days prior.  In general I plan on rechecking him in 6 months with a CBC, CMP, lipid panel, hemoglobin A1c and TSH    There are no Patient Instructions on file for this visit.  No follow-ups on file.

## 2023-08-11 ENCOUNTER — PATIENT ROUNDING (BHMG ONLY) (OUTPATIENT)
Dept: GASTROENTEROLOGY | Facility: CLINIC | Age: 73
End: 2023-08-11
Payer: MEDICARE

## 2023-08-11 NOTE — PROGRESS NOTES
August 11, 2023    Hello, may I speak with North Carcamo?    My name is Mariya      I am  with [facility]  [address] [place] [id number] [phone].    Before we get started may I verify your date of birth? 1950    I am calling to officially welcome you to our practice and ask about your recent visit. Is this a good time to talk? yes    Tell me about your visit with us. What things went well?  Dr. Peña was very thorough and easy to talk to        We're always looking for ways to make our patients' experiences even better. Do you have recommendations on ways we may improve?  no    Overall were you satisfied with your first visit to our practice? yes       I appreciate you taking the time to speak with me today. Is there anything else I can do for you? no      Thank you, and have a great day.

## 2023-08-14 ENCOUNTER — HOME CARE VISIT (OUTPATIENT)
Dept: HOME HEALTH SERVICES | Facility: HOME HEALTHCARE | Age: 73
End: 2023-08-14
Payer: MEDICARE

## 2023-08-14 ENCOUNTER — LAB REQUISITION (OUTPATIENT)
Dept: LAB | Facility: HOSPITAL | Age: 73
End: 2023-08-14
Payer: MEDICARE

## 2023-08-14 DIAGNOSIS — Z00.00 ENCOUNTER FOR GENERAL ADULT MEDICAL EXAMINATION WITHOUT ABNORMAL FINDINGS: ICD-10-CM

## 2023-08-14 LAB
ALBUMIN SERPL-MCNC: 3.8 G/DL (ref 3.5–5.2)
ALBUMIN/GLOB SERPL: 1.4 G/DL
ALP SERPL-CCNC: 79 U/L (ref 39–117)
ALT SERPL W P-5'-P-CCNC: 34 U/L (ref 1–41)
ANION GAP SERPL CALCULATED.3IONS-SCNC: 13.8 MMOL/L (ref 5–15)
AST SERPL-CCNC: 22 U/L (ref 1–40)
BASOPHILS # BLD AUTO: 0.03 10*3/MM3 (ref 0–0.2)
BASOPHILS NFR BLD AUTO: 0.4 % (ref 0–1.5)
BILIRUB SERPL-MCNC: 0.5 MG/DL (ref 0–1.2)
BUN SERPL-MCNC: 15 MG/DL (ref 8–23)
BUN/CREAT SERPL: 14.6 (ref 7–25)
CALCIUM SPEC-SCNC: 8.9 MG/DL (ref 8.6–10.5)
CHLORIDE SERPL-SCNC: 101 MMOL/L (ref 98–107)
CHOLEST SERPL-MCNC: 109 MG/DL (ref 0–200)
CO2 SERPL-SCNC: 23.2 MMOL/L (ref 22–29)
CREAT SERPL-MCNC: 1.03 MG/DL (ref 0.76–1.27)
CRP SERPL-MCNC: <0.3 MG/DL (ref 0–0.5)
DEPRECATED RDW RBC AUTO: 44.1 FL (ref 37–54)
EGFRCR SERPLBLD CKD-EPI 2021: 75.8 ML/MIN/1.73
EOSINOPHIL # BLD AUTO: 0.09 10*3/MM3 (ref 0–0.4)
EOSINOPHIL NFR BLD AUTO: 1.2 % (ref 0.3–6.2)
ERYTHROCYTE [DISTWIDTH] IN BLOOD BY AUTOMATED COUNT: 13 % (ref 12.3–15.4)
GLOBULIN UR ELPH-MCNC: 2.8 GM/DL
GLUCOSE SERPL-MCNC: 161 MG/DL (ref 65–99)
HBA1C MFR BLD: 5.9 % (ref 4.8–5.6)
HCT VFR BLD AUTO: 42.2 % (ref 37.5–51)
HDLC SERPL-MCNC: 38 MG/DL (ref 40–60)
HGB BLD-MCNC: 14.9 G/DL (ref 13–17.7)
IMM GRANULOCYTES # BLD AUTO: 0.04 10*3/MM3 (ref 0–0.05)
IMM GRANULOCYTES NFR BLD AUTO: 0.5 % (ref 0–0.5)
LDLC SERPL CALC-MCNC: 37 MG/DL (ref 0–100)
LDLC/HDLC SERPL: 0.74 {RATIO}
LYMPHOCYTES # BLD AUTO: 1.05 10*3/MM3 (ref 0.7–3.1)
LYMPHOCYTES NFR BLD AUTO: 13.7 % (ref 19.6–45.3)
MCH RBC QN AUTO: 32.6 PG (ref 26.6–33)
MCHC RBC AUTO-ENTMCNC: 35.3 G/DL (ref 31.5–35.7)
MCV RBC AUTO: 92.3 FL (ref 79–97)
MONOCYTES # BLD AUTO: 0.79 10*3/MM3 (ref 0.1–0.9)
MONOCYTES NFR BLD AUTO: 10.3 % (ref 5–12)
NEUTROPHILS NFR BLD AUTO: 5.67 10*3/MM3 (ref 1.7–7)
NEUTROPHILS NFR BLD AUTO: 73.9 % (ref 42.7–76)
NRBC BLD AUTO-RTO: 0 /100 WBC (ref 0–0.2)
PLATELET # BLD AUTO: 290 10*3/MM3 (ref 140–450)
PMV BLD AUTO: 11.4 FL (ref 6–12)
POTASSIUM SERPL-SCNC: 3.8 MMOL/L (ref 3.5–5.2)
PROT SERPL-MCNC: 6.6 G/DL (ref 6–8.5)
RBC # BLD AUTO: 4.57 10*6/MM3 (ref 4.14–5.8)
SODIUM SERPL-SCNC: 138 MMOL/L (ref 136–145)
TRIGL SERPL-MCNC: 214 MG/DL (ref 0–150)
VLDLC SERPL-MCNC: 34 MG/DL (ref 5–40)
WBC NRBC COR # BLD: 7.67 10*3/MM3 (ref 3.4–10.8)

## 2023-08-14 PROCEDURE — 80053 COMPREHEN METABOLIC PANEL: CPT | Performed by: INTERNAL MEDICINE

## 2023-08-14 PROCEDURE — G0299 HHS/HOSPICE OF RN EA 15 MIN: HCPCS

## 2023-08-14 PROCEDURE — 83036 HEMOGLOBIN GLYCOSYLATED A1C: CPT | Performed by: INTERNAL MEDICINE

## 2023-08-14 PROCEDURE — 80061 LIPID PANEL: CPT | Performed by: INTERNAL MEDICINE

## 2023-08-14 PROCEDURE — 86140 C-REACTIVE PROTEIN: CPT | Performed by: INTERNAL MEDICINE

## 2023-08-14 PROCEDURE — 85025 COMPLETE CBC W/AUTO DIFF WBC: CPT | Performed by: INTERNAL MEDICINE

## 2023-08-17 ENCOUNTER — OFFICE VISIT (OUTPATIENT)
Dept: FAMILY MEDICINE CLINIC | Facility: CLINIC | Age: 73
End: 2023-08-17
Payer: MEDICARE

## 2023-08-17 ENCOUNTER — HOME CARE VISIT (OUTPATIENT)
Dept: HOME HEALTH SERVICES | Facility: HOME HEALTHCARE | Age: 73
End: 2023-08-17
Payer: MEDICARE

## 2023-08-17 VITALS
SYSTOLIC BLOOD PRESSURE: 136 MMHG | DIASTOLIC BLOOD PRESSURE: 80 MMHG | RESPIRATION RATE: 16 BRPM | WEIGHT: 183 LBS | HEART RATE: 47 BPM | HEIGHT: 69 IN | TEMPERATURE: 97.5 F | OXYGEN SATURATION: 96 % | BODY MASS INDEX: 27.11 KG/M2

## 2023-08-17 VITALS
RESPIRATION RATE: 16 BRPM | SYSTOLIC BLOOD PRESSURE: 122 MMHG | TEMPERATURE: 97.1 F | OXYGEN SATURATION: 97 % | HEART RATE: 56 BPM | DIASTOLIC BLOOD PRESSURE: 66 MMHG

## 2023-08-17 DIAGNOSIS — I10 PRIMARY HYPERTENSION: Primary | ICD-10-CM

## 2023-08-17 DIAGNOSIS — R73.9 HYPERGLYCEMIA: ICD-10-CM

## 2023-08-17 DIAGNOSIS — I48.0 PAROXYSMAL ATRIAL FIBRILLATION: ICD-10-CM

## 2023-08-17 DIAGNOSIS — H61.21 IMPACTED CERUMEN OF RIGHT EAR: ICD-10-CM

## 2023-08-17 DIAGNOSIS — E78.5 HYPERLIPIDEMIA, UNSPECIFIED HYPERLIPIDEMIA TYPE: ICD-10-CM

## 2023-08-17 PROCEDURE — G0299 HHS/HOSPICE OF RN EA 15 MIN: HCPCS

## 2023-08-17 RX ORDER — LISINOPRIL 20 MG/1
20 TABLET ORAL
Qty: 90 TABLET | Refills: 3 | Status: SHIPPED | OUTPATIENT
Start: 2023-08-17 | End: 2024-08-11

## 2023-08-17 NOTE — PROGRESS NOTES
The ABCs of the Annual Wellness Visit  Subsequent Medicare Wellness Visit    Subjective    North Carcamo is a 73 y.o. male who presents for a Subsequent Medicare Wellness Visit.    The following portions of the patient's history were reviewed and   updated as appropriate: allergies, current medications, past family history, past medical history, past social history, past surgical history, and problem list.    Compared to one year ago, the patient feels his physical   health is the same.    Compared to one year ago, the patient feels his mental   health is the same.    Recent Hospitalizations:  This patient has had a [facility] admission record on file within the last 365 days.    Current Medical Providers:  Patient Care Team:  Pranav Guzman MD as PCP - General (Internal Medicine)    Outpatient Medications Prior to Visit   Medication Sig Dispense Refill    apixaban (ELIQUIS) 5 MG tablet tablet Take 1 tablet by mouth Every 12 (Twelve) Hours for 30 days. Indications: Atrial Fibrillation 60 tablet 0    Calcium Carbonate-Vitamin D (CALCIUM 600+D PO) Take 1 tablet by mouth 2 (Two) Times a Day. Indications: Calcium Deficiency      cefTRIAXone (ROCEPHIN) 2 g injection       metoprolol tartrate (LOPRESSOR) 25 MG tablet Take 0.5 tablets by mouth Every 12 (Twelve) Hours for 30 days. 30 tablet 0    metroNIDAZOLE (Flagyl) 500 MG tablet Take 1 tablet by mouth Every 8 (Eight) Hours for 14 days. 42 tablet 0    pravastatin (PRAVACHOL) 40 MG tablet TAKE 1 TABLET BY MOUTH EVERY NIGHT 90 tablet 3    Sodium Chloride, PF, 0.9 % injection Infuse 10 mL into a venous catheter Daily. before and after IV push of rocephin      lisinopril (PRINIVIL,ZESTRIL) 20 MG tablet Take 1 tablet by mouth Daily for 30 days. 30 tablet 0    cefTRIAXone (ROCEPHIN) 1 g injection Infuse 2 g into a venous catheter Daily for 14 days. Indications: Infection with Dysregulated Inflammatory Response, Pneumonia, Please arrange for 2 weeks of IV Rocephin  "at 2 g every 24 starting 8/2/2023.  Check weekly CBC CMP and CRP and call abnormal results to Dr. Sandoval at [phone].  Diagnosis: E. coli bacteremic sepsis likely due to pneumonia. 28 g 0     No facility-administered medications prior to visit.       No opioid medication identified on active medication list. I have reviewed chart for other potential  high risk medication/s and harmful drug interactions in the elderly.        Aspirin is not on active medication list.  Aspirin use is not indicated based on review of current medical condition/s. Risk of harm outweighs potential benefits.  .    Patient Active Problem List   Diagnosis    Hyperglycemia    Hyperlipidemia    Hypertension    Prostate cancer    Bladder cancer    E coli bacteremia    Paroxysmal atrial fibrillation    IgM deficiency    Bacteroides infection    Impacted cerumen of right ear     Advance Care Planning   Advance Care Planning     Advance Directive is on file.  ACP discussion was held with the patient during this visit. Patient has an advance directive in EMR which is still valid.      Objective    Vitals:    08/17/23 1016   BP: 136/80   BP Location: Right arm   Patient Position: Sitting   Cuff Size: Adult   Pulse: (!) 47   Resp: 16   Temp: 97.5 øF (36.4 øC)   TempSrc: Temporal   SpO2: 96%   Weight: 83 kg (183 lb)   Height: 175.3 cm (69.02\")     Estimated body mass index is 27.01 kg/mý as calculated from the following:    Height as of this encounter: 175.3 cm (69.02\").    Weight as of this encounter: 83 kg (183 lb).    BMI is >= 25 and <30. (Overweight) The following options were offered after discussion;: exercise counseling/recommendations      Does the patient have evidence of cognitive impairment? No    Lab Results   Component Value Date    TRIG 214 (H) 08/14/2023    HDL 38 (L) 08/14/2023    LDL 37 08/14/2023    VLDL 34 08/14/2023    HGBA1C 5.90 (H) 08/14/2023        HEALTH RISK ASSESSMENT    Smoking Status:  Social History     Tobacco Use "   Smoking Status Never   Smokeless Tobacco Never     Alcohol Consumption:  Social History     Substance and Sexual Activity   Alcohol Use Yes    Comment: MINIMUM CONSUMPTION     Fall Risk Screen:    CANDICE Fall Risk Assessment was completed, and patient is at LOW risk for falls.Assessment completed on:2023    Depression Screenin/17/2023    11:00 AM   PHQ-2/PHQ-9 Depression Screening   Little Interest or Pleasure in Doing Things 0-->not at all   Feeling Down, Depressed or Hopeless 0-->not at all   PHQ-9: Brief Depression Severity Measure Score 0       Health Habits and Functional and Cognitive Screenin/17/2023    11:00 AM   Functional & Cognitive Status   Do you have difficulty preparing food and eating? No   Do you have difficulty bathing yourself, getting dressed or grooming yourself? No   Do you have difficulty using the toilet? No   Do you have difficulty moving around from place to place? No   Do you have trouble with steps or getting out of a bed or a chair? No   Current Diet Well Balanced Diet   Dental Exam Up to date   Eye Exam Up to date   Exercise (times per week) 7 times per week   Current Exercises Include Walking   Do you need help using the phone?  No   Are you deaf or do you have serious difficulty hearing?  No   Do you need help to go to places out of walking distance? No   Do you need help shopping? No   Do you need help preparing meals?  No   Do you need help with housework?  No   Do you need help with laundry? No   Do you need help taking your medications? No   Do you need help managing money? No   Do you ever drive or ride in a car without wearing a seat belt? No   Have you felt unusual stress, anger or loneliness in the last month? No   Who do you live with? Spouse   If you need help, do you have trouble finding someone available to you? No   Do you have difficulty concentrating, remembering or making decisions? No       Age-appropriate Screening Schedule:  Refer to the  "list below for future screening recommendations based on patient's age, sex and/or medical conditions. Orders for these recommended tests are listed in the plan section. The patient has been provided with a written plan.    Health Maintenance   Topic Date Due    COLORECTAL CANCER SCREENING  01/14/2022    COVID-19 Vaccine (5 - Pfizer series) 04/16/2023    INFLUENZA VACCINE  10/01/2023    LIPID PANEL  08/14/2024    ANNUAL WELLNESS VISIT  08/17/2024    TDAP/TD VACCINES (2 - Td or Tdap) 08/26/2025    HEPATITIS C SCREENING  Completed    Pneumococcal Vaccine 65+  Completed    ZOSTER VACCINE  Completed                  CMS Preventative Services Quick Reference  Risk Factors Identified During Encounter  Immunizations Discussed/Encouraged: Influenza, COVID19, and RSV  The above risks/problems have been discussed with the patient.  Pertinent information has been shared with the patient in the After Visit Summary.  An After Visit Summary and PPPS were made available to the patient.    Follow Up:   Next Medicare Wellness visit to be scheduled in 1 year.       Additional E&M Note during same encounter follows:  Patient has multiple medical problems which are significant and separately identifiable that require additional work above and beyond the Medicare Wellness Visit.      Chief Complaint  Hyperlipidemia    Subjective        HPI  North Carcamo is also being seen today for follow-up on his history of paroxysmal atrial fibrillation, hypertension, hyperlipidemia, hyperglycemia and recent sepsis with obscure source.  He is feeling well, he has completed his antibiotic course and will have the PICC line removed today.  He also has been putting oil in his right ear because of the cerumen impaction and we need to irrigate it.         Objective   Vital Signs:  /80 (BP Location: Right arm, Patient Position: Sitting, Cuff Size: Adult)   Pulse (!) 47   Temp 97.5 øF (36.4 øC) (Temporal)   Resp 16   Ht 175.3 cm (69.02\")   Wt " 83 kg (183 lb)   SpO2 96%   BMI 27.01 kg/mý     Physical Exam  Vitals and nursing note reviewed.   Constitutional:       General: He is not in acute distress.     Appearance: Normal appearance. He is not ill-appearing.   HENT:      Head: Normocephalic and atraumatic.      Right Ear: Tympanic membrane, ear canal and external ear normal. There is impacted cerumen.      Left Ear: There is no impacted cerumen.   Cardiovascular:      Rate and Rhythm: Normal rate and regular rhythm.      Heart sounds: Normal heart sounds.   Pulmonary:      Effort: Pulmonary effort is normal. No respiratory distress.      Breath sounds: Normal breath sounds. No wheezing or rales.   Skin:     General: Skin is warm and dry.   Neurological:      General: No focal deficit present.      Mental Status: He is alert and oriented to person, place, and time.   Psychiatric:         Mood and Affect: Mood normal.         Behavior: Behavior normal.          Details of the Procedure                        Patient has a completely blocked right auditory canal.  He has been using olive oil to help soften it.  A very large cerumen impaction was successfully cleared with irrigation from the right ear and the tympanic membrane appeared normal and hearing was immediately restored.  Patient tolerated the procedure well with no signs of trauma.     Assessment and Plan #1-follow-up from recent sepsis, asymptomatic  #2-right cerumen impaction, successfully cleared  #3-history of paroxysmal atrial fibrillation, regular rhythm  #4-hypertension controlled         Diagnoses and all orders for this visit:    1. Primary hypertension (Primary)    2. Hyperlipidemia, unspecified hyperlipidemia type    3. Hyperglycemia    Other orders  -     lisinopril (PRINIVIL,ZESTRIL) 20 MG tablet; Take 1 tablet by mouth Daily for 360 days. Indications: High Blood Pressure Disorder  Dispense: 90 tablet; Refill: 3             Follow Up I recommended flu shot and RSV vaccinations in the  fall and a COVID-19 booster.  The patient will continue current medicines as now.  He should be rechecked in about 6 months with a CBC, CMP, lipid panel, hemoglobin A1c       No follow-ups on file.  Patient was given instructions and counseling regarding his condition or for health maintenance advice. Please see specific information pulled into the AVS if appropriate.

## 2023-08-21 VITALS
OXYGEN SATURATION: 99 % | DIASTOLIC BLOOD PRESSURE: 72 MMHG | TEMPERATURE: 97.1 F | RESPIRATION RATE: 16 BRPM | SYSTOLIC BLOOD PRESSURE: 126 MMHG | HEART RATE: 61 BPM

## 2023-08-31 ENCOUNTER — OFFICE VISIT (OUTPATIENT)
Dept: CARDIOLOGY | Facility: CLINIC | Age: 73
End: 2023-08-31
Payer: MEDICARE

## 2023-08-31 VITALS
HEIGHT: 69 IN | WEIGHT: 185 LBS | BODY MASS INDEX: 27.4 KG/M2 | SYSTOLIC BLOOD PRESSURE: 134 MMHG | HEART RATE: 49 BPM | DIASTOLIC BLOOD PRESSURE: 82 MMHG

## 2023-08-31 DIAGNOSIS — I48.0 PAROXYSMAL ATRIAL FIBRILLATION: Primary | ICD-10-CM

## 2023-08-31 PROCEDURE — 99213 OFFICE O/P EST LOW 20 MIN: CPT | Performed by: INTERNAL MEDICINE

## 2023-08-31 PROCEDURE — 3075F SYST BP GE 130 - 139MM HG: CPT | Performed by: INTERNAL MEDICINE

## 2023-08-31 PROCEDURE — 93000 ELECTROCARDIOGRAM COMPLETE: CPT | Performed by: INTERNAL MEDICINE

## 2023-08-31 PROCEDURE — 3079F DIAST BP 80-89 MM HG: CPT | Performed by: INTERNAL MEDICINE

## 2023-08-31 NOTE — PROGRESS NOTES
Date of Office Visit: 2023  Encounter Provider: Brennen Burroughs MD  Place of Service: [facility] CARDIOLOGY  Patient Name: North Carcamo  :1950    Chief Complaint   Patient presents with    Atrial Fibrillation   :     HPI: North Carcamo is a 73 y.o. male who presents today for atrial fibrillation.     I saw him during recent admission.  He was admitted with E. Coli bacteremia. He was seen to be in atrial fibrillation during admission.     He was started on apixaban and eliquis.      He had no awareness that he was in atrial fibrillation.   He has since gone back to sinus rhythm, he was not able to detect that either.     He has resumed his normal lifestyle, golfing, exercise etc.         Past Medical History:   Diagnosis Date    Cancer     PROSTATE    Hyperglycemia     Hyperlipidemia     Hypertension        Past Surgical History:   Procedure Laterality Date    CYSTOSCOPY BLADDER BIOPSY N/A 2019    Procedure: CYSTOSCOPY TUR BLADDER TUMOR;  Surgeon: Joel Russell MD;  Location: [facility];  Service: Urology    ELBOW PROCEDURE      PROSTATE SURGERY      TONSILLECTOMY      VASECTOMY         Social History     Socioeconomic History    Marital status:    Tobacco Use    Smoking status: Never    Smokeless tobacco: Never   Vaping Use    Vaping Use: Never used   Substance and Sexual Activity    Alcohol use: Yes     Comment: MINIMUM CONSUMPTION    Drug use: No    Sexual activity: Defer       Family History   Problem Relation Age of Onset    Hypertension Mother     Hypertension Father     Prostate cancer Father     Malig Hyperthermia Neg Hx        Review of Systems   Constitutional: Negative.   Cardiovascular: Negative.    Respiratory: Negative.     Gastrointestinal: Negative.      Allergies   Allergen Reactions    Penicillins Rash     Thinks childhood reaction of rash          Current Outpatient Medications:     apixaban (ELIQUIS) 5 MG tablet tablet, Take 1  "tablet by mouth Every 12 (Twelve) Hours. Indications: Atrial Fibrillation, Disp: 60 tablet, Rfl: 12    Calcium Carbonate-Vitamin D (CALCIUM 600+D PO), Take 1 tablet by mouth 2 (Two) Times a Day. Indications: Calcium Deficiency, Disp: , Rfl:     lisinopril (PRINIVIL,ZESTRIL) 20 MG tablet, Take 1 tablet by mouth Daily for 360 days. Indications: High Blood Pressure Disorder, Disp: 90 tablet, Rfl: 3    metoprolol tartrate (LOPRESSOR) 25 MG tablet, Take 0.5 tablets by mouth Every 12 (Twelve) Hours for 30 days., Disp: 30 tablet, Rfl: 0    pravastatin (PRAVACHOL) 40 MG tablet, TAKE 1 TABLET BY MOUTH EVERY NIGHT, Disp: 90 tablet, Rfl: 3    cefTRIAXone (ROCEPHIN) 2 g injection, , Disp: , Rfl:     EPINEPHrine, Anaphylaxis, (ADRENALIN) 1 MG/ML injection, Inject 0.3 mL into the appropriate muscle as directed by prescriber As Needed. (Patient not taking: Reported on 8/31/2023), Disp: , Rfl:     Sodium Chloride, PF, 0.9 % injection, Infuse 10 mL into a venous catheter Daily. before and after IV push of rocephin (Patient not taking: Reported on 8/31/2023), Disp: , Rfl:       Objective:     Vitals:    08/31/23 1419   BP: 134/82   Pulse: (!) 49   Weight: 83.9 kg (185 lb)   Height: 175.3 cm (69\")     Body mass index is 27.32 kg/mý.    PHYSICAL EXAM:    Vitals and nursing note reviewed.   Constitutional:       General: Not in acute distress.  Pulmonary:      Effort: Pulmonary effort is normal. No respiratory distress.   Cardiovascular:      Normal rate. Regular rhythm.   Edema:     Peripheral edema absent.   Skin:     General: Skin is warm and dry.   Neurological:      Mental Status: Alert and oriented to person, place, and time.   Psychiatric:         Behavior: Behavior normal.         Thought Content: Thought content normal.         Judgment: Judgment normal.           ECG 12 Lead    Date/Time: 8/31/2023 4:15 PM  Performed by: Brennen Burroughs MD  Authorized by: Brennen Burroughs MD   Comparison: compared with previous ECG " from 8/5/2023  Comparison to previous ECG: Atrial fibrillation has resolved since prior EKG.   Rhythm: sinus bradycardia  Ectopy: unifocal PVCs          Assessment:       Diagnosis Plan   1. Paroxysmal atrial fibrillation               Plan:       Asymptomatic Paroxysmal Atrial Fibrillation    Stop metoprolol, no evidence that it will reduce occurrence of further atrial fibrillation.     Due to elevated CHADS VASC score I recommend that he remain on apixaban.  Discussed risk of stroke.     As always, it has been a pleasure to participate in your patient's care.      Sincerely,         Brennen Burroughs MD

## 2023-09-01 RX ORDER — LISINOPRIL 20 MG/1
TABLET ORAL
Qty: 90 TABLET | Refills: 3 | Status: SHIPPED | OUTPATIENT
Start: 2023-09-01

## 2023-09-05 ENCOUNTER — TELEPHONE (OUTPATIENT)
Dept: GASTROENTEROLOGY | Facility: CLINIC | Age: 73
End: 2023-09-05
Payer: MEDICARE

## 2023-09-05 NOTE — TELEPHONE ENCOUNTER
Hub staff attempted to follow warm transfer process and was unsuccessful     Caller: North Carcamo    Relationship to patient: Self    Best call back number: [phone] (Home)     Patient is needing: CALL OR MYCHART MESSAGE IS OK. VM IS OK AS WELL. PT HAS A WORKOUT SCHED FOR THE DAY BEFORE HIS PROCED AT 12:30P. HE STARTS HIS PROCED PREP AT 12P. HE WANTS TO MAKE SURE HE DOESN'T NEED TO RESCHED THIS WORKOUT WITH HIS .

## 2023-09-05 NOTE — TELEPHONE ENCOUNTER
CALL OR WikipixelHART MESSAGE IS OK. VM IS OK AS WELL. PT HAS A WORKOUT SCHED FOR THE DAY BEFORE HIS PROCED ON 9/13 AT 12:30P. HE STARTS HIS PROCED PREP AT 12P. HE WANTS TO MAKE SURE HE DOESN'T NEED TO RESCHED THIS WORKOUT WITH HIS .

## 2023-09-13 ENCOUNTER — ANESTHESIA EVENT (OUTPATIENT)
Dept: GASTROENTEROLOGY | Facility: HOSPITAL | Age: 73
End: 2023-09-13
Payer: MEDICARE

## 2023-09-13 ENCOUNTER — HOSPITAL ENCOUNTER (OUTPATIENT)
Facility: HOSPITAL | Age: 73
Setting detail: HOSPITAL OUTPATIENT SURGERY
Discharge: HOME | End: 2023-09-13
Attending: INTERNAL MEDICINE | Admitting: INTERNAL MEDICINE
Payer: MEDICARE

## 2023-09-13 ENCOUNTER — ANESTHESIA (OUTPATIENT)
Dept: GASTROENTEROLOGY | Facility: HOSPITAL | Age: 73
End: 2023-09-13
Payer: MEDICARE

## 2023-09-13 VITALS
SYSTOLIC BLOOD PRESSURE: 122 MMHG | HEIGHT: 69 IN | DIASTOLIC BLOOD PRESSURE: 70 MMHG | WEIGHT: 181 LBS | HEART RATE: 50 BPM | OXYGEN SATURATION: 100 % | RESPIRATION RATE: 20 BRPM | BODY MASS INDEX: 26.81 KG/M2

## 2023-09-13 DIAGNOSIS — B96.20 E COLI BACTEREMIA: ICD-10-CM

## 2023-09-13 DIAGNOSIS — R78.81 E COLI BACTEREMIA: ICD-10-CM

## 2023-09-13 PROCEDURE — 45385 COLONOSCOPY W/LESION REMOVAL: CPT | Performed by: INTERNAL MEDICINE

## 2023-09-13 PROCEDURE — S0260 H&P FOR SURGERY: HCPCS | Performed by: INTERNAL MEDICINE

## 2023-09-13 PROCEDURE — 25010000002 PROPOFOL 10 MG/ML EMULSION: Performed by: REGISTERED NURSE

## 2023-09-13 PROCEDURE — 88305 TISSUE EXAM BY PATHOLOGIST: CPT | Performed by: INTERNAL MEDICINE

## 2023-09-13 RX ORDER — LIDOCAINE HYDROCHLORIDE 20 MG/ML
INJECTION, SOLUTION INFILTRATION; PERINEURAL AS NEEDED
Status: DISCONTINUED | OUTPATIENT
Start: 2023-09-13 | End: 2023-09-13 | Stop reason: SURG

## 2023-09-13 RX ORDER — PROPOFOL 10 MG/ML
VIAL (ML) INTRAVENOUS AS NEEDED
Status: DISCONTINUED | OUTPATIENT
Start: 2023-09-13 | End: 2023-09-13 | Stop reason: SURG

## 2023-09-13 RX ORDER — SODIUM CHLORIDE, SODIUM LACTATE, POTASSIUM CHLORIDE, CALCIUM CHLORIDE 600; 310; 30; 20 MG/100ML; MG/100ML; MG/100ML; MG/100ML
1000 INJECTION, SOLUTION INTRAVENOUS CONTINUOUS
Status: DISCONTINUED | OUTPATIENT
Start: 2023-09-13 | End: 2023-09-13 | Stop reason: HOSPADM

## 2023-09-13 RX ORDER — LIDOCAINE HYDROCHLORIDE 10 MG/ML
0.5 INJECTION, SOLUTION INFILTRATION; PERINEURAL ONCE AS NEEDED
Status: DISCONTINUED | OUTPATIENT
Start: 2023-09-13 | End: 2023-09-13 | Stop reason: HOSPADM

## 2023-09-13 RX ORDER — SODIUM CHLORIDE 0.9 % (FLUSH) 0.9 %
10 SYRINGE (ML) INJECTION AS NEEDED
Status: DISCONTINUED | OUTPATIENT
Start: 2023-09-13 | End: 2023-09-13 | Stop reason: HOSPADM

## 2023-09-13 RX ADMIN — PROPOFOL 200 MCG/KG/MIN: 10 INJECTION, EMULSION INTRAVENOUS at 08:56

## 2023-09-13 RX ADMIN — PROPOFOL 80 MG: 10 INJECTION, EMULSION INTRAVENOUS at 08:56

## 2023-09-13 RX ADMIN — SODIUM CHLORIDE, POTASSIUM CHLORIDE, SODIUM LACTATE AND CALCIUM CHLORIDE 1000 ML: 600; 310; 30; 20 INJECTION, SOLUTION INTRAVENOUS at 08:48

## 2023-09-13 RX ADMIN — LIDOCAINE HYDROCHLORIDE 80 MG: 20 INJECTION, SOLUTION INFILTRATION; PERINEURAL at 08:56

## 2023-09-13 NOTE — ANESTHESIA PREPROCEDURE EVALUATION
Anesthesia Evaluation     Patient summary reviewed                Airway   No difficulty expected  Dental      Pulmonary    (+) pneumonia resolved ,  Cardiovascular     ECG reviewed  Rhythm: regular    (+) hypertension, dysrhythmias Paroxysmal Atrial Fib      Neuro/Psych  GI/Hepatic/Renal/Endo      Musculoskeletal     Abdominal    Substance History      OB/GYN          Other                      Anesthesia Plan    ASA 3     MAC       Anesthetic plan, risks, benefits, and alternatives have been provided, discussed and informed consent has been obtained with: patient.    CODE STATUS:

## 2023-09-13 NOTE — ANESTHESIA POSTPROCEDURE EVALUATION
Patient: North Carcamo    Procedure Summary       Date: 09/13/23 Room / Location:  [facility] /  [facility]    Anesthesia Start: 0851 Anesthesia Stop: 0919    Procedure: COLONOSCOPY TO CECUM AND TERMINAL ILEUM WITH COLD POLYPECTOMY Diagnosis:       E coli bacteremia      Colon polyp      Diverticulosis      Internal hemorrhoids      (E coli bacteremia [R78.81, B96.20])    Surgeons: Humberto Peña MD Provider: Addison Rebolledo MD    Anesthesia Type: MAC ASA Status: 3            Anesthesia Type: MAC    Vitals  Vitals Value Taken Time   /70 09/13/23 0946   Temp     Pulse 50 09/13/23 0946   Resp 20 09/13/23 0946   SpO2 100 % 09/13/23 0946           Post Anesthesia Care and Evaluation    Patient location during evaluation: PACU  Patient participation: complete - patient participated  Level of consciousness: awake  Pain score: 1  Pain management: adequate    Airway patency: patent  Anesthetic complications: No anesthetic complications  PONV Status: none  Cardiovascular status: acceptable  Respiratory status: acceptable  Hydration status: acceptable

## 2023-09-13 NOTE — H&P
[facility]  Pre Procedure History & Physical    Chief Complaint:   History of E. coli bacteremia    Subjective     HPI:   Patient 73-year-old male history of hypertension, hyperlipidemia diverticulosis presenting with history of E. coli bacteremia of unknown source.  Patient with no history of colonoscopy screening here for colonoscopy.    Past Medical History:   Past Medical History:   Diagnosis Date    Cancer     PROSTATE    Diverticulosis     E. coli pneumonia 08/01/2023    HOSPITALIZED    History of recent hospitalization 08/01/2023    Hyperglycemia     Hyperlipidemia     Hypertension     Paroxysmal atrial fibrillation        Past Surgical History:  Past Surgical History:   Procedure Laterality Date    CYSTOSCOPY BLADDER BIOPSY N/A 6/12/2019    Procedure: CYSTOSCOPY TUR BLADDER TUMOR;  Surgeon: Joel Russell MD;  Location: [facility];  Service: Urology    ELBOW PROCEDURE      PROSTATE SURGERY      TONSILLECTOMY      VASECTOMY         Family History:  Family History   Problem Relation Age of Onset    Hypertension Mother     Hypertension Father     Prostate cancer Father     Malig Hyperthermia Neg Hx        Social History:   reports that he has never smoked. He has never used smokeless tobacco. He reports current alcohol use. He reports that he does not use drugs.    Medications:   Medications Prior to Admission   Medication Sig Dispense Refill Last Dose    Calcium Carbonate-Vitamin D (CALCIUM 600+D PO) Take 1 tablet by mouth 2 (Two) Times a Day. Indications: Calcium Deficiency       lisinopril (PRINIVIL,ZESTRIL) 20 MG tablet TAKE 1 TABLET BY MOUTH DAILY FOR HIGH BLOOD PRESSURE 90 tablet 3     pravastatin (PRAVACHOL) 40 MG tablet TAKE 1 TABLET BY MOUTH EVERY NIGHT 90 tablet 3     apixaban (ELIQUIS) 5 MG tablet tablet Take 1 tablet by mouth Every 12 (Twelve) Hours. Indications: Atrial Fibrillation 60 tablet 12 9/11/2023    EPINEPHrine, Anaphylaxis, (ADRENALIN) 1 MG/ML injection  "Inject 0.3 mL into the appropriate muscle as directed by prescriber As Needed. (Patient not taking: Reported on 8/31/2023)          Allergies:  Penicillins    ROS:    Pertinent items are noted in HPI     Objective     Blood pressure 142/87, pulse 55, resp. rate 14, height 175.3 cm (69\"), weight 82.1 kg (181 lb), SpO2 99 %.    Physical Exam   Constitutional: Pt is oriented to person, place, and time and well-developed, well-nourished, and in no distress.   Mouth/Throat: Oropharynx is clear and moist.   Neck: Normal range of motion.   Cardiovascular: Normal rate, regular rhythm and normal heart sounds.    Pulmonary/Chest: Effort normal and breath sounds normal.   Abdominal: Soft. Nontender  Skin: Skin is warm and dry.   Psychiatric: Mood, memory, affect and judgment normal.     Assessment & Plan     Diagnosis:  Colonoscopy screening  History of E. coli bacteremia    Anticipated Surgical Procedure:  Colonoscopy    The risks, benefits, and alternatives of this procedure have been discussed with the patient or the responsible party- the patient understands and agrees to proceed.                                                          "

## 2023-09-13 NOTE — BRIEF OP NOTE
COLONOSCOPY  Progress Note    North Carcamo  9/13/2023    Pre-op Diagnosis:   E coli bacteremia [R78.81, B96.20]       Post-Op Diagnosis Codes:     * E coli bacteremia [R78.81, B96.20]     * Colon polyp [K63.5]     * Diverticulosis [K57.90]     * Internal hemorrhoids [K64.8]    Procedure/CPT® Codes:        Procedure(s):  COLONOSCOPY TO CECUM AND TERMINAL ILEUM WITH COLD POLYPECTOMY              Surgeon(s):  Humberto Peña MD    Anesthesia: Monitored Anesthesia Care    Staff:   Endo Technician: Flor Wang RN  Endo Nurse: Ct Chopra RN         Estimated Blood Loss: minimal    Urine Voided: * No values recorded between 9/13/2023  8:50 AM and 9/13/2023  9:14 AM *    Specimens:                Specimens       ID Source Type Tests Collected By Collected At Frozen?    A Large Intestine, Transverse Colon Polyp TISSUE PATHOLOGY EXAM   Humberto Peña MD 9/13/23 0909     Description: TRANSVERSE COLON POLYP    This specimen was not marked as sent.                  Drains: * No LDAs found *    Findings: Colonoscopy to the terminal ileum with normal ileum mucosa.  Transverse colon polyp removed cold snare.  Diverticulosis in the ascending transverse descending and sigmoid colon with internal hemorrhoids seen.        Complications: None          Humberto Peña MD     Date: 9/13/2023  Time: 09:15 EDT

## 2023-09-14 LAB
LAB AP CASE REPORT: NORMAL
PATH REPORT.FINAL DX SPEC: NORMAL
PATH REPORT.GROSS SPEC: NORMAL

## 2023-10-17 ENCOUNTER — TELEPHONE (OUTPATIENT)
Dept: CARDIOLOGY | Facility: CLINIC | Age: 73
End: 2023-10-17
Payer: MEDICARE

## 2023-10-17 NOTE — TELEPHONE ENCOUNTER
REQUEST FOR CARDIAC CLEARANCE    Name of person calling: PT JAZMYN MENDOZA    Surgeon's name: DR PECK     Type of planned surgery: TOOTH EXTRACTION     Date of planned surgery: 11.3.23    Type of anesthesia: UNKNOWN    Have you been experiencing chest pain or shortness of breath? NO    Is your doctor requesting for you to stop any of your medications prior to your surgery? STOP TAKING ELIQUIS THE NIGHT OF 11.2.23 AND MORNING OF 11.3.23    PT JUST NEEDS THE OKAY TO STOP THE ELIQUIS THE NIGHT BEFORE, TO PROCEED WITH THE SURGERY. PT STATES YOU CAN CALL HIM TO GIVE THEM THE OKAY TO STOP THE MEDICATION. NO CARDIAC CLEARANCE IS NEEDING FAXED OVER.

## 2023-10-22 DIAGNOSIS — E78.5 HYPERLIPIDEMIA, UNSPECIFIED HYPERLIPIDEMIA TYPE: ICD-10-CM

## 2023-10-23 ENCOUNTER — TELEPHONE (OUTPATIENT)
Dept: FAMILY MEDICINE CLINIC | Facility: CLINIC | Age: 73
End: 2023-10-23

## 2023-10-23 RX ORDER — PRAVASTATIN SODIUM 40 MG
40 TABLET ORAL NIGHTLY
Qty: 90 TABLET | Refills: 3 | Status: SHIPPED | OUTPATIENT
Start: 2023-10-23

## 2023-10-23 NOTE — TELEPHONE ENCOUNTER
Caller: oNrth Carcamo    Relationship: Self    Best call back number: [phone]     What was the call regarding: SHOULD PATIENT OBTAIN THE MOST CURRENT PNEUMONIA SHOT?      Is it okay if the provider responds through MyChart:

## 2023-11-20 ENCOUNTER — TELEPHONE (OUTPATIENT)
Dept: GASTROENTEROLOGY | Facility: CLINIC | Age: 73
End: 2023-11-20
Payer: MEDICARE

## 2023-11-20 NOTE — TELEPHONE ENCOUNTER
Call to patient per Dr. Peña's results and recommendations.   Patient verbalized understanding       ----- Message from Humberto Peña MD sent at 11/20/2023 10:51 AM EST -----  Polyp benign repeat: 5 years as discussed

## 2023-12-18 ENCOUNTER — TELEPHONE (OUTPATIENT)
Age: 73
End: 2023-12-18
Payer: MEDICARE

## 2023-12-18 NOTE — TELEPHONE ENCOUNTER
Pt is scheduled for bilateral cataract surgery 3- with Dr. James Ibrahim. They are asking that we send a clearance letter. Is it ok to send...Saida

## 2024-02-21 DIAGNOSIS — I10 PRIMARY HYPERTENSION: Primary | ICD-10-CM

## 2024-02-21 DIAGNOSIS — E78.5 HYPERLIPIDEMIA, UNSPECIFIED HYPERLIPIDEMIA TYPE: ICD-10-CM

## 2024-02-21 DIAGNOSIS — R73.9 HYPERGLYCEMIA: ICD-10-CM

## 2024-02-23 LAB
ALBUMIN SERPL-MCNC: 4.3 G/DL (ref 3.8–4.8)
ALBUMIN/CREAT UR: 65 MG/G CREAT (ref 0–29)
ALBUMIN/GLOB SERPL: 1.6 {RATIO} (ref 1.2–2.2)
ALP SERPL-CCNC: 99 IU/L (ref 44–121)
ALT SERPL-CCNC: 23 IU/L (ref 0–44)
APO B SERPL-MCNC: 71 MG/DL
AST SERPL-CCNC: 24 IU/L (ref 0–40)
BILIRUB SERPL-MCNC: 0.9 MG/DL (ref 0–1.2)
BUN SERPL-MCNC: 18 MG/DL (ref 8–27)
BUN/CREAT SERPL: 14 (ref 10–24)
CALCIUM SERPL-MCNC: 9.6 MG/DL (ref 8.6–10.2)
CHLORIDE SERPL-SCNC: 103 MMOL/L (ref 96–106)
CHOLEST SERPL-MCNC: 147 MG/DL (ref 100–199)
CO2 SERPL-SCNC: 23 MMOL/L (ref 20–29)
CREAT SERPL-MCNC: 1.25 MG/DL (ref 0.76–1.27)
CREAT UR-MCNC: 249 MG/DL
EGFRCR SERPLBLD CKD-EPI 2021: 60 ML/MIN/1.73
GLOBULIN SER CALC-MCNC: 2.7 G/DL (ref 1.5–4.5)
GLUCOSE SERPL-MCNC: 120 MG/DL (ref 70–99)
HBA1C MFR BLD: 6.1 % (ref 4.8–5.6)
HDLC SERPL-MCNC: 47 MG/DL
LDLC SERPL CALC-MCNC: 76 MG/DL (ref 0–99)
LDLC/HDLC SERPL: 1.6 RATIO (ref 0–3.6)
MICROALBUMIN UR-MCNC: 160.8 UG/ML
POTASSIUM SERPL-SCNC: 4.7 MMOL/L (ref 3.5–5.2)
PROT SERPL-MCNC: 7 G/DL (ref 6–8.5)
SODIUM SERPL-SCNC: 141 MMOL/L (ref 134–144)
TRIGL SERPL-MCNC: 135 MG/DL (ref 0–149)
VLDLC SERPL CALC-MCNC: 24 MG/DL (ref 5–40)

## 2024-02-28 ENCOUNTER — OFFICE VISIT (OUTPATIENT)
Dept: FAMILY MEDICINE CLINIC | Facility: CLINIC | Age: 74
End: 2024-02-28
Payer: MEDICARE

## 2024-02-28 VITALS
WEIGHT: 189.5 LBS | BODY MASS INDEX: 28.07 KG/M2 | HEART RATE: 56 BPM | HEIGHT: 69 IN | RESPIRATION RATE: 16 BRPM | SYSTOLIC BLOOD PRESSURE: 148 MMHG | OXYGEN SATURATION: 97 % | DIASTOLIC BLOOD PRESSURE: 78 MMHG

## 2024-02-28 DIAGNOSIS — I48.0 PAROXYSMAL ATRIAL FIBRILLATION: ICD-10-CM

## 2024-02-28 DIAGNOSIS — E78.5 HYPERLIPIDEMIA, UNSPECIFIED HYPERLIPIDEMIA TYPE: ICD-10-CM

## 2024-02-28 DIAGNOSIS — R73.9 HYPERGLYCEMIA: ICD-10-CM

## 2024-02-28 DIAGNOSIS — I10 PRIMARY HYPERTENSION: Primary | ICD-10-CM

## 2024-02-28 PROBLEM — H61.21 IMPACTED CERUMEN OF RIGHT EAR: Status: RESOLVED | Noted: 2023-08-10 | Resolved: 2024-02-28

## 2024-02-28 PROCEDURE — 3078F DIAST BP <80 MM HG: CPT | Performed by: INTERNAL MEDICINE

## 2024-02-28 PROCEDURE — 99214 OFFICE O/P EST MOD 30 MIN: CPT | Performed by: INTERNAL MEDICINE

## 2024-02-28 PROCEDURE — 3077F SYST BP >= 140 MM HG: CPT | Performed by: INTERNAL MEDICINE

## 2024-02-28 NOTE — ASSESSMENT & PLAN NOTE
Prediabetes is stable. Encouraged patient to continue regular exercise, but may benefit from more strength training. Repeat A1c in 6 months.

## 2024-02-28 NOTE — PROGRESS NOTES
Henrique Nance MD  Internal Medicine  [phone] (office)             02/28/2024    Patient Information  North Carcamo                                                                                          [address] [address]  1950        Chief Complaint   Patient presents with    Establish Care    Results          History of Present Illness:    North Carcamo is a 73 y.o. male who presents to the office to establish care with new PCP. He has PAF, HTN, HLD, prediabetes, and history of prostate and bladder cancer. He is without complaint today and reports exercising by walking about 1 hour daily and lifting weights once per week.       Health Maintenance Due   Topic Date Due    COVID-19 Vaccine (6 - 2023-24 season) 12/15/2023        Allergies   Allergen Reactions    Penicillins Rash     Thinks childhood reaction of rash            Current Outpatient Medications:     apixaban (ELIQUIS) 5 MG tablet tablet, Take 1 tablet by mouth Every 12 (Twelve) Hours. Indications: Atrial Fibrillation, Disp: 60 tablet, Rfl: 12    Calcium Carbonate-Vitamin D (CALCIUM 600+D PO), Take 1 tablet by mouth 2 (Two) Times a Day. Indications: Calcium Deficiency, Disp: , Rfl:     lisinopril (PRINIVIL,ZESTRIL) 20 MG tablet, TAKE 1 TABLET BY MOUTH DAILY FOR HIGH BLOOD PRESSURE, Disp: 90 tablet, Rfl: 3    pravastatin (PRAVACHOL) 40 MG tablet, TAKE 1 TABLET BY MOUTH EVERY NIGHT, Disp: 90 tablet, Rfl: 3      Social History     Socioeconomic History    Marital status:    Tobacco Use    Smoking status: Never    Smokeless tobacco: Never   Vaping Use    Vaping Use: Never used   Substance and Sexual Activity    Alcohol use: Yes     Comment: MINIMUM CONSUMPTION    Drug use: No    Sexual activity: Defer         Vitals:    02/28/24 1042   BP: 148/78   BP Location: Right arm   Patient Position: Sitting   Cuff Size: Adult   Pulse: 56   Resp: 16   SpO2: 97%   Weight: 86 kg (189 lb 8 oz)   Height: 175.3 cm  "(69.02\")      Wt Readings from Last 3 Encounters:   02/28/24 86 kg (189 lb 8 oz)   09/13/23 82.1 kg (181 lb)   08/31/23 83.9 kg (185 lb)     Body mass index is 27.97 kg/m².      Physical Exam  Vitals reviewed.   Constitutional:       Appearance: Normal appearance.   Pulmonary:      Effort: Pulmonary effort is normal.   Neurological:      Mental Status: He is alert and oriented to person, place, and time.   Psychiatric:         Mood and Affect: Mood normal.         Behavior: Behavior normal.            Lab/other results:  Common labs          8/7/2023    10:30 8/14/2023    11:00 2/21/2024    00:00   Common Labs   Glucose 74  161  120    BUN 15  15  18    Creatinine 0.98  1.03  1.25    Sodium 137  138  141    Potassium 4.1  3.8  4.7    Chloride 101  101  103    Calcium 9.2  8.9  9.6    Total Protein   7.0    Albumin 3.5  3.8  4.3    Total Bilirubin 0.6  0.5  0.9    Alkaline Phosphatase 107  79  99    AST (SGOT) 29  22  24    ALT (SGPT) 34  34  23    WBC 6.68  7.67     Hemoglobin 13.3  14.9     Hematocrit 37.8  42.2     Platelets 233  290     Total Cholesterol  109     Total Cholesterol   147    Triglycerides  214  135    HDL Cholesterol  38  47    LDL Cholesterol   37  76    Hemoglobin A1C  5.90  6.1    Microalbumin, Urine   160.8    PSA 0.022          Assessment/Plan:    Diagnoses and all orders for this visit:    1. Primary hypertension (Primary)  Assessment & Plan:  Hypertension is uncontrolled  Continue current treatment regimen.  Dietary sodium restriction.  Weight loss.  Regular aerobic exercise.  Ambulatory blood pressure monitoring.  Blood pressure will be reassessedin 6 months. Will review home blood pressure readings in 6 weeks. If normal, continue lisinopril 20mg daily. If abnormal, increase to 40mg daily.       2. Hyperlipidemia, unspecified hyperlipidemia type  Assessment & Plan:  Well controlled with healthy lifestyle and pravastatin. No changes to plan. Repeat labs in 12 months.       3. " Hyperglycemia  Assessment & Plan:  Prediabetes is stable. Encouraged patient to continue regular exercise, but may benefit from more strength training. Repeat A1c in 6 months.       4. Paroxysmal atrial fibrillation  Assessment & Plan:  HR<60 and patient is asymptomatic. He is tolerating DOAC without bleeding events. Cardiology following.

## 2024-02-28 NOTE — ASSESSMENT & PLAN NOTE
Well controlled with healthy lifestyle and pravastatin. No changes to plan. Repeat labs in 12 months.

## 2024-02-28 NOTE — ASSESSMENT & PLAN NOTE
Hypertension is uncontrolled  Continue current treatment regimen.  Dietary sodium restriction.  Weight loss.  Regular aerobic exercise.  Ambulatory blood pressure monitoring.  Blood pressure will be reassessedin 6 months. Will review home blood pressure readings in 6 weeks. If normal, continue lisinopril 20mg daily. If abnormal, increase to 40mg daily.

## 2024-02-28 NOTE — ASSESSMENT & PLAN NOTE
HR<60 and patient is asymptomatic. He is tolerating DOAC without bleeding events. Cardiology following.

## 2024-04-10 ENCOUNTER — OFFICE VISIT (OUTPATIENT)
Dept: FAMILY MEDICINE CLINIC | Facility: CLINIC | Age: 74
End: 2024-04-10
Payer: MEDICARE

## 2024-04-10 VITALS
DIASTOLIC BLOOD PRESSURE: 90 MMHG | WEIGHT: 184.6 LBS | SYSTOLIC BLOOD PRESSURE: 154 MMHG | OXYGEN SATURATION: 99 % | HEIGHT: 69 IN | TEMPERATURE: 97.7 F | HEART RATE: 56 BPM | BODY MASS INDEX: 27.34 KG/M2 | RESPIRATION RATE: 12 BRPM

## 2024-04-10 DIAGNOSIS — E78.2 MIXED HYPERLIPIDEMIA: ICD-10-CM

## 2024-04-10 DIAGNOSIS — I10 PRIMARY HYPERTENSION: ICD-10-CM

## 2024-04-10 DIAGNOSIS — R80.9 MICROALBUMINURIA: ICD-10-CM

## 2024-04-10 DIAGNOSIS — I48.0 PAROXYSMAL ATRIAL FIBRILLATION: ICD-10-CM

## 2024-04-10 DIAGNOSIS — Z00.00 ENCOUNTER FOR MEDICAL EXAMINATION TO ESTABLISH CARE: Primary | ICD-10-CM

## 2024-04-10 DIAGNOSIS — R73.9 HYPERGLYCEMIA: ICD-10-CM

## 2024-04-10 RX ORDER — LISINOPRIL 40 MG/1
40 TABLET ORAL DAILY
Qty: 90 TABLET | Refills: 1 | Status: SHIPPED | OUTPATIENT
Start: 2024-04-10

## 2024-04-10 NOTE — PROGRESS NOTES
"Chief Complaint  Establish Care and Hypertension    Subjective        North Carcamo presents to [facility]  History of Present Illness  Establish medical care     73-year-old patient with known case of hypertension, hyperlipidemia, hyper glycemia, paroxysmal atrial fibrillation, allergies, history of kidney stones,HX of prostate and bladder (following Dr Russell urology specialist [facility] every 6 months). presented to the clinic for establishing medical care with a new provider  Surgical history history of prostrate surgery  No smoking history  Medications he is currently on pravastatin 40 mg p.o. daily and lisinopril 40 mg p.o. daily and Eliquis 5 mg p.o. twice daily and with calcium supplementation    No new complaints  For follow-up on blood pressure  Review of system is negative for fever, headache, chest pain, shortness of breath, palpitation, nausea, vomiting, any recent change in bladder habits.      Objective   Vital Signs:  /90 (BP Location: Left arm, Patient Position: Sitting, Cuff Size: Adult)   Pulse 56   Temp 97.7 °F (36.5 °C) (Oral)   Resp 12   Ht 175.3 cm (69.02\")   Wt 83.7 kg (184 lb 9.6 oz)   SpO2 99%   BMI 27.25 kg/m²   Estimated body mass index is 27.25 kg/m² as calculated from the following:    Height as of this encounter: 175.3 cm (69.02\").    Weight as of this encounter: 83.7 kg (184 lb 9.6 oz).               Physical Exam  HENT:      Head: Normocephalic and atraumatic.      Mouth/Throat:      Mouth: Mucous membranes are moist.      Pharynx: Oropharynx is clear.   Eyes:      Extraocular Movements: Extraocular movements intact.      Conjunctiva/sclera: Conjunctivae normal.      Pupils: Pupils are equal, round, and reactive to light.   Cardiovascular:      Rate and Rhythm: Normal rate and regular rhythm.   Pulmonary:      Effort: Pulmonary effort is normal.      Breath sounds: Normal breath sounds.   Abdominal:      General: Bowel sounds are " normal.      Palpations: Abdomen is soft.   Musculoskeletal:         General: Normal range of motion.      Cervical back: Neck supple.   Skin:     General: Skin is warm.      Capillary Refill: Capillary refill takes less than 2 seconds.   Neurological:      General: No focal deficit present.      Mental Status: He is alert and oriented to person, place, and time. Mental status is at baseline.   Psychiatric:         Mood and Affect: Mood normal.        Result Review :    The following data was reviewed by: Chloe Arriaga MD on 04/10/2024:  CMP          8/7/2023    10:30 8/14/2023    11:00 2/21/2024    00:00   CMP   Glucose 74  161  120    BUN 15  15  18    Creatinine 0.98  1.03  1.25    EGFR 80.4  75.8     Sodium 137  138  141    Potassium 4.1  3.8  4.7    Chloride 101  101  103    Calcium 9.2  8.9  9.6    Total Protein   7.0    Total Protein 6.7  6.6     Albumin 3.5  3.8  4.3    Globulin   2.7    Globulin 3.2  2.8     Total Bilirubin 0.6  0.5  0.9    Alkaline Phosphatase 107  79  99    AST (SGOT) 29  22  24    ALT (SGPT) 34  34  23    Albumin/Globulin Ratio 1.1  1.4     BUN/Creatinine Ratio 15.3  14.6  14    Anion Gap 11.1  13.8       CBC          8/5/2023    03:42 8/7/2023    10:30 8/14/2023    11:00   CBC   WBC 5.50  6.68  7.67    RBC 4.35  4.18  4.57    Hemoglobin 14.0  13.3  14.9    Hematocrit 39.9  37.8  42.2    MCV 91.7  90.4  92.3    MCH 32.2  31.8  32.6    MCHC 35.1  35.2  35.3    RDW 12.9  12.8  13.0    Platelets 178  233  290      Lipid Panel          8/14/2023    11:00 2/21/2024    00:00   Lipid Panel   Total Cholesterol 109     Total Cholesterol  147    Triglycerides 214  135    HDL Cholesterol 38  47    VLDL Cholesterol 34  24    LDL Cholesterol  37  76    LDL/HDL Ratio 0.74  1.6      TSH          8/5/2023    03:42   TSH   TSH 3.380                   Assessment and Plan     Diagnoses and all orders for this visit:    1. Encounter for medical examination to establish care (Primary)    2. Mixed  hyperlipidemia  Comments:  On statins, continue same will repeat lipid panel in 6 months  Orders:  -     CBC Auto Differential; Future  -     Comprehensive Metabolic Panel; Future  -     Lipid Panel With / Chol / HDL Ratio; Future  -     TSH; Future  -     Hemoglobin A1c; Future  -     Microalbumin / Creatinine Urine Ratio - Urine, Clean Catch; Future    3. Primary hypertension  Comments:  Not controlled increased lisinopril to 40 mg p.o. daily, RTC 1 month for blood pressure checkup  Orders:  -     lisinopril (PRINIVIL,ZESTRIL) 40 MG tablet; Take 1 tablet by mouth Daily.  Dispense: 90 tablet; Refill: 1  -     CBC Auto Differential; Future  -     Comprehensive Metabolic Panel; Future  -     Lipid Panel With / Chol / HDL Ratio; Future  -     TSH; Future  -     Hemoglobin A1c; Future  -     Microalbumin / Creatinine Urine Ratio - Urine, Clean Catch; Future    4. Paroxysmal atrial fibrillation  Comments:  On Eliquis 5 mg p.o. twice daily, not on any rate control medication his heart rate is around 56  Orders:  -     CBC Auto Differential; Future  -     Comprehensive Metabolic Panel; Future  -     Lipid Panel With / Chol / HDL Ratio; Future  -     TSH; Future  -     Hemoglobin A1c; Future  -     Microalbumin / Creatinine Urine Ratio - Urine, Clean Catch; Future    5. Hyperglycemia  -     CBC Auto Differential; Future  -     Comprehensive Metabolic Panel; Future  -     Lipid Panel With / Chol / HDL Ratio; Future  -     TSH; Future  -     Hemoglobin A1c; Future  -     Microalbumin / Creatinine Urine Ratio - Urine, Clean Catch; Future    6. Microalbuminuria  Comments:  On ACE inhibitor's, will continue monitor, will repeat labs in 6 visit  Orders:  -     CBC Auto Differential; Future  -     Comprehensive Metabolic Panel; Future  -     Lipid Panel With / Chol / HDL Ratio; Future  -     TSH; Future  -     Hemoglobin A1c; Future  -     Microalbumin / Creatinine Urine Ratio - Urine, Clean Catch; Future             Follow Up      No follow-ups on file.  Patient was given instructions and counseling regarding his condition or for health maintenance advice. Please see specific information pulled into the AVS if appropriate.

## 2024-05-13 ENCOUNTER — OFFICE VISIT (OUTPATIENT)
Dept: FAMILY MEDICINE CLINIC | Facility: CLINIC | Age: 74
End: 2024-05-13
Payer: MEDICARE

## 2024-05-13 VITALS
WEIGHT: 183 LBS | HEIGHT: 69 IN | OXYGEN SATURATION: 97 % | HEART RATE: 62 BPM | DIASTOLIC BLOOD PRESSURE: 78 MMHG | SYSTOLIC BLOOD PRESSURE: 126 MMHG | BODY MASS INDEX: 27.11 KG/M2

## 2024-05-13 DIAGNOSIS — I10 PRIMARY HYPERTENSION: Primary | ICD-10-CM

## 2024-05-13 PROCEDURE — 3074F SYST BP LT 130 MM HG: CPT | Performed by: STUDENT IN AN ORGANIZED HEALTH CARE EDUCATION/TRAINING PROGRAM

## 2024-05-13 PROCEDURE — 1160F RVW MEDS BY RX/DR IN RCRD: CPT | Performed by: STUDENT IN AN ORGANIZED HEALTH CARE EDUCATION/TRAINING PROGRAM

## 2024-05-13 PROCEDURE — 99213 OFFICE O/P EST LOW 20 MIN: CPT | Performed by: STUDENT IN AN ORGANIZED HEALTH CARE EDUCATION/TRAINING PROGRAM

## 2024-05-13 PROCEDURE — 1159F MED LIST DOCD IN RCRD: CPT | Performed by: STUDENT IN AN ORGANIZED HEALTH CARE EDUCATION/TRAINING PROGRAM

## 2024-05-13 PROCEDURE — 1126F AMNT PAIN NOTED NONE PRSNT: CPT | Performed by: STUDENT IN AN ORGANIZED HEALTH CARE EDUCATION/TRAINING PROGRAM

## 2024-05-13 PROCEDURE — 3078F DIAST BP <80 MM HG: CPT | Performed by: STUDENT IN AN ORGANIZED HEALTH CARE EDUCATION/TRAINING PROGRAM

## 2024-05-23 NOTE — PROGRESS NOTES
"Chief Complaint  Hypertension    Subjective        North Carcamo presents to [facility]  Hypertension      For follow-up on hypertension.  Patient was seen by me for the first time 4 weeks ago and in that visit patient blood pressure was not well-controlled and his medication was adjusted to lisinopril 40 mg p.o. daily and patient stated he is very compliant with his medication.  Objective   Vital Signs:  /78 (BP Location: Right arm, Patient Position: Sitting, Cuff Size: Adult)   Pulse 62   Ht 175.3 cm (69.02\")   Wt 83 kg (183 lb)   SpO2 97%   BMI 27.01 kg/m²   Estimated body mass index is 27.01 kg/m² as calculated from the following:    Height as of this encounter: 175.3 cm (69.02\").    Weight as of this encounter: 83 kg (183 lb).               Physical Exam  Cardiovascular:      Rate and Rhythm: Normal rate.      Pulses: Normal pulses.      Heart sounds: Normal heart sounds.   Pulmonary:      Effort: Pulmonary effort is normal.   Abdominal:      General: Bowel sounds are normal.      Palpations: Abdomen is soft.   Musculoskeletal:         General: Normal range of motion.   Skin:     General: Skin is warm.   Neurological:      Mental Status: He is alert and oriented to person, place, and time.        Result Review :                     Assessment and Plan     Diagnoses and all orders for this visit:    1. Primary hypertension (Primary)  Comments:  Well-controlled with current regimen, continue lisinopril 40 mg p.o. daily, RTC in 6 months with repeat labs             Follow Up     No follow-ups on file.  Patient was given instructions and counseling regarding his condition or for health maintenance advice. Please see specific information pulled into the AVS if appropriate.         "

## 2024-05-24 ENCOUNTER — PRE-ADMISSION TESTING (OUTPATIENT)
Dept: PREADMISSION TESTING | Facility: HOSPITAL | Age: 74
End: 2024-05-24
Payer: MEDICARE

## 2024-05-24 VITALS
SYSTOLIC BLOOD PRESSURE: 137 MMHG | BODY MASS INDEX: 26.72 KG/M2 | DIASTOLIC BLOOD PRESSURE: 79 MMHG | WEIGHT: 180.4 LBS | HEIGHT: 69 IN | HEART RATE: 51 BPM | TEMPERATURE: 97.1 F | OXYGEN SATURATION: 97 % | RESPIRATION RATE: 18 BRPM

## 2024-05-24 LAB
ANION GAP SERPL CALCULATED.3IONS-SCNC: 8 MMOL/L (ref 5–15)
BUN SERPL-MCNC: 13 MG/DL (ref 8–23)
BUN/CREAT SERPL: 11.2 (ref 7–25)
CALCIUM SPEC-SCNC: 9.6 MG/DL (ref 8.6–10.5)
CHLORIDE SERPL-SCNC: 103 MMOL/L (ref 98–107)
CO2 SERPL-SCNC: 26 MMOL/L (ref 22–29)
CREAT SERPL-MCNC: 1.16 MG/DL (ref 0.76–1.27)
DEPRECATED RDW RBC AUTO: 44.2 FL (ref 37–54)
EGFRCR SERPLBLD CKD-EPI 2021: 65.3 ML/MIN/1.73
ERYTHROCYTE [DISTWIDTH] IN BLOOD BY AUTOMATED COUNT: 12.8 % (ref 12.3–15.4)
GLUCOSE SERPL-MCNC: 82 MG/DL (ref 65–99)
HCT VFR BLD AUTO: 45.1 % (ref 37.5–51)
HGB BLD-MCNC: 15.1 G/DL (ref 13–17.7)
INR PPP: 1.2 (ref 0.9–1.1)
MCH RBC QN AUTO: 31.5 PG (ref 26.6–33)
MCHC RBC AUTO-ENTMCNC: 33.5 G/DL (ref 31.5–35.7)
MCV RBC AUTO: 94.2 FL (ref 79–97)
PLATELET # BLD AUTO: 318 10*3/MM3 (ref 140–450)
PMV BLD AUTO: 10.7 FL (ref 6–12)
POTASSIUM SERPL-SCNC: 4.1 MMOL/L (ref 3.5–5.2)
PROTHROMBIN TIME: 15.4 SECONDS (ref 11.7–14.2)
RBC # BLD AUTO: 4.79 10*6/MM3 (ref 4.14–5.8)
SODIUM SERPL-SCNC: 137 MMOL/L (ref 136–145)
WBC NRBC COR # BLD AUTO: 9.78 10*3/MM3 (ref 3.4–10.8)

## 2024-05-24 PROCEDURE — 93005 ELECTROCARDIOGRAM TRACING: CPT

## 2024-05-24 PROCEDURE — 36415 COLL VENOUS BLD VENIPUNCTURE: CPT

## 2024-05-24 PROCEDURE — 93010 ELECTROCARDIOGRAM REPORT: CPT | Performed by: STUDENT IN AN ORGANIZED HEALTH CARE EDUCATION/TRAINING PROGRAM

## 2024-05-24 PROCEDURE — 80048 BASIC METABOLIC PNL TOTAL CA: CPT

## 2024-05-24 PROCEDURE — 85027 COMPLETE CBC AUTOMATED: CPT

## 2024-05-24 PROCEDURE — 85610 PROTHROMBIN TIME: CPT

## 2024-05-24 NOTE — DISCHARGE INSTRUCTIONS
Take the following medications the morning of surgery with a small sip of water:  NONE    If you are on prescription narcotic pain medication to control your pain you may also take that medication the morning of surgery.    General Instructions:  Do not eat or drink anything 8 HOURS PRIOR TO  surgery.  Infants may have breast milk up to four hours before surgery.  Infants drinking formula may drink formula up to six hours before surgery.   Patients who avoid smoking, chewing tobacco and alcohol for 4 weeks prior to surgery have a reduced risk of post-operative complications.  Quit smoking as many days before surgery as you can.  Do not smoke, use chewing tobacco or drink alcohol the day of surgery.   If applicable bring your C-PAP/ BI-PAP machine in with you to preop day of surgery.  Bring any papers given to you in the doctor’s office.  Wear clean comfortable clothes.  Do not wear contact lenses, false eyelashes or make-up.  Bring a case for your glasses.   Bring crutches or walker if applicable.  Remove all piercings.  Leave jewelry and any other valuables at home.  Hair extensions with metal clips must be removed prior to surgery.  The Pre-Admission Testing nurse will instruct you to bring medications if unable to obtain an accurate list in Pre-Admission Testing.        If you were given a blood bank ID arm band remember to bring it with you the day of surgery.    Preventing a Surgical Site Infection:  For 2 to 3 days before surgery, avoid shaving with a razor because the razor can irritate skin and make it easier to develop an infection.    Any areas of open skin can increase the risk of a post-operative wound infection by allowing bacteria to enter and travel throughout the body.  Notify your surgeon if you have any skin wounds / rashes even if it is not near the expected surgical site.  The area will need assessed to determine if surgery should be delayed until it is healed.  The night prior to surgery shower  using a fresh bar of anti-bacterial soap (such as Dial) and clean washcloth.  Sleep in a clean bed with clean clothing.  Do not allow pets to sleep with you.  Shower on the morning of surgery using a fresh bar of anti-bacterial soap (such as Dial) and clean washcloth.  Dry with a clean towel and dress in clean clothing.  Ask your surgeon if you will be receiving antibiotics prior to surgery.  Make sure you, your family, and all healthcare providers clean their hands with soap and water or an alcohol based hand  before caring for you or your wound.    Day of surgery:  Your arrival time is approximately two hours before your scheduled surgery time.  Upon arrival, a Pre-op nurse and Anesthesiologist will review your health history, obtain vital signs, and answer questions you may have.  The only belongings needed at this time will be your home medications and if applicable your C-PAP/BI-PAP machine.  A Pre-op nurse will start an IV and you may receive medication in preparation for surgery, including something to help you relax.      Please be aware that surgery does come with discomfort.  We want to make every effort to control your discomfort so please discuss any uncontrolled symptoms with your nurse.   Your doctor will most likely have prescribed pain medications.      If you are going home after surgery you will receive individualized written care instructions before being discharged.  A responsible adult must drive you to and from the hospital on the day of your surgery and ideally stay with you through the night.  Discharge prescriptions can be filled by the hospital pharmacy during regular pharmacy hours.  If you are having surgery late in the day/evening your prescription may be e-prescribed to your pharmacy.  Please verify your pharmacy hours or chose a 24 hour pharmacy to avoid not having access to your prescription because your pharmacy has closed for the day.    If you are staying overnight  following surgery, you will be transported to your hospital room following the recovery period.  [facility] has all private rooms.    If you have any questions please call Pre-Admission Testing at [phone].  Deductibles and co-payments are collected on the day of service. Please be prepared to pay the required co-pay, deductible or deposit on the day of service as defined by your plan.    Call your surgeon immediately if you experience any of the following symptoms:  Sore Throat  Shortness of Breath or difficulty breathing  Cough  Chills  Body soreness or muscle pain  Headache  Fever  New loss of taste or smell  Do not arrive for your surgery ill.  Your procedure will need to be rescheduled to another time.  You will need to call your physician before the day of surgery to avoid any unnecessary exposure to hospital staff as well as other patients.

## 2024-05-25 LAB
QT INTERVAL: 447 MS
QTC INTERVAL: 432 MS

## 2024-05-30 NOTE — H&P
[facility] History and Physical    Patient Care Team:  Chloe Arriaga MD as PCP - General (Internal Medicine)  Brennen Burroughs MD as Consulting Physician (Cardiology)    Chief complaint bladder tumor    Subjective     Patient is a 74 y.o. male presents with history of recurrent superficial TCC and prior prostate cancer with recurrent findings on examination in the office to undergo biopsy fulguration for further evaluation urine has been clear.       Review of Systems   Pertinent items are noted in HPI    Past Medical History:   Diagnosis Date    Allergic     Penicillin    Cataract March 2022    Diverticulosis     E. coli pneumonia 08/01/2023    HOSPITALIZED    History of prostate cancer 2002    History of recent hospitalization 08/01/2023    Hyperglycemia     Hyperlipidemia     Hypertension     Kidney stone 2019    Has not been a problem.    On anticoagulant therapy     Paroxysmal atrial fibrillation      Past Surgical History:   Procedure Laterality Date    CATARACT EXTRACTION      WITH LENS IMPLANTS    COLONOSCOPY N/A 09/13/2023    Procedure: COLONOSCOPY TO CECUM AND TERMINAL ILEUM WITH COLD POLYPECTOMY;  Surgeon: Humberto Peña MD;  Location: [facility] ENDOSCOPY;  Service: Gastroenterology;  Laterality: N/A;  SCREENING  DIVERTICULOSIS, COLON POLYP, HEMORRHOIDS    CYSTOSCOPY BLADDER BIOPSY N/A 06/12/2019    Procedure: CYSTOSCOPY TUR BLADDER TUMOR;  Surgeon: Joel Russell MD;  Location: [facility] OR;  Service: Urology    ELBOW PROCEDURE Left     FX    PROSTATECTOMY      TONSILLECTOMY  1957    VASECTOMY       Family History   Problem Relation Age of Onset    Hypertension Mother     Hypertension Father     Prostate cancer Father     Malig Hyperthermia Neg Hx      Social History     Tobacco Use    Smoking status: Never    Smokeless tobacco: Never   Vaping Use    Vaping status: Never Used   Substance Use Topics    Alcohol use: Yes     Alcohol/week: 1.0 standard drink of alcohol     Types:  1 Drinks containing 0.5 oz of alcohol per week     Comment: Occasional beer or cocktail    Drug use: No       Meds:  No medications prior to admission.       Allergies:  Penicillins    Debilities:  Not    Objective     Vital Signs     No intake or output data in the 24 hours ending 05/30/24 0757       Physical Exam:      General Appearance:  Alert, cooperative, in no acute distress   Head:  Normocephalic, without obvious abnormality, atraumatic   Eyes:  Lids and lashes normal, conjunctivae and sclerae normal, no icterus, no pallor, corneas clear   Ears:  Ears appear intact with no abnormalities noted   Throat:     Neck:  No adenopathy, supple, trachea midline, no thyromegaly, no JVD   Back:  No kyphosis present, no scoliosis present, no skin lesions, erythema or scars, no tenderness to percussion or palpation, range of motion normal   Lungs:   respirations regular, even and unlabored    Heart:  Regular rhythm and normal rate   Chest Wall:  No abnormalities observed   Abdomen:  no masses, no organomegaly, soft non-tender, non-distended, no guarding, no rebound tenderness   Prostates flat                                                                        Results Review:    I reviewed the patient's new clinical results.  Results for orders placed or performed in visit on 05/24/24   CBC (No Diff)    Specimen: Blood   Result Value Ref Range    WBC 9.78 3.40 - 10.80 10*3/mm3    RBC 4.79 4.14 - 5.80 10*6/mm3    Hemoglobin 15.1 13.0 - 17.7 g/dL    Hematocrit 45.1 37.5 - 51.0 %    MCV 94.2 79.0 - 97.0 fL    MCH 31.5 26.6 - 33.0 pg    MCHC 33.5 31.5 - 35.7 g/dL    RDW 12.8 12.3 - 15.4 %    RDW-SD 44.2 37.0 - 54.0 fl    MPV 10.7 6.0 - 12.0 fL    Platelets 318 140 - 450 10*3/mm3   Basic Metabolic Panel    Specimen: Blood   Result Value Ref Range    Glucose 82 65 - 99 mg/dL    BUN 13 8 - 23 mg/dL    Creatinine 1.16 0.76 - 1.27 mg/dL    Sodium 137 136 - 145 mmol/L    Potassium 4.1 3.5 - 5.2 mmol/L    Chloride 103 98 - 107  mmol/L    CO2 26.0 22.0 - 29.0 mmol/L    Calcium 9.6 8.6 - 10.5 mg/dL    BUN/Creatinine Ratio 11.2 7.0 - 25.0    Anion Gap 8.0 5.0 - 15.0 mmol/L    eGFR 65.3 >60.0 mL/min/1.73   Protime-INR    Specimen: Blood   Result Value Ref Range    Protime 15.4 (H) 11.7 - 14.2 Seconds    INR 1.20 (H) 0.90 - 1.10   ECG 12 Lead   Result Value Ref Range    QT Interval 447 ms    QTC Interval 432 ms        Assessment:  Recurrent TCC bladder    History of prostate cancer    Plan:    Cystoscopy biopsy and fulguration of area possible TUR R-B-O discussed with patient limited to infection bleeding perforation can contiguous organ injury use of catheter with irrigation understands agrees to proceed    I discussed the patient's findings and my recommendations with patient.     Joel Russell MD  05/30/24  07:57 EDT

## 2024-05-31 ENCOUNTER — ANESTHESIA (OUTPATIENT)
Dept: PERIOP | Facility: HOSPITAL | Age: 74
End: 2024-05-31
Payer: MEDICARE

## 2024-05-31 ENCOUNTER — ANESTHESIA EVENT (OUTPATIENT)
Dept: PERIOP | Facility: HOSPITAL | Age: 74
End: 2024-05-31
Payer: MEDICARE

## 2024-05-31 ENCOUNTER — HOSPITAL ENCOUNTER (OUTPATIENT)
Facility: HOSPITAL | Age: 74
Setting detail: HOSPITAL OUTPATIENT SURGERY
Discharge: HOME | End: 2024-05-31
Attending: UROLOGY | Admitting: UROLOGY
Payer: MEDICARE

## 2024-05-31 VITALS
HEIGHT: 69 IN | DIASTOLIC BLOOD PRESSURE: 83 MMHG | WEIGHT: 180.34 LBS | RESPIRATION RATE: 10 BRPM | SYSTOLIC BLOOD PRESSURE: 128 MMHG | OXYGEN SATURATION: 96 % | BODY MASS INDEX: 26.71 KG/M2 | TEMPERATURE: 98.3 F | HEART RATE: 61 BPM

## 2024-05-31 DIAGNOSIS — D49.4 BLADDER TUMOR: ICD-10-CM

## 2024-05-31 PROCEDURE — 25010000002 PROPOFOL 200 MG/20ML EMULSION: Performed by: NURSE ANESTHETIST, CERTIFIED REGISTERED

## 2024-05-31 PROCEDURE — 25010000002 FENTANYL CITRATE (PF) 50 MCG/ML SOLUTION: Performed by: NURSE ANESTHETIST, CERTIFIED REGISTERED

## 2024-05-31 PROCEDURE — 25810000003 LACTATED RINGERS PER 1000 ML: Performed by: NURSE ANESTHETIST, CERTIFIED REGISTERED

## 2024-05-31 PROCEDURE — 25010000002 LEVOFLOXACIN PER 250 MG: Performed by: UROLOGY

## 2024-05-31 PROCEDURE — 25010000002 ONDANSETRON PER 1 MG: Performed by: NURSE ANESTHETIST, CERTIFIED REGISTERED

## 2024-05-31 PROCEDURE — 25810000003 LACTATED RINGERS PER 1000 ML: Performed by: ANESTHESIOLOGY

## 2024-05-31 PROCEDURE — 25010000002 DEXAMETHASONE SODIUM PHOSPHATE 20 MG/5ML SOLUTION: Performed by: NURSE ANESTHETIST, CERTIFIED REGISTERED

## 2024-05-31 PROCEDURE — 88305 TISSUE EXAM BY PATHOLOGIST: CPT | Performed by: UROLOGY

## 2024-05-31 RX ORDER — LABETALOL HYDROCHLORIDE 5 MG/ML
5 INJECTION, SOLUTION INTRAVENOUS
Status: DISCONTINUED | OUTPATIENT
Start: 2024-05-31 | End: 2024-05-31 | Stop reason: HOSPADM

## 2024-05-31 RX ORDER — FENTANYL CITRATE 50 UG/ML
25 INJECTION, SOLUTION INTRAMUSCULAR; INTRAVENOUS
Status: DISCONTINUED | OUTPATIENT
Start: 2024-05-31 | End: 2024-05-31 | Stop reason: HOSPADM

## 2024-05-31 RX ORDER — IPRATROPIUM BROMIDE AND ALBUTEROL SULFATE 2.5; .5 MG/3ML; MG/3ML
3 SOLUTION RESPIRATORY (INHALATION) ONCE AS NEEDED
Status: DISCONTINUED | OUTPATIENT
Start: 2024-05-31 | End: 2024-05-31 | Stop reason: HOSPADM

## 2024-05-31 RX ORDER — PROMETHAZINE HYDROCHLORIDE 25 MG/1
25 SUPPOSITORY RECTAL ONCE AS NEEDED
Status: DISCONTINUED | OUTPATIENT
Start: 2024-05-31 | End: 2024-05-31 | Stop reason: HOSPADM

## 2024-05-31 RX ORDER — SODIUM CHLORIDE 0.9 % (FLUSH) 0.9 %
3-10 SYRINGE (ML) INJECTION AS NEEDED
Status: DISCONTINUED | OUTPATIENT
Start: 2024-05-31 | End: 2024-05-31 | Stop reason: HOSPADM

## 2024-05-31 RX ORDER — HYDROCODONE BITARTRATE AND ACETAMINOPHEN 7.5; 325 MG/1; MG/1
1 TABLET ORAL EVERY 4 HOURS PRN
Status: DISCONTINUED | OUTPATIENT
Start: 2024-05-31 | End: 2024-05-31 | Stop reason: HOSPADM

## 2024-05-31 RX ORDER — PHENAZOPYRIDINE HYDROCHLORIDE 100 MG/1
100 TABLET, FILM COATED ORAL 3 TIMES DAILY PRN
Qty: 12 TABLET | Refills: 0 | Status: SHIPPED | OUTPATIENT
Start: 2024-05-31

## 2024-05-31 RX ORDER — MIDAZOLAM HYDROCHLORIDE 1 MG/ML
0.5 INJECTION INTRAMUSCULAR; INTRAVENOUS
Status: DISCONTINUED | OUTPATIENT
Start: 2024-05-31 | End: 2024-05-31 | Stop reason: HOSPADM

## 2024-05-31 RX ORDER — FAMOTIDINE 10 MG/ML
20 INJECTION, SOLUTION INTRAVENOUS ONCE
Status: COMPLETED | OUTPATIENT
Start: 2024-05-31 | End: 2024-05-31

## 2024-05-31 RX ORDER — ONDANSETRON 2 MG/ML
INJECTION INTRAMUSCULAR; INTRAVENOUS AS NEEDED
Status: DISCONTINUED | OUTPATIENT
Start: 2024-05-31 | End: 2024-05-31 | Stop reason: SURG

## 2024-05-31 RX ORDER — PROMETHAZINE HYDROCHLORIDE 25 MG/1
25 TABLET ORAL ONCE AS NEEDED
Status: DISCONTINUED | OUTPATIENT
Start: 2024-05-31 | End: 2024-05-31 | Stop reason: HOSPADM

## 2024-05-31 RX ORDER — SODIUM CHLORIDE 0.9 % (FLUSH) 0.9 %
3 SYRINGE (ML) INJECTION EVERY 12 HOURS SCHEDULED
Status: DISCONTINUED | OUTPATIENT
Start: 2024-05-31 | End: 2024-05-31 | Stop reason: HOSPADM

## 2024-05-31 RX ORDER — ONDANSETRON 2 MG/ML
4 INJECTION INTRAMUSCULAR; INTRAVENOUS ONCE AS NEEDED
Status: DISCONTINUED | OUTPATIENT
Start: 2024-05-31 | End: 2024-05-31 | Stop reason: HOSPADM

## 2024-05-31 RX ORDER — EPHEDRINE SULFATE 50 MG/ML
5 INJECTION, SOLUTION INTRAVENOUS ONCE AS NEEDED
Status: DISCONTINUED | OUTPATIENT
Start: 2024-05-31 | End: 2024-05-31 | Stop reason: HOSPADM

## 2024-05-31 RX ORDER — LIDOCAINE HYDROCHLORIDE 10 MG/ML
0.5 INJECTION, SOLUTION INFILTRATION; PERINEURAL ONCE AS NEEDED
Status: DISCONTINUED | OUTPATIENT
Start: 2024-05-31 | End: 2024-05-31 | Stop reason: HOSPADM

## 2024-05-31 RX ORDER — DROPERIDOL 2.5 MG/ML
0.62 INJECTION, SOLUTION INTRAMUSCULAR; INTRAVENOUS
Status: DISCONTINUED | OUTPATIENT
Start: 2024-05-31 | End: 2024-05-31 | Stop reason: HOSPADM

## 2024-05-31 RX ORDER — LIDOCAINE HYDROCHLORIDE 20 MG/ML
INJECTION, SOLUTION INFILTRATION; PERINEURAL AS NEEDED
Status: DISCONTINUED | OUTPATIENT
Start: 2024-05-31 | End: 2024-05-31 | Stop reason: SURG

## 2024-05-31 RX ORDER — NALOXONE HCL 0.4 MG/ML
0.2 VIAL (ML) INJECTION AS NEEDED
Status: DISCONTINUED | OUTPATIENT
Start: 2024-05-31 | End: 2024-05-31 | Stop reason: HOSPADM

## 2024-05-31 RX ORDER — DIPHENHYDRAMINE HYDROCHLORIDE 50 MG/ML
12.5 INJECTION INTRAMUSCULAR; INTRAVENOUS
Status: DISCONTINUED | OUTPATIENT
Start: 2024-05-31 | End: 2024-05-31 | Stop reason: HOSPADM

## 2024-05-31 RX ORDER — HYDROCODONE BITARTRATE AND ACETAMINOPHEN 7.5; 325 MG/1; MG/1
1 TABLET ORAL EVERY 6 HOURS PRN
Qty: 10 TABLET | Refills: 0 | Status: SHIPPED | OUTPATIENT
Start: 2024-05-31

## 2024-05-31 RX ORDER — FENTANYL CITRATE 50 UG/ML
50 INJECTION, SOLUTION INTRAMUSCULAR; INTRAVENOUS ONCE AS NEEDED
Status: DISCONTINUED | OUTPATIENT
Start: 2024-05-31 | End: 2024-05-31 | Stop reason: HOSPADM

## 2024-05-31 RX ORDER — PROPOFOL 10 MG/ML
INJECTION, EMULSION INTRAVENOUS AS NEEDED
Status: DISCONTINUED | OUTPATIENT
Start: 2024-05-31 | End: 2024-05-31 | Stop reason: SURG

## 2024-05-31 RX ORDER — LEVOFLOXACIN 5 MG/ML
500 INJECTION, SOLUTION INTRAVENOUS ONCE
Status: COMPLETED | OUTPATIENT
Start: 2024-05-31 | End: 2024-05-31

## 2024-05-31 RX ORDER — HYDROMORPHONE HYDROCHLORIDE 1 MG/ML
0.25 INJECTION, SOLUTION INTRAMUSCULAR; INTRAVENOUS; SUBCUTANEOUS
Status: DISCONTINUED | OUTPATIENT
Start: 2024-05-31 | End: 2024-05-31 | Stop reason: HOSPADM

## 2024-05-31 RX ORDER — ONDANSETRON 4 MG/1
4 TABLET, FILM COATED ORAL DAILY PRN
Qty: 10 TABLET | Refills: 1 | Status: SHIPPED | OUTPATIENT
Start: 2024-05-31

## 2024-05-31 RX ORDER — DEXAMETHASONE SODIUM PHOSPHATE 4 MG/ML
INJECTION, SOLUTION INTRA-ARTICULAR; INTRALESIONAL; INTRAMUSCULAR; INTRAVENOUS; SOFT TISSUE AS NEEDED
Status: DISCONTINUED | OUTPATIENT
Start: 2024-05-31 | End: 2024-05-31 | Stop reason: SURG

## 2024-05-31 RX ORDER — LIDOCAINE HYDROCHLORIDE 20 MG/ML
JELLY TOPICAL AS NEEDED
Status: DISCONTINUED | OUTPATIENT
Start: 2024-05-31 | End: 2024-05-31 | Stop reason: HOSPADM

## 2024-05-31 RX ORDER — HYDRALAZINE HYDROCHLORIDE 20 MG/ML
5 INJECTION INTRAMUSCULAR; INTRAVENOUS
Status: DISCONTINUED | OUTPATIENT
Start: 2024-05-31 | End: 2024-05-31 | Stop reason: HOSPADM

## 2024-05-31 RX ORDER — FLUMAZENIL 0.1 MG/ML
0.2 INJECTION INTRAVENOUS AS NEEDED
Status: DISCONTINUED | OUTPATIENT
Start: 2024-05-31 | End: 2024-05-31 | Stop reason: HOSPADM

## 2024-05-31 RX ORDER — HYDROCODONE BITARTRATE AND ACETAMINOPHEN 5; 325 MG/1; MG/1
1 TABLET ORAL ONCE AS NEEDED
Status: DISCONTINUED | OUTPATIENT
Start: 2024-05-31 | End: 2024-05-31 | Stop reason: HOSPADM

## 2024-05-31 RX ORDER — FENTANYL CITRATE 50 UG/ML
INJECTION, SOLUTION INTRAMUSCULAR; INTRAVENOUS AS NEEDED
Status: DISCONTINUED | OUTPATIENT
Start: 2024-05-31 | End: 2024-05-31 | Stop reason: SURG

## 2024-05-31 RX ORDER — SODIUM CHLORIDE, SODIUM LACTATE, POTASSIUM CHLORIDE, CALCIUM CHLORIDE 600; 310; 30; 20 MG/100ML; MG/100ML; MG/100ML; MG/100ML
INJECTION, SOLUTION INTRAVENOUS CONTINUOUS PRN
Status: DISCONTINUED | OUTPATIENT
Start: 2024-05-31 | End: 2024-05-31 | Stop reason: SURG

## 2024-05-31 RX ORDER — SODIUM CHLORIDE, SODIUM LACTATE, POTASSIUM CHLORIDE, CALCIUM CHLORIDE 600; 310; 30; 20 MG/100ML; MG/100ML; MG/100ML; MG/100ML
9 INJECTION, SOLUTION INTRAVENOUS CONTINUOUS
Status: DISCONTINUED | OUTPATIENT
Start: 2024-05-31 | End: 2024-05-31 | Stop reason: HOSPADM

## 2024-05-31 RX ADMIN — SODIUM CHLORIDE, POTASSIUM CHLORIDE, SODIUM LACTATE AND CALCIUM CHLORIDE 9 ML/HR: 600; 310; 30; 20 INJECTION, SOLUTION INTRAVENOUS at 12:52

## 2024-05-31 RX ADMIN — DEXAMETHASONE SODIUM PHOSPHATE 8 MG: 4 INJECTION, SOLUTION INTRAMUSCULAR; INTRAVENOUS at 14:39

## 2024-05-31 RX ADMIN — FAMOTIDINE 20 MG: 10 INJECTION INTRAVENOUS at 13:01

## 2024-05-31 RX ADMIN — PROPOFOL 150 MG: 10 INJECTION, EMULSION INTRAVENOUS at 14:28

## 2024-05-31 RX ADMIN — SODIUM CHLORIDE, POTASSIUM CHLORIDE, SODIUM LACTATE AND CALCIUM CHLORIDE: 600; 310; 30; 20 INJECTION, SOLUTION INTRAVENOUS at 14:20

## 2024-05-31 RX ADMIN — FENTANYL CITRATE 50 MCG: 50 INJECTION, SOLUTION INTRAMUSCULAR; INTRAVENOUS at 14:39

## 2024-05-31 RX ADMIN — LIDOCAINE HYDROCHLORIDE 60 MG: 20 INJECTION, SOLUTION INFILTRATION; PERINEURAL at 14:28

## 2024-05-31 RX ADMIN — ONDANSETRON 4 MG: 2 INJECTION INTRAMUSCULAR; INTRAVENOUS at 14:39

## 2024-05-31 RX ADMIN — LEVOFLOXACIN 500 MG: 500 INJECTION, SOLUTION INTRAVENOUS at 13:12

## 2024-05-31 NOTE — ANESTHESIA POSTPROCEDURE EVALUATION
Patient: [place]    Procedure Summary       Date: 05/31/24 Room / Location: [facility] OR 01 / [facility] OR    Anesthesia Start: 1419 Anesthesia Stop: 1457    Procedure: CYSTOSCOPY BLADDER BIOPSY FULGURATION Diagnosis:     Surgeons: Joel Russell MD Provider: Heriberto Delatorre MD    Anesthesia Type: general ASA Status: 3            Anesthesia Type: general    Vitals  Vitals Value Taken Time   /79 05/31/24 1515   Temp 36.8 °C (98.3 °F) 05/31/24 1455   Pulse 54 05/31/24 1517   Resp 10 05/31/24 1455   SpO2 98 % 05/31/24 1517   Vitals shown include unfiled device data.        Post Anesthesia Care and Evaluation    Patient location during evaluation: PHASE II  Patient participation: complete - patient participated  Level of consciousness: awake and alert  Pain management: adequate    Airway patency: patent  Anesthetic complications: No anesthetic complications  PONV Status: none  Cardiovascular status: acceptable and hemodynamically stable  Respiratory status: acceptable, nonlabored ventilation and spontaneous ventilation  Hydration status: acceptable

## 2024-05-31 NOTE — BRIEF OP NOTE
CYSTOSCOPY BLADDER BIOPSY  Progress Note    [place]  5/31/2024    Pre-op Diagnosis:   Recurrent superficial bladder cancer history of prostate cancer and radiation cystitis       Post-Op Diagnosis Codes:  Same superficial recurrence of disease on the right side just above the right ureteral orifice biopsied and cauterized extensive radiation cystitis changes the trigone posterior bladder wall    Procedure/CPT® Codes:        Procedure(s):  CYSTOSCOPY BLADDER BIOPSY FULGURATION              Surgeon(s):  Joel Russell MD    Anesthesia: General    Staff:   Circulator: Cristiana Ely RN  Scrub Person: Stefan Flanagan  Orientee: Santos Berger RN         Estimated Blood Loss: minimal    Urine Voided: * No values recorded between 5/31/2024  2:20 PM and 5/31/2024  2:53 PM *    Specimens:                Specimens       ID Source Type Tests Collected By Collected At Frozen?    A Urinary Bladder Tissue TISSUE PATHOLOGY EXAM   Joel Russell MD 5/31/24 1438 No    Description: right posterior bladder wall biopsy. formalin                  Drains: * No LDAs found *    Findings: As above        Complications:           Joel Russell MD     Date: 5/31/2024  Time: 15:01 EDT

## 2024-05-31 NOTE — ANESTHESIA PREPROCEDURE EVALUATION
Anesthesia Evaluation                  Airway   Mallampati: II  Dental      Pulmonary    (-) sleep apnea, not a smoker    ROS comment: Negative patient screen for CORINNE    Cardiovascular     (+) hypertension, dysrhythmias Paroxysmal Atrial Fib, hyperlipidemia      Neuro/Psych  GI/Hepatic/Renal/Endo      Musculoskeletal     Abdominal    Substance History      OB/GYN          Other                    Anesthesia Plan    ASA 3     general       Anesthetic plan, risks, benefits, and alternatives have been provided, discussed and informed consent has been obtained with: patient.    CODE STATUS:

## 2024-05-31 NOTE — DISCHARGE INSTRUCTIONS
[facility]     Home Care after: Cystoscopy, Bladder/urethral biopsy    Follow the guidelines below. Call your doctor if you notice any unusual symptoms. Remember: You are under the influence of medication. Do not drive, drink alcoholic beverages, sign legal documents or make major decisions during the next 24 hours.    Wound Care  You will not have a dressing. There are no incisions.  You may have some red-tinged urine.  This will fluctuate and go away.  It is normal to have some bladder spasms, frequency of urination, and urgency to urinate.  You may see some pink- tinged or red-tinged urine  It is okay to use tylenol, ibuprofen, pyridium and/or oxybutynin for her bladder spasms.    Activity  Plan at least a few days off work, more if necessary, especially if your job requires heavy lifting or a lot of physical activities.  If you wish to return to work sooner, that is okay, but light-duty is recommended.  Sexual activity may resume in a few days  No jogging, tennis, heavy weight-lifting or prolonged physical activity for 2 weeks.  No driving while taking narcotic pain medication  You can shower immediately after your procedure   Avoid straining with bowel movements. Narcotics can cause constipation so you can take over-the-counter stool softeners (Senokot-S, Miralax, Colace) per the instructions on the box; hold these pills if you are experiencing diarrhea.       Return to Work/School  You may return to work/school in a few days.  If you need a note, please obtain from Dr. Vora's office during your follow-up visit    Bathing    You can shower immediately after your procedure.  Tub baths are okay    Diet  Gradually resume regular diet, as tolerated.   Drinking plenty of water is very important.    Driving (if applicable)  No driving for 24 hours after surgery due to the anesthetic.  No driving anytime you are on pain medication.    Educational  The medication used to put you to sleep will be acting in your  body for the next 24 hours, so you might feel a little sleepy. This feeling will slowly wear off. For the first 24 hours, you should NOT:   Drive a car, operate machinery, or power tools.  Drink any alcoholic drinks (even beer).   Make any important decisions, sign any important or legal papers.  For your safety, we strongly suggest that a responsible adult stay with you for the rest of the day and during the night after you leave the hospital.  Medication  You may take your usual medications unless told otherwise by your doctor.  Do not take any anticoagulation (Ibuprofen, Advil, Aspirin, Eliquis, Xarelto, Coumadin, etc) until cleared by your Doctor.  Narcotic pain medications can cause constipation. You may take an over-the-counter stool softener or laxative if needed.   A bowel movement every day is preferred, every other day is reasonable.      Call your Doctor if:  Call to notify your surgeon if you develop:  Fever greater than 101F  Unmanageable pain despite pain medication  Pain medication not effective or makes you ill  Blood staining continues to worsen for more than 24 hours  Difficulty breathing or unusual shortness of breath, excessive bleeding, drainage at the operative site, fevers, chills, increased pain that is not relieved by pain medications, persistent nausea or vomiting    HOW TO REACH YOUR DOCTOR  Monday - Friday from 8:00 - 4:30, call the Urology Office, [phone].  After hours, weekend and holidays call the [phone] or go to Emergency Room    Follow up care:   The Urology clinic will call to schedule your post-op appointment, to occur 1-2 weeks after your operation.  If you do not receive a call within 1 week for scheduling, please call [phone] to make an appointment.

## 2024-05-31 NOTE — ANESTHESIA PROCEDURE NOTES
Airway  Date/Time: 5/31/2024 2:30 PM    General Information and Staff    Anesthesiologist: Heriberto Delatorre MD  CRNA/CAA: Ladonna Nascimento CRNA    Indications and Patient Condition  Indications for airway management: airway protection    Preoxygenated: yes  MILS maintained throughout  Mask difficulty assessment: 0 - not attempted    Final Airway Details  Final airway type: supraglottic airway      Successful airway: unique  Size 4     Number of attempts at approach: 1  Assessment: lips, teeth, and gum same as pre-op

## 2024-05-31 NOTE — OP NOTE
Preoperative diagnosis recurrent superficial bladder cancer with a history of prostate cancer post radical prostatectomy and radiation therapy and signs of radiation cystitis    Postoperative diagnosis superficial tumor recurrence along the right posterior bladder wall just above the right ureteral orifice extensive radiation cystitis and neovascularity    Operative procedure cystoscopy with bladder biopsy and fulguration    Surgeon [facility]    Procedure note 74-year-old male with above-mentioned history and findings undergo above-mentioned procedure    Site was marked antibiotics were given timeout was taken allergies to penicillin after adequate general anesthesia he was placed very carefully modified dorsolithotomy position all pressure points really then prepped and draped sterile fashion over genitalia 2% intraurethral lidocaine jelly is administered scope was advanced bladder urethra was normal prostate was absent open bladder neck signs of radiation cystitis scattered throughout the bladder ureteral orifice ease were pinpoint with clear E flux bilaterally area of concern along the right posterior bladder wall the remainder of the bladder other than the radiation changes was negative biopsies were taken of this area sent to pathology 1 single specimen this area is then cauterized with the Bugbee electrode which was confirmed 1 normotensive to be hemostatic the bladder was then partially filled the patient procedure well was sent to the recovery in satisfactory condition findings were called to his wife Nanette at [phone]    Disposition instruction sheet to follow accordingly keeping off his Eliquis until his urine is clear for at least 24 hours additional medications include the following Norco Zofran and Pyridium no further antibiotics progressive diet and activities including avoiding semihard surfaces that may balance may provoke any bleeding office will call for follow-up  appointment about 2 to 3 weeks with results of the biopsies

## 2024-06-01 LAB
LAB AP CASE REPORT: NORMAL
LAB AP SYNOPTIC CHECKLIST: NORMAL
PATH REPORT.FINAL DX SPEC: NORMAL
PATH REPORT.GROSS SPEC: NORMAL

## 2024-09-03 ENCOUNTER — OFFICE VISIT (OUTPATIENT)
Age: 74
End: 2024-09-03
Payer: MEDICARE

## 2024-09-03 VITALS
HEIGHT: 69 IN | HEART RATE: 51 BPM | SYSTOLIC BLOOD PRESSURE: 134 MMHG | OXYGEN SATURATION: 98 % | WEIGHT: 185 LBS | BODY MASS INDEX: 27.4 KG/M2 | DIASTOLIC BLOOD PRESSURE: 82 MMHG

## 2024-09-03 DIAGNOSIS — I48.0 PAROXYSMAL ATRIAL FIBRILLATION: Primary | ICD-10-CM

## 2024-09-03 PROCEDURE — 3075F SYST BP GE 130 - 139MM HG: CPT

## 2024-09-03 PROCEDURE — 3079F DIAST BP 80-89 MM HG: CPT

## 2024-09-03 PROCEDURE — 93000 ELECTROCARDIOGRAM COMPLETE: CPT

## 2024-09-03 PROCEDURE — 99213 OFFICE O/P EST LOW 20 MIN: CPT

## 2024-09-03 RX ORDER — APIXABAN 5 MG/1
TABLET, FILM COATED ORAL
COMMUNITY
Start: 2024-08-15

## 2024-09-03 NOTE — PROGRESS NOTES
Date of Office Visit: 2024  Encounter Provider: ARJUN Garcia  Place of Service: [facility]  Patient Name: North Carcamo  :1950    No chief complaint on file.  :     HPI: North Carcamo is a 74 y.o. male who follows with Dr. Burroughs. He has a history of PAF.     He had two episodes of AF in  when admitted with E. Coli.     Saw Dr. Burroughs in follow up and was doing well. Maintaining NSR. He was asymptomatic with AF and in the setting of infection.                 He presents today for follow up appt.     When he had AF in  he was asymptomatic.     He checks his HR daily with watch and its usually in the 50s.     He works out a few times a week without any difficulty.     He denies any chest pain, dyspnea, palpitaitons.     EKG shows NSR.     [id number] is 3.      Past Medical History:   Diagnosis Date    Allergic     Penicillin    Cataract 2022    Diverticulosis     E. coli pneumonia 2023    HOSPITALIZED    History of prostate cancer     History of recent hospitalization 2023    Hyperglycemia     Hyperlipidemia     Hypertension     Kidney stone     Has not been a problem.    On anticoagulant therapy     Paroxysmal atrial fibrillation        Past Surgical History:   Procedure Laterality Date    CATARACT EXTRACTION      WITH LENS IMPLANTS    COLONOSCOPY N/A 2023    Procedure: COLONOSCOPY TO CECUM AND TERMINAL ILEUM WITH COLD POLYPECTOMY;  Surgeon: Humberto Peña MD;  Location: [facility] ENDOSCOPY;  Service: Gastroenterology;  Laterality: N/A;  SCREENING  DIVERTICULOSIS, COLON POLYP, HEMORRHOIDS    CYSTOSCOPY BLADDER BIOPSY N/A 2019    Procedure: CYSTOSCOPY TUR BLADDER TUMOR;  Surgeon: Joel Russell MD;  Location: [facility] OR;  Service: Urology    CYSTOSCOPY BLADDER BIOPSY N/A 2024    Procedure: CYSTOSCOPY BLADDER BIOPSY FULGURATION;  Surgeon: Joel Russell MD;  Location: [facility] OR;   Service: Urology;  Laterality: N/A;    ELBOW PROCEDURE Left     FX    PROSTATECTOMY      TONSILLECTOMY  1957    VASECTOMY         Social History     Socioeconomic History    Marital status:    Tobacco Use    Smoking status: Never    Smokeless tobacco: Never   Vaping Use    Vaping status: Never Used   Substance and Sexual Activity    Alcohol use: Yes     Alcohol/week: 1.0 standard drink of alcohol     Types: 1 Drinks containing 0.5 oz of alcohol per week     Comment: Occasional beer or cocktail    Drug use: No    Sexual activity: Yes     Partners: Female     Birth control/protection: Vasectomy       Family History   Problem Relation Age of Onset    Hypertension Mother     Hypertension Father     Prostate cancer Father     Malig Hyperthermia Neg Hx        Review of Systems   Constitutional: Negative for chills, fever and malaise/fatigue.   Cardiovascular:  Negative for chest pain, dyspnea on exertion, leg swelling, near-syncope, orthopnea, palpitations, paroxysmal nocturnal dyspnea and syncope.   Respiratory:  Negative for cough and shortness of breath.    Hematologic/Lymphatic: Negative.    Musculoskeletal:  Negative for joint pain, joint swelling and myalgias.   Gastrointestinal:  Negative for abdominal pain, diarrhea, melena, nausea and vomiting.   Genitourinary:  Negative for frequency and hematuria.   Neurological:  Negative for light-headedness, numbness, paresthesias and seizures.   Allergic/Immunologic: Negative.    All other systems reviewed and are negative.      Allergies   Allergen Reactions    Penicillins Rash     Thinks childhood reaction of rash          Current Outpatient Medications:     Calcium Carbonate-Vitamin D (CALCIUM 600+D PO), Take 1 tablet by mouth 2 (Two) Times a Day. PT HOLDING FOR SURGERY  Indications: Calcium Deficiency, Disp: , Rfl:     Eliquis 5 MG tablet tablet, TAKE 1 TABLET BY MOUTH EVERY 12 HOURS FOR ATRIAL FIBRILLATION, Disp: , Rfl:     lisinopril (PRINIVIL,ZESTRIL) 40 MG  "tablet, Take 1 tablet by mouth Daily., Disp: 90 tablet, Rfl: 1    Multiple Vitamins-Minerals (DAILY MULTIVITAMIN PO), Take 1 tablet by mouth Daily., Disp: , Rfl:     pravastatin (PRAVACHOL) 40 MG tablet, TAKE 1 TABLET BY MOUTH EVERY NIGHT, Disp: 90 tablet, Rfl: 3      Objective:     Vitals:    09/03/24 1122   BP: 134/82   Pulse: 51   SpO2: 98%   Weight: 83.9 kg (185 lb)   Height: 174 cm (68.5\")     Body mass index is 27.72 kg/m².    PHYSICAL EXAM:    Vitals Reviewed.   General Appearance: No acute distress, well developed and well nourished.   Eyes: Conjunctiva and lids: No erythema, swelling, or discharge. Sclera non-icteric.   HENT: Atraumatic, normocephalic. External eyes, ears, and nose normal.   Respiratory: No signs of respiratory distress. Respiration rhythm and depth normal.   Clear to auscultation. No rales, crackles, rhonchi, or wheezing auscultated.   Cardiovascular:  Heart Rate and Rhythm: Normal, Heart Sounds: Normal S1 and S2. No S3 or S4 noted.  Gastrointestinal:  Abdomen soft, non-distended, non-tender.   Musculoskeletal: Normal movement of extremities  Skin: Warm and dry.   Psychiatric: Patient alert and oriented to person, place, and time. Speech and behavior appropriate. Normal mood and affect.       ECG 12 Lead    Date/Time: 9/3/2024 11:48 AM  Performed by: Jayson Johnson APRN    Authorized by: Jayson Johnson APRN  Comparison: compared with previous ECG   Similar to previous ECG  Rhythm: sinus rhythm            Assessment:       Diagnosis Plan   1. Paroxysmal atrial fibrillation               Plan:   PAF--- he has done well since last visit. He had two episodes of AF in the setting of E.coli. he does not think he has had any since then based on his HR and lack of symptoms but he was not symptomatic with the AF. Due to elevated [id number] we elected to continue AC.             Follow up in one year.           As always, it has been a pleasure to participate in your patient's " care.      Sincerely,         ARJUN Garcia

## 2024-09-26 RX ORDER — APIXABAN 5 MG/1
5 TABLET, FILM COATED ORAL EVERY 12 HOURS SCHEDULED
Qty: 60 TABLET | Refills: 11 | Status: SHIPPED | OUTPATIENT
Start: 2024-09-26

## 2024-10-01 ENCOUNTER — APPOINTMENT (OUTPATIENT)
Dept: CT IMAGING | Facility: HOSPITAL | Age: 74
End: 2024-10-01
Payer: MEDICARE

## 2024-10-01 ENCOUNTER — HOSPITAL ENCOUNTER (OUTPATIENT)
Facility: HOSPITAL | Age: 74
Setting detail: OBSERVATION
Discharge: HOME | End: 2024-10-03
Attending: EMERGENCY MEDICINE | Admitting: EMERGENCY MEDICINE
Payer: MEDICARE

## 2024-10-01 DIAGNOSIS — R31.0 GROSS HEMATURIA: Primary | ICD-10-CM

## 2024-10-01 DIAGNOSIS — R33.8 ACUTE URINARY RETENTION: ICD-10-CM

## 2024-10-01 DIAGNOSIS — Z79.01 ANTICOAGULATED: ICD-10-CM

## 2024-10-01 PROBLEM — R31.9 HEMATURIA: Status: ACTIVE | Noted: 2024-10-01

## 2024-10-01 LAB
ALBUMIN SERPL-MCNC: 4.1 G/DL (ref 3.5–5.2)
ALBUMIN/GLOB SERPL: 1.5 G/DL
ALP SERPL-CCNC: 96 U/L (ref 39–117)
ALT SERPL W P-5'-P-CCNC: 27 U/L (ref 1–41)
ANION GAP SERPL CALCULATED.3IONS-SCNC: 10.6 MMOL/L (ref 5–15)
AST SERPL-CCNC: 24 U/L (ref 1–40)
BACTERIA UR QL AUTO: ABNORMAL /HPF
BASOPHILS # BLD AUTO: 0.02 10*3/MM3 (ref 0–0.2)
BASOPHILS NFR BLD AUTO: 0.1 % (ref 0–1.5)
BILIRUB SERPL-MCNC: 0.7 MG/DL (ref 0–1.2)
BILIRUB UR QL STRIP: NEGATIVE
BUN SERPL-MCNC: 14 MG/DL (ref 8–23)
BUN/CREAT SERPL: 11.7 (ref 7–25)
CALCIUM SPEC-SCNC: 9.4 MG/DL (ref 8.6–10.5)
CHLORIDE SERPL-SCNC: 102 MMOL/L (ref 98–107)
CLARITY UR: ABNORMAL
CO2 SERPL-SCNC: 26.4 MMOL/L (ref 22–29)
COLOR UR: YELLOW
CREAT SERPL-MCNC: 1.2 MG/DL (ref 0.76–1.27)
DEPRECATED RDW RBC AUTO: 42.6 FL (ref 37–54)
EGFRCR SERPLBLD CKD-EPI 2021: 62.7 ML/MIN/1.73
EOSINOPHIL # BLD AUTO: 0.02 10*3/MM3 (ref 0–0.4)
EOSINOPHIL NFR BLD AUTO: 0.1 % (ref 0.3–6.2)
ERYTHROCYTE [DISTWIDTH] IN BLOOD BY AUTOMATED COUNT: 12.3 % (ref 12.3–15.4)
GLOBULIN UR ELPH-MCNC: 2.8 GM/DL
GLUCOSE SERPL-MCNC: 183 MG/DL (ref 65–99)
GLUCOSE UR STRIP-MCNC: NEGATIVE MG/DL
HCT VFR BLD AUTO: 43.5 % (ref 37.5–51)
HGB BLD-MCNC: 15 G/DL (ref 13–17.7)
HGB UR QL STRIP.AUTO: ABNORMAL
HYALINE CASTS UR QL AUTO: ABNORMAL /LPF
IMM GRANULOCYTES # BLD AUTO: 0.05 10*3/MM3 (ref 0–0.05)
IMM GRANULOCYTES NFR BLD AUTO: 0.4 % (ref 0–0.5)
KETONES UR QL STRIP: NEGATIVE
LEUKOCYTE ESTERASE UR QL STRIP.AUTO: NEGATIVE
LYMPHOCYTES # BLD AUTO: 0.53 10*3/MM3 (ref 0.7–3.1)
LYMPHOCYTES NFR BLD AUTO: 3.9 % (ref 19.6–45.3)
MCH RBC QN AUTO: 32.4 PG (ref 26.6–33)
MCHC RBC AUTO-ENTMCNC: 34.5 G/DL (ref 31.5–35.7)
MCV RBC AUTO: 94 FL (ref 79–97)
MONOCYTES # BLD AUTO: 0.61 10*3/MM3 (ref 0.1–0.9)
MONOCYTES NFR BLD AUTO: 4.5 % (ref 5–12)
NEUTROPHILS NFR BLD AUTO: 12.24 10*3/MM3 (ref 1.7–7)
NEUTROPHILS NFR BLD AUTO: 91 % (ref 42.7–76)
NITRITE UR QL STRIP: NEGATIVE
NRBC BLD AUTO-RTO: 0 /100 WBC (ref 0–0.2)
PH UR STRIP.AUTO: 7 [PH] (ref 5–8)
PLATELET # BLD AUTO: 226 10*3/MM3 (ref 140–450)
PMV BLD AUTO: 10.7 FL (ref 6–12)
POTASSIUM SERPL-SCNC: 3.8 MMOL/L (ref 3.5–5.2)
PROT SERPL-MCNC: 6.9 G/DL (ref 6–8.5)
PROT UR QL STRIP: ABNORMAL
RBC # BLD AUTO: 4.63 10*6/MM3 (ref 4.14–5.8)
RBC # UR STRIP: ABNORMAL /HPF
REF LAB TEST METHOD: ABNORMAL
SODIUM SERPL-SCNC: 139 MMOL/L (ref 136–145)
SP GR UR STRIP: 1.02 (ref 1–1.03)
SQUAMOUS #/AREA URNS HPF: ABNORMAL /HPF
UROBILINOGEN UR QL STRIP: ABNORMAL
WBC # UR STRIP: ABNORMAL /HPF
WBC NRBC COR # BLD AUTO: 13.47 10*3/MM3 (ref 3.4–10.8)

## 2024-10-01 PROCEDURE — 80053 COMPREHEN METABOLIC PANEL: CPT | Performed by: EMERGENCY MEDICINE

## 2024-10-01 PROCEDURE — 36415 COLL VENOUS BLD VENIPUNCTURE: CPT

## 2024-10-01 PROCEDURE — 81001 URINALYSIS AUTO W/SCOPE: CPT | Performed by: EMERGENCY MEDICINE

## 2024-10-01 PROCEDURE — 74176 CT ABD & PELVIS W/O CONTRAST: CPT

## 2024-10-01 PROCEDURE — G0378 HOSPITAL OBSERVATION PER HR: HCPCS

## 2024-10-01 PROCEDURE — 99285 EMERGENCY DEPT VISIT HI MDM: CPT

## 2024-10-01 PROCEDURE — 85025 COMPLETE CBC W/AUTO DIFF WBC: CPT | Performed by: EMERGENCY MEDICINE

## 2024-10-01 PROCEDURE — 51798 US URINE CAPACITY MEASURE: CPT

## 2024-10-01 RX ORDER — ONDANSETRON 2 MG/ML
4 INJECTION INTRAMUSCULAR; INTRAVENOUS EVERY 6 HOURS PRN
Status: DISCONTINUED | OUTPATIENT
Start: 2024-10-01 | End: 2024-10-03 | Stop reason: HOSPADM

## 2024-10-01 RX ORDER — SODIUM CHLORIDE 0.9 % (FLUSH) 0.9 %
10 SYRINGE (ML) INJECTION AS NEEDED
Status: DISCONTINUED | OUTPATIENT
Start: 2024-10-01 | End: 2024-10-03 | Stop reason: HOSPADM

## 2024-10-01 RX ORDER — BISACODYL 5 MG/1
5 TABLET, DELAYED RELEASE ORAL DAILY PRN
Status: DISCONTINUED | OUTPATIENT
Start: 2024-10-01 | End: 2024-10-03 | Stop reason: HOSPADM

## 2024-10-01 RX ORDER — BISACODYL 10 MG
10 SUPPOSITORY, RECTAL RECTAL DAILY PRN
Status: DISCONTINUED | OUTPATIENT
Start: 2024-10-01 | End: 2024-10-03 | Stop reason: HOSPADM

## 2024-10-01 RX ORDER — LISINOPRIL 20 MG/1
40 TABLET ORAL DAILY
Status: DISCONTINUED | OUTPATIENT
Start: 2024-10-01 | End: 2024-10-03 | Stop reason: HOSPADM

## 2024-10-01 RX ORDER — ACETAMINOPHEN 325 MG/1
650 TABLET ORAL EVERY 4 HOURS PRN
Status: DISCONTINUED | OUTPATIENT
Start: 2024-10-01 | End: 2024-10-03 | Stop reason: HOSPADM

## 2024-10-01 RX ORDER — AMOXICILLIN 250 MG
2 CAPSULE ORAL 2 TIMES DAILY PRN
Status: DISCONTINUED | OUTPATIENT
Start: 2024-10-01 | End: 2024-10-03 | Stop reason: HOSPADM

## 2024-10-01 RX ORDER — SODIUM CHLORIDE 0.9 % (FLUSH) 0.9 %
10 SYRINGE (ML) INJECTION EVERY 12 HOURS SCHEDULED
Status: DISCONTINUED | OUTPATIENT
Start: 2024-10-01 | End: 2024-10-03 | Stop reason: HOSPADM

## 2024-10-01 RX ORDER — PRAVASTATIN SODIUM 40 MG
40 TABLET ORAL NIGHTLY
Status: DISCONTINUED | OUTPATIENT
Start: 2024-10-01 | End: 2024-10-03 | Stop reason: HOSPADM

## 2024-10-01 RX ORDER — POLYETHYLENE GLYCOL 3350 17 G/17G
17 POWDER, FOR SOLUTION ORAL DAILY PRN
Status: DISCONTINUED | OUTPATIENT
Start: 2024-10-01 | End: 2024-10-03 | Stop reason: HOSPADM

## 2024-10-01 RX ORDER — SODIUM CHLORIDE 9 MG/ML
40 INJECTION, SOLUTION INTRAVENOUS AS NEEDED
Status: DISCONTINUED | OUTPATIENT
Start: 2024-10-01 | End: 2024-10-03 | Stop reason: HOSPADM

## 2024-10-01 RX ADMIN — LISINOPRIL 40 MG: 20 TABLET ORAL at 09:37

## 2024-10-01 RX ADMIN — Medication 10 ML: at 20:27

## 2024-10-01 RX ADMIN — Medication 10 ML: at 09:38

## 2024-10-01 RX ADMIN — PRAVASTATIN SODIUM 40 MG: 40 TABLET ORAL at 20:30

## 2024-10-01 NOTE — H&P
[facility]   HISTORY AND PHYSICAL    Patient Name: North Carcamo  : 1950  MRN: [id number]  Primary Care Physician:  Chloe Arriaga MD  Date of admission: 10/1/2024    Subjective   Subjective     Chief Complaint:   Chief Complaint   Patient presents with    Blood in Urine         HPI:    North Carcamo is a 74 y.o. male comes in complaining of blood in the urine since 8 PM last night.  Patient states he also felt like he had trouble going but otherwise denies any pain.  Patient denies any flank pain, fever, nausea or vomiting.  Patient states he would break out into a sweat when trying to urinate however patient states he does not feel sweaty at this time and states this feeling has since resolved.  Patient reports that he had a spot on his bladder removed with [facility] about 1 month ago.  Patient has a history of A-fib and on Eliquis.    In the ED, white blood cell count of 13.4 with increased neutrophils, otherwise unremarkable CBC and CMP for acute findings.  UA has too numerous to count RBCs and microscopic unable to determine given blood present in urine.  Patient is afebrile, pulse in the 80s, on room air oxygen 98% SpO2 and blood pressure 180s over 104.  Three-way Paz catheter with CBI was initiated in ED.  Urology,  consulted by ED provider.        Review of Systems   All systems were reviewed and negative except for: as per HPI    Personal History     Past Medical History:   Diagnosis Date    Allergic     Penicillin    Cataract 2022    Diverticulosis     E. coli pneumonia 2023    HOSPITALIZED    History of prostate cancer     History of recent hospitalization 2023    Hyperglycemia     Hyperlipidemia     Hypertension     Kidney stone 2019    Has not been a problem.    On anticoagulant therapy     Paroxysmal atrial fibrillation        Past Surgical History:   Procedure Laterality Date    CATARACT EXTRACTION      WITH LENS IMPLANTS    COLONOSCOPY  N/A 09/13/2023    Procedure: COLONOSCOPY TO CECUM AND TERMINAL ILEUM WITH COLD POLYPECTOMY;  Surgeon: Humberto Peña MD;  Location: [facility] ENDOSCOPY;  Service: Gastroenterology;  Laterality: N/A;  SCREENING  DIVERTICULOSIS, COLON POLYP, HEMORRHOIDS    CYSTOSCOPY BLADDER BIOPSY N/A 06/12/2019    Procedure: CYSTOSCOPY TUR BLADDER TUMOR;  Surgeon: Joel Russell MD;  Location: [facility] MAIN OR;  Service: Urology    CYSTOSCOPY BLADDER BIOPSY N/A 5/31/2024    Procedure: CYSTOSCOPY BLADDER BIOPSY FULGURATION;  Surgeon: Joel Russell MD;  Location: [facility] MAIN OR;  Service: Urology;  Laterality: N/A;    ELBOW PROCEDURE Left     FX    PROSTATECTOMY      TONSILLECTOMY  1957    VASECTOMY         Family History: family history includes Hypertension in his father and mother; Prostate cancer in his father. Otherwise pertinent FHx was reviewed and not pertinent to current issue.    Social History:  reports that he has never smoked. He has never used smokeless tobacco. He reports current alcohol use of about 1.0 standard drink of alcohol per week. He reports that he does not use drugs.    Home Medications:  Calcium Carbonate-Vitamin D, Multiple Vitamins-Minerals, apixaban, lisinopril, and pravastatin    Allergies:  Allergies   Allergen Reactions    Penicillins Rash     Thinks childhood reaction of rash        Objective   Objective     Vitals:   Temp:  [96 °F (35.6 °C)] 96 °F (35.6 °C)  Heart Rate:  [66-82] 82  Resp:  [18] 18  BP: (184)/(104) 184/104  Physical Exam    Constitutional: Awake, alert   Eyes: PERRLA, sclerae anicteric, no conjunctival injection   HENT: NCAT, mucous membranes moist   Neck: Supple, no thyromegaly, no lymphadenopathy, trachea midline   Respiratory: Clear to auscultation bilaterally, nonlabored respirations    Cardiovascular: RRR, no murmurs, rubs, or gallops, palpable pedal pulses bilaterally   Gastrointestinal: Positive bowel sounds, soft, nontender, nondistended   Musculoskeletal: No  bilateral ankle edema, no clubbing or cyanosis to extremities   Psychiatric: Appropriate affect, cooperative   Neurologic: Oriented x 3, strength symmetric in all extremities, Cranial Nerves grossly intact to confrontation, speech clear   Skin: No rashes     Result Review    Result Review:  I have personally reviewed the results from the time of this admission to 10/1/2024 06:53 EDT and agree with these findings:  [x]  Laboratory list / accordion  []  Microbiology  []  Radiology  []  EKG/Telemetry   []  Cardiology/Vascular   []  Pathology  []  Old records  []  Other:  Most notable findings include: see above      Assessment & Plan   Assessment / Plan     Brief Patient Summary:  North Carcamo is a 74 y.o. male who comes in complaining of blood in the urine.    Active Hospital Problems:  Active Hospital Problems    Diagnosis     **Hematuria      Plan:     Hematuria  Leukocytosis  History of bladder cancer, prostate cancer status post prostatectomy  -Had spot on his bladder removed 1 month ago with [facility].  -white blood cell count of 13.4 with increased neutrophils, otherwise unremarkable CBC and CMP for acute findings.   - UA has too numerous to count RBCs and microscopic unable to determine given blood present in urine.    -Patient is afebrile, pulse in the 80s, on room air oxygen 98% SpO2 and blood pressure 180s over 104.    -Three-way Paz catheter with CBI was initiated in ED.    -Patient reportedly had 500 cc of bloody urine out after three-way catheter was placed.  -Urology,  consulted by ED provider.  -Continue CBI  -hold eliquis  -N.p.o.    History of A-fib   -hold home Eliquis    Hyperlipidemia  -Check lipid panel, continue home statin    Essential hypertension, chronic, poorly controlled  -Continue home lisinopril, monitor        VTE Prophylaxis: SCDs  Mechanical VTE prophylaxis orders are present.        CODE STATUS:    Code Status (Patient has no pulse and is not breathing): CPR  (Attempt to Resuscitate)  Medical Interventions (Patient has pulse or is breathing): Full Support    Admission Status:  I believe this patient meets observation status.    78 minutes have been spent by [facility] providers in the care of this patient while under observation status.      Appropriate PPE worn during patient encounter.  Hand hygeine performed before and after seeing the patient.      Electronically signed by WALT Shelton, 10/01/24, 6:20 AM EDT.

## 2024-10-01 NOTE — PROGRESS NOTES
ED OBSERVATION PROGRESS/DISCHARGE SUMMARY    Date of Admission: 10/1/2024   LOS: 0 days   PCP: Chloe Arriaga MD      Subjective     Hospital Outcome:     Patient is a very pleasant afebrile ambulatory 74 year old  male admitted to the ED Observation Unit for hematuria. He was started on CBI last night.     CT abdomen pelvis this AM showed bladder mass consistent with hematoma. Structure cart was obtained from OR and urology APRN exchanged catheter with removal of multiple bladder clots. Patient tolerated this well and continued on CBI.     Plan to monitor overnight for recurrence of hematuria, this afternoon urinary output is fairly clear with a pink tinge.     ROS:  General: no fevers, chills  Respiratory: no cough, dyspnea  Cardiovascular: no chest pain, palpitations  Abdomen: No abdominal pain, nausea, vomiting, or diarrhea  Neurologic: No focal weakness    Objective   Physical Exam:  I have reviewed the vital signs.  Temp:  [96 °F (35.6 °C)] 96 °F (35.6 °C)  Heart Rate:  [66-82] 82  Resp:  [18] 18  BP: (184)/(104) 184/104  General Appearance:    Alert, cooperative, no distress  Head:    Normocephalic, atraumatic  Eyes:    Sclerae anicteric  Neck:   Supple, no mass  Lungs: Clear to auscultation bilaterally, respirations unlabored  Heart: Regular rate and rhythm, S1 and S2 normal, no murmur, rub or gallop  Abdomen:  Soft, nontender, bowel sounds active, nondistended  Extremities: No clubbing, cyanosis, or edema to lower extremities  Pulses:  2+ and symmetric in distal lower extremities  Skin: No rashes   Neurologic: Oriented x3, Normal strength to extremities    Results Review:    I have reviewed the labs, radiology results and diagnostic studies.    Results from last 7 days   Lab Units 10/01/24  0535   WBC 10*3/mm3 13.47*   HEMOGLOBIN g/dL 15.0   HEMATOCRIT % 43.5   PLATELETS 10*3/mm3 226     Results from last 7 days   Lab Units 10/01/24  0535   SODIUM mmol/L 139   POTASSIUM mmol/L 3.8   CHLORIDE  mmol/L 102   CO2 mmol/L 26.4   BUN mg/dL 14   CREATININE mg/dL 1.20   CALCIUM mg/dL 9.4   BILIRUBIN mg/dL 0.7   ALK PHOS U/L 96   ALT (SGPT) U/L 27   AST (SGOT) U/L 24   GLUCOSE mg/dL 183*     Imaging Results (Last 24 Hours)       ** No results found for the last 24 hours. **            I have reviewed the medications.  ---------------------------------------------------------------------------------------------  Assessment & Plan   Assessment/Problem List    Hematuria      Plan:    Hematuria  Leukocytosis  History of bladder cancer, prostate cancer status post prostatectomy  -Had spot on his bladder removed 1 month ago with [facility].  -white blood cell count of 13.4 with increased neutrophils, otherwise unremarkable CBC and CMP for acute findings.   - UA has too numerous to count RBCs and microscopic unable to determine given blood present in urine.    -Patient is afebrile, pulse in the 80s, on room air oxygen 98% SpO2 and blood pressure 180s over 104.    -Three-way Paz catheter with CBI was initiated in ED.    -Patient reportedly had 500 cc of bloody urine out after three-way catheter was placed.  -Urology following  -Continue CBI vis sp catheter placed by urology today  -hold eliquis  -N.p.o. after midnight     History of A-fib   -hold home Eliquis  -Telemetry     Hyperlipidemia  -Continue home statin     Essential hypertension, chronic, poorly controlled  -Continue home lisinopril, monitor       Disposition: I anticipate patient to be discharged home tomorrow      This note will serve as a progress note    Summer Urban, APRN 10/01/24 07:08 EDT    I have worn appropriate PPE during this patient encounter, sanitized my hands both with entering and exiting patient's room.      51 minutes has been spent by [facility] providers in the care of this patient while under observation status

## 2024-10-01 NOTE — ED NOTES
"Nursing report ED to floor  North Carcamo  74 y.o.  male    HPI :  HPI (Adult)  Stated Reason for Visit: blood in urine that started yesterday around 2145. states he has hx of prostate cancer and stones  History Obtained From: patient    Chief Complaint  Chief Complaint   Patient presents with    Blood in Urine       Admitting doctor:   Juan Pablo Valdivia II, MD    Admitting diagnosis:   The primary encounter diagnosis was Gross hematuria. Diagnoses of Acute urinary retention and Anticoagulated were also pertinent to this visit.    Code status:   Current Code Status       Date Active Code Status Order ID Comments User Context       Prior            Allergies:   Penicillins    Isolation:   No active isolations    Intake and Output    Intake/Output Summary (Last 24 hours) at 10/1/2024 0627  Last data filed at 10/1/2024 0523  Gross per 24 hour   Intake --   Output 650 ml   Net -650 ml       Weight:       10/01/24  0354   Weight: 81.6 kg (180 lb)       Most recent vitals:   Vitals:    10/01/24 0354 10/01/24 0402 10/01/24 0403   BP:  (!) 184/104    Pulse: 82 66 82   Resp: 18     Temp: 96 °F (35.6 °C)     TempSrc: Tympanic     SpO2: 98%     Weight: 81.6 kg (180 lb)     Height: 175.3 cm (69\")         Active LDAs/IV Access:   Lines, Drains & Airways       Active LDAs       Name Placement date Placement time Site Days    Urethral Catheter Coude;Other (Comment) 16 Fr. 10/01/24  0522  -- less than 1                    Labs (abnormal labs have a star):   Labs Reviewed   URINALYSIS W/ MICROSCOPIC IF INDICATED (NO CULTURE) - Abnormal; Notable for the following components:       Result Value    Appearance, UA Turbid (*)     Blood, UA Moderate (2+) (*)     Protein, UA >=300 mg/dL (3+) (*)     All other components within normal limits   COMPREHENSIVE METABOLIC PANEL - Abnormal; Notable for the following components:    Glucose 183 (*)     All other components within normal limits    Narrative:     GFR Normal >60  Chronic Kidney " Disease <60  Kidney Failure <15    The GFR formula is only valid for adults with stable renal function between ages 18 and 70.   CBC WITH AUTO DIFFERENTIAL - Abnormal; Notable for the following components:    WBC 13.47 (*)     Neutrophil % 91.0 (*)     Lymphocyte % 3.9 (*)     Monocyte % 4.5 (*)     Eosinophil % 0.1 (*)     Neutrophils, Absolute 12.24 (*)     Lymphocytes, Absolute 0.53 (*)     All other components within normal limits   URINALYSIS, MICROSCOPIC ONLY - Abnormal; Notable for the following components:    RBC, UA Too Numerous to Count (*)     WBC, UA Unable to determine due to loaded field (*)     Bacteria, UA Unable to determine due to loaded field (*)     Squamous Epithelial Cells, UA Unable to determine due to loaded field (*)     All other components within normal limits   CBC AND DIFFERENTIAL    Narrative:     The following orders were created for panel order CBC & Differential.  Procedure                               Abnormality         Status                     ---------                               -----------         ------                     CBC Auto Differential[[id number]]        Abnormal            Final result                 Please view results for these tests on the individual orders.       EKG:   No orders to display       Meds given in ED:   Medications - No data to display    Imaging results:  No radiology results for the last day    Ambulatory status:   - as tolerated    Social issues:   Social History     Socioeconomic History    Marital status:    Tobacco Use    Smoking status: Never    Smokeless tobacco: Never   Vaping Use    Vaping status: Never Used   Substance and Sexual Activity    Alcohol use: Yes     Alcohol/week: 1.0 standard drink of alcohol     Types: 1 Drinks containing 0.5 oz of alcohol per week     Comment: Occasional beer or cocktail    Drug use: No    Sexual activity: Yes     Partners: Female     Birth control/protection: Vasectomy       Peripheral  Neurovascular       Neuro Cognitive  Neuro Cognitive (Adult)  Cognitive/Neuro/Behavioral WDL: WDL  Additional Documentation: Mercer Coma Scale (Group)  Juan Pablo Coma Scale  Best Eye Response: 4-->(E4) spontaneous  Best Motor Response: 6-->(M6) obeys commands  Best Verbal Response: 5-->(V5) oriented  Mercer Coma Scale Score: 15    Learning  Learning Assessment (Adult)  Learning Readiness and Ability: no barriers identified  Education Provided  Person Taught: patient    Respiratory  Respiratory WDL  Respiratory WDL: WDL    Abdominal Pain       Pain Assessments  Pain (Adult)  (0-10) Pain Rating: Rest: 10  Pain Location: abdomen    NIH Stroke Scale       Pipe James RN  10/01/24 06:27 EDT

## 2024-10-01 NOTE — POST-PROCEDURE NOTE
[facility] Catheter Placement    Patient Identification:  NAME:  North Carcamo  Age:  74 y.o.   Sex:  male   :  1950   MRN:  [id number]       CT shows 4 cm heterogenous hyperdense area in bladder, likely blood products/clot. Patient was instructed on need to exchange existing catheter to a 3-way Andrew. Verbal consent was obtained.  Existing catheter removed  Perineal hygiene given  Patient was prepped and draped in sterile fashion.  Lidocaine topical urojet jelly applied directly into urethra  Using sterile technique a 24 ch Andrew catheter was easily advanced to catheter hub   Balloon inflated with 20 mL of water  Catheter gentle retracted until resistance met  Manually irrigated catheter with 300 cc of sterile water with full return of fluids  A few small clots removed from bladder, output remained light pink  Closed drainage system attached  CBI re-started   Patient tolerated well    Plan:  - Continue CBI, titrate slowly to maintain a clear/light pink output  - Manually irrigate PRN for suspected catheter obstruction s/t clot  - Continue to hold Eliquis  - Urology following    Argenis Light, APRN  10/01/24

## 2024-10-01 NOTE — CONSULTS
[facility]      Patient Identification:  NAME:  North Carcamo  Age:  74 y.o.   Sex:  male   :  1950   MRN:  [id number]       Chief complaint: Urinary Retention     History of present illness:  North Carcamo is a 74 y.o. man, well known to [facility], followed by Dr. Brian Russell, primarily, but also by Dr. Meng gonzalez and the [facility]. 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 Russell scheduled on 1/15/2025.  He presents with Gross hematuria occurring at the end of void starting yesterday evening about 8pm. 3-way Paz placed and Continuous Bladder Irrigation (CBI) initiated with god clearance of hematuria. He is on Eliquis. He does also have history of urolithiasis for which I do not believe any definitive intervention has been pursued.     Cr 1.2  WBC 13.47  UA Blood ++, Nit neg, LE neg    No new imaging.      Past medical history:  Past Medical History:   Diagnosis Date    Allergic     Penicillin    Cataract 2022    Diverticulosis     E. coli pneumonia 2023    HOSPITALIZED    History of prostate cancer     History of recent hospitalization 2023    Hyperglycemia     Hyperlipidemia     Hypertension     Kidney stone 2019    Has not been a problem.    On anticoagulant therapy     Paroxysmal atrial fibrillation        Past surgical history:  Past Surgical History:   Procedure Laterality Date    CATARACT EXTRACTION      WITH LENS IMPLANTS    COLONOSCOPY N/A 2023    Procedure: COLONOSCOPY TO CECUM AND TERMINAL ILEUM WITH COLD POLYPECTOMY;  Surgeon: Humberto Peña MD;  Location: [facility] ENDOSCOPY;  Service: Gastroenterology;  Laterality: N/A;  SCREENING  DIVERTICULOSIS, COLON POLYP, HEMORRHOIDS    CYSTOSCOPY BLADDER BIOPSY N/A 2019    Procedure: CYSTOSCOPY TUR BLADDER TUMOR;  Surgeon: Drew  Joel CORLEY MD;  Location: [facility] OR;  Service: Urology    CYSTOSCOPY BLADDER BIOPSY N/A 5/31/2024    Procedure: CYSTOSCOPY BLADDER BIOPSY FULGURATION;  Surgeon: Joel Russell MD;  Location: [facility] OR;  Service: Urology;  Laterality: N/A;    ELBOW PROCEDURE Left     FX    PROSTATECTOMY      TONSILLECTOMY  1957    VASECTOMY         Allergies:  Penicillins    Home medications:  Medications Prior to Admission   Medication Sig Dispense Refill Last Dose    Calcium Carbonate-Vitamin D (CALCIUM 600+D PO) Take 1 tablet by mouth 2 (Two) Times a Day. PT HOLDING FOR SURGERY  Indications: Calcium Deficiency       Eliquis 5 MG tablet tablet Take 1 tablet by mouth Every 12 (Twelve) Hours. 60 tablet 11     lisinopril (PRINIVIL,ZESTRIL) 40 MG tablet Take 1 tablet by mouth Daily. 90 tablet 1     Multiple Vitamins-Minerals (DAILY MULTIVITAMIN PO) Take 1 tablet by mouth Daily.       pravastatin (PRAVACHOL) 40 MG tablet TAKE 1 TABLET BY MOUTH EVERY NIGHT 90 tablet 3         Hospital medications:  sodium chloride, 10 mL, Intravenous, Q12H           acetaminophen    senna-docusate sodium **AND** polyethylene glycol **AND** bisacodyl **AND** bisacodyl    Calcium Replacement - Follow Nurse / BPA Driven Protocol    Magnesium Standard Dose Replacement - Follow Nurse / BPA Driven Protocol    melatonin    ondansetron    Phosphorus Replacement - Follow Nurse / BPA Driven Protocol    Potassium Replacement - Follow Nurse / BPA Driven Protocol    sodium chloride    sodium chloride    Family history:  Family History   Problem Relation Age of Onset    Hypertension Mother     Hypertension Father     Prostate cancer Father     Malig Hyperthermia Neg Hx        Social history:  Social History     Tobacco Use    Smoking status: Never    Smokeless tobacco: Never   Vaping Use    Vaping status: Never Used   Substance Use Topics    Alcohol use: Yes     Alcohol/week: 1.0 standard drink of alcohol     Types: 1 Drinks containing 0.5 oz of alcohol  per week     Comment: Occasional beer or cocktail    Drug use: No       REVIEW OF SYSTEMS:  Constitutional - Negative for fevers/chills  Eyes/Ears/Nose/Mouth/Throat - Negative for changes in vision  Cardiovascular - Negative for chest pain, dysrhythmia  Respiratory - Negative for dyspnea  Gastrointestinal - Negative for nausea or vomiting  Genitourinary - See HPI  Hematologic/Lymphatic - Negative for bruising  Skin - Negative for erythema  Endocrine - Negative for history of diabetes    Objective:  TMax 24 hours:   Temp (24hrs), Av °F (35.6 °C), Min:96 °F (35.6 °C), Max:96 °F (35.6 °C)      Vitals Ranges:   Temp:  [96 °F (35.6 °C)] 96 °F (35.6 °C)  Heart Rate:  [66-82] 82  Resp:  [18] 18  BP: (184)/(104) 184/104    Intake/Output Last 3 shifts:  I/O last 3 completed shifts:  In: -   Out: 3650 [Urine:3650]     Physical Exam:    General Appearance:    Alert, cooperative, NAD   HEENT:    No trauma, pupils reactive, hearing intact   Back:     No CVA tenderness   Lungs:     Respirations unlabored, no wheezing    Heart:    RRR, intact peripheral pulses   Abdomen:     Soft, NDNT, no masses, no guarding   :    Paz with light-pink/clear efflux with cbi running moderate rate    Extremities:   No edema, no deformity   Lymphatic:   No neck or groin LAD   Skin:   No bleeding, bruising or rashes   Neuro/Psych:   Orientation intact, mood/affect pleasant, no focal findings       Results review:   I reviewed the patient's new clinical results.    Data review:  Lab Results (last 24 hours)       Procedure Component Value Units Date/Time    Urinalysis, Microscopic Only - Indwelling Urethral Catheter [[id number]]  (Abnormal) Collected: 10/01/24 0531    Specimen: Urine from Indwelling Urethral Catheter Updated: 10/01/24 0621     RBC, UA Too Numerous to Count /HPF      WBC, UA       Unable to determine due to loaded field     /HPF     Bacteria, UA       Unable to determine due to loaded field     /HPF     Squamous Epithelial Cells,  UA       Unable to determine due to loaded field     /HPF     Hyaline Casts, UA       Unable to determine due to loaded field     /LPF     Methodology Manual Light Microscopy    Urinalysis With Microscopic If Indicated (No Culture) - Indwelling Urethral Catheter [[id number]]  (Abnormal) Collected: 10/01/24 0531    Specimen: Urine from Indwelling Urethral Catheter Updated: 10/01/24 0613     Color, UA Yellow     Appearance, UA Turbid     pH, UA 7.0     Specific Gravity, UA 1.016     Glucose, UA Negative     Ketones, UA Negative     Bilirubin, UA Negative     Blood, UA Moderate (2+)     Protein, UA >=300 mg/dL (3+)     Leuk Esterase, UA Negative     Nitrite, UA Negative     Urobilinogen, UA 1.0 E.U./dL    Comprehensive Metabolic Panel [[id number]]  (Abnormal) Collected: 10/01/24 0535    Specimen: Blood Updated: 10/01/24 0607     Glucose 183 mg/dL      BUN 14 mg/dL      Creatinine 1.20 mg/dL      Sodium 139 mmol/L      Potassium 3.8 mmol/L      Chloride 102 mmol/L      CO2 26.4 mmol/L      Calcium 9.4 mg/dL      Total Protein 6.9 g/dL      Albumin 4.1 g/dL      ALT (SGPT) 27 U/L      AST (SGOT) 24 U/L      Alkaline Phosphatase 96 U/L      Total Bilirubin 0.7 mg/dL      Globulin 2.8 gm/dL      A/G Ratio 1.5 g/dL      BUN/Creatinine Ratio 11.7     Anion Gap 10.6 mmol/L      eGFR 62.7 mL/min/1.73     Narrative:      GFR Normal >60  Chronic Kidney Disease <60  Kidney Failure <15    The GFR formula is only valid for adults with stable renal function between ages 18 and 70.    CBC & Differential [[id number]]  (Abnormal) Collected: 10/01/24 0535    Specimen: Blood Updated: 10/01/24 0547    Narrative:      The following orders were created for panel order CBC & Differential.  Procedure                               Abnormality         Status                     ---------                               -----------         ------                     CBC Auto Differential[[id number]]        Abnormal            Final result                  Please view results for these tests on the individual orders.    CBC Auto Differential [[id number]]  (Abnormal) Collected: 10/01/24 0535    Specimen: Blood Updated: 10/01/24 0547     WBC 13.47 10*3/mm3      RBC 4.63 10*6/mm3      Hemoglobin 15.0 g/dL      Hematocrit 43.5 %      MCV 94.0 fL      MCH 32.4 pg      MCHC 34.5 g/dL      RDW 12.3 %      RDW-SD 42.6 fl      MPV 10.7 fL      Platelets 226 10*3/mm3      Neutrophil % 91.0 %      Lymphocyte % 3.9 %      Monocyte % 4.5 %      Eosinophil % 0.1 %      Basophil % 0.1 %      Immature Grans % 0.4 %      Neutrophils, Absolute 12.24 10*3/mm3      Lymphocytes, Absolute 0.53 10*3/mm3      Monocytes, Absolute 0.61 10*3/mm3      Eosinophils, Absolute 0.02 10*3/mm3      Basophils, Absolute 0.02 10*3/mm3      Immature Grans, Absolute 0.05 10*3/mm3      nRBC 0.0 /100 WBC              Imaging:  Imaging Results (Last 24 Hours)       ** No results found for the last 24 hours. **               Assessment:       Hematuria      Urinary Retention  Hematuria  History of Prostate cancer   History of Bladder cancer   Renal calculi    - 3-way Paz placed by ER - CBI running, efflux light pink/clear  - Awaiting CT imaging     Plan:   - Agree with admission to Obs unit  - Hold Eliquis  - Continuous Bladder Irrigation (CBI) - titrate down to low rate/clear/light pink efflux  - CT Abd/pelvis non-con (stone protocol)  - Ultimately, he will have follow-up with Dr. Brian Russell ([facility]) 1-2 weeks after Discharge - Schedulers messaged.  - Urology will follow through resolution of hematuria     Hema Vora MD  10/01/24  07:07 EDT

## 2024-10-01 NOTE — ED PROVIDER NOTES
EMERGENCY DEPARTMENT ENCOUNTER    Room Number:  09/09  PCP: Chloe Arriaga MD  Historian: Patient, spouse    I initially evaluated the patient at 3:58 AM    HPI:  Chief Complaint: Blood in urine  A complete HPI/ROS/PMH/PSH/SH/FH are unobtainable due to: Nothing  Context: [place] Carcamo is a 74 y.o. male with a medical history of prostate cancer, paroxysmal atrial fibrillation who presents to the ED c/o acute blood in urine.  Patient urinated around 9:45 PM.  Urine was initially normal but then became bloody.  Since then, he has had difficulty urinating and reports that his urine is still red.  He feels like he is unable to empty his bladder completely.  He is on Eliquis.  Denies fever, chills, flank pain, abdominal pain, nausea, vomiting, or dysuria.            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 coli pneumonia 08/01/2023    History of prostate cancer 2002    History of recent hospitalization 08/01/2023    Kidney stone 2019    On anticoagulant therapy          PAST SURGICAL HISTORY  Past Surgical History:   Procedure Laterality Date    CATARACT EXTRACTION      WITH LENS IMPLANTS    COLONOSCOPY N/A 09/13/2023    Procedure: COLONOSCOPY TO CECUM AND TERMINAL ILEUM WITH COLD POLYPECTOMY;  Surgeon: Humberto Peña MD;  Location: [facility] ENDOSCOPY;  Service: Gastroenterology;  Laterality: N/A;  SCREENING  DIVERTICULOSIS, COLON POLYP, HEMORRHOIDS    CYSTOSCOPY BLADDER BIOPSY N/A 06/12/2019     Procedure: CYSTOSCOPY TUR BLADDER TUMOR;  Surgeon: Joel Russell MD;  Location: [facility] OR;  Service: Urology    CYSTOSCOPY BLADDER BIOPSY N/A 5/31/2024    Procedure: CYSTOSCOPY BLADDER BIOPSY FULGURATION;  Surgeon: Joel Russell MD;  Location: [facility] OR;  Service: Urology;  Laterality: N/A;    ELBOW PROCEDURE Left     FX    PROSTATECTOMY      TONSILLECTOMY  1957    VASECTOMY           FAMILY HISTORY  Family History   Problem Relation Age of Onset    Hypertension Mother     Hypertension Father     Prostate cancer Father     Malig Hyperthermia Neg Hx          SOCIAL HISTORY  Social History     Socioeconomic History    Marital status:    Tobacco Use    Smoking status: Never    Smokeless tobacco: Never   Vaping Use    Vaping status: Never Used   Substance and Sexual Activity    Alcohol use: Yes     Alcohol/week: 1.0 standard drink of alcohol     Types: 1 Drinks containing 0.5 oz of alcohol per week     Comment: Occasional beer or cocktail    Drug use: No    Sexual activity: Yes     Partners: Female     Birth control/protection: Vasectomy         ALLERGIES  Penicillins    REVIEW OF SYSTEMS  Review of Systems  Included in HPI  All systems reviewed and negative except for those discussed in HPI.      PHYSICAL EXAM  ED Triage Vitals [10/01/24 0354]   Temp Heart Rate Resp BP SpO2   96 °F (35.6 °C) 82 18 -- 98 %      Temp src Heart Rate Source Patient Position BP Location FiO2 (%)   Tympanic -- -- -- --       Physical Exam      GENERAL: Awake, alert, oriented x 3.  Well-developed, well-nourished and nontoxic-appearing elderly male.  Resting comfortably in no acute distress  HENT: NCAT, nares patent  EYES: no scleral icterus  CV: regular rhythm, normal rate  RESPIRATORY: normal effort, clear to auscultation bilaterally  ABDOMEN: soft, nondistended, nontender, no CVA tenderness  MUSCULOSKELETAL: Extremities are nontender with full range of motion  NEURO: Speech is normal.  No facial droop.  PSYCH:   calm, cooperative  SKIN: warm, dry    Vital signs and nursing notes reviewed.          LAB RESULTS  Recent Results (from the past 24 hour(s))   Urinalysis With Microscopic If Indicated (No Culture) - Indwelling Urethral Catheter    Collection Time: 10/01/24  5:31 AM    Specimen: Indwelling Urethral Catheter; Urine   Result Value Ref Range    Color, UA Yellow Yellow, Straw    Appearance, UA Turbid (A) Clear    pH, UA 7.0 5.0 - 8.0    Specific Gravity, UA 1.016 1.005 - 1.030    Glucose, UA Negative Negative    Ketones, UA Negative Negative    Bilirubin, UA Negative Negative    Blood, UA Moderate (2+) (A) Negative    Protein, UA >=300 mg/dL (3+) (A) Negative    Leuk Esterase, UA Negative Negative    Nitrite, UA Negative Negative    Urobilinogen, UA 1.0 E.U./dL 0.2 - 1.0 E.U./dL   Urinalysis, Microscopic Only - Indwelling Urethral Catheter    Collection Time: 10/01/24  5:31 AM    Specimen: Indwelling Urethral Catheter; Urine   Result Value Ref Range    RBC, UA Too Numerous to Count (A) None Seen, 0-2 /HPF    WBC, UA Unable to determine due to loaded field (A) None Seen, 0-2 /HPF    Bacteria, UA Unable to determine due to loaded field (A) None Seen /HPF    Squamous Epithelial Cells, UA Unable to determine due to loaded field (A) None Seen, 0-2 /HPF    Hyaline Casts, UA Unable to determine due to loaded field None Seen /LPF    Methodology Manual Light Microscopy    Comprehensive Metabolic Panel    Collection Time: 10/01/24  5:35 AM    Specimen: Blood   Result Value Ref Range    Glucose 183 (H) 65 - 99 mg/dL    BUN 14 8 - 23 mg/dL    Creatinine 1.20 0.76 - 1.27 mg/dL    Sodium 139 136 - 145 mmol/L    Potassium 3.8 3.5 - 5.2 mmol/L    Chloride 102 98 - 107 mmol/L    CO2 26.4 22.0 - 29.0 mmol/L    Calcium 9.4 8.6 - 10.5 mg/dL    Total Protein 6.9 6.0 - 8.5 g/dL    Albumin 4.1 3.5 - 5.2 g/dL    ALT (SGPT) 27 1 - 41 U/L    AST (SGOT) 24 1 - 40 U/L    Alkaline Phosphatase 96 39 - 117 U/L    Total Bilirubin 0.7 0.0 - 1.2 mg/dL     Globulin 2.8 gm/dL    A/G Ratio 1.5 g/dL    BUN/Creatinine Ratio 11.7 7.0 - 25.0    Anion Gap 10.6 5.0 - 15.0 mmol/L    eGFR 62.7 >60.0 mL/min/1.73   CBC Auto Differential    Collection Time: 10/01/24  5:35 AM    Specimen: Blood   Result Value Ref Range    WBC 13.47 (H) 3.40 - 10.80 10*3/mm3    RBC 4.63 4.14 - 5.80 10*6/mm3    Hemoglobin 15.0 13.0 - 17.7 g/dL    Hematocrit 43.5 37.5 - 51.0 %    MCV 94.0 79.0 - 97.0 fL    MCH 32.4 26.6 - 33.0 pg    MCHC 34.5 31.5 - 35.7 g/dL    RDW 12.3 12.3 - 15.4 %    RDW-SD 42.6 37.0 - 54.0 fl    MPV 10.7 6.0 - 12.0 fL    Platelets 226 140 - 450 10*3/mm3    Neutrophil % 91.0 (H) 42.7 - 76.0 %    Lymphocyte % 3.9 (L) 19.6 - 45.3 %    Monocyte % 4.5 (L) 5.0 - 12.0 %    Eosinophil % 0.1 (L) 0.3 - 6.2 %    Basophil % 0.1 0.0 - 1.5 %    Immature Grans % 0.4 0.0 - 0.5 %    Neutrophils, Absolute 12.24 (H) 1.70 - 7.00 10*3/mm3    Lymphocytes, Absolute 0.53 (L) 0.70 - 3.10 10*3/mm3    Monocytes, Absolute 0.61 0.10 - 0.90 10*3/mm3    Eosinophils, Absolute 0.02 0.00 - 0.40 10*3/mm3    Basophils, Absolute 0.02 0.00 - 0.20 10*3/mm3    Immature Grans, Absolute 0.05 0.00 - 0.05 10*3/mm3    nRBC 0.0 0.0 - 0.2 /100 WBC       Ordered the above labs and reviewed the results.        RADIOLOGY  No Radiology Exams Resulted Within Past 24 Hours    Ordered the above noted radiological studies. Reviewed by me in PACS.            PROCEDURES  Procedures        OUTPATIENT MEDICATION MANAGEMENT:  Current Facility-Administered Medications Ordered in Epic   Medication Dose Route Frequency Provider Last Rate Last Admin    acetaminophen (TYLENOL) tablet 650 mg  650 mg Oral Q4H PRN Meng Sanchez PA        sennosides-docusate (PERICOLACE) 8.6-50 MG per tablet 2 tablet  2 tablet Oral BID PRN Meng Sanchez PA        And    polyethylene glycol (MIRALAX) packet 17 g  17 g Oral Daily PRN Meng Sanchez PA        And    bisacodyl (DULCOLAX) EC tablet 5 mg  5 mg Oral Daily PRN Meng Sanchez PA        And    bisacodyl (DULCOLAX)  suppository 10 mg  10 mg Rectal Daily PRN Meng Sanchez PA        Calcium Replacement - Follow Nurse / BPA Driven Protocol   Does not apply PRN Meng Sanchez PA        Magnesium Standard Dose Replacement - Follow Nurse / BPA Driven Protocol   Does not apply PRN Meng Sanchez PA        melatonin tablet 5 mg  5 mg Oral Nightly PRN Meng Sanchez PA        ondansetron (ZOFRAN) injection 4 mg  4 mg Intravenous Q6H PRN Meng Sanchez PA        Phosphorus Replacement - Follow Nurse / BPA Driven Protocol   Does not apply PRN Meng Sanchez PA        Potassium Replacement - Follow Nurse / BPA Driven Protocol   Does not apply PRN Meng Sanchez PA        sodium chloride 0.9 % flush 10 mL  10 mL Intravenous Q12H Meng Sanchez PA        sodium chloride 0.9 % flush 10 mL  10 mL Intravenous PRN Meng Sanchez PA        sodium chloride 0.9 % infusion 40 mL  40 mL Intravenous PRN Meng Sanchez PA         Current Outpatient Medications Ordered in Epic   Medication Sig Dispense Refill    Calcium Carbonate-Vitamin D (CALCIUM 600+D PO) Take 1 tablet by mouth 2 (Two) Times a Day. PT HOLDING FOR SURGERY  Indications: Calcium Deficiency      Eliquis 5 MG tablet tablet Take 1 tablet by mouth Every 12 (Twelve) Hours. 60 tablet 11    lisinopril (PRINIVIL,ZESTRIL) 40 MG tablet Take 1 tablet by mouth Daily. 90 tablet 1    Multiple Vitamins-Minerals (DAILY MULTIVITAMIN PO) Take 1 tablet by mouth Daily.      pravastatin (PRAVACHOL) 40 MG tablet TAKE 1 TABLET BY MOUTH EVERY NIGHT 90 tablet 3           MEDICATIONS GIVEN IN ER  Medications   sodium chloride 0.9 % flush 10 mL (has no administration in time range)   sodium chloride 0.9 % flush 10 mL (has no administration in time range)   sodium chloride 0.9 % infusion 40 mL (has no administration in time range)   acetaminophen (TYLENOL) tablet 650 mg (has no administration in time range)   sennosides-docusate (PERICOLACE) 8.6-50 MG per tablet 2 tablet (has no administration in time range)     And   polyethylene glycol  (MIRALAX) packet 17 g (has no administration in time range)     And   bisacodyl (DULCOLAX) EC tablet 5 mg (has no administration in time range)     And   bisacodyl (DULCOLAX) suppository 10 mg (has no administration in time range)   Potassium Replacement - Follow Nurse / BPA Driven Protocol (has no administration in time range)   Magnesium Standard Dose Replacement - Follow Nurse / BPA Driven Protocol (has no administration in time range)   Phosphorus Replacement - Follow Nurse / BPA Driven Protocol (has no administration in time range)   Calcium Replacement - Follow Nurse / BPA Driven Protocol (has no administration in time range)   ondansetron (ZOFRAN) injection 4 mg (has no administration in time range)   melatonin tablet 5 mg (has no administration in time range)                   MEDICAL DECISION MAKING, PROGRESS, and CONSULTS    All labs have been independently reviewed by me.  All radiology studies have been reviewed by me and I have also reviewed the radiology report.   EKG's independently viewed and interpreted by me.  Discussion below represents my analysis of pertinent findings related to patient's condition, differential diagnosis, treatment plan and final disposition.      Additional sources:    - Discussed/ obtained information from independent historians: Spouse at bedside    - External (non-ED) record review: Patient had a cystoscopy, bladder biopsy, and fulguration in May 2024.  Cystoscopy showed superficial tumor recurrence along the right posterior bladder wall with extensive radiation cystitis and neovascularity.    -Prescription drug monitoring program review:     N/A    - Chronic or social conditions impacting patient care (Social Determinants of Health): None          Orders placed during this visit:  Orders Placed This Encounter   Procedures    Urinalysis With Microscopic If Indicated (No Culture) - Urine, Clean Catch    Comprehensive Metabolic Panel    CBC Auto Differential    Urinalysis,  Microscopic Only - Urine, Clean Catch    Basic Metabolic Panel    CBC (No Diff)    Lipid Panel    NPO Diet NPO Type: Strict NPO    Bladder scan    Insert 3-way Urinary Catheter    Assess Need for Indwelling Urinary Catheter - Follow Removal Protocol    Urinary Catheter Care    Continuous Bladder Irrigation    Vital Signs    Notify Provider (With Default Parameters)    Activity - Ad Ashlee    Intake & Output    Weigh Patient    Oral Care    Saline Lock & Maintain IV Access    Place Sequential Compression Device    Maintain Sequential Compression Device    Code Status and Medical Interventions: CPR (Attempt to Resuscitate); Full Support    Urology (on-call MD unless specified)    Inpatient Urology Consult    Insert Peripheral IV    Initiate ED Observation Status    CBC & Differential         Additional orders considered but not ordered:          Differential diagnosis includes, but is not limited to:    UTI, hematuria, renal colic      Independent interpretation of labs, radiology studies, and discussions with consultants:  ED Course as of 10/01/24 0633   e Oct 01, 2024   0405 Patient complains of gross hematuria difficulty voiding.  He is afebrile.  He is on Eliquis.  Abdominal exam is benign.  Will perform a bladder scan to see if he is retaining.  If he is unable to urinate spontaneously, he will likely require catheterization. [WH]   0420 Patient was unable to urinate.  Bladder scan was 306.  Three-way catheter will be placed as the patient may require continuous bladder irrigation. [WH]   0521 Three-way catheter was placed with return of approximately 500 mL of grossly bloody urine.  Continuous bladder irrigation will be initiated [WH]   0557 WBC(!): 13.47 [WH]   0557 Hemoglobin: 15.0 [WH]   0608 Glucose(!): 183 [WH]   0608 BUN: 14 [WH]   0608 Creatinine: 1.20 [WH]   0608 Patient is resting comfortably.  CBI is ongoing.  Urine is now light pink.  Test results, diagnosis, and plans for admission were discussed with  the patient and his wife. [WH]   0616 Case discussed with Dr. Anderson, urologist.  Pertinent history, exam findings, test results, and diagnoses were discussed with him.  He agrees with the plan for admission and will see the patient in consult.  He would like for him to be kept n.p.o. [WH]   0618 Case discussed with SINDI Fan, and he agrees to admit the patient to Dr. Valdivia.  Pertinent history, exam findings, test results, ED course, diagnoses, and pending urology consultation were discussed with him. [WH]   0623 Dr. Peña, urology, is now at bedside [WH]   0632 MDM: Patient presented to the ED with acute urinary retention and gross hematuria.  He was afebrile.  Abdominal exam was benign.  Three-way catheter was placed and he was started on continuous bladder irrigation.  Urine began to clear but remained blood-tinged.  White blood cell count was mildly elevated.  Renal function was normal.  Case was discussed with urology.  Patient will be admitted.  He is on Eliquis. []      ED Course User Index  [] [place] MD         COMPLEXITY OF CARE  The patient requires admission.      DIAGNOSIS  Final diagnoses:   Gross hematuria   Acute urinary retention   Anticoagulated         DISPOSITION  ADMISSION    Discussed treatment plan and reason for admission with pt/family and admitting physician.  Pt/family voiced understanding of the plan for admission for further testing/treatment as needed.               Latest Documented Vital Signs:  AS OF 06:33 EDT VITALS:    BP - (!) 184/104  HR - 82  TEMP - 96 °F (35.6 °C) (Tympanic)  O2 SATS - 98%            --    Please note that portions of this were completed with a voice recognition program.       Note Disclaimer: At [facility], we believe that sharing information builds trust and better relationships. You are receiving this note because you are receiving care at [facility] or recently visited. It is possible you will see health information before a  provider has talked with you about it. This kind of information can be easy to misunderstand. To help you fully understand what it means for your health, we urge you to discuss this note with your provider.             North Lunsford MD  10/01/24 0633

## 2024-10-02 ENCOUNTER — ANESTHESIA EVENT (OUTPATIENT)
Dept: PERIOP | Facility: HOSPITAL | Age: 74
End: 2024-10-02
Payer: MEDICARE

## 2024-10-02 ENCOUNTER — ANESTHESIA (OUTPATIENT)
Dept: PERIOP | Facility: HOSPITAL | Age: 74
End: 2024-10-02
Payer: MEDICARE

## 2024-10-02 LAB
ANION GAP SERPL CALCULATED.3IONS-SCNC: 10.6 MMOL/L (ref 5–15)
BUN SERPL-MCNC: 17 MG/DL (ref 8–23)
BUN/CREAT SERPL: 16.5 (ref 7–25)
CALCIUM SPEC-SCNC: 9 MG/DL (ref 8.6–10.5)
CHLORIDE SERPL-SCNC: 105 MMOL/L (ref 98–107)
CHOLEST SERPL-MCNC: 136 MG/DL (ref 0–200)
CO2 SERPL-SCNC: 23.4 MMOL/L (ref 22–29)
CREAT SERPL-MCNC: 1.03 MG/DL (ref 0.76–1.27)
DEPRECATED RDW RBC AUTO: 43.8 FL (ref 37–54)
EGFRCR SERPLBLD CKD-EPI 2021: 75.3 ML/MIN/1.73
ERYTHROCYTE [DISTWIDTH] IN BLOOD BY AUTOMATED COUNT: 12.5 % (ref 12.3–15.4)
GLUCOSE SERPL-MCNC: 126 MG/DL (ref 65–99)
HCT VFR BLD AUTO: 42.4 % (ref 37.5–51)
HDLC SERPL-MCNC: 46 MG/DL (ref 40–60)
HGB BLD-MCNC: 14.1 G/DL (ref 13–17.7)
LDLC SERPL CALC-MCNC: 67 MG/DL (ref 0–100)
LDLC/HDLC SERPL: 1.4 {RATIO}
MCH RBC QN AUTO: 31.6 PG (ref 26.6–33)
MCHC RBC AUTO-ENTMCNC: 33.3 G/DL (ref 31.5–35.7)
MCV RBC AUTO: 95.1 FL (ref 79–97)
PLATELET # BLD AUTO: 203 10*3/MM3 (ref 140–450)
PMV BLD AUTO: 11 FL (ref 6–12)
POTASSIUM SERPL-SCNC: 3.6 MMOL/L (ref 3.5–5.2)
RBC # BLD AUTO: 4.46 10*6/MM3 (ref 4.14–5.8)
SODIUM SERPL-SCNC: 139 MMOL/L (ref 136–145)
TRIGL SERPL-MCNC: 128 MG/DL (ref 0–150)
VLDLC SERPL-MCNC: 23 MG/DL (ref 5–40)
WBC NRBC COR # BLD AUTO: 11.28 10*3/MM3 (ref 3.4–10.8)

## 2024-10-02 PROCEDURE — 25010000002 FENTANYL CITRATE (PF) 50 MCG/ML SOLUTION: Performed by: ANESTHESIOLOGY

## 2024-10-02 PROCEDURE — 85027 COMPLETE CBC AUTOMATED: CPT | Performed by: NURSE PRACTITIONER

## 2024-10-02 PROCEDURE — 25010000002 LIDOCAINE 2% SOLUTION: Performed by: NURSE ANESTHETIST, CERTIFIED REGISTERED

## 2024-10-02 PROCEDURE — 25010000002 ONDANSETRON PER 1 MG: Performed by: NURSE ANESTHETIST, CERTIFIED REGISTERED

## 2024-10-02 PROCEDURE — 25810000003 LACTATED RINGERS PER 1000 ML: Performed by: ANESTHESIOLOGY

## 2024-10-02 PROCEDURE — G0378 HOSPITAL OBSERVATION PER HR: HCPCS

## 2024-10-02 PROCEDURE — 25010000002 DEXAMETHASONE SODIUM PHOSPHATE 20 MG/5ML SOLUTION: Performed by: NURSE ANESTHETIST, CERTIFIED REGISTERED

## 2024-10-02 PROCEDURE — 25810000003 DEXTROSE 5% IN LACTATED RINGERS PER 1000 ML: Performed by: NURSE PRACTITIONER

## 2024-10-02 PROCEDURE — 80048 BASIC METABOLIC PNL TOTAL CA: CPT | Performed by: NURSE PRACTITIONER

## 2024-10-02 PROCEDURE — A9270 NON-COVERED ITEM OR SERVICE: HCPCS | Performed by: UROLOGY

## 2024-10-02 PROCEDURE — 80061 LIPID PANEL: CPT | Performed by: NURSE PRACTITIONER

## 2024-10-02 PROCEDURE — C1769 GUIDE WIRE: HCPCS | Performed by: UROLOGY

## 2024-10-02 PROCEDURE — 25010000002 PROPOFOL 200 MG/20ML EMULSION: Performed by: NURSE ANESTHETIST, CERTIFIED REGISTERED

## 2024-10-02 PROCEDURE — 63710000001 PRAVASTATIN 40 MG TABLET: Performed by: UROLOGY

## 2024-10-02 RX ORDER — ONDANSETRON 2 MG/ML
INJECTION INTRAMUSCULAR; INTRAVENOUS AS NEEDED
Status: DISCONTINUED | OUTPATIENT
Start: 2024-10-02 | End: 2024-10-02 | Stop reason: SURG

## 2024-10-02 RX ORDER — DEXTROSE, SODIUM CHLORIDE, SODIUM LACTATE, POTASSIUM CHLORIDE, AND CALCIUM CHLORIDE 5; .6; .31; .03; .02 G/100ML; G/100ML; G/100ML; G/100ML; G/100ML
100 INJECTION, SOLUTION INTRAVENOUS CONTINUOUS
Status: DISCONTINUED | OUTPATIENT
Start: 2024-10-02 | End: 2024-10-03 | Stop reason: HOSPADM

## 2024-10-02 RX ORDER — FAMOTIDINE 10 MG/ML
20 INJECTION, SOLUTION INTRAVENOUS ONCE
Status: DISCONTINUED | OUTPATIENT
Start: 2024-10-02 | End: 2024-10-02 | Stop reason: HOSPADM

## 2024-10-02 RX ORDER — HYDROCODONE BITARTRATE AND ACETAMINOPHEN 7.5; 325 MG/1; MG/1
1 TABLET ORAL EVERY 4 HOURS PRN
Status: DISCONTINUED | OUTPATIENT
Start: 2024-10-02 | End: 2024-10-02 | Stop reason: HOSPADM

## 2024-10-02 RX ORDER — ONDANSETRON 2 MG/ML
4 INJECTION INTRAMUSCULAR; INTRAVENOUS ONCE AS NEEDED
Status: DISCONTINUED | OUTPATIENT
Start: 2024-10-02 | End: 2024-10-02 | Stop reason: HOSPADM

## 2024-10-02 RX ORDER — DEXAMETHASONE SODIUM PHOSPHATE 4 MG/ML
INJECTION, SOLUTION INTRA-ARTICULAR; INTRALESIONAL; INTRAMUSCULAR; INTRAVENOUS; SOFT TISSUE AS NEEDED
Status: DISCONTINUED | OUTPATIENT
Start: 2024-10-02 | End: 2024-10-02 | Stop reason: SURG

## 2024-10-02 RX ORDER — NALOXONE HCL 0.4 MG/ML
0.2 VIAL (ML) INJECTION AS NEEDED
Status: DISCONTINUED | OUTPATIENT
Start: 2024-10-02 | End: 2024-10-02 | Stop reason: HOSPADM

## 2024-10-02 RX ORDER — SODIUM CHLORIDE 0.9 % (FLUSH) 0.9 %
3 SYRINGE (ML) INJECTION EVERY 12 HOURS SCHEDULED
Status: DISCONTINUED | OUTPATIENT
Start: 2024-10-02 | End: 2024-10-02 | Stop reason: HOSPADM

## 2024-10-02 RX ORDER — HYDRALAZINE HYDROCHLORIDE 20 MG/ML
5 INJECTION INTRAMUSCULAR; INTRAVENOUS
Status: DISCONTINUED | OUTPATIENT
Start: 2024-10-02 | End: 2024-10-02 | Stop reason: HOSPADM

## 2024-10-02 RX ORDER — LABETALOL HYDROCHLORIDE 5 MG/ML
5 INJECTION, SOLUTION INTRAVENOUS
Status: DISCONTINUED | OUTPATIENT
Start: 2024-10-02 | End: 2024-10-02 | Stop reason: HOSPADM

## 2024-10-02 RX ORDER — PROMETHAZINE HYDROCHLORIDE 25 MG/1
25 TABLET ORAL ONCE AS NEEDED
Status: DISCONTINUED | OUTPATIENT
Start: 2024-10-02 | End: 2024-10-02 | Stop reason: HOSPADM

## 2024-10-02 RX ORDER — LIDOCAINE HYDROCHLORIDE 10 MG/ML
0.5 INJECTION, SOLUTION INFILTRATION; PERINEURAL ONCE AS NEEDED
Status: DISCONTINUED | OUTPATIENT
Start: 2024-10-02 | End: 2024-10-02 | Stop reason: HOSPADM

## 2024-10-02 RX ORDER — IPRATROPIUM BROMIDE AND ALBUTEROL SULFATE 2.5; .5 MG/3ML; MG/3ML
3 SOLUTION RESPIRATORY (INHALATION) ONCE AS NEEDED
Status: DISCONTINUED | OUTPATIENT
Start: 2024-10-02 | End: 2024-10-02 | Stop reason: HOSPADM

## 2024-10-02 RX ORDER — POTASSIUM CHLORIDE 750 MG/1
40 TABLET, FILM COATED, EXTENDED RELEASE ORAL ONCE
Status: COMPLETED | OUTPATIENT
Start: 2024-10-02 | End: 2024-10-02

## 2024-10-02 RX ORDER — SODIUM CHLORIDE, SODIUM LACTATE, POTASSIUM CHLORIDE, CALCIUM CHLORIDE 600; 310; 30; 20 MG/100ML; MG/100ML; MG/100ML; MG/100ML
9 INJECTION, SOLUTION INTRAVENOUS CONTINUOUS
Status: DISCONTINUED | OUTPATIENT
Start: 2024-10-02 | End: 2024-10-03

## 2024-10-02 RX ORDER — LIDOCAINE HYDROCHLORIDE 20 MG/ML
INJECTION, SOLUTION INFILTRATION; PERINEURAL AS NEEDED
Status: DISCONTINUED | OUTPATIENT
Start: 2024-10-02 | End: 2024-10-02 | Stop reason: SURG

## 2024-10-02 RX ORDER — LEVOFLOXACIN 500 MG/1
500 TABLET, FILM COATED ORAL EVERY 24 HOURS
Status: COMPLETED | OUTPATIENT
Start: 2024-10-02 | End: 2024-10-02

## 2024-10-02 RX ORDER — PROMETHAZINE HYDROCHLORIDE 25 MG/1
25 SUPPOSITORY RECTAL ONCE AS NEEDED
Status: DISCONTINUED | OUTPATIENT
Start: 2024-10-02 | End: 2024-10-02 | Stop reason: HOSPADM

## 2024-10-02 RX ORDER — DIPHENHYDRAMINE HYDROCHLORIDE 50 MG/ML
12.5 INJECTION INTRAMUSCULAR; INTRAVENOUS
Status: DISCONTINUED | OUTPATIENT
Start: 2024-10-02 | End: 2024-10-02 | Stop reason: HOSPADM

## 2024-10-02 RX ORDER — DROPERIDOL 2.5 MG/ML
0.62 INJECTION, SOLUTION INTRAMUSCULAR; INTRAVENOUS
Status: DISCONTINUED | OUTPATIENT
Start: 2024-10-02 | End: 2024-10-02 | Stop reason: HOSPADM

## 2024-10-02 RX ORDER — MIDAZOLAM HYDROCHLORIDE 1 MG/ML
0.5 INJECTION INTRAMUSCULAR; INTRAVENOUS
Status: DISCONTINUED | OUTPATIENT
Start: 2024-10-02 | End: 2024-10-02 | Stop reason: HOSPADM

## 2024-10-02 RX ORDER — FENTANYL CITRATE 50 UG/ML
25 INJECTION, SOLUTION INTRAMUSCULAR; INTRAVENOUS
Status: DISCONTINUED | OUTPATIENT
Start: 2024-10-02 | End: 2024-10-02 | Stop reason: HOSPADM

## 2024-10-02 RX ORDER — EPHEDRINE SULFATE 50 MG/ML
5 INJECTION, SOLUTION INTRAVENOUS ONCE AS NEEDED
Status: DISCONTINUED | OUTPATIENT
Start: 2024-10-02 | End: 2024-10-02 | Stop reason: HOSPADM

## 2024-10-02 RX ORDER — HYDROCODONE BITARTRATE AND ACETAMINOPHEN 5; 325 MG/1; MG/1
1 TABLET ORAL ONCE AS NEEDED
Status: DISCONTINUED | OUTPATIENT
Start: 2024-10-02 | End: 2024-10-02 | Stop reason: HOSPADM

## 2024-10-02 RX ORDER — PROPOFOL 10 MG/ML
INJECTION, EMULSION INTRAVENOUS AS NEEDED
Status: DISCONTINUED | OUTPATIENT
Start: 2024-10-02 | End: 2024-10-02 | Stop reason: SURG

## 2024-10-02 RX ORDER — FENTANYL CITRATE 50 UG/ML
50 INJECTION, SOLUTION INTRAMUSCULAR; INTRAVENOUS ONCE AS NEEDED
Status: COMPLETED | OUTPATIENT
Start: 2024-10-02 | End: 2024-10-02

## 2024-10-02 RX ORDER — SODIUM CHLORIDE 0.9 % (FLUSH) 0.9 %
3-10 SYRINGE (ML) INJECTION AS NEEDED
Status: DISCONTINUED | OUTPATIENT
Start: 2024-10-02 | End: 2024-10-02 | Stop reason: HOSPADM

## 2024-10-02 RX ORDER — LIDOCAINE HYDROCHLORIDE 20 MG/ML
JELLY TOPICAL AS NEEDED
Status: DISCONTINUED | OUTPATIENT
Start: 2024-10-02 | End: 2024-10-02 | Stop reason: HOSPADM

## 2024-10-02 RX ORDER — HYDROMORPHONE HYDROCHLORIDE 1 MG/ML
0.25 INJECTION, SOLUTION INTRAMUSCULAR; INTRAVENOUS; SUBCUTANEOUS
Status: DISCONTINUED | OUTPATIENT
Start: 2024-10-02 | End: 2024-10-02 | Stop reason: HOSPADM

## 2024-10-02 RX ORDER — FLUMAZENIL 0.1 MG/ML
0.2 INJECTION INTRAVENOUS AS NEEDED
Status: DISCONTINUED | OUTPATIENT
Start: 2024-10-02 | End: 2024-10-02 | Stop reason: HOSPADM

## 2024-10-02 RX ADMIN — FENTANYL CITRATE 50 MCG: 50 INJECTION, SOLUTION INTRAMUSCULAR; INTRAVENOUS at 17:41

## 2024-10-02 RX ADMIN — SODIUM CHLORIDE, POTASSIUM CHLORIDE, SODIUM LACTATE AND CALCIUM CHLORIDE 9 ML/HR: 600; 310; 30; 20 INJECTION, SOLUTION INTRAVENOUS at 17:10

## 2024-10-02 RX ADMIN — PROPOFOL 200 MG: 10 INJECTION, EMULSION INTRAVENOUS at 17:18

## 2024-10-02 RX ADMIN — DEXAMETHASONE SODIUM PHOSPHATE 4 MG: 4 INJECTION, SOLUTION INTRAMUSCULAR; INTRAVENOUS at 17:20

## 2024-10-02 RX ADMIN — Medication 10 ML: at 10:12

## 2024-10-02 RX ADMIN — LISINOPRIL 40 MG: 20 TABLET ORAL at 10:12

## 2024-10-02 RX ADMIN — LEVOFLOXACIN 500 MG: 500 TABLET, FILM COATED ORAL at 10:12

## 2024-10-02 RX ADMIN — Medication 10 ML: at 20:31

## 2024-10-02 RX ADMIN — ONDANSETRON 4 MG: 2 INJECTION INTRAMUSCULAR; INTRAVENOUS at 17:21

## 2024-10-02 RX ADMIN — SODIUM CHLORIDE, SODIUM LACTATE, POTASSIUM CHLORIDE, CALCIUM CHLORIDE AND DEXTROSE MONOHYDRATE 100 ML/HR: 5; 600; 310; 30; 20 INJECTION, SOLUTION INTRAVENOUS at 10:12

## 2024-10-02 RX ADMIN — POTASSIUM CHLORIDE 40 MEQ: 750 TABLET, EXTENDED RELEASE ORAL at 10:12

## 2024-10-02 RX ADMIN — LIDOCAINE HYDROCHLORIDE 100 MG: 20 INJECTION, SOLUTION INFILTRATION; PERINEURAL at 17:17

## 2024-10-02 RX ADMIN — PRAVASTATIN SODIUM 40 MG: 40 TABLET ORAL at 20:29

## 2024-10-02 NOTE — BRIEF OP NOTE
CYSTOSCOPY WITH CLOT EVACUATION  Progress Note    [place] Carcamo  10/2/2024    Pre-op Diagnosis:   Gross hematuria and clot retention       Post-Op Diagnosis Codes:  Radiation cystitis bleeding areas cauterized    Procedure/CPT® Codes:        Procedure(s):  CYSTOSCOPY WITH CLOT EVACUATION, CAUTERY OF BLEEDING              Surgeon(s):  Joel Russell MD    Anesthesia: General    Staff:   Circulator: Nori Stokes RN  Scrub Person: Talon Betancur         Estimated Blood Loss: minimal    Urine Voided: * No values recorded between 10/2/2024  5:13 PM and 10/2/2024  5:52 PM *    Specimens:                None          Drains:   Continuous Bladder Irrigation Triple-lumen 22 Fr (Active)       [REMOVED] Urethral Catheter Coude;Other (Comment) 16 Fr. (Removed)   Daily Indications Continuous Bladder Irrigation 10/02/24 0000   Site Assessment Clean;Skin intact 10/02/24 0000   Collection Container Standard drainage bag 10/02/24 0000   Securement Method Securing device 10/02/24 0000   Catheter care complete Yes 10/02/24 0800   Output (mL) 1500 mL 10/02/24 1655       Findings: Diffuse areas of radiation cystitis were that were oozing these were cauterized clots evacuated approximately 150 cc        Complications: None findings called to wife Nanette at [phone]          Joel Russell MD     Date: 10/2/2024  Time: 17:56 EDT

## 2024-10-02 NOTE — ANESTHESIA PROCEDURE NOTES
Airway  Urgency: elective    Date/Time: 10/2/2024 5:19 PM  Airway not difficult    General Information and Staff    Patient location during procedure: OR  Anesthesiologist: Paige Hoskins MD  CRNA/CAA: Vishnu Arellano CRNA    Indications and Patient Condition  Indications for airway management: airway protection    Preoxygenated: yes  Mask difficulty assessment: 1 - vent by mask    Final Airway Details  Final airway type: supraglottic airway      Successful airway: unique  Size 5     Number of attempts at approach: 1  Assessment: lips, teeth, and gum same as pre-op and atraumatic intubation    Additional Comments  Pre 02 100%, SIVI,, atraumatic intubation, BLBS, Positive ETC02.

## 2024-10-02 NOTE — PROGRESS NOTES
ED OBSERVATION PROGRESS/DISCHARGE SUMMARY    Date of Admission: 10/1/2024   LOS: 0 days   PCP: Chloe Arriaga MD        Subjective     Hospital Outcome:   74-year-old male admitted to the observation unit for hematuria.  He was started on a CBI.  Initial workup in the emergency department is unremarkable other than a glucose of 183, and WBCs of 13.47.  CT abdomen pelvis stone protocol shows mild bladder distention around a Paz catheter with approximately 4 cm heterogeneous hyperdense structure along the ventral aspect of the Paz catheter, which may reflect blood products/clot, although neoplasm cannot be excluded.  There is also mild bladder wall thickening, which may be at least in part due to poor distention with cystitis and neoplasm also not excluded.  Nonobstructing left renal stones and right sided bladder stones.  No hydronephrosis.  Urology was consulted and they have seen the patient.    10/2: Patient seen and evaluated by Dr. Russell with the first urology service who recommends cystoscopy this afternoon, anticipates patient to require continuous bladder irrigation overnight with anticipated discharge tomorrow.  Patient has been n.p.o. since midnight and is agreeable to plan    ROS:  General: no fevers, chills  Respiratory: no cough, dyspnea  Cardiovascular: no chest pain, palpitations  Abdomen: No abdominal pain, nausea, vomiting, or diarrhea  Neurologic: No focal weakness    Objective   Physical Exam:  I have reviewed the vital signs.  Temp:  [98.2 °F (36.8 °C)-98.6 °F (37 °C)] 98.5 °F (36.9 °C)  Heart Rate:  [57-84] 57  Resp:  [18] 18  BP: (140-184)/() 152/83  General Appearance:    Alert, cooperative, no distress  Head:    Normocephalic, atraumatic  Eyes:    Sclerae anicteric  Neck:   Supple, no mass  Lungs: Clear to auscultation bilaterally, respirations unlabored  Heart: Regular rate and rhythm, S1 and S2 normal, no murmur, rub or gallop  Abdomen:  Soft, nontender, bowel sounds active,  nondistended  Genitourinary: Indwelling Paz catheter with grade 1 hematuria output in catheter bag  Extremities: No clubbing, cyanosis, or edema to lower extremities  Pulses:  2+ and symmetric in distal lower extremities  Skin: No rashes   Neurologic: Oriented x3, Normal strength to extremities    Results Review:    I have reviewed the labs, radiology results and diagnostic studies.    Results from last 7 days   Lab Units 10/01/24  0535   WBC 10*3/mm3 13.47*   HEMOGLOBIN g/dL 15.0   HEMATOCRIT % 43.5   PLATELETS 10*3/mm3 226     Results from last 7 days   Lab Units 10/01/24  0535   SODIUM mmol/L 139   POTASSIUM mmol/L 3.8   CHLORIDE mmol/L 102   CO2 mmol/L 26.4   BUN mg/dL 14   CREATININE mg/dL 1.20   CALCIUM mg/dL 9.4   BILIRUBIN mg/dL 0.7   ALK PHOS U/L 96   ALT (SGPT) U/L 27   AST (SGOT) U/L 24   GLUCOSE mg/dL 183*     Imaging Results (Last 24 Hours)       Procedure Component Value Units Date/Time    CT Abdomen Pelvis Stone Protocol [[id number]] Collected: 10/01/24 0958     Updated: 10/01/24 1013    Narrative:      CT ABDOMEN PELVIS STONE PROTOCOL-     HISTORY: Hematuria, retention, renal calculi.     TECHNIQUE:  CT of the abdomen and pelvis was performed without  intravenous contrast. Reformatted images were reviewed. Evaluation of  solid organs and vasculature is limited without intravenous contrast.  Radiation dose reduction techniques were utilized, including automated  exposure control and exposure modulation based on body size.     COMPARISON: CT abdomen and pelvis 8/3/2023     FINDINGS:     There is mild bibasilar atelectasis and or scarring. There is trace  pleural fluid at least on the left.  The liver is normal in size. The gallbladder is present. The spleen is  normal in size. There are no pancreatic calcifications. The adrenal  glands are within normal limits. There are 2 nonobstructing left renal  stones measuring up to 0.8 cm, similar to 2023. There is no  hydronephrosis.  No pathologically  enlarged abdominal or pelvic lymph nodes are  identified. There is at least mild calcific atherosclerosis.  There is no bowel obstruction. There is extensive colonic  diverticulosis. The appendix is visualized. The bladder is mildly  distended around a Paz catheter. Air within the bladder lumen is  likely due to recent instrumentation. Along the ventral aspect of the  Paz catheter, there is an approximately 4 cm hypodense masslike  structure, which is slightly heterogeneous. There are are at least 2  small stones within the dependent aspect of the bladder on the right. A  single bladder stone was noted in 2023. There is mild bladder wall  thickening. The prostate is surgically absent.  There is surgical scarring in the midline anterior pelvic wall. There is  degenerative disc disease.          Impression:         1.  Mild bladder distention around a Paz catheter with an  approximately 4 cm heterogeneous hyperdense structure along the ventral  aspect of the Paz catheter, which may reflect blood products/clot,  although neoplasm is not excluded. There is also mild bladder wall  thickening, which may be at least in part due to poor distention with  cystitis and neoplasm also not excluded. Correlate with urinalysis.  Recommend urology consultation and direct inspection.  2.  Nonobstructing left renal stones and right-sided bladder stones. No  hydronephrosis.     This report was finalized on 10/1/2024 10:10 AM by Dr. Henna Lepe M.D  on Workstation: [id number]               I have reviewed the medications.  ---------------------------------------------------------------------------------------------  Assessment & Plan   Assessment/Problem List    Hematuria      Plan:  Hematuria  Leukocytosis  History of bladder cancer, prostate cancer status post prostatectomy  -Had spot on his bladder removed 1 month ago with first urology.  -white blood cell count of 13.4 with increased neutrophils, otherwise unremarkable CBC  and CMP for acute findings.   - UA has too numerous to count RBCs and microscopic unable to determine given blood present in urine.    -Patient is afebrile, pulse in the 80s, on room air oxygen 98% SpO2 and blood pressure 180s over 104.    -Three-way Paz catheter with CBI was initiated in ED.    -Patient reportedly had 500 cc of bloody urine out after three-way catheter was placed.  -Urology following, plan for cystoscopy today with CBI overnight and anticipate discharge home tomorrow  -Continue CBI vis sp catheter placed by urology 10/1  -hold eliquis  -N.p.o. after midnight     History of A-fib   -hold home Eliquis  -Telemetry     Hyperlipidemia  -Continue home statin     Essential hypertension, chronic, poorly controlled  -Continue home lisinopril, monitor       Disposition: Anticipate patient to be discharged home tomorrow  This note will serve as a progress note    Barbra Arvizu, APRN 10/02/24 04:00 EDT    I have worn appropriate PPE during this patient encounter, sanitized my hands both with entering and exiting patient's room.      54 minutes has been spent by [facility] providers in the care of this patient while under observation status

## 2024-10-02 NOTE — OP NOTE
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 gross hematuria and clot retention    Postoperative diagnosis same bleeding from areas of radiation cystitis clots evacuated cauterized CBI with clear well normotensive postop    Procedure cystoscopy clot evacuation Susu cautery of bleeding Paz reinsertion    Surgeon Drew    Anesthesia General    Procedure note 74-year-old male with history as mentioned above to undergo the above mentioned procedure site was marked antibiotics given timeout was taking allergies to penicillin reviewed after adequate general anesthesia was placed very carefully modified dorsolithotomy position all pressure points relieved prepped draped sterile fashion of genitalia 2% intraurethral lidocaine jelly is administered scope was advanced bladder urethra was normal prostate absent he has a scarring of his urogenital diaphragm I was able to navigate with the scope bladder neck was widely patent signs of radiation cystitis involving the trigone and right and left lateral walls bladder dome did not appear to be affected areas of clots were then evacuated approximate 150 cc and then cauterized areas of oozing once these have been completed with no active bleeding I did not see any transitional cell carcinoma anywhere I scope was removed and a 22 [place]kin[place] latex catheter was then placed to continuous irrigation with a clear return patient procedure well findings was called to his wife Nanette [phone]    He will be voiding trial and discharge in the morning with instruction sheet to maintain off the Eliquis for a minimum of 48 hours once his urine is clear and if possible up to a week to ensure possibility of no postoperative bleeding and additionally will do it discussed the possibilities of hyperbaric oxygen I see him in the office the office will call for follow-up appointment    No urine culture was done and CT scan  showed bladder distention around the Paz catheter with a 4 cm of clot bladder wall thickening no hydronephrosis nonobstructing left renal stones I did not see any right-sided bladder stones as mentioned in the CT report however calcifications along the bladder wall

## 2024-10-02 NOTE — BRIEF OP NOTE
CYSTOSCOPY WITH CLOT EVACUATION  Progress Note    [place]  10/2/2024    Pre-op Diagnosis:   Gross hematuria clot retention       Post-Op Diagnosis Codes:  Same findings of radiation cystitis cauterized catheter replaced    Procedure/CPT® Codes:        Procedure(s):  CYSTOSCOPY WITH CLOT EVACUATION, CAUTERY OF BLEEDING              Surgeon(s):  Joel Russell MD    Anesthesia: General    Staff:   * No surgical staff found *         Estimated Blood Loss: none    Urine Voided: * No values recorded between 10/2/2024 12:00 AM and 10/2/2024  4:54 PM *    Specimens:                None          Drains:   Urethral Catheter Coude;Other (Comment) 16 Fr. (Active)   Daily Indications Continuous Bladder Irrigation 10/02/24 0000   Site Assessment Clean;Skin intact 10/02/24 0000   Collection Container Standard drainage bag 10/02/24 0000   Securement Method Securing device 10/02/24 0000   Catheter care complete Yes 10/02/24 0800   Output (mL) 2000 mL 10/02/24 0523       Findings: As above        Complications:           Joel Russell MD     Date: 10/2/2024  Time: 16:54 EDT

## 2024-10-02 NOTE — H&P (VIEW-ONLY)
GROSS HEME     AVSS  ON CBI  NO PAIN   STILL LIGHT PINK OFF ELIQUIS  NO CULTURE DONE PLAN CLOT EVAC  CAUTERY THIS EVENING  D/W NURSE       36.6

## 2024-10-02 NOTE — PROGRESS NOTES
Dr Russell - Post Op Cysto/Anesthesia Instructions    POST-OP ANESTHESIA INSTRUCTIONS  Every patient must be accompanied and assisted by a responsible adult upon discharge for today.  Do not take any medication unless specifically prescribed and discussed.  General, Regional, or intravenous sedation anesthesia can leave you feeling very tired.  You may have a decreased appetite. We suggest you resume intake slowly with small amounts when you feel hungry. If there is any doubt or uneasiness, do not eat or drink until you feel like it. Call your doctor for persistent vomiting.  DO NOT DRIVE a car or operate any hazardous machinery for 24 hours.  DO NOT DRINK ANY ALCOHOLIC BEVERAGES for 24 hours or while taking medications.  Be aware there may be occasional dizziness today.  Do not make any important or critical decisions today.  Feel free to discuss any questions with us if you have a question after you get home, or call your doctor.    POST-OP CYSTOSCOPY  Following your Cystoscopy evacuation of clot and cautery of bleeding with the urine may intermittently be blood-tinged be cautious drink plenty of fluids and this will clear do not take your Eliquis until the urine is clear at the minimum of 48 hours and if possible 1 week to allow adequate healing  To encourage kidney and bladder function, you should drink as much fluid as possible, especially water.  If you have difficulty urinating, try sitting in a bathtub of warm water. If you become uncomfortable because you cannot urinate, call your doctor or come to the Emergency Department at the hospital.  Dr Russell's office will call you to schedule an appointment  Continue pre-op medications unless instructed and those prescribed to you.  See note regarding Eliquis above  Call Dr Russell's office at [phone] if any questions, or if in severe pain, nausea/vomiting, fever> 100.5 degrees, or unable to void.  Diet as tolerated.

## 2024-10-02 NOTE — ANESTHESIA PREPROCEDURE EVALUATION
" Anesthesia Evaluation     Patient summary reviewed and Nursing notes reviewed   no history of anesthetic complications:   NPO Solid Status: > 8 hours  NPO Liquid Status: > 2 hours           Airway   Mallampati: II  Dental - normal exam     Pulmonary - negative pulmonary ROS   (+) pneumonia ,  Cardiovascular - negative cardio ROS  Exercise tolerance: good (4-7 METS)    ECG reviewed  PT is on anticoagulation therapy  Rhythm: regular    (+) hypertension, dysrhythmias Atrial Fib, hyperlipidemia      Neuro/Psych- negative ROS  GI/Hepatic/Renal/Endo - negative ROS   (+) renal disease- stones    Musculoskeletal (-) negative ROS    Abdominal    Substance History - negative use     OB/GYN negative ob/gyn ROS         Other      history of cancer remission                  Anesthesia Plan    ASA 3     general     (/67 (BP Location: Left arm, Patient Position: Lying)   Pulse 57   Temp 36.6 °C (97.8 °F) (Oral)   Resp 16   Ht 175.3 cm (69\")   Wt 81.6 kg (180 lb)   SpO2 99%   BMI 26.58 kg/m²     I have reviewed the patient's history with the patient and the chart, including all pertinent laboratory results and imaging. I have explained the risks of anesthesia including but not limited to dental damage, corneal abrasion, nerve injury, MI, stroke, and death.    )  intravenous induction     Anesthetic plan, risks, benefits, and alternatives have been provided, discussed and informed consent has been obtained with: patient.    CODE STATUS:    Code Status (Patient has no pulse and is not breathing): CPR (Attempt to Resuscitate)  Medical Interventions (Patient has pulse or is breathing): Full Support      "

## 2024-10-02 NOTE — PROGRESS NOTES
GROSS HEME     AVSS  ON CBI  NO PAIN   STILL LIGHT PINK OFF ELIQUIS  NO CULTURE DONE PLAN CLOT EVAC  CAUTERY THIS EVENING  D/W NURSE

## 2024-10-02 NOTE — ANESTHESIA POSTPROCEDURE EVALUATION
"Patient: [place]    Procedure Summary       Date: 10/02/24 Room / Location: [facility] OR 01 / [facility] MAIN OR    Anesthesia Start: 1713 Anesthesia Stop: 1751    Procedure: CYSTOSCOPY WITH CLOT EVACUATION, CAUTERY OF BLEEDING (Urethra) Diagnosis:     Surgeons: Joel Russell MD Provider: Paige Hoskins MD    Anesthesia Type: general ASA Status: 3            Anesthesia Type: general    Vitals  Vitals Value Taken Time   /80 10/02/24 1805   Temp 37 °C (98.6 °F) 10/02/24 1755   Pulse 58 10/02/24 1812   Resp     SpO2 99 % 10/02/24 1812   Vitals shown include unfiled device data.        Post Anesthesia Care and Evaluation    Patient location during evaluation: bedside  Patient participation: complete - patient participated  Level of consciousness: awake  Pain management: adequate    Airway patency: patent  Anesthetic complications: No anesthetic complications  PONV Status: controlled  Cardiovascular status: acceptable  Respiratory status: acceptable  Hydration status: acceptable    Comments: /80   Pulse 64   Temp 37 °C (98.6 °F)   Resp 16   Ht 175.3 cm (69\")   Wt 81.6 kg (180 lb)   SpO2 100%   BMI 26.58 kg/m²       "

## 2024-10-02 NOTE — PROGRESS NOTES
REUBEN PALM Attestation Note    I supervised care provided by the midlevel provider.    The SINDI and I have discussed this patient's history, physical exam, and treatment plan. I have reviewed the documentation and personally had a face to face interaction with the patient  I affirm the documentation and agree with the treatment and plan. I provided a substantive portion of the care of this patient.  I personally performed the physical exam, in its entirety.  My personal findings are documented in below:    History:  74-year-old male admitted for hematuria has required CBI overnight.  Feels well this morning.    Physical Exam:  General: No acute distress.  HENT: NCAT, PERRL, Nares patent.  Eyes: no scleral icterus.  Neck: trachea midline, no ROM limitations.  CV: Pink warm and well-perfused throughout  Respiratory: No distress or increased work of breathing  Abdomen: soft, no focal tenderness.  Benign.  Uro-: Clear liquid in Paz collection bag  Musculoskeletal: no deformity.  Neuro: alert, moves all extremities, follows commands.  Skin: warm, dry.    Assessment and Plan:  Hematuria: To OR with Dr. Russell, urology today

## 2024-10-02 NOTE — PLAN OF CARE
Goal Outcome Evaluation:               Pt admitted to Observation for hematuria. Pt here for CBI and Urology is following pt. Holding pt Eliquis per order. Pts output light pink to clear. Pts input increased as needed. Pt tolerating CBI well and does not complain of any pain at this time. Pts catheter care completed and no redness/ pain at cathete site. Urology to see patient today.

## 2024-10-03 ENCOUNTER — READMISSION MANAGEMENT (OUTPATIENT)
Dept: CALL CENTER | Facility: HOSPITAL | Age: 74
End: 2024-10-03
Payer: MEDICARE

## 2024-10-03 VITALS
RESPIRATION RATE: 18 BRPM | DIASTOLIC BLOOD PRESSURE: 70 MMHG | HEART RATE: 70 BPM | SYSTOLIC BLOOD PRESSURE: 124 MMHG | BODY MASS INDEX: 26.66 KG/M2 | TEMPERATURE: 97.8 F | HEIGHT: 69 IN | WEIGHT: 180 LBS | OXYGEN SATURATION: 97 %

## 2024-10-03 PROCEDURE — 25810000003 LACTATED RINGERS PER 1000 ML: Performed by: PHYSICIAN ASSISTANT

## 2024-10-03 PROCEDURE — A9270 NON-COVERED ITEM OR SERVICE: HCPCS | Performed by: UROLOGY

## 2024-10-03 PROCEDURE — G0378 HOSPITAL OBSERVATION PER HR: HCPCS

## 2024-10-03 PROCEDURE — 63710000001 LISINOPRIL 20 MG TABLET: Performed by: UROLOGY

## 2024-10-03 RX ORDER — CEPHALEXIN 500 MG/1
500 CAPSULE ORAL 3 TIMES DAILY
Qty: 21 CAPSULE | Refills: 0 | Status: SHIPPED | OUTPATIENT
Start: 2024-10-03 | End: 2024-10-10

## 2024-10-03 RX ORDER — SODIUM CHLORIDE, SODIUM LACTATE, POTASSIUM CHLORIDE, CALCIUM CHLORIDE 600; 310; 30; 20 MG/100ML; MG/100ML; MG/100ML; MG/100ML
100 INJECTION, SOLUTION INTRAVENOUS CONTINUOUS
Status: DISCONTINUED | OUTPATIENT
Start: 2024-10-03 | End: 2024-10-03 | Stop reason: HOSPADM

## 2024-10-03 RX ADMIN — LISINOPRIL 40 MG: 20 TABLET ORAL at 10:22

## 2024-10-03 RX ADMIN — SODIUM CHLORIDE, POTASSIUM CHLORIDE, SODIUM LACTATE AND CALCIUM CHLORIDE 100 ML/HR: 600; 310; 30; 20 INJECTION, SOLUTION INTRAVENOUS at 14:08

## 2024-10-03 NOTE — PROGRESS NOTES
"  [facility] Progress Note    Chief Complaint: No complaints    Doing well through the night.  No catheter issues.  No significant hematuria.  No clots.  E flux is very clear.    Review of Systems:    The following systems were reviewed and negative;  respiratory, cardiovascular, and gastrointestinal          Vital Signs  /65 (BP Location: Left arm, Patient Position: Lying)   Pulse 58   Temp 98.4 °F (36.9 °C) (Oral)   Resp 20   Ht 175.3 cm (69\")   Wt 81.6 kg (180 lb)   SpO2 95%   BMI 26.58 kg/m²     Physical Exam:     General Appearance:    Alert, cooperative, NAD   HEENT:    No trauma, pupils reactive, hearing intact   Back:     No CVA tenderness   Lungs:     Respirations unlabored, no wheezing    Heart:    RRR, intact peripheral pulses   Abdomen:   Soft benign   :  External genitalia normal   Extremities:   No edema, no deformity   Lymphatic:   No neck or groin LAD   Skin:   No bleeding, bruising or rashes   Neuro/Psych:   Orientation intact, mood/affect pleasant, no focal findings        Results Review:     I reviewed the patient's new clinical results.  Lab Results (last 24 hours)       ** No results found for the last 24 hours. **          Imaging Results (Last 24 Hours)       ** No results found for the last 24 hours. **            Medication Review:   I have personally reviewed    Current Facility-Administered Medications:     acetaminophen (TYLENOL) tablet 650 mg, 650 mg, Oral, Q4H PRN, Joel Russell MD    [Held by provider] apixaban (ELIQUIS) tablet 5 mg, 5 mg, Oral, Q12H, Joel Russell MD    sennosides-docusate (PERICOLACE) 8.6-50 MG per tablet 2 tablet, 2 tablet, Oral, BID PRN **AND** polyethylene glycol (MIRALAX) packet 17 g, 17 g, Oral, Daily PRN **AND** bisacodyl (DULCOLAX) EC tablet 5 mg, 5 mg, Oral, Daily PRN **AND** bisacodyl (DULCOLAX) suppository 10 mg, 10 mg, Rectal, Daily PRN, Joel Russell MD    Calcium Replacement - Follow Nurse / BPA Driven Protocol, , Does " not apply, PRDrew DEAL Morris B, MD    dextrose 5 % and lactated Ringer's infusion, 100 mL/hr, Intravenous, Continuous, Joel Russell MD, Last Rate: 100 mL/hr at 10/02/24 1012, 100 mL/hr at 10/02/24 1012    lisinopril (PRINIVIL,ZESTRIL) tablet 40 mg, 40 mg, Oral, Daily, Joel Russell MD, 40 mg at 10/02/24 1012    Magnesium Standard Dose Replacement - Follow Nurse / BPA Driven Protocol, , Does not apply, PRDrew DEAL Morris B, MD    melatonin tablet 5 mg, 5 mg, Oral, Nightly PRN, Joel Russell MD    ondansetron (ZOFRAN) injection 4 mg, 4 mg, Intravenous, Q6H PRN, Joel Russell MD    Phosphorus Replacement - Follow Nurse / BPA Driven Protocol, , Does not apply, PRN, Joel Russell MD    Potassium Replacement - Follow Nurse / BPA Driven Protocol, , Does not apply, PRDrew DEAL Morris B, MD    pravastatin (PRAVACHOL) tablet 40 mg, 40 mg, Oral, Nightly, Joel Russell MD, 40 mg at 10/02/24 2029    sodium chloride 0.9 % flush 10 mL, 10 mL, Intravenous, Q12H, Joel Russell MD, 10 mL at 10/02/24 2031    sodium chloride 0.9 % flush 10 mL, 10 mL, Intravenous, PRN, Joel Russell MD    sodium chloride 0.9 % infusion 40 mL, 40 mL, Intravenous, PRN, Joel Russell MD    Allergies:    Penicillins    Assessment:    Active Problems:    Hematuria       Irradiation cystitis with gross hematuria status post cystoscopy clot evacuation fulguration    Plan:    Paz out today.  Okay to discharge from our standpoint.  He will follow-up with Dr. Russell in a couple weeks to recheck his urine.  Home on oral antibiotics for about 1 week just to cover his procedure.    I take care of his antibiotics.  And office will call the patient for follow-up.  Resume Eliquis if urine remains clear once home.      Robbi Horn MD    10/3/2024  07:27 EDT

## 2024-10-03 NOTE — DISCHARGE SUMMARY
ED OBSERVATION PROGRESS/DISCHARGE SUMMARY    Date of Admission: 10/1/2024   LOS: 0 days   PCP: Chloe Arriaga MD    Final Diagnosis hematuria      Subjective     Hospital Outcome: Discharge    74-year-old male admitted to the observation unit for hematuria.  He was started on a CBI.  Initial workup in the emergency department is unremarkable other than a glucose of 183, and WBCs of 13.47.  CT abdomen pelvis stone protocol shows mild bladder distention around a Paz catheter with approximately 4 cm heterogeneous hyperdense structure along the ventral aspect of the Paz catheter, which may reflect blood products/clot, although neoplasm cannot be excluded.  There is also mild bladder wall thickening, which may be at least in part due to poor distention with cystitis and neoplasm also not excluded.  Nonobstructing left renal stones and right sided bladder stones.  No hydronephrosis.  Urology was consulted and they have seen the patient.     10/2: Patient seen and evaluated [facility] Russell [facility] who recommends cystoscopy this afternoon, anticipates patient to require continuous bladder irrigation overnight with anticipated discharge tomorrow.  Patient has been n.p.o. since midnight and is agreeable to plan       ROS:  General: no fevers, chills  Respiratory: no cough, dyspnea  Cardiovascular: no chest pain, palpitations  Abdomen: No abdominal pain, nausea, vomiting, or diarrhea  Neurologic: No focal weakness    10/3/2024.    6:18 PM.  Patient has been cleared by urology.  Patient had a cystoscope yesterday and continuous bladder irrigation.  Urine is now clear.  Patient can have catheter removed and can be discharged to follow-up with urology as an outpatient.    Objective   Physical Exam:  I have reviewed the vital signs.  Temp:  [97.8 °F (36.6 °C)-99.1 °F (37.3 °C)] 97.8 °F (36.6 °C)  Heart Rate:  [56-70] 70  Resp:  [16-20] 18  BP: (112-159)/(65-81) 124/70  General Appearance:    Alert,  cooperative, no distress  Head:    Normocephalic, atraumatic  Eyes:    Sclerae anicteric  Neck:   Supple, no mass  Lungs: Clear to auscultation bilaterally, respirations unlabored  Heart: Regular rate and rhythm, S1 and S2 normal, no murmur, rub or gallop  Abdomen:  Soft, nontender, bowel sounds active, nondistended  Extremities: No clubbing, cyanosis, or edema to lower extremities  Pulses:  2+ and symmetric in distal lower extremities  Skin: No rashes   Neurologic: Oriented x3, Normal strength to extremities    Results Review:    I have reviewed the labs, radiology results and diagnostic studies.    Results from last 7 days   Lab Units 10/02/24  0412   WBC 10*3/mm3 11.28*   HEMOGLOBIN g/dL 14.1   HEMATOCRIT % 42.4   PLATELETS 10*3/mm3 203     Results from last 7 days   Lab Units 10/02/24  0412 10/01/24  0535   SODIUM mmol/L 139 139   POTASSIUM mmol/L 3.6 3.8   CHLORIDE mmol/L 105 102   CO2 mmol/L 23.4 26.4   BUN mg/dL 17 14   CREATININE mg/dL 1.03 1.20   CALCIUM mg/dL 9.0 9.4   BILIRUBIN mg/dL  --  0.7   ALK PHOS U/L  --  96   ALT (SGPT) U/L  --  27   AST (SGOT) U/L  --  24   GLUCOSE mg/dL 126* 183*     Imaging Results (Last 24 Hours)       ** No results found for the last 24 hours. **            I have reviewed the medications.  ---------------------------------------------------------------------------------------------  Assessment & Plan   Assessment/Problem List    Hematuria      Plan:    Hematuria  Leukocytosis  History of bladder cancer, prostate cancer status post prostatectomy  -Had spot on his bladder removed 1 month ago with [facility].  -white blood cell count of 13.4 with increased neutrophils, otherwise unremarkable CBC and CMP for acute findings.   - UA has too numerous to count RBCs and microscopic unable to determine given blood present in urine.    -Patient is afebrile, pulse in the 80s, on room air oxygen 98% SpO2 and blood pressure 180s over 104.    -Patient has been cleared by  urology.  -Urine clear patient being greater than 200 cc of clear urine.  -Patient wants to go home and the plan will be as above.     History of A-fib   -Continue to hold Eliquis for 2 more days     Hyperlipidemia  -Continue home statin     Essential hypertension, chronic, poorly controlled  -Continue home lisinopril, monitor        Disposition: Anticipate patient to be discharged home tomorrow  This note will serve as a progress note    Disposition: Discharge    Follow-up after [facility]charge: [facility]    This note will serve as a discharge summary and progress note    Jose Enrique Ramirez III, PA 10/03/24 18:20 EDT    I have worn appropriate PPE during this patient encounter, sanitized my hands both with entering and exiting patient's room.      31 minutes has been spent by [facility] providers in the care of this patient while under observation status

## 2024-10-03 NOTE — NURSING NOTE
Bladder scan completed since pt has not urinated since removing catheter. Scan measured 0mL. Initiating IV fluids.

## 2024-10-03 NOTE — PLAN OF CARE
Goal Outcome Evaluation:               Pt admitted to Observation for hematuria. Pt here for CBI after cystoscopy on 10/2/24. Urology following patient. Holding Elquis still per order. Pts output is a light pink to clear. Pts input is increased as needed. Pt has been tolerating CBI and has no complaints of pain at this time. Pts catheter care completed with no redness/pain. Urology to follow up with patient.

## 2024-10-03 NOTE — PLAN OF CARE
Goal Outcome Evaluation:      Pt catheter removed this morning around 0900. Output has been measured throughout the day. Continuous IVF initiated. Pt has been able to urinate independently. Discharge pending.

## 2024-10-03 NOTE — PROGRESS NOTES
REUBEN PALM Attestation Note     I supervised care provided by the midlevel provider. We have discussed this patient's history, physical exam, and treatment plan. I have reviewed the midlevel provider's note and I agree with the midlevel provider's findings and plan of care.   SHARED VISIT: This visit was performed by BOTH a physician and an APC. The substantive portion of the medical decision making was performed by this attesting physician who made or approved the management plan and takes responsibility for patient management. All studies in the APC note (if performed) were independently interpreted by me.   I have personally had a face to face encounter with the patient.   My personal findings are documented below:      Subjective  Pt is a 74 y.o. male admitted from Emergency Department for evaluation and treatment of 74-year-old male with history of anticoagulation on Eliquis presented several days ago with urinary retention and gross hematuria.  Patient has undergone continuous bladder irrigation and cystoscopy by Dr. Brian Russell.  Today he is doing much better and urine is cleared.    Physical Exam  GENERAL: Alert and in NAD, Vitals reviewed-blood pressure 130/80, pulse 56.  Temperature and O2 sats benign  HENT: nares patent  EYES: no scleral icterus  CV: regular rhythm, regular rate-no murmur  RESPIRATORY: normal effort  ABDOMEN: soft  MUSCULOSKELETAL: no deformity  NEURO: Strength sensation and coordination are grossly intact.  Speech and mentation are unremarkable  SKIN: warm, dry    Assessment/Plan  I discussed tx and evaluation of this patient with WALT Ramirez.  Patient improved after cystoscopy yesterday and continuous bladder irrigation.  Urine is grossly clear.  Appreciate urology consultation by Dr. Henrique Horn who feels that patient can have catheter removed and be discharged with outpatient follow-up.  Okay to resume anticoagulation if urine remains clear.

## 2024-10-03 NOTE — DISCHARGE INSTRUCTIONS
Continue drink plenty of fluids to ensure you are voiding routinely and not becoming dehydrated.  Recommend holding your Eliquis for a total of 3 days.  [facility] closely with [facility] on an outpatient basis.  Return to the ER with any further hematuria, clotting, or urinary retention.

## 2024-10-04 ENCOUNTER — TRANSITIONAL CARE MANAGEMENT TELEPHONE ENCOUNTER (OUTPATIENT)
Dept: CALL CENTER | Facility: HOSPITAL | Age: 74
End: 2024-10-04
Payer: MEDICARE

## 2024-10-04 ENCOUNTER — TELEPHONE (OUTPATIENT)
Dept: FAMILY MEDICINE CLINIC | Facility: CLINIC | Age: 74
End: 2024-10-04

## 2024-10-04 NOTE — OUTREACH NOTE
Call Center TCM Note      Flowsheet Row Responses   [facility] patient discharged from? [place]   Does the patient have one of the following disease processes/diagnoses(primary or secondary)? Other   TCM attempt successful? Yes   Call start time 1014   Call end time 1024   Discharge diagnosis CYSTOSCOPY WITH CLOT EVACUATION   Person spoke with today (if not patient) and relationship Nanette and Pt   Meds reviewed with patient/caregiver? Yes   Is the patient having any side effects they believe may be caused by any medication additions or changes? No   Does the patient have all medications ordered at discharge? Yes   Is the patient taking all medications as directed (includes completed medication regime)? Yes   Comments HOSP DC FU appt 10/8/24 930 am.   Does the patient have an appointment with their PCP within 7-14 days of discharge? Yes   Has home health visited the patient within 72 hours of discharge? N/A   Psychosocial issues? No   Did the patient receive a copy of their discharge instructions? Yes   Nursing interventions Reviewed instructions with patient   What is the patient's perception of their health status since discharge? Improving   Is the patient/caregiver able to teach back signs and symptoms related to disease process for when to call PCP? Yes   Is the patient/caregiver able to teach back signs and symptoms related to disease process for when to call 911? Yes   Is the patient/caregiver able to teach back the hierarchy of who to call/visit for symptoms/problems? PCP, Specialist, Home health nurse, Urgent Care, ED, 911 Yes   TCM call completed? Yes   Wrap up additional comments Pt reports that he had only a small amt of blood in urine. He will discuss with Urology/ cardio  on restarting eliquis   Call end time 1024            Saida Kilgore RN    10/4/2024, 10:24 EDT

## 2024-10-04 NOTE — TELEPHONE ENCOUNTER
Caller: [place]    Relationship: Self    Best call back number: [phone]     What is the best time to reach you: ANYTIME    Who are you requesting to speak with (clinical staff, provider,  specific staff member): CLINICAL    What was the call regarding: PATIENT IS SCHEDULED FOR COVID AND FLU SHOT AT HIS PHARMACY ON MONDAY 10/07/24 AND IS WANTING TO MAKE SURE THAT IT IS STILL OK FOR HIM TO GET THOSE. HE HAS APPOINTMENT WITH DR BARRY ON 10/08/24 FOR HOSPITAL FOLLOW UP.    PLEASE CALL TO ADVISE.     Is it okay if the provider responds through [employer]: THIS IS FINE WITH THE PATIENT.

## 2024-10-04 NOTE — OUTREACH NOTE
Prep Survey      Flowsheet Row Responses   [facility] facility patient discharged from? [place]   Is LACE score < 7 ? Yes   Eligibility [facility]   Date of Admission 10/01/24   Date of Discharge 10/03/24   Discharge Disposition Home or Self Care   Discharge diagnosis CYSTOSCOPY WITH CLOT EVACUATION   Does the patient have one of the following disease processes/diagnoses(primary or secondary)? Other   Does the patient have Home health ordered? No   Is there a DME ordered? No   Prep survey completed? Yes            SHAWN WONG - Registered Nurse

## 2024-10-08 ENCOUNTER — OFFICE VISIT (OUTPATIENT)
Dept: FAMILY MEDICINE CLINIC | Facility: CLINIC | Age: 74
End: 2024-10-08
Payer: MEDICARE

## 2024-10-08 VITALS
HEIGHT: 69 IN | TEMPERATURE: 96.8 F | HEART RATE: 50 BPM | OXYGEN SATURATION: 97 % | DIASTOLIC BLOOD PRESSURE: 80 MMHG | RESPIRATION RATE: 18 BRPM | BODY MASS INDEX: 27.3 KG/M2 | SYSTOLIC BLOOD PRESSURE: 122 MMHG | WEIGHT: 184.3 LBS

## 2024-10-08 DIAGNOSIS — Z09 HOSPITAL DISCHARGE FOLLOW-UP: Primary | ICD-10-CM

## 2024-10-08 DIAGNOSIS — E78.2 MIXED HYPERLIPIDEMIA: ICD-10-CM

## 2024-10-08 DIAGNOSIS — Z85.46 HISTORY OF PROSTATE CANCER: ICD-10-CM

## 2024-10-08 DIAGNOSIS — Z90.79 S/P PROSTATECTOMY: ICD-10-CM

## 2024-10-08 DIAGNOSIS — I10 PRIMARY HYPERTENSION: ICD-10-CM

## 2024-10-08 DIAGNOSIS — I48.0 PAROXYSMAL ATRIAL FIBRILLATION: ICD-10-CM

## 2024-10-08 DIAGNOSIS — R31.0 GROSS HEMATURIA: ICD-10-CM

## 2024-10-08 DIAGNOSIS — Z85.51 HISTORY OF BLADDER CANCER: ICD-10-CM

## 2024-10-08 PROCEDURE — 99495 TRANSJ CARE MGMT MOD F2F 14D: CPT | Performed by: STUDENT IN AN ORGANIZED HEALTH CARE EDUCATION/TRAINING PROGRAM

## 2024-10-08 PROCEDURE — 1159F MED LIST DOCD IN RCRD: CPT | Performed by: STUDENT IN AN ORGANIZED HEALTH CARE EDUCATION/TRAINING PROGRAM

## 2024-10-08 PROCEDURE — 1126F AMNT PAIN NOTED NONE PRSNT: CPT | Performed by: STUDENT IN AN ORGANIZED HEALTH CARE EDUCATION/TRAINING PROGRAM

## 2024-10-08 PROCEDURE — 3074F SYST BP LT 130 MM HG: CPT | Performed by: STUDENT IN AN ORGANIZED HEALTH CARE EDUCATION/TRAINING PROGRAM

## 2024-10-08 PROCEDURE — 1160F RVW MEDS BY RX/DR IN RCRD: CPT | Performed by: STUDENT IN AN ORGANIZED HEALTH CARE EDUCATION/TRAINING PROGRAM

## 2024-10-08 PROCEDURE — 1111F DSCHRG MED/CURRENT MED MERGE: CPT | Performed by: STUDENT IN AN ORGANIZED HEALTH CARE EDUCATION/TRAINING PROGRAM

## 2024-10-08 PROCEDURE — 3079F DIAST BP 80-89 MM HG: CPT | Performed by: STUDENT IN AN ORGANIZED HEALTH CARE EDUCATION/TRAINING PROGRAM

## 2024-10-08 NOTE — PROGRESS NOTES
Chief Complaint  Hospital Follow Up Visit    Subjective    History of Present Illness  The patient presents for evaluation of multiple medical concerns.    He reports feeling well overall, with improved urinary control since the removal of his catheter. He was discharged from the hospital on Thursday and has not observed any blood in his urine since Friday. He attributes the previous presence of blood to irritation from the catheter. His initial hospital visit was due to a full bladder and inability to urinate. He was informed that the blood in his urine was likely due to clots formed over time from past procedures. He underwent a procedure to scrape his bladder while in the hospital. He occasionally experiences a spraying effect during urination, which he finds bothersome. He notes an increased urgency to urinate but reports no burning sensation or difficulty urinating. He is currently on Cephalexin and has five days remaining on this course. He plans to resume taking Eliquis once his urine is clear.    He has a history of prostate removal, after which his PSA levels began to rise slightly. A scan detected a suspicious spot, which was treated with radiation at [facility]. He has been regularly monitored for bladder stones, which have not caused any issues.    He had a history of E. coli infection two years ago, which led to atrial fibrillation during his hospital stay. His cardiologist attributed this to stress from the infection and prescribed Eliquis as a precaution. He was advised to stop taking Eliquis upon discharge and has not resumed it since. He was also advised to consult his cardiologist about continuing Eliquis. His last cardiology appointment was in early September 2024.    He is scheduled for a wellness checkup in November 2024 and has a lab appointment set for the same day. He has an upcoming appointment with Dr. Russell.       [place] presents to [facility]  "CARE  History of Present Illness    Objective   Vital Signs:  /80 (BP Location: Left arm, Patient Position: Sitting, Cuff Size: Adult)   Pulse 50   Temp 96.8 °F (36 °C) (Temporal)   Resp 18   Ht 175.3 cm (69.02\")   Wt 83.6 kg (184 lb 4.8 oz)   SpO2 97%   BMI 27.20 kg/m²   Estimated body mass index is 27.2 kg/m² as calculated from the following:    Height as of this encounter: 175.3 cm (69.02\").    Weight as of this encounter: 83.6 kg (184 lb 4.8 oz).    BMI is >= 25 and <30. (Overweight) The following options were offered after discussion;: exercise counseling/recommendations and nutrition counseling/recommendations      Physical Exam  Eyes:      Extraocular Movements: Extraocular movements intact.      Conjunctiva/sclera: Conjunctivae normal.      Pupils: Pupils are equal, round, and reactive to light.   Cardiovascular:      Rate and Rhythm: Normal rate.      Pulses: Normal pulses.      Heart sounds: Normal heart sounds.   Pulmonary:      Effort: Pulmonary effort is normal.      Breath sounds: Normal breath sounds.   Abdominal:      General: Bowel sounds are normal. There is no distension.      Palpations: Abdomen is soft.      Tenderness: There is no abdominal tenderness. There is no right CVA tenderness, left CVA tenderness or guarding.   Skin:     General: Skin is warm.   Neurological:      Mental Status: He is alert and oriented to person, place, and time.        Result Review :                Assessment and Plan   Diagnoses and all orders for this visit:    1. Hospital discharge follow-up (Primary)    2. Primary hypertension    3. Mixed hyperlipidemia    4. Paroxysmal atrial fibrillation    5. Gross hematuria    6. History of prostate cancer    7. History of bladder cancer    8. S/P prostatectomy        Assessment & Plan  1. Post-hospitalization follow-up.  He reports improvement in his urinary symptoms since being discharged from the hospital on 10/03/2024. He was initially unable to urinate due " to clots from previous procedures, which led to the insertion of a catheter. He has not noticed any blood in his urine since 10/04/2024. He is currently taking Cephalexin and has approximately 5 days left of the course. He is advised to continue taking Eliquis for A. fib unless he notices blood in his urine, in which case he should stop and consult his urologist. He should monitor the color of his urine and maintain adequate hydration. His blood pressure is well-controlled on Lisinopril 40 mg, and his cholesterol is managed with Pravastatin 40 mg. He is due for cholesterol labs, which will be done before his annual wellness check in November.    2. Urinary urgency.  He continues to experience some urgency when urinating, which has decreased considerably. This may be due to irritation from the catheter. He is currently on Cephalexin, which should help resolve any remaining infection or irritation. He is advised to complete the antibiotic course and follow up with Dr. Russell in a couple of weeks for further evaluation.    3. Atrial Fibrillation.  He is advised to continue taking Eliquis unless he notices blood in his urine. If blood is detected, he should stop Eliquis and consult his urologist. He is reminded of the importance of taking Eliquis to prevent clot formation and potential stroke. He should monitor his urine color closely.    4. Health Maintenance.  He is scheduled for lab tests prior to his annual wellness check in November. His cholesterol levels were last checked in 02/2024, and he is due for a recheck.    Follow-up  Return in a couple of weeks for follow-up with Dr. Russell.            Follow Up   No follow-ups on file.  Patient was given instructions and counseling regarding his condition or for health maintenance advice. Please see specific information pulled into the AVS if appropriate.     Patient or patient representative verbalized consent for the use of Ambient Listening during the visit with   Chloe Arriaga MD for chart documentation. 10/8/2024  11:34 EDT

## 2024-10-18 DIAGNOSIS — E78.5 HYPERLIPIDEMIA, UNSPECIFIED HYPERLIPIDEMIA TYPE: ICD-10-CM

## 2024-10-18 RX ORDER — PRAVASTATIN SODIUM 40 MG
40 TABLET ORAL NIGHTLY
Qty: 90 TABLET | Refills: 3 | Status: SHIPPED | OUTPATIENT
Start: 2024-10-18

## 2024-10-30 ENCOUNTER — APPOINTMENT (OUTPATIENT)
Dept: CT IMAGING | Facility: HOSPITAL | Age: 74
End: 2024-10-30
Payer: MEDICARE

## 2024-10-30 ENCOUNTER — HOSPITAL ENCOUNTER (OUTPATIENT)
Facility: HOSPITAL | Age: 74
Setting detail: OBSERVATION
Discharge: HOME | End: 2024-10-31
Attending: EMERGENCY MEDICINE | Admitting: EMERGENCY MEDICINE
Payer: MEDICARE

## 2024-10-30 DIAGNOSIS — R31.0 GROSS HEMATURIA: Primary | ICD-10-CM

## 2024-10-30 DIAGNOSIS — N30.01 ACUTE CYSTITIS WITH HEMATURIA: ICD-10-CM

## 2024-10-30 LAB
ANION GAP SERPL CALCULATED.3IONS-SCNC: 10.6 MMOL/L (ref 5–15)
BACTERIA UR QL AUTO: ABNORMAL /HPF
BASOPHILS # BLD AUTO: 0.03 10*3/MM3 (ref 0–0.2)
BASOPHILS NFR BLD AUTO: 0.2 % (ref 0–1.5)
BILIRUB UR QL STRIP: NEGATIVE
BUN SERPL-MCNC: 11 MG/DL (ref 8–23)
BUN/CREAT SERPL: 9.9 (ref 7–25)
CALCIUM SPEC-SCNC: 9.9 MG/DL (ref 8.6–10.5)
CHLORIDE SERPL-SCNC: 102 MMOL/L (ref 98–107)
CLARITY UR: ABNORMAL
CO2 SERPL-SCNC: 24.4 MMOL/L (ref 22–29)
COLOR UR: ABNORMAL
CREAT SERPL-MCNC: 1.11 MG/DL (ref 0.76–1.27)
DEPRECATED RDW RBC AUTO: 41.5 FL (ref 37–54)
EGFRCR SERPLBLD CKD-EPI 2021: 68.8 ML/MIN/1.73
EOSINOPHIL # BLD AUTO: 0.01 10*3/MM3 (ref 0–0.4)
EOSINOPHIL NFR BLD AUTO: 0.1 % (ref 0.3–6.2)
ERYTHROCYTE [DISTWIDTH] IN BLOOD BY AUTOMATED COUNT: 12.1 % (ref 12.3–15.4)
GLUCOSE SERPL-MCNC: 144 MG/DL (ref 65–99)
GLUCOSE UR STRIP-MCNC: NEGATIVE MG/DL
HCT VFR BLD AUTO: 47.1 % (ref 37.5–51)
HGB BLD-MCNC: 16.3 G/DL (ref 13–17.7)
HGB UR QL STRIP.AUTO: ABNORMAL
HYALINE CASTS UR QL AUTO: ABNORMAL /LPF
IMM GRANULOCYTES # BLD AUTO: 0.08 10*3/MM3 (ref 0–0.05)
IMM GRANULOCYTES NFR BLD AUTO: 0.6 % (ref 0–0.5)
KETONES UR QL STRIP: NEGATIVE
LEUKOCYTE ESTERASE UR QL STRIP.AUTO: ABNORMAL
LYMPHOCYTES # BLD AUTO: 0.72 10*3/MM3 (ref 0.7–3.1)
LYMPHOCYTES NFR BLD AUTO: 5 % (ref 19.6–45.3)
MCH RBC QN AUTO: 32 PG (ref 26.6–33)
MCHC RBC AUTO-ENTMCNC: 34.6 G/DL (ref 31.5–35.7)
MCV RBC AUTO: 92.5 FL (ref 79–97)
MONOCYTES # BLD AUTO: 0.73 10*3/MM3 (ref 0.1–0.9)
MONOCYTES NFR BLD AUTO: 5 % (ref 5–12)
NEUTROPHILS NFR BLD AUTO: 12.93 10*3/MM3 (ref 1.7–7)
NEUTROPHILS NFR BLD AUTO: 89.1 % (ref 42.7–76)
NITRITE UR QL STRIP: POSITIVE
NRBC BLD AUTO-RTO: 0 /100 WBC (ref 0–0.2)
PH UR STRIP.AUTO: 5.5 [PH] (ref 5–8)
PLATELET # BLD AUTO: 258 10*3/MM3 (ref 140–450)
PMV BLD AUTO: 10.9 FL (ref 6–12)
POTASSIUM SERPL-SCNC: 4.1 MMOL/L (ref 3.5–5.2)
PROT UR QL STRIP: ABNORMAL
RBC # BLD AUTO: 5.09 10*6/MM3 (ref 4.14–5.8)
RBC # UR STRIP: ABNORMAL /HPF
REF LAB TEST METHOD: ABNORMAL
SODIUM SERPL-SCNC: 137 MMOL/L (ref 136–145)
SP GR UR STRIP: 1.02 (ref 1–1.03)
SQUAMOUS #/AREA URNS HPF: ABNORMAL /HPF
UROBILINOGEN UR QL STRIP: ABNORMAL
WBC # UR STRIP: ABNORMAL /HPF
WBC NRBC COR # BLD AUTO: 14.5 10*3/MM3 (ref 3.4–10.8)

## 2024-10-30 PROCEDURE — 25010000002 CEFTRIAXONE PER 250 MG: Performed by: EMERGENCY MEDICINE

## 2024-10-30 PROCEDURE — G0378 HOSPITAL OBSERVATION PER HR: HCPCS

## 2024-10-30 PROCEDURE — 51798 US URINE CAPACITY MEASURE: CPT

## 2024-10-30 PROCEDURE — 25010000002 HYDROMORPHONE 1 MG/ML SOLUTION: Performed by: NURSE PRACTITIONER

## 2024-10-30 PROCEDURE — 99285 EMERGENCY DEPT VISIT HI MDM: CPT

## 2024-10-30 PROCEDURE — 87086 URINE CULTURE/COLONY COUNT: CPT | Performed by: EMERGENCY MEDICINE

## 2024-10-30 PROCEDURE — 74176 CT ABD & PELVIS W/O CONTRAST: CPT

## 2024-10-30 PROCEDURE — 96375 TX/PRO/DX INJ NEW DRUG ADDON: CPT

## 2024-10-30 PROCEDURE — 81001 URINALYSIS AUTO W/SCOPE: CPT | Performed by: EMERGENCY MEDICINE

## 2024-10-30 PROCEDURE — 96365 THER/PROPH/DIAG IV INF INIT: CPT

## 2024-10-30 PROCEDURE — 80048 BASIC METABOLIC PNL TOTAL CA: CPT | Performed by: EMERGENCY MEDICINE

## 2024-10-30 PROCEDURE — 85025 COMPLETE CBC W/AUTO DIFF WBC: CPT | Performed by: EMERGENCY MEDICINE

## 2024-10-30 RX ORDER — LISINOPRIL 20 MG/1
40 TABLET ORAL DAILY
Status: DISCONTINUED | OUTPATIENT
Start: 2024-10-31 | End: 2024-10-31 | Stop reason: HOSPADM

## 2024-10-30 RX ORDER — SODIUM CHLORIDE 0.9 % (FLUSH) 0.9 %
10 SYRINGE (ML) INJECTION AS NEEDED
Status: DISCONTINUED | OUTPATIENT
Start: 2024-10-30 | End: 2024-10-31 | Stop reason: HOSPADM

## 2024-10-30 RX ORDER — PRAVASTATIN SODIUM 40 MG
40 TABLET ORAL NIGHTLY
Status: DISCONTINUED | OUTPATIENT
Start: 2024-10-30 | End: 2024-10-31 | Stop reason: HOSPADM

## 2024-10-30 RX ORDER — SODIUM CHLORIDE 0.9 % (FLUSH) 0.9 %
10 SYRINGE (ML) INJECTION EVERY 12 HOURS SCHEDULED
Status: DISCONTINUED | OUTPATIENT
Start: 2024-10-30 | End: 2024-10-31 | Stop reason: HOSPADM

## 2024-10-30 RX ORDER — LIDOCAINE HYDROCHLORIDE 20 MG/ML
JELLY TOPICAL ONCE
Status: COMPLETED | OUTPATIENT
Start: 2024-10-30 | End: 2024-10-30

## 2024-10-30 RX ADMIN — Medication 10 ML: at 14:27

## 2024-10-30 RX ADMIN — CEFTRIAXONE 2000 MG: 2 INJECTION, POWDER, FOR SOLUTION INTRAMUSCULAR; INTRAVENOUS at 13:01

## 2024-10-30 RX ADMIN — HYDROMORPHONE HYDROCHLORIDE 1 MG: 1 INJECTION, SOLUTION INTRAMUSCULAR; INTRAVENOUS; SUBCUTANEOUS at 14:25

## 2024-10-30 RX ADMIN — LIDOCAINE HYDROCHLORIDE: 20 JELLY TOPICAL at 15:14

## 2024-10-30 RX ADMIN — PRAVASTATIN SODIUM 40 MG: 40 TABLET ORAL at 21:00

## 2024-10-30 RX ADMIN — Medication 10 ML: at 21:08

## 2024-10-30 NOTE — ED PROVIDER NOTES
EMERGENCY DEPARTMENT MD ATTESTATION NOTE    Room Number:  19/19  PCP: Chloe Arriaga MD  Independent Historians: Patient and Family    HPI:  A complete HPI/ROS/PMH/PSH/SH/FH are unobtainable due to: None    Chronic or social conditions impacting patient care (Social Determinants of Health): None      Context: [place] is a 74 y.o. male with a medical history of atrial fibrillation, anticoagulated on Eliquis who presents to the ED c/o acute hematuria.  The patient reports he woke up this morning and felt like he needed to urinate.  He states he had bladder pressure.  He states he was eventually able to urinate and it was grossly bloody.  He reports a similar episode about a month ago.  He states that he feels much better now that he has been able to urinate.  He reports that 1 month ago he was told to stop his Eliquis for 2 days.  He states he stopped it for a longer period of time and actually just restarted it 1 week ago.  He notes he had no clots in his urine this morning.  He has been able to urinate.  He called his urologist office and they directed him here.        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  Laboratory evaluation 10/2/2024 shows normal BMP, normal CBC except for a white count of 11    Prescription drug monitoring program review:           PHYSICAL EXAM    I have reviewed the triage vital signs and nursing notes.    ED Triage Vitals   Temp Heart Rate Resp BP SpO2   10/30/24 1043 10/30/24 1042 10/30/24 1109 10/30/24 1112 10/30/24 1042   96 °F (35.6 °C) 91 18 (!) 160/104 97 %      Temp src Heart Rate Source Patient Position BP Location FiO2 (%)   10/30/24 1043 10/30/24 1042 10/30/24 1112 10/30/24 1112 --   Tympanic Monitor Lying Right arm        Physical Exam  GENERAL: Awake, alert, no acute distress, not acutely  ill-appearing  SKIN: Warm, dry  HENT: Normocephalic, atraumatic  EYES: no scleral icterus  CV: regular rhythm, regular rate  RESPIRATORY: normal effort, lungs clear  ABDOMEN: soft, nontender, nondistended  MUSCULOSKELETAL: no deformity  NEURO: alert, moves all extremities, follows commands            MEDICATIONS GIVEN IN ER  Medications   sodium chloride 0.9 % flush 10 mL (has no administration in time range)   cefTRIAXone (ROCEPHIN) 2,000 mg in sodium chloride 0.9 % 100 mL MBP (has no administration in time range)         ORDERS PLACED DURING THIS VISIT:  Orders Placed This Encounter   Procedures    Urine Culture - Urine,    CT Abdomen Pelvis Without Contrast    Urinalysis With Culture If Indicated - Urine, Clean Catch    Urinalysis, Microscopic Only - Urine, Clean Catch    Basic Metabolic Panel    CBC Auto Differential    Bladder scan    Urology (on-call MD unless specified)    Insert Peripheral IV    Initiate ED Observation Status    CBC & Differential         PROCEDURES  Procedures            PROGRESS, DATA ANALYSIS, CONSULTS, AND MEDICAL DECISION MAKING  All labs have been independently interpreted by me.  All radiology studies have been reviewed by me. All EKG's have been independently viewed and interpreted by me.  Discussion below represents my analysis of pertinent findings related to patient's condition, differential diagnosis, treatment plan and final disposition.    Differential diagnosis includes but is not limited to UTI, hematuria, urinary retention.    Clinical Scores:                   ED Course as of 10/30/24 1252   Wed Oct 30, 2024   1116 Bladder scan after urinating <160cc [MP]   1241 Discussing with Argenis with urology.  She request CT of the abdomen pelvis to rule out clots in his bladder.  She requests IV antibiotics for a potential UTI.  She requests hospital admission for further management and to evaluate for need for irrigation.  She wants him to be NPO. [TR]   1251 Discussing with Jerilyn  Jose with the observation unit.  He accepts for admission. [TR]   7174 I reviewed the workup and findings with the patient and family at the bedside.  Answered all questions.  Plan admission.  They are agreeable. [TR]      ED Course User Index  [MP] Katya Spencer PA-C  [TR] Zachery Box MD       MDM: Plan to obtain urinalysis.  The patient recently had CT imaging.  He recently had cystoscopy.  He is not passing any clots.  He is able to urinate at this time.  Plan to obtain urine and discussed with urology.      COMPLEXITY OF CARE  The patient requires admission.    Please note that portions of this document were completed with a voice recognition program.    Note Disclaimer: At [facility], we believe that sharing information builds trust and better relationships. You are receiving this note because you recently visited [facility]. It is possible you will see health information before a provider has talked with you about it. This kind of information can be easy to misunderstand. To help you fully understand what it means for your health, we urge you to discuss this note with your provider.         Zachery Box MD  10/30/24 3598

## 2024-10-30 NOTE — PROGRESS NOTES
[facility] Note  10/30/2024    Notified of catheter balloon not inflating well. RN inserted 22 Andrew catheter and was able to decompress bladder with 600 cc of UOP returned, however, only able to inflate the balloon with 5 cc and patient reporting discomfort with inflation.     Patient was instructed on need for repositioning of urethral catheter and verbal consent was obtained.  Patient still prepped and draped from insertion   Donned sterile gloves  Catheter balloon was deflated with 5 mL of water returned and catheter removed  Lidocaine urojet jelly was applied directly into urethra  Catheter was placed and advanced to the hub without difficulty  Balloon re-inflated with 30 mL of water  Catheter gently retracted until resistance met  CBI was re-started  Patient tolerated well    Plan  - Continue CBI, titrate to maintain a clear/light pink output, wean slowly  - Manually irrigate PRN for suspected catheter obstruction  - Will make NPO at midnight in the event of acute changes overnight   - Continue to hold Eliquis  - Urology following    Argenis Light, APRN  10/30/24

## 2024-10-30 NOTE — H&P
[facility]   HISTORY AND PHYSICAL    Patient Name: North Carcamo  : 1950  MRN: [id number]  Primary Care Physician:  Chloe Arriaga MD  Date of admission: 10/30/2024    Subjective   Subjective     Chief Complaint: Hematuria    HPI:    North Carcamo is a 74 y.o. male with PMH of hyperlipidemia, hypertension, proximal A-fib on Eliquis presents [facility] with hematuria.  Patient reports around 630 he was urinating when he and saw blood in his urine and was unable to empty his bladder and developed a pressure in his lower abdomen.  Patient was able to urinate about 300 cc while in the ER.    States that he had similar episode on 10/2/2024 and was admitted to the observation unit and started on CBI.  Urology evaluated patient at that time and performed cystoscopy with clot evacuation and patient was discharged home and was instructed to hold Eliquis for 3 days however patient states that he held for couple weeks and first dose was last Friday.      ED workup reviewed: Creatinine 1.11, glucose 144, WBC 14.5, hemoglobin 16.3 and platelets 258.  UA: Blood moderate, nitrate positive, leukocytes moderate, protein 100, RBC TNTC and WBC 21-50 therefore patient was started on IV Rocephin.    Review of Systems   All systems were reviewed and negative except for: That mentioned above in HPI    Personal History     Past Medical History:   Diagnosis Date    Allergic     Penicillin    Cataract 2022    Diverticulosis     E. coli pneumonia 2023    HOSPITALIZED    History of prostate cancer     History of recent hospitalization 2023    Hyperglycemia     Hyperlipidemia     Hypertension     Kidney stone 2019    Has not been a problem.    On anticoagulant therapy     Paroxysmal atrial fibrillation        Past Surgical History:   Procedure Laterality Date    CATARACT EXTRACTION      WITH LENS IMPLANTS    COLONOSCOPY N/A 2023    Procedure: COLONOSCOPY TO CECUM AND TERMINAL ILEUM  WITH COLD POLYPECTOMY;  Surgeon: Humberto Peña MD;  Location: [facility] ENDOSCOPY;  Service: Gastroenterology;  Laterality: N/A;  SCREENING  DIVERTICULOSIS, COLON POLYP, HEMORRHOIDS    CYSTOSCOPY BLADDER BIOPSY N/A 06/12/2019    Procedure: CYSTOSCOPY TUR BLADDER TUMOR;  Surgeon: Joel Russell MD;  Location: [facility] MAIN OR;  Service: Urology    CYSTOSCOPY BLADDER BIOPSY N/A 05/31/2024    Procedure: CYSTOSCOPY BLADDER BIOPSY FULGURATION;  Surgeon: Joel Russell MD;  Location: [facility] MAIN OR;  Service: Urology;  Laterality: N/A;    CYSTOSCOPY WITH CLOT EVACUATION N/A 10/2/2024    Procedure: CYSTOSCOPY WITH CLOT EVACUATION, CAUTERY OF BLEEDING, DENG INSERTION;  Surgeon: Joel Russell MD;  Location: [facility] MAIN OR;  Service: Urology;  Laterality: N/A;    ELBOW PROCEDURE Left     FX    PROSTATECTOMY      PROSTATECTOMY  03/01/2003    TONSILLECTOMY  1957    VASECTOMY         Family History: family history includes Hypertension in his father and mother; Prostate cancer in his father. Otherwise pertinent FHx was reviewed and not pertinent to current issue.    Social History:  reports that he has never smoked. He has never used smokeless tobacco. He reports current alcohol use of about 1.0 standard drink of alcohol per week. He reports that he does not use drugs.    Home Medications:  Calcium Carbonate-Vitamin D, Multiple Vitamins-Minerals, apixaban, lisinopril, and pravastatin    Allergies:  Allergies   Allergen Reactions    Penicillins Rash     Thinks childhood reaction of rash        Objective   Objective     Vitals:   Temp:  [96 °F (35.6 °C)] 96 °F (35.6 °C)  Heart Rate:  [91] 91  Resp:  [18] 18  BP: (145-160)/() 156/92  Physical Exam    Constitutional: Awake, alert   Eyes: PERRLA, sclerae anicteric, no conjunctival injection   HENT: NCAT, mucous membranes moist   Neck: Supple, no thyromegaly, no lymphadenopathy, trachea midline   Respiratory: Clear to auscultation bilaterally, nonlabored  respirations    Cardiovascular: RRR, no murmurs, rubs, or gallops, palpable pedal pulses bilaterally   Gastrointestinal: Positive bowel sounds, soft, nontender, nondistended   Musculoskeletal: No bilateral ankle edema, no clubbing or cyanosis to extremities   Psychiatric: Appropriate affect, cooperative   Neurologic: Oriented x 3, strength symmetric in all extremities, Cranial Nerves grossly intact to confrontation, speech clear   Skin: No rashes     Result Review    Result Review:  I have personally reviewed the results from the time of this admission to 10/30/2024 13:05 EDT and agree with these findings:  [x]  Laboratory list / accordion  []  Microbiology  [x]  Radiology  []  EKG/Telemetry   []  Cardiology/Vascular   []  Pathology  []  Old records  []  Other:        Assessment & Plan   Assessment / Plan     Brief Patient Summary:  North Carcamo is a 74 y.o. male who has been admitted to the observation unit due to hematuria.  Patient was able to void about 300 cc however started having some lower abdominal pressure discomfort, bladder scanner showed 600 cc therefore three-way Paz catheter was placed and patient had 600 cc of urine output.  Continue bladder radiation initiated and Argenis-APRN with urology currently at bedside evaluating patient.     Active Hospital Problems:  Active Hospital Problems    Diagnosis     **Hematuria      Plan:   Hematuria  Leukocytosis  UTI  -Urology following  -IV Rocephin  -Hemoglobin 16.3 and hematocrit 47.1  -CT abdomen shows moderately distended bladder and haziness surrounding the urinary bladder most likely consistent with cystitis.    -Three-way placed and patient was started on CBI    History of A-fib   -hold home Eliquis  -Telemetry     Hyperlipidemia  -Continue home statin     Essential hypertension, chronic, poorly controlled  -Continue home lisinopril, monitor    VTE Prophylaxis:  Mechanical VTE prophylaxis orders are present.      CODE STATUS:    Level Of Support  Discussed With: Patient  Code Status (Patient has no pulse and is not breathing): CPR (Attempt to Resuscitate)  Medical Interventions (Patient has pulse or is breathing): Full Support    Admission Status:  I believe this patient meets observation status.    During patient visit, I utilized appropriate personal protective equipment including gloves. Appropriate PPE was worn during the entire visit.  Hand hygiene was completed before and after    60 minutes has been spent by [facility] providers in the care of this patient while under observation status    Electronically signed by ARJUN Sy, 10/30/24, 1:05 PM EDT.

## 2024-10-30 NOTE — ED PROVIDER NOTES
EMERGENCY DEPARTMENT ENCOUNTER  Room Number:  19/19  PCP: Chloe Arriaga MD  Independent Historians: Patient      HPI:  Chief Complaint: had concerns including Blood in Urine and Urinary Frequency.     A complete HPI/ROS/PMH/PSH/SH/FH are unobtainable due to: None    Chronic or social conditions impacting patient care (Social Determinants of Health): None      Context: North Carcamo is a 74 y.o. male with a medical history of hyperlipidemia, hypertension, prostate and bladder cancer, paroxysmal atrial fibrillation who presents to the ED c/o acute hematuria.  Patient reports he got up to urinate in the middle of the night and noticed dark urine.  This became grossly bloody in the morning.  Reports by midmorning he could only trickle and was unable to urinate.  Reports associated bladder pressure.  Reports he had a similar episode at the beginning of the month and had cystoscopy performed by Dr. Russell with urology.  He held his Eliquis for several weeks and restarted this 1 week ago.  Upon arrival to the emergency department, patient reports he was able to urinate and admits to complete resolution of his bladder pressure.  He is still having gross hematuria.  He did not take his Eliquis this morning.  Patient has no other systemic complaints at this time.      Review of prior external notes (non-ED) -and- Review of prior external test results outside of this encounter:  Patient seen in office by ophthalmology on 10/18/2024 for bilateral pseudophakia.  Reviewed assessment and plan.  Will continue to observe.  Reviewed labs collected on 10/2/2024.  CBC with hemoglobin 14.1, BMP with creatinine 1.03.    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 coli pneumonia 08/01/2023    History of prostate cancer 2002    History of recent hospitalization 08/01/2023    Kidney stone 2019    On anticoagulant therapy          PAST SURGICAL HISTORY  Past Surgical History:   Procedure Laterality Date    CATARACT EXTRACTION      WITH LENS IMPLANTS    COLONOSCOPY N/A 09/13/2023    Procedure: COLONOSCOPY TO CECUM AND TERMINAL ILEUM WITH COLD POLYPECTOMY;  Surgeon: Humberto Peña MD;  Location: [facility] ENDOSCOPY;  Service: Gastroenterology;  Laterality: N/A;  SCREENING  DIVERTICULOSIS, COLON POLYP, HEMORRHOIDS    CYSTOSCOPY BLADDER BIOPSY N/A 06/12/2019    Procedure: CYSTOSCOPY TUR BLADDER TUMOR;  Surgeon: Joel Russell MD;  Location: [facility] OR;  Service: Urology    CYSTOSCOPY BLADDER BIOPSY N/A 05/31/2024    Procedure: CYSTOSCOPY BLADDER BIOPSY FULGURATION;  Surgeon: Joel Russell MD;  Location: [facility] OR;  Service: Urology;  Laterality: N/A;    CYSTOSCOPY WITH CLOT EVACUATION N/A 10/2/2024    Procedure: CYSTOSCOPY WITH CLOT EVACUATION, CAUTERY OF BLEEDING, DENG INSERTION;  Surgeon: Joel Russell MD;  Location: [facility] OR;  Service: Urology;  Laterality: N/A;    ELBOW PROCEDURE Left     FX    PROSTATECTOMY      PROSTATECTOMY  03/01/2003    TONSILLECTOMY  1957    VASECTOMY           FAMILY HISTORY  Family History   Problem Relation Age of Onset    Hypertension Mother     Hypertension Father     Prostate cancer Father     Malig Hyperthermia Neg Hx          SOCIAL HISTORY  Social History     Socioeconomic History    Marital status:    Tobacco Use    Smoking status: Never    Smokeless tobacco: Never   Vaping  Use    Vaping status: Never Used   Substance and Sexual Activity    Alcohol use: Yes     Alcohol/week: 1.0 standard drink of alcohol     Types: 1 Drinks containing 0.5 oz of alcohol per week     Comment: Occasional beer or cocktail    Drug use: No    Sexual activity: Yes     Partners: Female     Birth control/protection: Vasectomy         ALLERGIES  Penicillins      REVIEW OF SYSTEMS  Included in HPI  All systems reviewed and negative except for those discussed in HPI.      PHYSICAL EXAM    I have reviewed the triage vital signs and nursing notes.    ED Triage Vitals   Temp Heart Rate Resp BP SpO2   10/30/24 1043 10/30/24 1042 10/30/24 1109 10/30/24 1112 10/30/24 1042   96 °F (35.6 °C) 91 18 (!) 160/104 97 %      Temp src Heart Rate Source Patient Position BP Location FiO2 (%)   10/30/24 1043 10/30/24 1042 10/30/24 1112 10/30/24 1112 --   Tympanic Monitor Lying Right arm        Physical Exam  Constitutional:       General: He is not in acute distress.     Appearance: Normal appearance.   HENT:      Head: Normocephalic and atraumatic.      Nose: Nose normal.      Mouth/Throat:      Mouth: Mucous membranes are moist.   Eyes:      Conjunctiva/sclera: Conjunctivae normal.      Pupils: Pupils are equal, round, and reactive to light.   Cardiovascular:      Rate and Rhythm: Normal rate and regular rhythm.      Pulses: Normal pulses.      Heart sounds: Normal heart sounds.   Pulmonary:      Effort: Pulmonary effort is normal.      Breath sounds: Normal breath sounds.   Abdominal:      General: There is no distension.   Musculoskeletal:         General: Normal range of motion.      Cervical back: Normal range of motion and neck supple.   Skin:     General: Skin is warm.      Capillary Refill: Capillary refill takes less than 2 seconds.   Neurological:      General: No focal deficit present.      Mental Status: He is alert and oriented to person, place, and time.   Psychiatric:         Mood and Affect: Mood normal.              LAB RESULTS  Recent Results (from the past 24 hours)   Urinalysis With Culture If Indicated - Urine, Clean Catch    Collection Time: 10/30/24 11:13 AM    Specimen: Urine, Clean Catch   Result Value Ref Range    Color, UA Brown (A) Yellow, Straw    Appearance, UA Turbid (A) Clear    pH, UA 5.5 5.0 - 8.0    Specific Gravity, UA 1.016 1.005 - 1.030    Glucose, UA Negative Negative    Ketones, UA Negative Negative    Bilirubin, UA Negative Negative    Blood, UA Moderate (2+) (A) Negative    Protein,  mg/dL (2+) (A) Negative    Leuk Esterase, UA Moderate (2+) (A) Negative    Nitrite, UA Positive (A) Negative    Urobilinogen, UA 0.2 E.U./dL 0.2 - 1.0 E.U./dL   Urinalysis, Microscopic Only - Urine, Clean Catch    Collection Time: 10/30/24 11:13 AM    Specimen: Urine, Clean Catch   Result Value Ref Range    RBC, UA Too Numerous to Count (A) None Seen, 0-2 /HPF    WBC, UA 21-50 (A) None Seen, 0-2 /HPF    Bacteria, UA None Seen None Seen /HPF    Squamous Epithelial Cells, UA 0-2 None Seen, 0-2 /HPF    Hyaline Casts, UA None Seen None Seen /LPF    Methodology Automated Microscopy    Basic Metabolic Panel    Collection Time: 10/30/24 12:54 PM    Specimen: Blood   Result Value Ref Range    Glucose 144 (H) 65 - 99 mg/dL    BUN 11 8 - 23 mg/dL    Creatinine 1.11 0.76 - 1.27 mg/dL    Sodium 137 136 - 145 mmol/L    Potassium 4.1 3.5 - 5.2 mmol/L    Chloride 102 98 - 107 mmol/L    CO2 24.4 22.0 - 29.0 mmol/L    Calcium 9.9 8.6 - 10.5 mg/dL    BUN/Creatinine Ratio 9.9 7.0 - 25.0    Anion Gap 10.6 5.0 - 15.0 mmol/L    eGFR 68.8 >60.0 mL/min/1.73   CBC Auto Differential    Collection Time: 10/30/24 12:54 PM    Specimen: Blood   Result Value Ref Range    WBC 14.50 (H) 3.40 - 10.80 10*3/mm3    RBC 5.09 4.14 - 5.80 10*6/mm3    Hemoglobin 16.3 13.0 - 17.7 g/dL    Hematocrit 47.1 37.5 - 51.0 %    MCV 92.5 79.0 - 97.0 fL    MCH 32.0 26.6 - 33.0 pg    MCHC 34.6 31.5 - 35.7 g/dL    RDW 12.1 (L) 12.3 - 15.4 %    RDW-SD 41.5  37.0 - 54.0 fl    MPV 10.9 6.0 - 12.0 fL    Platelets 258 140 - 450 10*3/mm3    Neutrophil % 89.1 (H) 42.7 - 76.0 %    Lymphocyte % 5.0 (L) 19.6 - 45.3 %    Monocyte % 5.0 5.0 - 12.0 %    Eosinophil % 0.1 (L) 0.3 - 6.2 %    Basophil % 0.2 0.0 - 1.5 %    Immature Grans % 0.6 (H) 0.0 - 0.5 %    Neutrophils, Absolute 12.93 (H) 1.70 - 7.00 10*3/mm3    Lymphocytes, Absolute 0.72 0.70 - 3.10 10*3/mm3    Monocytes, Absolute 0.73 0.10 - 0.90 10*3/mm3    Eosinophils, Absolute 0.01 0.00 - 0.40 10*3/mm3    Basophils, Absolute 0.03 0.00 - 0.20 10*3/mm3    Immature Grans, Absolute 0.08 (H) 0.00 - 0.05 10*3/mm3    nRBC 0.0 0.0 - 0.2 /100 WBC         RADIOLOGY  CT Abdomen Pelvis Without Contrast    Result Date: 10/30/2024  CT ABDOMEN AND PELVIS WITHOUT IV CONTRAST  HISTORY: 74-year-old male with hematuria and groin pain. Recently admitted for a bladder infection. Prostatectomy in the past.  TECHNIQUE: Radiation dose reduction techniques were utilized, including automated exposure control and exposure modulation based on body size. 3 mm images were obtained through the abdomen and pelvis without the administration of IV contrast. Compared with previous CT 10/01/2024.  FINDINGS: 1. Urinary bladder is moderately distended and there is haziness surrounding the urinary bladder suspected to represent acute UTI/cystitis. There is new mild to moderate fullness of both renal collecting systems and ureters which may be secondary to the bladder distention, but pyelonephritis cannot be excluded. No change in the 2 large stones within the left kidney each measuring 9 mm. There are no right renal or ureteral stones. Few stones within the urinary bladder are present and were present previously as well.  2. No new abnormality is seen at the noncontrasted liver, gallbladder, spleen, pancreas, or adrenals. There is no acute bowel abnormality. There is extensive colonic diverticulosis without evidence for diverticulitis. Appendix appears within  normal limits.  3. No evidence for pneumonia or pleural effusions at the visualized lung bases.         MEDICATIONS GIVEN IN ER  Medications - No data to display      ORDERS PLACED DURING THIS VISIT:  Orders Placed This Encounter   Procedures    Urinalysis With Culture If Indicated - Urine, Clean Catch         OUTPATIENT MEDICATION MANAGEMENT:  No current Epic-ordered facility-administered medications on file.     Current Outpatient Medications Ordered in Epic   Medication Sig Dispense Refill    Calcium Carbonate-Vitamin D (CALCIUM 600+D PO) Take 1 tablet by mouth 2 (Two) Times a Day. PT HOLDING FOR SURGERY  Indications: Calcium Deficiency      Eliquis 5 MG tablet tablet Take 1 tablet by mouth Every 12 (Twelve) Hours. 60 tablet 11    lisinopril (PRINIVIL,ZESTRIL) 40 MG tablet Take 1 tablet by mouth Daily. 90 tablet 1    Multiple Vitamins-Minerals (DAILY MULTIVITAMIN PO) Take 1 tablet by mouth Daily.      pravastatin (PRAVACHOL) 40 MG tablet Take 1 tablet by mouth Every Night. Indications: High Amount of Fats in the Blood 90 tablet 3           PROGRESS, DATA ANALYSIS, CONSULTS, AND MEDICAL DECISION MAKING  All labs have been independently interpreted by me.  All radiology studies have been reviewed by me. All EKG's have been independently viewed and interpreted by me.  Discussion below represents my analysis of pertinent findings related to patient's condition, differential diagnosis, treatment plan and final disposition.    Differential diagnosis includes but is not limited to obstructing clot, gross hematuria, blood loss anemia, UTI with hematuria.        ED Course as of 10/30/24 1656   Wed Oct 30, 2024   1116 Bladder scan after urinating <160cc [MP]   1241 Discussing with Argenis with urology.  She request CT of the abdomen pelvis to rule out clots in his bladder.  She requests IV antibiotics for a potential UTI.  She requests hospital admission for further management and to evaluate for need for irrigation.  She  wants him to be NPO. [TR]   1251 Discussing with Jerilyn Garcia with the observation unit.  He accepts for admission. [TR]   1252 I reviewed the workup and findings with the patient and family at the bedside.  Answered all questions.  Plan admission.  They are agreeable. [TR]      ED Course User Index  [MP] Katya Spencer PA-C  [TR] Zachery Box MD             AS OF 11:19 EDT VITALS:    BP - 145/94  HR - 91  TEMP - 96 °F (35.6 °C) (Tympanic)  O2 SATS - 97%    COMPLEXITY OF CARE  The patient requires admission.      DIAGNOSIS  Final diagnoses:   Gross hematuria   Acute cystitis with hematuria         DISPOSITION  ED Disposition       ED Disposition   Decision to Admit    Condition   --    Comment   --                Please note that portions of this document were completed with a voice recognition program.    Note Disclaimer: At [facility], we believe that sharing information builds trust and better relationships. You are receiving this note because you recently visited [facility]. It is possible you will see health information before a provider has talked with you about it. This kind of information can be easy to misunderstand. To help you fully understand what it means for your health, we urge you to discuss this note with your provider.     Katya Spencer PA-C  10/30/24 4546

## 2024-10-30 NOTE — PROGRESS NOTES
Patient Care Team:  Chloe Arriaga MD as PCP - General (Internal Medicine)  Manjeet, Brennen Giraldo MD as Consulting Physician (Cardiology)     REUBEN PALM H&P Note    I supervised care provided by the midlevel provider. We have discussed this patient's history, physical exam, and treatment plan. I have reviewed the midlevel provider's note and I agree with the midlevel provider's findings and plan of care.   SHARED VISIT: This visit was performed by BOTH a physician and an APC. The substantive portion of the medical decision making was performed by this attesting physician who made or approved the management plan and takes responsibility for patient management.   I have personally had a face to face encounter with the patient.   My personal findings are documented below:    History:  74-year-old male with history of A-fib on Eliquis, hypertension hyperlipidemia presents with hematuria.  Patient had recent admission for same and underwent three-way Paz with CBI, urology evaluated and patient underwent cystoscopy with clot evacuation.  Urinalysis had no bacteria seen but numerous red blood cells with 21-50 red blood cells, positive for nitrites and positive for leukocyte Estrace.  Patient not anemic, kidney function normal, platelets 258.  White blood cell count 14.5, patient afebrile.    Physical Exam:  General: No acute distress.  HENT: NCAT, PERRL, Nares patent.  Eyes: no scleral icterus.  Neck: trachea midline, no ROM limitations.  CV: regular rhythm, regular rate.  Respiratory: normal effort, CTAB.  Abdomen: soft, nondistended, NTTP, no rebound tenderness, no guarding or rigidity.  Musculoskeletal: no deformity.  Neuro: alert, moves all extremities, follows commands.  Skin: warm, dry.    Assessment and Plan:  Patient admitted to the observation unit, urology consulted, three-way catheter in place patient undergoing CBI, treating with Rocephin for concern for cystitis, holding Eliquis, monitoring, trending labs.

## 2024-10-30 NOTE — CONSULTS
FIRST UROLOGY CONSULT      Patient Identification:  NAME:  North Carcamo  Age:  74 y.o.   Sex:  male   :  1950   MRN:  [id number]     Chief complaint: Blood in urine    History of present illness:      Pt is a 74 y.o. male with a history of prostate cancer s/p RRP /salvage IMRT/ADT 2019-2020, bladder cancer s/p cystoscopy clot evac with fulguration on 10/2, gross hematuria, and radiation cystitis that presented to the ED on 10/30/24 with complaints of acute hematuria. Patient reports first noticing hematuria this morning. Was initially able to urinate but noticed a few hours later he had the strong sensation to urinate but was unable to. Patient recently restarted is Eliquis but reports holding his morning dose.     Urology was consulted for evaluation and treatment of gross hematuria. Patient able to urinate after presentation to ED. Reports immediate relief in suprapubic pressure following. Patient denies presence of clots. States that he feels that he needs to urinate again but unable. Bladder scan yielded <160 cc in bladder after voiding. Known history of radiation cystitis.     In hospital:  -AVSS, good UOP  -WBC - 14.50  -Creat - 1.11  -UA - Moderate LE, positive nitrites, moderate blood, TNTC RBC, 21-50 WBC, no bacteria  -UCx - In process  -Blood culture - In process    Asked to see    Past medical history:  Past Medical History:   Diagnosis Date    Allergic     Penicillin    Cataract 2022    Diverticulosis     E. coli pneumonia 2023    HOSPITALIZED    History of prostate cancer     History of recent hospitalization 2023    Hyperglycemia     Hyperlipidemia     Hypertension     Kidney stone     Has not been a problem.    On anticoagulant therapy     Paroxysmal atrial fibrillation        Past surgical history:  Past Surgical History:   Procedure Laterality Date    CATARACT EXTRACTION      WITH LENS IMPLANTS    COLONOSCOPY N/A 2023    Procedure:  COLONOSCOPY TO CECUM AND TERMINAL ILEUM WITH COLD POLYPECTOMY;  Surgeon: Humberto Peña MD;  Location: [facility] ENDOSCOPY;  Service: Gastroenterology;  Laterality: N/A;  SCREENING  DIVERTICULOSIS, COLON POLYP, HEMORRHOIDS    CYSTOSCOPY BLADDER BIOPSY N/A 2019    Procedure: CYSTOSCOPY TUR BLADDER TUMOR;  Surgeon: Joel Russell MD;  Location: [facility] OR;  Service: Urology    CYSTOSCOPY BLADDER BIOPSY N/A 2024    Procedure: CYSTOSCOPY BLADDER BIOPSY FULGURATION;  Surgeon: Joel Russell MD;  Location: [facility] MAIN OR;  Service: Urology;  Laterality: N/A;    CYSTOSCOPY WITH CLOT EVACUATION N/A 10/2/2024    Procedure: CYSTOSCOPY WITH CLOT EVACUATION, CAUTERY OF BLEEDING, DENG INSERTION;  Surgeon: Joel Russell MD;  Location: [facility] MAIN OR;  Service: Urology;  Laterality: N/A;    ELBOW PROCEDURE Left     FX    PROSTATECTOMY      PROSTATECTOMY  2003    TONSILLECTOMY  195    VASECTOMY         Allergies:  Penicillins    Home medications:  (Not in a hospital admission)       Hospital medications:    No current facility-administered medications for this encounter.        Family history:  Family History   Problem Relation Age of Onset    Hypertension Mother     Hypertension Father     Prostate cancer Father     Malig Hyperthermia Neg Hx        Social history:  Social History     Tobacco Use    Smoking status: Never    Smokeless tobacco: Never   Vaping Use    Vaping status: Never Used   Substance Use Topics    Alcohol use: Yes     Alcohol/week: 1.0 standard drink of alcohol     Types: 1 Drinks containing 0.5 oz of alcohol per week     Comment: Occasional beer or cocktail    Drug use: No       Review of systems:      12 point negative except as in HPI    Objective:  TMax 24 hours:   Temp (24hrs), Av °F (35.6 °C), Min:96 °F (35.6 °C), Max:96 °F (35.6 °C)      Vitals Ranges:   Temp:  [96 °F (35.6 °C)] 96 °F (35.6 °C)  Heart Rate:  [91] 91  Resp:  [18] 18  BP: (145-160)/()  156/92    Intake/Output Last 3 shifts:  No intake/output data recorded.     Physical Exam:    General Appearance:    Alert, cooperative, NAD   Back:     No CVA tenderness   Abdomen:  :      Soft, NDNT, no palpable bladder distension, nontender    Deferred   Neuro/Psych:   Orientation intact, mood/affect pleasant       Results review:   I reviewed the patient's new clinical results.    Data review:  Lab Results (last 24 hours)       Procedure Component Value Units Date/Time    Urinalysis, Microscopic Only - Urine, Clean Catch [[id number]]  (Abnormal) Collected: 10/30/24 1113    Specimen: Urine, Clean Catch Updated: 10/30/24 1148     RBC, UA Too Numerous to Count /HPF      WBC, UA 21-50 /HPF      Bacteria, UA None Seen /HPF      Squamous Epithelial Cells, UA 0-2 /HPF      Hyaline Casts, UA None Seen /LPF      Methodology Automated Microscopy    Urinalysis With Culture If Indicated - Urine, Clean Catch [[id number]]  (Abnormal) Collected: 10/30/24 1113    Specimen: Urine, Clean Catch Updated: 10/30/24 1148     Color, UA Brown     Comment: Any Substance that causes an abnormal urine color can alter the accuracy of the chemical reactions.        Appearance, UA Turbid     pH, UA 5.5     Specific Gravity, UA 1.016     Glucose, UA Negative     Ketones, UA Negative     Bilirubin, UA Negative     Blood, UA Moderate (2+)     Protein,  mg/dL (2+)     Leuk Esterase, UA Moderate (2+)     Nitrite, UA Positive     Urobilinogen, UA 0.2 E.U./dL    Narrative:      In absence of clinical symptoms, the presence of pyuria, bacteria, and/or nitrites on the urinalysis result does not correlate with infection.    Urine Culture - Urine, Urine, Clean Catch [[id number]] Collected: 10/30/24 1113    Specimen: Urine, Clean Catch Updated: 10/30/24 1148             Imaging:  Imaging Results (Last 24 Hours)       ** No results found for the last 24 hours. **               Assessment:     Gross hematuria   Radiation cystitis   Hx of bladder  cancer  Hx of prostate cancer    Plan:   - Recommend CT abd/pelvis to assess for clot retention. Will await results.  - Hold Eliquis  - Bladder scan q shift and PRN for suspected urinary retention or patient with complaints of suprapubic pain/pressure  - Urology will follow    ARJUN Siegel  10/30/24  12:30 EDT    Plan reviewed and discussed with Dr. Russell

## 2024-10-30 NOTE — PLAN OF CARE
Goal Outcome Evaluation:   Pt admitted for hematuria. Paz inserted. Urology consulted. Pt is running clear at this time. Pt denies any pain. Npo at midnight. Provider aware of flagged sepsis screen. ABX started and urine culture pending.

## 2024-10-31 ENCOUNTER — READMISSION MANAGEMENT (OUTPATIENT)
Dept: CALL CENTER | Facility: HOSPITAL | Age: 74
End: 2024-10-31
Payer: MEDICARE

## 2024-10-31 VITALS
TEMPERATURE: 98.6 F | HEART RATE: 71 BPM | DIASTOLIC BLOOD PRESSURE: 79 MMHG | SYSTOLIC BLOOD PRESSURE: 132 MMHG | RESPIRATION RATE: 18 BRPM | WEIGHT: 184.6 LBS | HEIGHT: 69 IN | OXYGEN SATURATION: 95 % | BODY MASS INDEX: 27.34 KG/M2

## 2024-10-31 LAB
ANION GAP SERPL CALCULATED.3IONS-SCNC: 9.8 MMOL/L (ref 5–15)
BACTERIA SPEC AEROBE CULT: NO GROWTH
BUN SERPL-MCNC: 14 MG/DL (ref 8–23)
BUN/CREAT SERPL: 14.7 (ref 7–25)
CALCIUM SPEC-SCNC: 9.2 MG/DL (ref 8.6–10.5)
CHLORIDE SERPL-SCNC: 102 MMOL/L (ref 98–107)
CO2 SERPL-SCNC: 25.2 MMOL/L (ref 22–29)
CREAT SERPL-MCNC: 0.95 MG/DL (ref 0.76–1.27)
DEPRECATED RDW RBC AUTO: 42.7 FL (ref 37–54)
EGFRCR SERPLBLD CKD-EPI 2021: 83 ML/MIN/1.73
ERYTHROCYTE [DISTWIDTH] IN BLOOD BY AUTOMATED COUNT: 12.4 % (ref 12.3–15.4)
GLUCOSE SERPL-MCNC: 138 MG/DL (ref 65–99)
HCT VFR BLD AUTO: 40.2 % (ref 37.5–51)
HGB BLD-MCNC: 13.8 G/DL (ref 13–17.7)
MCH RBC QN AUTO: 32 PG (ref 26.6–33)
MCHC RBC AUTO-ENTMCNC: 34.3 G/DL (ref 31.5–35.7)
MCV RBC AUTO: 93.3 FL (ref 79–97)
PLATELET # BLD AUTO: 216 10*3/MM3 (ref 140–450)
PMV BLD AUTO: 11 FL (ref 6–12)
POTASSIUM SERPL-SCNC: 3.5 MMOL/L (ref 3.5–5.2)
RBC # BLD AUTO: 4.31 10*6/MM3 (ref 4.14–5.8)
SODIUM SERPL-SCNC: 137 MMOL/L (ref 136–145)
WBC NRBC COR # BLD AUTO: 10.89 10*3/MM3 (ref 3.4–10.8)

## 2024-10-31 PROCEDURE — 25010000002 CEFTRIAXONE PER 250 MG: Performed by: NURSE PRACTITIONER

## 2024-10-31 PROCEDURE — G0378 HOSPITAL OBSERVATION PER HR: HCPCS

## 2024-10-31 PROCEDURE — 96366 THER/PROPH/DIAG IV INF ADDON: CPT

## 2024-10-31 PROCEDURE — 80048 BASIC METABOLIC PNL TOTAL CA: CPT | Performed by: NURSE PRACTITIONER

## 2024-10-31 PROCEDURE — 85027 COMPLETE CBC AUTOMATED: CPT | Performed by: NURSE PRACTITIONER

## 2024-10-31 RX ADMIN — Medication 10 ML: at 08:32

## 2024-10-31 RX ADMIN — LISINOPRIL 40 MG: 20 TABLET ORAL at 08:31

## 2024-10-31 RX ADMIN — CEFTRIAXONE SODIUM 1000 MG: 1 INJECTION, POWDER, FOR SOLUTION INTRAMUSCULAR; INTRAVENOUS at 13:58

## 2024-10-31 NOTE — DISCHARGE SUMMARY
ED OBSERVATION PROGRESS/DISCHARGE SUMMARY    Date of Admission: 10/30/2024   LOS: 0 days   PCP: Chloe Arriaga MD      Subjective     Hospital Outcome:   North Carcamo is a 74 y.o. male with PMH of hyperlipidemia, hypertension, proximal A-fib on Eliquis who was admitted to the ED observation unit for further evaluation and treatment of hematuria.  Patient reports around 630 he was urinating when he saw blood in his urine and was unable to empty his bladder and developed a pressure in his lower abdomen.  Patient had a similar episode on 10/2/2024 and was admitted to the observation unit and started on CBI.  Urology evaluated patient at that time and performed cystoscopy with clot evacuation and patient was discharged home and was instructed to hold Eliquis for 3 days however patient states that he held for couple weeks and restarted dose was last Friday. Urinalysis showed  Blood moderate, nitrate positive, leukocytes moderate, protein 100, RBC TNTC and WBC 21-50 therefore patient was started on IV Rocephin.     10/31/24:  No new events overnight. Continues with CBI. Vital signs remain stable. Urology evaluated patient and is asked to clamp CBI for 2 hours and if urine remained clear discontinue Paz. urine remained clear therefore Paz catheter was discontinued and patient was able to void without pain discomfort however continued to have hematuria.  Discussed the case with Argenis DÍAZ with urology and she stated that since patient has not passed any clots and is not having any pain or discomfort he can be discharged home with the follow-up in the office in 1 to 2 weeks.       ROS:  General: no fevers, chills  Respiratory: no cough, dyspnea  Cardiovascular: no chest pain, palpitations  Abdomen: No abdominal pain, nausea, vomiting, or diarrhea  Neurologic: No focal weakness    Objective   Physical Exam:  I have reviewed the vital signs.  Temp:  [96 °F (35.6 °C)-98.6 °F (37 °C)] 98.4 °F (36.9 °C)  Heart Rate:   [53-93] 53  Resp:  [16-19] 16  BP: (130-160)/() 130/72  General Appearance:    Alert, cooperative, no distress  Head:    Normocephalic, atraumatic  Eyes:    Sclerae anicteric  Neck:   Supple, no mass  Lungs: Clear to auscultation bilaterally, respirations unlabored  Heart: Regular rate and rhythm, S1 and S2 normal, no murmur, rub or gallop  Abdomen:  Soft, nontender, bowel sounds active, nondistended  Extremities: No clubbing, cyanosis, or edema to lower extremities  Pulses:  2+ and symmetric in distal lower extremities  Skin: No rashes   Neurologic: Oriented x3, Normal strength to extremities    Results Review:    I have reviewed the labs, radiology results and diagnostic studies.    Results from last 7 days   Lab Units 10/31/24  0336   WBC 10*3/mm3 10.89*   HEMOGLOBIN g/dL 13.8   HEMATOCRIT % 40.2   PLATELETS 10*3/mm3 216     Results from last 7 days   Lab Units 10/31/24  0336 10/30/24  1254   SODIUM mmol/L 137 137   POTASSIUM mmol/L 3.5 4.1   CHLORIDE mmol/L 102 102   CO2 mmol/L 25.2 24.4   BUN mg/dL 14 11   CREATININE mg/dL 0.95 1.11   CALCIUM mg/dL 9.2 9.9   GLUCOSE mg/dL 138* 144*     Imaging Results (Last 24 Hours)       Procedure Component Value Units Date/Time    CT Abdomen Pelvis Without Contrast [[id number]] Collected: 10/30/24 1358     Updated: 10/30/24 1358    Narrative:      CT ABDOMEN AND PELVIS WITHOUT IV CONTRAST     HISTORY: 74-year-old male with hematuria and groin pain. Recently  admitted for a bladder infection. Prostatectomy in the past.     TECHNIQUE: Radiation dose reduction techniques were utilized, including  automated exposure control and exposure modulation based on body size.   3 mm images were obtained through the abdomen and pelvis without the  administration of IV contrast. Compared with previous CT 10/01/2024.     FINDINGS:  1. Urinary bladder is moderately distended and there is haziness  surrounding the urinary bladder suspected to represent acute  UTI/cystitis. There is new  mild to moderate fullness of both renal  collecting systems and ureters which may be secondary to the bladder  distention, but pyelonephritis cannot be excluded.  No change in the 2 large stones within the left kidney each measuring 9  mm. There are no right renal or ureteral stones. Few stones within the  urinary bladder are present and were present previously as well.     2. No new abnormality is seen at the noncontrasted liver, gallbladder,  spleen, pancreas, or adrenals.  There is no acute bowel abnormality. There is extensive colonic  diverticulosis without evidence for diverticulitis. Appendix appears  within normal limits.     3. No evidence for pneumonia or pleural effusions at the visualized lung  bases.               I have reviewed the medications.  ---------------------------------------------------------------------------------------------  Assessment & Plan   Assessment/Problem List    Hematuria      Plan:  Hematuria  Leukocytosis  UTI  -Urology following  -IV Rocephin  -Hemoglobin 16.3 and hematocrit 47.1  -CT abdomen shows moderately distended bladder and haziness surrounding the urinary bladder most likely consistent with cystitis.    -Three-way placed and patient was started on CBI     History of A-fib   -hold home Eliquis  -Telemetry     Hyperlipidemia  -Continue home statin     Essential hypertension, chronic, poorly controlled  -Continue home lisinopril, monitor       Disposition: Home    Follow-up after Discharge: Urology    This note will serve as a discharge summary    Sara Sow, APRN 10/31/24 05:10 EDT    I have worn appropriate PPE during this patient encounter, sanitized my hands both with entering and exiting patient's room.      30 minutes has been spent by [facility] providers in the care of this patient while under observation status

## 2024-10-31 NOTE — OUTREACH NOTE
Prep Survey      Flowsheet Row Responses   [facility] patient discharged from? [place]   Is LACE score < 7 ? Yes   Eligibility [facility]   Date of Admission 10/30/24   Date of Discharge 10/31/24   Discharge Disposition Home or Self Care   Discharge diagnosis Hematuria   Does the patient have one of the following disease processes/diagnoses(primary or secondary)? Other   Does the patient have Home health ordered? No   Is there a DME ordered? No   Prep survey completed? Yes            Lillian COMBS - Registered Nurse

## 2024-10-31 NOTE — DISCHARGE INSTRUCTIONS
Return to the emergency department if you develop lower abdominal pressure and inability to urinate you develop fever greater than 101 that is uncontrolled with Tylenol  Follow-up with urology in a few days

## 2024-10-31 NOTE — PROGRESS NOTES
"   LOS: 0 days   Patient Care Team:  Chloe Arriaga MD as PCP - General (Internal Medicine)  Brennen Burroughs MD as Consulting Physician (Cardiology)      Subjective   Interval History: Hematuria has improved overnight. Patient reports not problems with the catheter. Eliquis on hold.    Objective     ROS   12 POINT NEG ROS PERTINENT IN HPI      Vital Signs  Temp:  [96 °F (35.6 °C)-98.6 °F (37 °C)] 98.6 °F (37 °C)  Heart Rate:  [53-93] 68  Resp:  [16-19] 18  BP: (127-160)/() 127/85    No intake or output data in the 24 hours ending 10/31/24 1030    Flowsheet Rows      Flowsheet Row First Filed Value   Admission Height 175.3 cm (69\") Documented at 10/30/2024 1112   Admission Weight 83.7 kg (184 lb 9.6 oz) Documented at 10/30/2024 1112            Physical Exam:     General appearance: alert, cooperative, oriented  Paz intact, urine crystal clear at low CBI rate.          Lab Results (all)       Procedure Component Value Units Date/Time    Urine Culture - Urine, Urine, Clean Catch [[id number]]  (Normal) Collected: 10/30/24 1113    Specimen: Urine, Clean Catch Updated: 10/31/24 0813     Urine Culture No growth    Basic Metabolic Panel [[id number]]  (Abnormal) Collected: 10/31/24 0336    Specimen: Blood from Hand, Left Updated: 10/31/24 0416     Glucose 138 mg/dL      BUN 14 mg/dL      Creatinine 0.95 mg/dL      Sodium 137 mmol/L      Potassium 3.5 mmol/L      Chloride 102 mmol/L      CO2 25.2 mmol/L      Calcium 9.2 mg/dL      BUN/Creatinine Ratio 14.7     Anion Gap 9.8 mmol/L      eGFR 83.0 mL/min/1.73     Narrative:      GFR Normal >60  Chronic Kidney Disease <60  Kidney Failure <15    The GFR formula is only valid for adults with stable renal function between ages 18 and 70.    CBC (No Diff) [[id number]]  (Abnormal) Collected: 10/31/24 0336    Specimen: Blood from Hand, Left Updated: 10/31/24 0405     WBC 10.89 10*3/mm3      RBC 4.31 10*6/mm3      Hemoglobin 13.8 g/dL      Hematocrit 40.2 %      MCV 93.3 " fL      MCH 32.0 pg      MCHC 34.3 g/dL      RDW 12.4 %      RDW-SD 42.7 fl      MPV 11.0 fL      Platelets 216 10*3/mm3     Basic Metabolic Panel [[id number]]  (Abnormal) Collected: 10/30/24 1254    Specimen: Blood Updated: 10/30/24 1326     Glucose 144 mg/dL      BUN 11 mg/dL      Creatinine 1.11 mg/dL      Sodium 137 mmol/L      Potassium 4.1 mmol/L      Comment: Slight hemolysis detected by analyzer. Result may be falsely elevated.        Chloride 102 mmol/L      CO2 24.4 mmol/L      Calcium 9.9 mg/dL      BUN/Creatinine Ratio 9.9     Anion Gap 10.6 mmol/L      eGFR 68.8 mL/min/1.73     Narrative:      GFR Normal >60  Chronic Kidney Disease <60  Kidney Failure <15    The GFR formula is only valid for adults with stable renal function between ages 18 and 70.    CBC & Differential [[id number]]  (Abnormal) Collected: 10/30/24 1254    Specimen: Blood Updated: 10/30/24 1310    Narrative:      The following orders were created for panel order CBC & Differential.  Procedure                               Abnormality         Status                     ---------                               -----------         ------                     CBC Auto Differential[[id number]]        Abnormal            Final result                 Please view results for these tests on the individual orders.    CBC Auto Differential [[id number]]  (Abnormal) Collected: 10/30/24 1254    Specimen: Blood Updated: 10/30/24 1310     WBC 14.50 10*3/mm3      RBC 5.09 10*6/mm3      Hemoglobin 16.3 g/dL      Hematocrit 47.1 %      MCV 92.5 fL      MCH 32.0 pg      MCHC 34.6 g/dL      RDW 12.1 %      RDW-SD 41.5 fl      MPV 10.9 fL      Platelets 258 10*3/mm3      Neutrophil % 89.1 %      Lymphocyte % 5.0 %      Monocyte % 5.0 %      Eosinophil % 0.1 %      Basophil % 0.2 %      Immature Grans % 0.6 %      Neutrophils, Absolute 12.93 10*3/mm3      Lymphocytes, Absolute 0.72 10*3/mm3      Monocytes, Absolute 0.73 10*3/mm3      Eosinophils, Absolute 0.01  10*3/mm3      Basophils, Absolute 0.03 10*3/mm3      Immature Grans, Absolute 0.08 10*3/mm3      nRBC 0.0 /100 WBC     Urinalysis, Microscopic Only - Urine, Clean Catch [[id number]]  (Abnormal) Collected: 10/30/24 1113    Specimen: Urine, Clean Catch Updated: 10/30/24 1148     RBC, UA Too Numerous to Count /HPF      WBC, UA 21-50 /HPF      Bacteria, UA None Seen /HPF      Squamous Epithelial Cells, UA 0-2 /HPF      Hyaline Casts, UA None Seen /LPF      Methodology Automated Microscopy    Urinalysis With Culture If Indicated - Urine, Clean Catch [[id number]]  (Abnormal) Collected: 10/30/24 1113    Specimen: Urine, Clean Catch Updated: 10/30/24 1148     Color, UA Brown     Comment: Any Substance that causes an abnormal urine color can alter the accuracy of the chemical reactions.        Appearance, UA Turbid     pH, UA 5.5     Specific Gravity, UA 1.016     Glucose, UA Negative     Ketones, UA Negative     Bilirubin, UA Negative     Blood, UA Moderate (2+)     Protein,  mg/dL (2+)     Leuk Esterase, UA Moderate (2+)     Nitrite, UA Positive     Urobilinogen, UA 0.2 E.U./dL    Narrative:      In absence of clinical symptoms, the presence of pyuria, bacteria, and/or nitrites on the urinalysis result does not correlate with infection.            Imaging Results (All)       Procedure Component Value Units Date/Time    CT Abdomen Pelvis Without Contrast [[id number]] Collected: 10/30/24 1358     Updated: 10/31/24 0843    Narrative:      CT ABDOMEN AND PELVIS WITHOUT IV CONTRAST     HISTORY: 74-year-old male with hematuria and groin pain. Recently  admitted for a bladder infection. Prostatectomy in the past.     TECHNIQUE: Radiation dose reduction techniques were utilized, including  automated exposure control and exposure modulation based on body size.   3 mm images were obtained through the abdomen and pelvis without the  administration of IV contrast. Compared with previous CT 10/01/2024.     FINDINGS:  1. Urinary  bladder is moderately distended and there is haziness  surrounding the urinary bladder suspected to represent acute  UTI/cystitis. There is new mild to moderate fullness of both renal  collecting systems and ureters which may be secondary to the bladder  distention, but pyelonephritis cannot be excluded.  No change in the 2 large stones within the left kidney each measuring 9  mm. There are no right renal or ureteral stones. Few stones within the  urinary bladder are present and were present previously as well.     2. No new abnormality is seen at the noncontrasted liver, gallbladder,  spleen, pancreas, or adrenals.  There is no acute bowel abnormality. There is extensive colonic  diverticulosis without evidence for diverticulitis. Appendix appears  within normal limits.     3. No evidence for pneumonia or pleural effusions at the visualized lung  bases.     This report was finalized on 10/31/2024 8:40 AM by Dr. Vanita Randhawa M.D  on Workstation: [id number]               Medication Review:   Current Facility-Administered Medications   Medication Dose Route Frequency Provider Last Rate Last Admin    cefTRIAXone (ROCEPHIN) 1,000 mg in sodium chloride 0.9 % 100 mL MBP  1,000 mg Intravenous Once Qadah, Abdalhakim, APRN        cefTRIAXone (ROCEPHIN) 1,000 mg in sodium chloride 0.9 % 100 mL MBP  1,000 mg Intravenous Q24H Sara Sow, APRN        lisinopril (PRINIVIL,ZESTRIL) tablet 40 mg  40 mg Oral Daily Qadah, Abdalhakim, APRN   40 mg at 10/31/24 0831    pravastatin (PRAVACHOL) tablet 40 mg  40 mg Oral Nightly Qadah, Abdalhakim, APRN   40 mg at 10/30/24 2100    sodium chloride 0.9 % flush 10 mL  10 mL Intravenous PRN Zachery Box MD        sodium chloride 0.9 % flush 10 mL  10 mL Intravenous Q12H Qadah, Abdalhakim, APRN   10 mL at 10/31/24 0832    sodium chloride 0.9 % flush 10 mL  10 mL Intravenous PRN Qadah, Abdalhakim, APRN           Current Facility-Administered Medications:     cefTRIAXone (ROCEPHIN) 1,000 mg  in sodium chloride 0.9 % 100 mL MBP, 1,000 mg, Intravenous, Once, Qadah, Abdalhakim, APRN    cefTRIAXone (ROCEPHIN) 1,000 mg in sodium chloride 0.9 % 100 mL MBP, 1,000 mg, Intravenous, Q24H, Sara Sow M, APRN    lisinopril (PRINIVIL,ZESTRIL) tablet 40 mg, 40 mg, Oral, Daily, Qadah, Abdalhakim, APRN, 40 mg at 10/31/24 0831    pravastatin (PRAVACHOL) tablet 40 mg, 40 mg, Oral, Nightly, Qadah, Abdalhakim, APRN, 40 mg at 10/30/24 2100    [COMPLETED] Insert Peripheral IV, , , Once **AND** sodium chloride 0.9 % flush 10 mL, 10 mL, Intravenous, PRN, Zachery Box MD    sodium chloride 0.9 % flush 10 mL, 10 mL, Intravenous, Q12H, Qadah, Abdalhakim, APRN, 10 mL at 10/31/24 0832    sodium chloride 0.9 % flush 10 mL, 10 mL, Intravenous, PRN, Qadah, Abdalhakim, APRN  Medications Discontinued During This Encounter   Medication Reason    HYDROmorphone (DILAUDID) injection 1 mg        Assessment & Plan         Hematuria        Plan   - keep CBI clamped. If urine remains clear for 2 hours, d/c mcclellan.  - hold Eliquis as long as possible.   - Urine culture negative. No need for antibiotic course after discharge.  - We will arrange follow up with Dr. Russell in 1-2 weeks.    Davian José Jr., MD  10/31/24  10:30 EDT

## 2024-10-31 NOTE — PROGRESS NOTES
"  [facility] Progress Note    Chief Complaint: None    No new issues to the night.  Urine is clearing up very nicely.  No significant ongoing bleeding.    Review of Systems:    The following systems were reviewed and negative;  respiratory and cardiovascular          Vital Signs  /72 (BP Location: Left arm, Patient Position: Lying)   Pulse 53   Temp 98.4 °F (36.9 °C) (Oral)   Resp 16   Ht 175.3 cm (69\")   Wt 83.7 kg (184 lb 9.6 oz)   SpO2 97%   BMI 27.26 kg/m²     Physical Exam:     General Appearance:    Alert, cooperative, NAD   HEENT:    No trauma, pupils reactive, hearing intact   Back:     No CVA tenderness   Lungs:     Respirations unlabored, no wheezing    Heart:    RRR, intact peripheral pulses   Abdomen:   Soft and benign   :  Paz anchored   Extremities:   No edema, no deformity   Lymphatic:   No neck or groin LAD   Skin:   No bleeding, bruising or rashes   Neuro/Psych:   Orientation intact, mood/affect pleasant, no focal findings        Results Review:     I reviewed the patient's new clinical results.  Lab Results (last 24 hours)       Procedure Component Value Units Date/Time    Basic Metabolic Panel [[id number]]  (Abnormal) Collected: 10/31/24 0336    Specimen: Blood from Hand, Left Updated: 10/31/24 0416     Glucose 138 mg/dL      BUN 14 mg/dL      Creatinine 0.95 mg/dL      Sodium 137 mmol/L      Potassium 3.5 mmol/L      Chloride 102 mmol/L      CO2 25.2 mmol/L      Calcium 9.2 mg/dL      BUN/Creatinine Ratio 14.7     Anion Gap 9.8 mmol/L      eGFR 83.0 mL/min/1.73     Narrative:      GFR Normal >60  Chronic Kidney Disease <60  Kidney Failure <15    The GFR formula is only valid for adults with stable renal function between ages 18 and 70.    CBC (No Diff) [[id number]]  (Abnormal) Collected: 10/31/24 0336    Specimen: Blood from Hand, Left Updated: 10/31/24 0405     WBC 10.89 10*3/mm3      RBC 4.31 10*6/mm3      Hemoglobin 13.8 g/dL      Hematocrit 40.2 %      MCV 93.3 fL      MCH " 32.0 pg      MCHC 34.3 g/dL      RDW 12.4 %      RDW-SD 42.7 fl      MPV 11.0 fL      Platelets 216 10*3/mm3     Basic Metabolic Panel [[id number]]  (Abnormal) Collected: 10/30/24 1254    Specimen: Blood Updated: 10/30/24 1326     Glucose 144 mg/dL      BUN 11 mg/dL      Creatinine 1.11 mg/dL      Sodium 137 mmol/L      Potassium 4.1 mmol/L      Comment: Slight hemolysis detected by analyzer. Result may be falsely elevated.        Chloride 102 mmol/L      CO2 24.4 mmol/L      Calcium 9.9 mg/dL      BUN/Creatinine Ratio 9.9     Anion Gap 10.6 mmol/L      eGFR 68.8 mL/min/1.73     Narrative:      GFR Normal >60  Chronic Kidney Disease <60  Kidney Failure <15    The GFR formula is only valid for adults with stable renal function between ages 18 and 70.    CBC & Differential [[id number]]  (Abnormal) Collected: 10/30/24 1254    Specimen: Blood Updated: 10/30/24 1310    Narrative:      The following orders were created for panel order CBC & Differential.  Procedure                               Abnormality         Status                     ---------                               -----------         ------                     CBC Auto Differential[[id number]]        Abnormal            Final result                 Please view results for these tests on the individual orders.    CBC Auto Differential [[id number]]  (Abnormal) Collected: 10/30/24 1254    Specimen: Blood Updated: 10/30/24 1310     WBC 14.50 10*3/mm3      RBC 5.09 10*6/mm3      Hemoglobin 16.3 g/dL      Hematocrit 47.1 %      MCV 92.5 fL      MCH 32.0 pg      MCHC 34.6 g/dL      RDW 12.1 %      RDW-SD 41.5 fl      MPV 10.9 fL      Platelets 258 10*3/mm3      Neutrophil % 89.1 %      Lymphocyte % 5.0 %      Monocyte % 5.0 %      Eosinophil % 0.1 %      Basophil % 0.2 %      Immature Grans % 0.6 %      Neutrophils, Absolute 12.93 10*3/mm3      Lymphocytes, Absolute 0.72 10*3/mm3      Monocytes, Absolute 0.73 10*3/mm3      Eosinophils, Absolute 0.01 10*3/mm3       Basophils, Absolute 0.03 10*3/mm3      Immature Grans, Absolute 0.08 10*3/mm3      nRBC 0.0 /100 WBC     Urinalysis, Microscopic Only - Urine, Clean Catch [[id number]]  (Abnormal) Collected: 10/30/24 1113    Specimen: Urine, Clean Catch Updated: 10/30/24 1148     RBC, UA Too Numerous to Count /HPF      WBC, UA 21-50 /HPF      Bacteria, UA None Seen /HPF      Squamous Epithelial Cells, UA 0-2 /HPF      Hyaline Casts, UA None Seen /LPF      Methodology Automated Microscopy    Urinalysis With Culture If Indicated - Urine, Clean Catch [[id number]]  (Abnormal) Collected: 10/30/24 1113    Specimen: Urine, Clean Catch Updated: 10/30/24 1148     Color, UA Brown     Comment: Any Substance that causes an abnormal urine color can alter the accuracy of the chemical reactions.        Appearance, UA Turbid     pH, UA 5.5     Specific Gravity, UA 1.016     Glucose, UA Negative     Ketones, UA Negative     Bilirubin, UA Negative     Blood, UA Moderate (2+)     Protein,  mg/dL (2+)     Leuk Esterase, UA Moderate (2+)     Nitrite, UA Positive     Urobilinogen, UA 0.2 E.U./dL    Narrative:      In absence of clinical symptoms, the presence of pyuria, bacteria, and/or nitrites on the urinalysis result does not correlate with infection.    Urine Culture - Urine, Urine, Clean Catch [[id number]] Collected: 10/30/24 1113    Specimen: Urine, Clean Catch Updated: 10/30/24 1148          Imaging Results (Last 24 Hours)       Procedure Component Value Units Date/Time    CT Abdomen Pelvis Without Contrast [[id number]] Collected: 10/30/24 1358     Updated: 10/30/24 1358    Narrative:      CT ABDOMEN AND PELVIS WITHOUT IV CONTRAST     HISTORY: 74-year-old male with hematuria and groin pain. Recently  admitted for a bladder infection. Prostatectomy in the past.     TECHNIQUE: Radiation dose reduction techniques were utilized, including  automated exposure control and exposure modulation based on body size.   3 mm images were obtained through the  abdomen and pelvis without the  administration of IV contrast. Compared with previous CT 10/01/2024.     FINDINGS:  1. Urinary bladder is moderately distended and there is haziness  surrounding the urinary bladder suspected to represent acute  UTI/cystitis. There is new mild to moderate fullness of both renal  collecting systems and ureters which may be secondary to the bladder  distention, but pyelonephritis cannot be excluded.  No change in the 2 large stones within the left kidney each measuring 9  mm. There are no right renal or ureteral stones. Few stones within the  urinary bladder are present and were present previously as well.     2. No new abnormality is seen at the noncontrasted liver, gallbladder,  spleen, pancreas, or adrenals.  There is no acute bowel abnormality. There is extensive colonic  diverticulosis without evidence for diverticulitis. Appendix appears  within normal limits.     3. No evidence for pneumonia or pleural effusions at the visualized lung  bases.               Medication Review:   I have personally reviewed    Current Facility-Administered Medications:     cefTRIAXone (ROCEPHIN) 1,000 mg in sodium chloride 0.9 % 100 mL MBP, 1,000 mg, Intravenous, Once, Qadah, Abdalhakim, APRN    cefTRIAXone (ROCEPHIN) 1,000 mg in sodium chloride 0.9 % 100 mL MBP, 1,000 mg, Intravenous, Q24H, Sara Sow, ARJUN    lisinopril (PRINIVIL,ZESTRIL) tablet 40 mg, 40 mg, Oral, Daily, Qadah, Abdalhakim, APRN    pravastatin (PRAVACHOL) tablet 40 mg, 40 mg, Oral, Nightly, Qadah, Abdalhakim, APRN, 40 mg at 10/30/24 2100    [COMPLETED] Insert Peripheral IV, , , Once **AND** sodium chloride 0.9 % flush 10 mL, 10 mL, Intravenous, PRN, Zachery Box MD    sodium chloride 0.9 % flush 10 mL, 10 mL, Intravenous, Q12H, Qadah, Abdalhakim, APRN, 10 mL at 10/30/24 2108    sodium chloride 0.9 % flush 10 mL, 10 mL, Intravenous, PRN, Qadah, Abdalhakim, APRN    Allergies:    Penicillins    Assessment:    Active  Problems:    Hematuria       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      Robbi Horn MD    10/31/2024  07:58 EDT

## 2024-10-31 NOTE — PROGRESS NOTES
MD ATTESTATION NOTE    The SINDI and I have discussed this patient's history, physical exam, and treatment plan.  I have reviewed the documentation and personally had a face to face interaction with the patient. I affirm the documentation and agree with the treatment and plan.  The attached note describes my personal findings.      I provided a substantive portion of the care of the patient.  I personally performed the physical exam in its entirety, and below are my findings.  tion.      Brief HPI: This patient is a 74-year-old male with a history of Eliquis dependent atrial fibrillation admitted to the observation unit for further management and treatment of gross hematuria.  The patient has had the symptoms before and is actually required an ablation.  He currently has a three-way catheter in place and is resting comfortably now without any further physical complaints.      PHYSICAL EXAM  ED Triage Vitals   Temp Heart Rate Resp BP SpO2   10/30/24 1043 10/30/24 1042 10/30/24 1109 10/30/24 1112 10/30/24 1042   96 °F (35.6 °C) 91 18 (!) 160/104 97 %      Temp src Heart Rate Source Patient Position BP Location FiO2 (%)   10/30/24 1043 10/30/24 1042 10/30/24 1112 10/30/24 1112 --   Tympanic Monitor Lying Right arm          GENERAL: Resting comfortably and in no acute distress, nontoxic in appearance  HENT: nares patent  EYES: no scleral icterus  CV: regular rhythm, normal rate, no M/R/G  RESPIRATORY: normal effort, lungs clear bilaterally  ABDOMEN: soft, nontender, no rebound or guarding  MUSCULOSKELETAL: no deformity, no edema  NEURO: alert, moves all extremities, follows commands  PSYCH:  calm, cooperative  SKIN: warm, dry    Vital signs and nursing notes reviewed.        Plan: Urology has seen the patient in consultation and has asked us to hold the continuous bladder irrigation.  If he remains clear, the catheter can be removed.  If he does not clear, he will need to to continue the bladder irrigation and urology  further management.  Further planning to follow.

## 2024-10-31 NOTE — PLAN OF CARE
Goal Outcome Evaluation:              Outcome Evaluation: Patient is alert and oriented times 4, on room air and independent baseline. He was admitted due urinary obstruction and hematuria. He is on CBI 3 way with clear to light pink output. Denies any pain and no other complaints. Urology consulted and following.

## 2024-10-31 NOTE — NURSING NOTE
Paz remained clear and was removed at 11:15. Patient tolerated removal well. Patient educated on s/s to call RN. Urinal given to patient. Will monitor for spontaneous voiding.

## 2024-11-01 ENCOUNTER — TRANSITIONAL CARE MANAGEMENT TELEPHONE ENCOUNTER (OUTPATIENT)
Dept: CALL CENTER | Facility: HOSPITAL | Age: 74
End: 2024-11-01
Payer: MEDICARE

## 2024-11-01 DIAGNOSIS — I10 PRIMARY HYPERTENSION: ICD-10-CM

## 2024-11-01 RX ORDER — LISINOPRIL 40 MG/1
40 TABLET ORAL DAILY
Qty: 90 TABLET | Refills: 1 | Status: SHIPPED | OUTPATIENT
Start: 2024-11-01

## 2024-11-01 NOTE — TELEPHONE ENCOUNTER
Caller: [place]    Relationship: Self    Best call back number: [phone]     Requested Prescriptions:   Requested Prescriptions     Pending Prescriptions Disp Refills    lisinopril (PRINIVIL,ZESTRIL) 40 MG tablet 90 tablet 1     Sig: Take 1 tablet by mouth Daily.        Pharmacy where request should be sent: [id number] - [place], [place] - [address] [facility] [address]  - [phone]  - [phone] FX     Last office visit with prescribing clinician: 10/8/2024   Last telemedicine visit with prescribing clinician: Visit date not found   Next office visit with prescribing clinician: 12/4/2024     Additional details provided by patient: WILL NEED NEW PRESCRIPTION    Does the patient have less than a 3 day supply:  [x] Yes  [] No    Would you like a call back once the refill request has been completed: [] Yes [] No    If the office needs to give you a call back, can they leave a voicemail: [] Yes [] No    Padmini Dodson Rep   11/01/24 10:09 EDT

## 2024-11-01 NOTE — OUTREACH NOTE
Call Center TCM Note      Flowsheet Row Responses   [facility] [place], operated by [facility] patient discharged from? [facility]   Does the patient have one of the following disease processes/diagnoses(primary or secondary)? Other   TCM attempt successful? Yes   Call start time 1102   Call end time 1107   Discharge diagnosis Hematuria   Meds reviewed with patient/caregiver? Yes   Is the patient having any side effects they believe may be caused by any medication additions or changes? No   Does the patient have all medications ordered at discharge? N/A   Is the patient taking all medications as directed (includes completed medication regime)? No  [Patient reports he is still holding Eliquis but has reached out to Cardiology.]   What is preventing the patient from taking all medications as directed? Side effects   Nursing Interventions Nurse provided patient education   Comments Patient reports he has previously scheduled PCP follow up on 12/4/24 that he wishes to keep.   Does the patient have an appointment with their PCP within 7-14 days of discharge? No   Nursing Interventions Routed TCM call to PCP office   Has home health visited the patient within 72 hours of discharge? N/A   Psychosocial issues? No   Did the patient receive a copy of their discharge instructions? Yes   Nursing interventions Reviewed instructions with patient   What is the patient's perception of their health status since discharge? Improving   Is the patient/caregiver able to teach back signs and symptoms related to disease process for when to call PCP? Yes   Is the patient/caregiver able to teach back signs and symptoms related to disease process for when to call 911? Yes   Is the patient/caregiver able to teach back the hierarchy of who to call/visit for symptoms/problems? PCP, Specialist, Home health nurse, Urgent Care, ED, 911 Yes   TCM call completed? Yes   Wrap up additional comments patient reports heis urinating without difficulty and urine has been clear.    Call end time 1107   Would this patient benefit from a Referral to [facility] Social Work? No   Is the patient interested in additional calls from an ambulatory ? No            Joy Almeida RN    11/1/2024, 11:09 EDT

## 2024-11-04 NOTE — CASE MANAGEMENT/SOCIAL WORK
Case Management Discharge Note      Final Note: Discharge to home. Vilma SALDANA RN CCP         Selected Continued Care - Discharged on 10/31/2024 Admission date: 10/30/2024 - Discharge disposition: Home or Self Care      Destination    No services have been selected for the patient.                Durable Medical Equipment    No services have been selected for the patient.                Dialysis/Infusion    No services have been selected for the patient.                Home Medical Care    No services have been selected for the patient.                Therapy    No services have been selected for the patient.                Community Resources    No services have been selected for the patient.                Community & DME    No services have been selected for the patient.                         Final Discharge Disposition Code: 01 - home or self-care

## 2024-11-06 ENCOUNTER — TELEPHONE (OUTPATIENT)
Age: 74
End: 2024-11-06
Payer: MEDICARE

## 2024-11-06 NOTE — TELEPHONE ENCOUNTER
Pt is seeing Dr. Russell tomorrow and will let us know if he requests and loger hold instruction...Saida

## 2024-11-06 NOTE — TELEPHONE ENCOUNTER
Pt called to let you know he was in the hospital 10/1 with blood in his urinethat clotted and he had to have a procedure. His apixaban was held until bleeding stopped. He has the same problem 10/30 and was told he needed to call and let us know and ask if he should stop thwe A/C all together...Saida

## 2024-12-02 ENCOUNTER — OFFICE VISIT (OUTPATIENT)
Dept: WOUND CARE | Facility: HOSPITAL | Age: 74
End: 2024-12-02
Payer: MEDICARE

## 2024-12-13 ENCOUNTER — OFFICE VISIT (OUTPATIENT)
Dept: FAMILY MEDICINE CLINIC | Facility: CLINIC | Age: 74
End: 2024-12-13
Payer: MEDICARE

## 2024-12-13 VITALS
DIASTOLIC BLOOD PRESSURE: 90 MMHG | WEIGHT: 188.6 LBS | HEIGHT: 69 IN | SYSTOLIC BLOOD PRESSURE: 160 MMHG | HEART RATE: 55 BPM | BODY MASS INDEX: 27.93 KG/M2 | TEMPERATURE: 98.2 F | RESPIRATION RATE: 16 BRPM | OXYGEN SATURATION: 99 %

## 2024-12-13 DIAGNOSIS — E78.5 HYPERLIPIDEMIA, UNSPECIFIED HYPERLIPIDEMIA TYPE: ICD-10-CM

## 2024-12-13 DIAGNOSIS — R80.9 POSITIVE FOR MICROALBUMINURIA: ICD-10-CM

## 2024-12-13 DIAGNOSIS — N18.2 STAGE 2 CHRONIC KIDNEY DISEASE: ICD-10-CM

## 2024-12-13 DIAGNOSIS — C61 PROSTATE CANCER: ICD-10-CM

## 2024-12-13 DIAGNOSIS — E78.2 MIXED HYPERLIPIDEMIA: ICD-10-CM

## 2024-12-13 DIAGNOSIS — I48.0 PAROXYSMAL ATRIAL FIBRILLATION: ICD-10-CM

## 2024-12-13 DIAGNOSIS — Z00.00 ENCOUNTER FOR SUBSEQUENT ANNUAL WELLNESS VISIT (AWV) IN MEDICARE PATIENT: Primary | ICD-10-CM

## 2024-12-13 DIAGNOSIS — C67.9 MALIGNANT NEOPLASM OF URINARY BLADDER, UNSPECIFIED SITE: ICD-10-CM

## 2024-12-13 DIAGNOSIS — I10 PRIMARY HYPERTENSION: ICD-10-CM

## 2024-12-13 PROCEDURE — 3080F DIAST BP >= 90 MM HG: CPT | Performed by: STUDENT IN AN ORGANIZED HEALTH CARE EDUCATION/TRAINING PROGRAM

## 2024-12-13 PROCEDURE — 3077F SYST BP >= 140 MM HG: CPT | Performed by: STUDENT IN AN ORGANIZED HEALTH CARE EDUCATION/TRAINING PROGRAM

## 2024-12-13 PROCEDURE — G0439 PPPS, SUBSEQ VISIT: HCPCS | Performed by: STUDENT IN AN ORGANIZED HEALTH CARE EDUCATION/TRAINING PROGRAM

## 2024-12-13 PROCEDURE — 1160F RVW MEDS BY RX/DR IN RCRD: CPT | Performed by: STUDENT IN AN ORGANIZED HEALTH CARE EDUCATION/TRAINING PROGRAM

## 2024-12-13 PROCEDURE — 1159F MED LIST DOCD IN RCRD: CPT | Performed by: STUDENT IN AN ORGANIZED HEALTH CARE EDUCATION/TRAINING PROGRAM

## 2024-12-13 PROCEDURE — 1126F AMNT PAIN NOTED NONE PRSNT: CPT | Performed by: STUDENT IN AN ORGANIZED HEALTH CARE EDUCATION/TRAINING PROGRAM

## 2024-12-13 RX ORDER — DAPAGLIFLOZIN 10 MG/1
1 TABLET, FILM COATED ORAL DAILY
Qty: 30 TABLET | Refills: 0 | Status: SHIPPED | OUTPATIENT
Start: 2024-12-13
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
## 2025-01-05 ENCOUNTER — HOSPITAL ENCOUNTER (OUTPATIENT)
Facility: HOSPITAL | Age: 75
Setting detail: OBSERVATION
Discharge: HOME | End: 2025-01-08
Attending: EMERGENCY MEDICINE | Admitting: EMERGENCY MEDICINE
Payer: MEDICARE

## 2025-01-05 DIAGNOSIS — R31.0 GROSS HEMATURIA: Primary | ICD-10-CM

## 2025-01-05 DIAGNOSIS — C67.9 MALIGNANT NEOPLASM OF URINARY BLADDER, UNSPECIFIED SITE: ICD-10-CM

## 2025-01-05 LAB
ALBUMIN SERPL-MCNC: 4.5 G/DL (ref 3.5–5.2)
ALBUMIN/GLOB SERPL: 1.4 G/DL
ALP SERPL-CCNC: 105 U/L (ref 39–117)
ALT SERPL W P-5'-P-CCNC: 29 U/L (ref 1–41)
ANION GAP SERPL CALCULATED.3IONS-SCNC: 14 MMOL/L (ref 5–15)
AST SERPL-CCNC: 28 U/L (ref 1–40)
BACTERIA UR QL AUTO: ABNORMAL /HPF
BASOPHILS # BLD AUTO: 0.04 10*3/MM3 (ref 0–0.2)
BASOPHILS NFR BLD AUTO: 0.3 % (ref 0–1.5)
BILIRUB SERPL-MCNC: 0.9 MG/DL (ref 0–1.2)
BILIRUB UR QL STRIP: NEGATIVE
BUN SERPL-MCNC: 12 MG/DL (ref 8–23)
BUN/CREAT SERPL: 10.6 (ref 7–25)
CALCIUM SPEC-SCNC: 10.5 MG/DL (ref 8.6–10.5)
CHLORIDE SERPL-SCNC: 101 MMOL/L (ref 98–107)
CLARITY UR: ABNORMAL
CO2 SERPL-SCNC: 22 MMOL/L (ref 22–29)
COLOR UR: ABNORMAL
CREAT SERPL-MCNC: 1.13 MG/DL (ref 0.76–1.27)
DEPRECATED RDW RBC AUTO: 40.2 FL (ref 37–54)
EGFRCR SERPLBLD CKD-EPI 2021: 67.4 ML/MIN/1.73
EOSINOPHIL # BLD AUTO: 0.01 10*3/MM3 (ref 0–0.4)
EOSINOPHIL NFR BLD AUTO: 0.1 % (ref 0.3–6.2)
ERYTHROCYTE [DISTWIDTH] IN BLOOD BY AUTOMATED COUNT: 12.3 % (ref 12.3–15.4)
GLOBULIN UR ELPH-MCNC: 3.3 GM/DL
GLUCOSE SERPL-MCNC: 125 MG/DL (ref 65–99)
GLUCOSE UR STRIP-MCNC: NEGATIVE MG/DL
HCT VFR BLD AUTO: 47.2 % (ref 37.5–51)
HGB BLD-MCNC: 16.8 G/DL (ref 13–17.7)
HGB UR QL STRIP.AUTO: ABNORMAL
HYALINE CASTS UR QL AUTO: ABNORMAL /LPF
IMM GRANULOCYTES # BLD AUTO: 0.05 10*3/MM3 (ref 0–0.05)
IMM GRANULOCYTES NFR BLD AUTO: 0.4 % (ref 0–0.5)
INR PPP: 0.96 (ref 0.9–1.1)
KETONES UR QL STRIP: NEGATIVE
LEUKOCYTE ESTERASE UR QL STRIP.AUTO: ABNORMAL
LYMPHOCYTES # BLD AUTO: 1.1 10*3/MM3 (ref 0.7–3.1)
LYMPHOCYTES NFR BLD AUTO: 7.8 % (ref 19.6–45.3)
MCH RBC QN AUTO: 32.2 PG (ref 26.6–33)
MCHC RBC AUTO-ENTMCNC: 35.6 G/DL (ref 31.5–35.7)
MCV RBC AUTO: 90.4 FL (ref 79–97)
MONOCYTES # BLD AUTO: 1.16 10*3/MM3 (ref 0.1–0.9)
MONOCYTES NFR BLD AUTO: 8.2 % (ref 5–12)
NEUTROPHILS NFR BLD AUTO: 11.83 10*3/MM3 (ref 1.7–7)
NEUTROPHILS NFR BLD AUTO: 83.2 % (ref 42.7–76)
NITRITE UR QL STRIP: POSITIVE
NRBC BLD AUTO-RTO: 0 /100 WBC (ref 0–0.2)
PH UR STRIP.AUTO: 7 [PH] (ref 5–8)
PLATELET # BLD AUTO: 264 10*3/MM3 (ref 140–450)
PMV BLD AUTO: 10.9 FL (ref 6–12)
POTASSIUM SERPL-SCNC: 4 MMOL/L (ref 3.5–5.2)
PROT SERPL-MCNC: 7.8 G/DL (ref 6–8.5)
PROT UR QL STRIP: ABNORMAL
PROTHROMBIN TIME: 13 SECONDS (ref 11.7–14.2)
RBC # BLD AUTO: 5.22 10*6/MM3 (ref 4.14–5.8)
RBC # UR STRIP: ABNORMAL /HPF
REF LAB TEST METHOD: ABNORMAL
SODIUM SERPL-SCNC: 137 MMOL/L (ref 136–145)
SP GR UR STRIP: 1.01 (ref 1–1.03)
SQUAMOUS #/AREA URNS HPF: ABNORMAL /HPF
UROBILINOGEN UR QL STRIP: ABNORMAL
WBC # UR STRIP: ABNORMAL /HPF
WBC NRBC COR # BLD AUTO: 14.19 10*3/MM3 (ref 3.4–10.8)

## 2025-01-05 PROCEDURE — 81001 URINALYSIS AUTO W/SCOPE: CPT | Performed by: EMERGENCY MEDICINE

## 2025-01-05 PROCEDURE — 87086 URINE CULTURE/COLONY COUNT: CPT | Performed by: EMERGENCY MEDICINE

## 2025-01-05 PROCEDURE — P9612 CATHETERIZE FOR URINE SPEC: HCPCS

## 2025-01-05 PROCEDURE — 80053 COMPREHEN METABOLIC PANEL: CPT | Performed by: EMERGENCY MEDICINE

## 2025-01-05 PROCEDURE — 99285 EMERGENCY DEPT VISIT HI MDM: CPT

## 2025-01-05 PROCEDURE — 25010000002 CEFTRIAXONE PER 250 MG: Performed by: NURSE PRACTITIONER

## 2025-01-05 PROCEDURE — 85610 PROTHROMBIN TIME: CPT | Performed by: EMERGENCY MEDICINE

## 2025-01-05 PROCEDURE — G0378 HOSPITAL OBSERVATION PER HR: HCPCS

## 2025-01-05 PROCEDURE — 85025 COMPLETE CBC W/AUTO DIFF WBC: CPT | Performed by: EMERGENCY MEDICINE

## 2025-01-05 RX ORDER — SODIUM CHLORIDE 0.9 % (FLUSH) 0.9 %
10 SYRINGE (ML) INJECTION AS NEEDED
Status: DISCONTINUED | OUTPATIENT
Start: 2025-01-05 | End: 2025-01-08 | Stop reason: HOSPADM

## 2025-01-05 RX ORDER — SODIUM CHLORIDE 9 MG/ML
40 INJECTION, SOLUTION INTRAVENOUS AS NEEDED
Status: DISCONTINUED | OUTPATIENT
Start: 2025-01-05 | End: 2025-01-08 | Stop reason: HOSPADM

## 2025-01-05 RX ORDER — PRAVASTATIN SODIUM 40 MG
40 TABLET ORAL NIGHTLY
Status: DISCONTINUED | OUTPATIENT
Start: 2025-01-05 | End: 2025-01-08 | Stop reason: HOSPADM

## 2025-01-05 RX ORDER — SODIUM CHLORIDE 0.9 % (FLUSH) 0.9 %
10 SYRINGE (ML) INJECTION EVERY 12 HOURS SCHEDULED
Status: DISCONTINUED | OUTPATIENT
Start: 2025-01-05 | End: 2025-01-08 | Stop reason: HOSPADM

## 2025-01-05 RX ORDER — LISINOPRIL 20 MG/1
40 TABLET ORAL DAILY
Status: DISCONTINUED | OUTPATIENT
Start: 2025-01-06 | End: 2025-01-08 | Stop reason: HOSPADM

## 2025-01-05 RX ORDER — ACETAMINOPHEN 325 MG/1
650 TABLET ORAL EVERY 6 HOURS PRN
Status: DISCONTINUED | OUTPATIENT
Start: 2025-01-05 | End: 2025-01-08 | Stop reason: HOSPADM

## 2025-01-05 RX ORDER — LORAZEPAM 1 MG/1
1 TABLET ORAL ONCE
Status: COMPLETED | OUTPATIENT
Start: 2025-01-05 | End: 2025-01-05

## 2025-01-05 RX ADMIN — CEFTRIAXONE SODIUM 1000 MG: 1 INJECTION, POWDER, FOR SOLUTION INTRAMUSCULAR; INTRAVENOUS at 20:14

## 2025-01-05 RX ADMIN — Medication 10 ML: at 23:11

## 2025-01-05 RX ADMIN — ACETAMINOPHEN 650 MG: 325 TABLET ORAL at 21:08

## 2025-01-05 RX ADMIN — LORAZEPAM 1 MG: 1 TABLET ORAL at 20:08

## 2025-01-05 RX ADMIN — PRAVASTATIN SODIUM 40 MG: 40 TABLET ORAL at 21:08

## 2025-01-05 RX ADMIN — Medication 5 MG: at 23:44

## 2025-01-05 NOTE — ED NOTES
"..Nursing report ED to floor  [place] Car[place]o  74 y.o.  male    HPI :  HPI  Stated Reason for Visit: pt states that he has hadd blood in urine since this morning. pt states he has hx of prostate cancer.  History Obtained From: patient    Chief Complaint  Chief Complaint   Patient presents with    Blood in Urine       Admitting doctor:   Carlos Cheatham MD    Admitting diagnosis:   The primary encounter diagnosis was Gross hematuria. A diagnosis of Malignant neoplasm of urinary bladder, unspecified site was also pertinent to this visit.    Code status:   Current Code Status       Date Active Code Status Order ID Comments User Context       1/5/2025 1746 CPR (Attempt to Resuscitate) [id number]  Jose Manuel Garcia APRN ED        Question Answer    Code Status (Patient has no pulse and is not breathing) CPR (Attempt to Resuscitate)    Medical Interventions (Patient has pulse or is breathing) Full Support    Level Of Support Discussed With Patient                    Allergies:   Penicillins    Isolation:   No active isolations    Intake and Output  No intake or output data in the 24 hours ending 01/05/25 1755    Weight:       01/05/25  1615   Weight: 81.6 kg (180 lb)       Most recent vitals:   Vitals:    01/05/25 1602 01/05/25 1615 01/05/25 1617   BP:   (!) 178/112   Pulse: 119     Resp: 16     Temp: 97.7 °F (36.5 °C)     TempSrc: Tympanic     SpO2: 98%     Weight:  81.6 kg (180 lb)    Height:  175.3 cm (69\")        Active LDAs/IV Access:   Lines, Drains & Airways       Active LDAs       Name Placement date Placement time Site Days    Peripheral IV 01/05/25 1616 Left Antecubital 01/05/25  1616  Antecubital  less than 1    Continuous Bladder Irrigation Triple-lumen 24 Fr 01/05/25  1751  Triple-lumen  less than 1                    Labs (abnormal labs have a star):   Labs Reviewed   COMPREHENSIVE METABOLIC PANEL - Abnormal; Notable for the following components:       Result Value    Glucose 125 (*)     All other components " within normal limits    Narrative:     GFR Categories in Chronic Kidney Disease (CKD)      GFR Category          GFR (mL/min/1.73)    Interpretation  G1                     90 or greater         Normal or high (1)  G2                      60-89                Mild decrease (1)  G3a                   45-59                Mild to moderate decrease  G3b                   30-44                Moderate to severe decrease  G4                    15-29                Severe decrease  G5                    14 or less           Kidney failure          (1)In the absence of evidence of kidney disease, neither GFR category G1 or G2 fulfill the criteria for CKD.    eGFR calculation 2021 CKD-EPI creatinine equation, which does not include race as a factor   CBC WITH AUTO DIFFERENTIAL - Abnormal; Notable for the following components:    WBC 14.19 (*)     Neutrophil % 83.2 (*)     Lymphocyte % 7.8 (*)     Eosinophil % 0.1 (*)     Neutrophils, Absolute 11.83 (*)     Monocytes, Absolute 1.16 (*)     All other components within normal limits   PROTIME-INR - Normal   URINALYSIS W/ CULTURE IF INDICATED   URINALYSIS, MICROSCOPIC ONLY   CBC AND DIFFERENTIAL    Narrative:     The following orders were created for panel order CBC & Differential.  Procedure                               Abnormality         Status                     ---------                               -----------         ------                     CBC Auto Differential[[id number]]        Abnormal            Final result                 Please view results for these tests on the individual orders.       EKG:   No orders to display       Meds given in ED:   Medications   sodium chloride 0.9 % flush 10 mL (has no administration in time range)   sodium chloride 0.9 % flush 10 mL (has no administration in time range)   sodium chloride 0.9 % flush 10 mL (has no administration in time range)   sodium chloride 0.9 % infusion 40 mL (has no administration in time range)        Imaging results:  No radiology results for the last day    Ambulatory status:   - assist x1    Social issues:   Social History     Socioeconomic History    Marital status:    Tobacco Use    Smoking status: Never     Passive exposure: Never    Smokeless tobacco: Never   Vaping Use    Vaping status: Never Used   Substance and Sexual Activity    Alcohol use: Yes     Alcohol/week: 1.0 standard drink of alcohol     Types: 1 Drinks containing 0.5 oz of alcohol per week     Comment: Occasional beer or cocktail    Drug use: No    Sexual activity: Yes     Partners: Female     Birth control/protection: Vasectomy       Peripheral Neurovascular  Peripheral Neurovascular (Adult)  Peripheral Neurovascular WDL: WDL    Neuro Cognitive  Neuro Cognitive (Adult)  Cognitive/Neuro/Behavioral WDL: WDL, orientation  Orientation: oriented x 4    Learning  Learning Assessment  Learning Readiness and Ability: no barriers identified  Education Provided  Person Taught: patient  Teaching Method: verbal instruction    Respiratory  Respiratory WDL  Respiratory WDL: WDL    Abdominal Pain       Pain Assessments  Pain (Adult)  (0-10) Pain Rating: Rest: 6  Pain Location:  (bladder)    NIH Stroke Scale       Selena Kilgore RN  01/05/25 17:55 EST

## 2025-01-05 NOTE — ED PROVIDER NOTES
EMERGENCY DEPARTMENT ENCOUNTER  Room Number:  14/14  PCP: Chloe Arriaga MD  Independent Historians: Patient, wife at bedside      HPI:  Chief Complaint: had concerns including Blood in Urine.     A complete HPI/ROS/PMH/PSH/SH/FH are unobtainable due to:   Chronic or social conditions impacting patient care (Social Determinants of Health):       Context: North Carcamo is a 74 y.o. male with a medical history of bladder cancer, hypertension and hyperlipidemia who presents to the ED c/o acute blood in urine.  Patient has a history of gross hematuria was hospitalized for same in October of this year.  He did have brief amount of blood in his urine a couple weeks ago but it worsened this morning.  He has had gross blood and clots that started this morning.  He has had difficulty fully evacuating the bladder and does complain of mild suprapubic discomfort.  Denies fever.  Denies dysuria prior to the hematuria.  Patient states he did stop taking his Eliquis back in October and is currently not anticoagulated.  His urologist is Dr. Brian Russell and he has set him up for hyperbaric therapy to help with bleeding but this is not started yet.      Review of prior external notes (non-ED) -and- Review of prior external test results outside of this encounter:   I reviewed prior medical records note patient was hospitalized in the observation unit in October with gross hematuria felt to be related to cystitis.  He was treated at that time with three-way catheter and continuous bladder irrigation.  Patient is anticoagulated on Eliquis with a history of A-fib.      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 coli pneumonia 08/01/2023    History of prostate cancer 2002    History of recent hospitalization 08/01/2023    Kidney stone 2019    On anticoagulant therapy          PAST SURGICAL HISTORY  Past Surgical History:   Procedure Laterality Date    CATARACT EXTRACTION      WITH LENS IMPLANTS    COLONOSCOPY N/A 09/13/2023    Procedure: COLONOSCOPY TO CECUM AND TERMINAL ILEUM WITH COLD POLYPECTOMY;  Surgeon: Humberto Peña MD;  Location: [facility] ENDOSCOPY;  Service: Gastroenterology;  Laterality: N/A;  SCREENING  DIVERTICULOSIS, COLON POLYP, HEMORRHOIDS    CYSTOSCOPY BLADDER BIOPSY N/A 06/12/2019    Procedure: CYSTOSCOPY TUR BLADDER TUMOR;  Surgeon: Joel Russell MD;  Location: [facility] OR;  Service: Urology    CYSTOSCOPY BLADDER BIOPSY N/A 05/31/2024    Procedure: CYSTOSCOPY BLADDER BIOPSY FULGURATION;  Surgeon: Joel Russell MD;  Location: [facility] MAIN OR;  Service: Urology;  Laterality: N/A;    CYSTOSCOPY WITH CLOT EVACUATION N/A 10/02/2024    Procedure: CYSTOSCOPY WITH CLOT EVACUATION, CAUTERY OF BLEEDING, DENG INSERTION;  Surgeon: Joel Russell MD;  Location: [facility] MAIN OR;  Service: Urology;  Laterality: N/A;    ELBOW PROCEDURE Left     FX    EYE SURGERY  03/2024    Cataract    PROSTATECTOMY      PROSTATECTOMY  03/01/2003    TONSILLECTOMY  1957    VASECTOMY           FAMILY HISTORY  Family History   Problem Relation Age of Onset    Hypertension Mother     Hypertension Father     Prostate cancer Father     Malig Hyperthermia Neg Hx          SOCIAL HISTORY  Social History     Socioeconomic History    Marital status:    Tobacco Use    Smoking status: Never     Passive exposure: Never    Smokeless tobacco: Never    Vaping Use    Vaping status: Never Used   Substance and Sexual Activity    Alcohol use: Yes     Alcohol/week: 1.0 standard drink of alcohol     Types: 1 Drinks containing 0.5 oz of alcohol per week     Comment: Occasional beer or cocktail    Drug use: No    Sexual activity: Yes     Partners: Female     Birth control/protection: Vasectomy         ALLERGIES  Penicillins      REVIEW OF SYSTEMS  Review of Systems   Constitutional:  Negative for fever.   Respiratory:  Negative for shortness of breath.    Cardiovascular:  Negative for chest pain.   Genitourinary:  Positive for difficulty urinating and hematuria.   All other systems reviewed and are negative.    Included in HPI  All systems reviewed and negative except for those discussed in HPI.      PHYSICAL EXAM    I have reviewed the triage vital signs and nursing notes.    ED Triage Vitals [01/05/25 1602]   Temp Heart Rate Resp BP SpO2   97.7 °F (36.5 °C) 119 16 -- 98 %      Temp src Heart Rate Source Patient Position BP Location FiO2 (%)   Tympanic Monitor -- -- --       Physical Exam  GENERAL: Alert well-appearing male no obvious distress.  Triage vitals reviewed notable for initial pulse of 119.  Initial blood pressure 178/112.  Temperature and O2 sats are normal  SKIN: Warm, dry  HENT: Normocephalic, atraumatic  EYES: no scleral icterus  CV: regular rhythm, regular rate-no murmur, heart rate in the 90  RESPIRATORY: normal effort, lungs clear-O2 sats upper 90s room air  ABDOMEN: soft, mild suprapubic tenderness without rebound or guarding, nondistended  MUSCULOSKELETAL: no deformity  NEURO: alert, moves all extremities, follows commands      LAB RESULTS  Recent Results (from the past 24 hours)   Comprehensive Metabolic Panel    Collection Time: 01/05/25  4:23 PM    Specimen: Blood   Result Value Ref Range    Glucose 125 (H) 65 - 99 mg/dL    BUN 12 8 - 23 mg/dL    Creatinine 1.13 0.76 - 1.27 mg/dL    Sodium 137 136 - 145 mmol/L    Potassium 4.0 3.5 - 5.2 mmol/L     Chloride 101 98 - 107 mmol/L    CO2 22.0 22.0 - 29.0 mmol/L    Calcium 10.5 8.6 - 10.5 mg/dL    Total Protein 7.8 6.0 - 8.5 g/dL    Albumin 4.5 3.5 - 5.2 g/dL    ALT (SGPT) 29 1 - 41 U/L    AST (SGOT) 28 1 - 40 U/L    Alkaline Phosphatase 105 39 - 117 U/L    Total Bilirubin 0.9 0.0 - 1.2 mg/dL    Globulin 3.3 gm/dL    A/G Ratio 1.4 g/dL    BUN/Creatinine Ratio 10.6 7.0 - 25.0    Anion Gap 14.0 5.0 - 15.0 mmol/L    eGFR 67.4 >60.0 mL/min/1.73   Protime-INR    Collection Time: 01/05/25  4:23 PM    Specimen: Blood   Result Value Ref Range    Protime 13.0 11.7 - 14.2 Seconds    INR 0.96 0.90 - 1.10   CBC Auto Differential    Collection Time: 01/05/25  4:23 PM    Specimen: Blood   Result Value Ref Range    WBC 14.19 (H) 3.40 - 10.80 10*3/mm3    RBC 5.22 4.14 - 5.80 10*6/mm3    Hemoglobin 16.8 13.0 - 17.7 g/dL    Hematocrit 47.2 37.5 - 51.0 %    MCV 90.4 79.0 - 97.0 fL    MCH 32.2 26.6 - 33.0 pg    MCHC 35.6 31.5 - 35.7 g/dL    RDW 12.3 12.3 - 15.4 %    RDW-SD 40.2 37.0 - 54.0 fl    MPV 10.9 6.0 - 12.0 fL    Platelets 264 140 - 450 10*3/mm3    Neutrophil % 83.2 (H) 42.7 - 76.0 %    Lymphocyte % 7.8 (L) 19.6 - 45.3 %    Monocyte % 8.2 5.0 - 12.0 %    Eosinophil % 0.1 (L) 0.3 - 6.2 %    Basophil % 0.3 0.0 - 1.5 %    Immature Grans % 0.4 0.0 - 0.5 %    Neutrophils, Absolute 11.83 (H) 1.70 - 7.00 10*3/mm3    Lymphocytes, Absolute 1.10 0.70 - 3.10 10*3/mm3    Monocytes, Absolute 1.16 (H) 0.10 - 0.90 10*3/mm3    Eosinophils, Absolute 0.01 0.00 - 0.40 10*3/mm3    Basophils, Absolute 0.04 0.00 - 0.20 10*3/mm3    Immature Grans, Absolute 0.05 0.00 - 0.05 10*3/mm3    nRBC 0.0 0.0 - 0.2 /100 WBC         RADIOLOGY  No Radiology Exams Resulted Within Past 24 Hours      MEDICATIONS GIVEN IN ER  Medications   sodium chloride 0.9 % flush 10 mL (has no administration in time range)         ORDERS PLACED DURING THIS VISIT:  Orders Placed This Encounter   Procedures    Comprehensive Metabolic Panel    Urinalysis With Culture If Indicated -  Urine, Catheter    Protime-INR    CBC Auto Differential    Insert Peripheral IV    CBC & Differential         OUTPATIENT MEDICATION MANAGEMENT:  Current Facility-Administered Medications Ordered in Epic   Medication Dose Route Frequency Provider Last Rate Last Admin    sodium chloride 0.9 % flush 10 mL  10 mL Intravenous PRN Logan Shanks MD         Current Outpatient Medications Ordered in Epic   Medication Sig Dispense Refill    Calcium Carbonate-Vitamin D (CALCIUM 600+D PO) Take 1 tablet by mouth 2 (Two) Times a Day. PT HOLDING FOR SURGERY  Indications: Calcium Deficiency      dapagliflozin Propanediol (Farxiga) 10 MG tablet Take 10 mg by mouth Daily. 30 tablet 0    Eliquis 5 MG tablet tablet Take 1 tablet by mouth Every 12 (Twelve) Hours. (Patient not taking: Reported on 12/24/2024) 60 tablet 11    lisinopril (PRINIVIL,ZESTRIL) 40 MG tablet Take 1 tablet by mouth Daily. 90 tablet 1    Multiple Vitamins-Minerals (DAILY MULTIVITAMIN PO) Take 1 tablet by mouth Daily.      pravastatin (PRAVACHOL) 40 MG tablet Take 1 tablet by mouth Every Night. Indications: High Amount of Fats in the Blood 90 tablet 3         PROCEDURES  Procedures            PROGRESS, DATA ANALYSIS, CONSULTS, AND MEDICAL DECISION MAKING  All labs have been independently interpreted by me.  All radiology studies have been reviewed by me. All EKG's have been independently viewed and interpreted by me.  Discussion below represents my analysis of pertinent findings related to patient's condition, differential diagnosis, treatment plan and final disposition.    Differential diagnosis includes but is not limited to bladder cancer, UTI, bladder obstruction, renal failure, anemia.      ED Course as of 01/05/25 1745   Sun Jan 05, 2025   1623 Will ask nursing staff to get a bladder scan and place a three-way catheter if he has significant retention. [DB]   1649 Bladder scan showed greater than 400 cc of urine.  Will place three-way catheter [DB]   5680  CBC reviewed notable for mildly elevated white count of 14.2 of unclear clinical significance.  Hemoglobin and platelet counts are normal [DB]   1735 Chemistries reviewed are benign without significant hepatic or renal failure.  Electrolytes unremarkable. [DB]   1743 I discussed treatment and evaluation of this patient with Jerilyn Garcia from observation unit will admit for further evaluation treatment [DB]   1744 Three-way catheter was placed and hooked up to continuous bladder irrigation. [DB]      ED Course User Index  [DB] Logan Shanks MD             AS OF 17:45 EST VITALS:    BP - (!) 178/112  HR - 119  TEMP - 97.7 °F (36.5 °C) (Tympanic)  O2 SATS - 98%    COMPLEXITY OF CARE  74-year-old male with history of bladder cancer presents with gross hematuria onset today.  He has had history of similar in the past.    Please see ED course above, independent evaluation and interpretation of ED testing including ED laboratory analysis which was notable for mild leukocytosis.  White count platelet counts unremarkable.  Chemistries are benign without evidence of hepatic or renal failure.    Patient did have greater than 400 cc of urine in the bladder consistent with acute urinary retention.  A three-way catheter was placed and was hooked up to continuous bladder irrigation.    I did discuss care of this patient with observation unit will admit for bladder irrigation and urology consultation.      DIAGNOSIS  Final diagnoses:   Gross hematuria   Malignant neoplasm of urinary bladder, unspecified site         DISPOSITION  ED Disposition       ED Disposition   Intended Admit    Condition   --    Comment   --                Please note that portions of this document were completed with a voice recognition program.    Note Disclaimer: At [facility], we believe that sharing information builds trust and better relationships. You are receiving this note because you recently visited [facility]. It is possible you will see  health information before a provider has talked with you about it. This kind of information can be easy to misunderstand. To help you fully understand what it means for your health, we urge you to discuss this note with your provider.         Logan Shanks MD  01/05/25 6735

## 2025-01-05 NOTE — H&P
[facility]   HISTORY AND PHYSICAL    Patient Name: North Carcamo  : 1950  MRN: [id number]  Primary Care Physician:  Chloe Arriaga MD  Date of admission: 2025    Subjective   Subjective     Chief Complaint: Diarrhea    HPI:    North Carcamo is a 74 y.o. male with a PMH of hypertension, hyperlipidemia, prostate/bladder cancer and paroxysmal A-fib presents [facility] with hematuria.  Patient previously was on Eliquis and was hospitalized several times in the past for hematuria however was taken off  of Eliquis few months ago and has been following with Dr. Russell with urology.  States that he has been approved for hyperbaric oxygen therapy and is scheduled to start receiving it in the near future.  2 weeks ago states that he had an episode of hematuria and has had no further episodes since then.  This morning patient woke up and was able to urinate however noticed a blood and clots in his urine and throughout the day his urine output declined and was unable to empty his bladder.  Patient denies fever or chills.  Denies chest pain, palpitation shortness of breath.      Laboratory evaluation notably unremarkable.  Hemoglobin 16.8, platelets 264 and WBC 14.19.  UA: Blood moderate, nitrate positive, leukocytes moderate 1300, RBC TNTC and WBC 11-20    Review of Systems   All systems were reviewed and negative except for: That mentioned above in HPI    Personal History     Past Medical History:   Diagnosis Date    Allergic     Penicillin    Cataract 2022    Diverticulosis     E. coli pneumonia 2023    HOSPITALIZED    History of prostate cancer 2002    History of recent hospitalization 2023    Hyperglycemia     Hyperlipidemia     Hypertension     Kidney stone 2019    Has not been a problem.    On anticoagulant therapy     Paroxysmal atrial fibrillation        Past Surgical History:   Procedure Laterality Date    CATARACT EXTRACTION      WITH LENS IMPLANTS    COLONOSCOPY  N/A 09/13/2023    Procedure: COLONOSCOPY TO CECUM AND TERMINAL ILEUM WITH COLD POLYPECTOMY;  Surgeon: Humberto Peña MD;  Location: [facility] ENDOSCOPY;  Service: Gastroenterology;  Laterality: N/A;  SCREENING  DIVERTICULOSIS, COLON POLYP, HEMORRHOIDS    CYSTOSCOPY BLADDER BIOPSY N/A 06/12/2019    Procedure: CYSTOSCOPY TUR BLADDER TUMOR;  Surgeon: Joel Russell MD;  Location: [facility] MAIN OR;  Service: Urology    CYSTOSCOPY BLADDER BIOPSY N/A 05/31/2024    Procedure: CYSTOSCOPY BLADDER BIOPSY FULGURATION;  Surgeon: Joel Russell MD;  Location: [facility] MAIN OR;  Service: Urology;  Laterality: N/A;    CYSTOSCOPY WITH CLOT EVACUATION N/A 10/02/2024    Procedure: CYSTOSCOPY WITH CLOT EVACUATION, CAUTERY OF BLEEDING, DENG INSERTION;  Surgeon: Joel Russell MD;  Location: [facility] MAIN OR;  Service: Urology;  Laterality: N/A;    ELBOW PROCEDURE Left     FX    EYE SURGERY  03/2024    Cataract    PROSTATECTOMY      PROSTATECTOMY  03/01/2003    TONSILLECTOMY  1957    VASECTOMY         Family History: family history includes Hypertension in his father and mother; Prostate cancer in his father. Otherwise pertinent FHx was reviewed and not pertinent to current issue.    Social History:  reports that he has never smoked. He has never been exposed to tobacco smoke. He has never used smokeless tobacco. He reports current alcohol use of about 1.0 standard drink of alcohol per week. He reports that he does not use drugs.    Home Medications:  Calcium Carbonate-Vitamin D, Multiple Vitamins-Minerals, apixaban, dapagliflozin Propanediol, lisinopril, and pravastatin    Allergies:  Allergies   Allergen Reactions    Penicillins Rash     Thinks childhood reaction of rash        Objective   Objective     Vitals:   Temp:  [97.7 °F (36.5 °C)] 97.7 °F (36.5 °C)  Heart Rate:  [119] 119  Resp:  [16] 16  BP: (178)/(112) 178/112  Physical Exam    Constitutional: Awake, alert   Eyes: PERRLA, sclerae anicteric, no conjunctival  injection   HENT: NCAT, mucous membranes moist   Neck: Supple, no thyromegaly, no lymphadenopathy, trachea midline   Respiratory: Clear to auscultation bilaterally, nonlabored respirations    Cardiovascular: RRR, no murmurs, rubs, or gallops, palpable pedal pulses bilaterally   Gastrointestinal: Positive bowel sounds, soft, nontender, nondistended   Musculoskeletal: No bilateral ankle edema, no clubbing or cyanosis to extremities   Psychiatric: Appropriate affect, cooperative   Neurologic: Oriented x 3, strength symmetric in all extremities, Cranial Nerves grossly intact to confrontation, speech clear   Skin: No rashes     Result Review    Result Review:  I have personally reviewed the results from the time of this admission to 1/5/2025 18:11 EST and agree with these findings:  [x]  Laboratory list / accordion  []  Microbiology  []  Radiology  []  EKG/Telemetry   []  Cardiology/Vascular   []  Pathology  []  Old records  []  Other:    Assessment & Plan   Assessment / Plan     Brief Patient Summary:  North Carcamo is a 74 y.o. male who admitted to observation secondary to hematuria.  Three-way Paz catheter was placed and patient was started on CBI.    Active Hospital Problems:  Active Hospital Problems    Diagnosis     **Hematuria      Plan:   Hematuria  UTI  -Urology consult  -CBI initiated in the ED, continue  -Trend H&H  -IV Rocephin    Hypertension  Hyperlipidemia  -Cont home meds   - VS per nursing       VTE Prophylaxis:  Mechanical VTE prophylaxis orders are present.      CODE STATUS:    Level Of Support Discussed With: Patient  Code Status (Patient has no pulse and is not breathing): CPR (Attempt to Resuscitate)  Medical Interventions (Patient has pulse or is breathing): Full Support    Admission Status:  I believe this patient meets observation status.    During patient visit, I utilized appropriate personal protective equipment including gloves. Appropriate PPE was worn during the entire visit.  Hand  hygiene was completed before and after    60 minutes has been spent by [facility] providers in the care of this patient while under observation status    Electronically signed by ARJUN Sy, 01/05/25, 6:11 PM EST.

## 2025-01-06 ENCOUNTER — APPOINTMENT (OUTPATIENT)
Dept: CT IMAGING | Facility: HOSPITAL | Age: 75
End: 2025-01-06
Payer: MEDICARE

## 2025-01-06 LAB
ANION GAP SERPL CALCULATED.3IONS-SCNC: 16.1 MMOL/L (ref 5–15)
BUN SERPL-MCNC: 20 MG/DL (ref 8–23)
BUN/CREAT SERPL: 9.9 (ref 7–25)
CALCIUM SPEC-SCNC: 9.8 MG/DL (ref 8.6–10.5)
CHLORIDE SERPL-SCNC: 99 MMOL/L (ref 98–107)
CO2 SERPL-SCNC: 21.9 MMOL/L (ref 22–29)
CREAT SERPL-MCNC: 2.03 MG/DL (ref 0.76–1.27)
D-LACTATE SERPL-SCNC: 1.4 MMOL/L (ref 0.5–2)
DEPRECATED RDW RBC AUTO: 42.8 FL (ref 37–54)
EGFRCR SERPLBLD CKD-EPI 2021: 33.4 ML/MIN/1.73
ERYTHROCYTE [DISTWIDTH] IN BLOOD BY AUTOMATED COUNT: 12.7 % (ref 12.3–15.4)
GLUCOSE SERPL-MCNC: 222 MG/DL (ref 65–99)
HCT VFR BLD AUTO: 45.3 % (ref 37.5–51)
HGB BLD-MCNC: 15.4 G/DL (ref 13–17.7)
MCH RBC QN AUTO: 31.5 PG (ref 26.6–33)
MCHC RBC AUTO-ENTMCNC: 34 G/DL (ref 31.5–35.7)
MCV RBC AUTO: 92.6 FL (ref 79–97)
PLATELET # BLD AUTO: 254 10*3/MM3 (ref 140–450)
PMV BLD AUTO: 11.2 FL (ref 6–12)
POTASSIUM SERPL-SCNC: 4.8 MMOL/L (ref 3.5–5.2)
RBC # BLD AUTO: 4.89 10*6/MM3 (ref 4.14–5.8)
SODIUM SERPL-SCNC: 137 MMOL/L (ref 136–145)
WBC NRBC COR # BLD AUTO: 24 10*3/MM3 (ref 3.4–10.8)

## 2025-01-06 PROCEDURE — 87040 BLOOD CULTURE FOR BACTERIA: CPT | Performed by: PHYSICIAN ASSISTANT

## 2025-01-06 PROCEDURE — G0378 HOSPITAL OBSERVATION PER HR: HCPCS

## 2025-01-06 PROCEDURE — 85027 COMPLETE CBC AUTOMATED: CPT

## 2025-01-06 PROCEDURE — 25810000003 SODIUM CHLORIDE 0.9 % SOLUTION: Performed by: PHYSICIAN ASSISTANT

## 2025-01-06 PROCEDURE — 96365 THER/PROPH/DIAG IV INF INIT: CPT

## 2025-01-06 PROCEDURE — 80048 BASIC METABOLIC PNL TOTAL CA: CPT

## 2025-01-06 PROCEDURE — 83605 ASSAY OF LACTIC ACID: CPT | Performed by: PHYSICIAN ASSISTANT

## 2025-01-06 PROCEDURE — 74176 CT ABD & PELVIS W/O CONTRAST: CPT

## 2025-01-06 PROCEDURE — 25010000002 CEFTRIAXONE PER 250 MG: Performed by: NURSE PRACTITIONER

## 2025-01-06 RX ORDER — SODIUM CHLORIDE 9 MG/ML
75 INJECTION, SOLUTION INTRAVENOUS CONTINUOUS
Status: DISCONTINUED | OUTPATIENT
Start: 2025-01-06 | End: 2025-01-08 | Stop reason: HOSPADM

## 2025-01-06 RX ADMIN — SODIUM CHLORIDE 75 ML/HR: 9 INJECTION, SOLUTION INTRAVENOUS at 06:00

## 2025-01-06 RX ADMIN — PRAVASTATIN SODIUM 40 MG: 40 TABLET ORAL at 20:08

## 2025-01-06 RX ADMIN — SODIUM CHLORIDE 1000 ML: 9 INJECTION, SOLUTION INTRAVENOUS at 12:22

## 2025-01-06 RX ADMIN — CEFTRIAXONE SODIUM 1000 MG: 1 INJECTION, POWDER, FOR SOLUTION INTRAMUSCULAR; INTRAVENOUS at 18:28

## 2025-01-06 RX ADMIN — Medication 10 ML: at 10:46

## 2025-01-06 RX ADMIN — LISINOPRIL 40 MG: 20 TABLET ORAL at 10:45

## 2025-01-06 NOTE — PROGRESS NOTES
ED OBSERVATION PROGRESS/DISCHARGE SUMMARY    Date of Admission: 1/5/2025   LOS: 0 days   PCP: Chloe Arriaga MD    Final Diagnosis pending      Subjective     Hospital Outcome:     74-year-old male admitted to the observation unit with a diagnosis of hematuria with urinary retention.  Three-way Paz catheter was placed and the patient was placed on CBI.  Initial lab work in the emergency department is unremarkable other than a glucose of 125, WBCs 14.19, urinalysis shows moderate blood, nitrate positive, moderate leukocytes, too numerous to count RBCs and 11-20 WBCs.  Urine culture is pending at this time.  He was started on Rocephin 1 g IV every 24 hours.  Overnight      ROS:  General: no fevers, chills  Respiratory: no cough, dyspnea  Cardiovascular: no chest pain, palpitations  Abdomen: No abdominal pain, nausea, vomiting, or diarrhea  Neurologic: No focal weakness    1/6/2025.    8:15 AM.  We are going to order a CT of the abdomen pelvis without contrast to ensure the patient's catheter is in place as his renal function has deteriorated overnight and the patient is complaining of discomfort.    10:15 AM.  Paz not in proper place.  RN staff to deflate the balloon and resituate.    10:45 AM.  Paz catheter is now in proper place patient states she is feeling significantly better.  A liter of IV fluids have been ordered in an attempt to correct BUN/creatinine/GFR.      3:45 PM.  Patient's urine is still bloody appears to be clearing.  Plan on holding overnight with urology to reevaluate in the morning.  Will get repeat CBC and BMP in the morning.  Will continue with IV Rocephin for UTI    Objective   Physical Exam:  I have reviewed the vital signs.  Temp:  [98.1 °F (36.7 °C)-99.5 °F (37.5 °C)] 99.5 °F (37.5 °C)  Heart Rate:  [67-88] 76  Resp:  [16-20] 18  BP: (131-174)/(72-94) 131/72  General Appearance:    Alert, cooperative, no distress  Head:    Normocephalic, atraumatic  Eyes:    Sclerae anicteric  Neck:    Supple, no mass  Lungs: Clear to auscultation bilaterally, respirations unlabored  Heart: Regular rate and rhythm, S1 and S2 normal, no murmur, rub or gallop  Abdomen:  Soft, nontender, bowel sounds active, nondistended  Extremities: No clubbing, cyanosis, or edema to lower extremities  Pulses:  2+ and symmetric in distal lower extremities  Skin: No rashes   Neurologic: Oriented x3, Normal strength to extremities    Results Review:    I have reviewed the labs, radiology results and diagnostic studies.    Results from last 7 days   Lab Units 01/06/25  0302   WBC 10*3/mm3 24.00*   HEMOGLOBIN g/dL 15.4   HEMATOCRIT % 45.3   PLATELETS 10*3/mm3 254     Results from last 7 days   Lab Units 01/06/25  0302 01/05/25  1623   SODIUM mmol/L 137 137   POTASSIUM mmol/L 4.8 4.0   CHLORIDE mmol/L 99 101   CO2 mmol/L 21.9* 22.0   BUN mg/dL 20 12   CREATININE mg/dL 2.03* 1.13   CALCIUM mg/dL 9.8 10.5   BILIRUBIN mg/dL  --  0.9   ALK PHOS U/L  --  105   ALT (SGPT) U/L  --  29   AST (SGOT) U/L  --  28   GLUCOSE mg/dL 222* 125*     Imaging Results (Last 24 Hours)       Procedure Component Value Units Date/Time    CT Abdomen Pelvis Without Contrast [[id number]] Collected: 01/06/25 1028     Updated: 01/06/25 1353    Narrative:      CT ABDOMEN PELVIS WO CONTRAST-     HISTORY: 74 years of age, Male.  Hematuria; R31.0-Gross hematuria;  C67.9-Malignant neoplasm of bladder, unspecified     TECHNIQUE:  CT includes axial imaging from the lung bases to the  trochanters without intravenous contrast and without use of oral  contrast. Data reconstructed in coronal and sagittal planes. Radiation  dose reduction techniques were utilized, including automated exposure  control and exposure modulation based on body size.     COMPARISON: CT abdomen and pelvis 10/30/2024, 10/01/2024     FINDINGS: Mild wall thickening of the distal esophagus. Trace bilateral  pleural fluid, greater on the left.     Liver, spleen, adrenal glands, pancreas, gallbladder appear  within  normal limits.     There is moderate urinary bladder distention and there is dense material  within the posterior aspect of urinary bladder consistent with  hemorrhage. Mild to moderate bilateral hydronephrosis with bilateral  perinephric stranding that is increased compared to the previous CT  10/30/2024. Two 9 mm left mid and upper pole renal stones are without  change. No right renal stone or ureteral stone. There are 2 small stones  in the urinary bladder measuring 2 mm and 4 mm. There is air within the  urinary bladder. No ascites. Previous prostatectomy.     Colonic diverticulosis without evidence for diverticulitis. No evidence  for appendicitis.       Impression:      1. Moderate urinary bladder distention which contains air as well as  dense material posteriorly consistent with hemorrhage. 2 small urinary  bladder stones without change.  2. Mild to moderate bilateral hydronephrosis with increased perinephric  stranding and hydroureter to the level of the bladder. Two 9 mm left  renal stones without change.  3. Colonic diverticulosis without evidence for diverticulitis.  4. Mild circumferential distal esophageal wall thickening with small  pleural effusions.     Radiation dose reduction techniques were utilized, including automated  exposure control and exposure modulation based on body size.        This report was finalized on 1/6/2025 1:50 PM by Adan Malagon M.D on  Workstation: [id number]               I have reviewed the medications.     Discharge Medications        ASK your doctor about these medications        Instructions Start Date   CALCIUM 600+D PO   1 tablet, 2 Times Daily      DAILY MULTIVITAMIN PO   1 tablet, Daily      dapagliflozin Propanediol 10 MG tablet  Commonly known as: Farxiga   10 mg, Oral, Daily      Eliquis 5 MG tablet tablet  Generic drug: apixaban   5 mg, Oral, Every 12 Hours Scheduled      lisinopril 40 MG tablet  Commonly known as: PRINIVIL,ZESTRIL   40 mg, Oral,  Daily      pravastatin 40 MG tablet  Commonly known as: PRAVACHOL   40 mg, Oral, Nightly              ---------------------------------------------------------------------------------------------  Assessment & Plan   Assessment/Problem List    Hematuria      Plan:    Hematuria  UTI  -Urology consult  -CBI initiated in the ED, continue  -Urology has evaluated with the plan above.  -Continue IV Rocephin  -Repeat CBC and BMP in the morning     Hypertension  Hyperlipidemia  -Cont home meds   - VS per nursing      Disposition: Pending    Follow-up after Discharge: Pending    This note will serve as a progress note    Jose Enrique Ramirez III, PA 01/06/25 18:42 EST    I have worn appropriate PPE during this patient encounter, sanitized my hands both with entering and exiting patient's room.      42 minutes has been spent by [facility] providers in the care of this patient while under observation status

## 2025-01-06 NOTE — PROGRESS NOTES
MD Attestation Note    SHARED VISIT: This visit was performed by BOTH a physician and an APC. The substantive portion of the medical decision making was performed by this attesting physician who made or approved the management plan and takes responsibility for patient management. All studies in the APC note (if performed) were independently interpreted by me.       My personal findings are:        Subjective:  Patient admitted for further management of acute hematuria, currently undergoing continuous bladder irrigation.  His Eliquis is on hold.  Patient reports feeling much better after his catheter was repositioned earlier today.        Objective:  GENERAL: Awake, alert, in no acute distress.   HENT:  Normocephalic, atraumatic. Nares patent.   EYES: Pupils equal, reactive. Extraocular movements normal.   RESPIRATORY: Normal effort.   CARDIOVASCULAR: Regular rhythm, normal rate.   ABDOMEN:  Nontender. Abdomen nondistended.   : Paz catheter in place with light pink urine present in the catheter tubing, CBI in progress  NEUROLOGIC: Cranial nerves II-XII normal.  Speech fluent and clear.  EXTREMITIES: No pedal edema. 2+ pedal pulses bilaterally. No deformity.   SKIN:  No rash, normal to inspection.      CT Abdomen Pelvis Without Contrast    Result Date: 1/6/2025  CT ABDOMEN PELVIS WO CONTRAST-  HISTORY: 74 years of age, Male.  Hematuria; R31.0-Gross hematuria; C67.9-Malignant neoplasm of bladder, unspecified  TECHNIQUE:  CT includes axial imaging from the lung bases to the trochanters without intravenous contrast and without use of oral contrast. Data reconstructed in coronal and sagittal planes. Radiation dose reduction techniques were utilized, including automated exposure control and exposure modulation based on body size.  COMPARISON: CT abdomen and pelvis 10/30/2024, 10/01/2024  FINDINGS: Mild wall thickening of the distal esophagus. Trace bilateral pleural fluid, greater on the left.  Liver, spleen, adrenal  glands, pancreas, gallbladder appear within normal limits.  There is moderate urinary bladder distention and there is dense material within the posterior aspect of urinary bladder consistent with hemorrhage. Mild to moderate bilateral hydronephrosis with bilateral perinephric stranding that is increased compared to the previous CT 10/30/2024. Two 9 mm left mid and upper pole renal stones are without change. No right renal stone or ureteral stone. There are 2 small stones in the urinary bladder measuring 2 mm and 4 mm. There is air within the urinary bladder. No ascites. Previous prostatectomy.  Colonic diverticulosis without evidence for diverticulitis. No evidence for appendicitis.      1. Moderate urinary bladder distention which contains air as well as dense material posteriorly consistent with hemorrhage. 2 small urinary bladder stones without change. 2. Mild to moderate bilateral hydronephrosis with increased perinephric stranding and hydroureter to the level of the bladder. Two 9 mm left renal stones without change. 3. Colonic diverticulosis without evidence for diverticulitis. 4. Mild circumferential distal esophageal wall thickening with small pleural effusions.  Radiation dose reduction techniques were utilized, including automated exposure control and exposure modulation based on body size.          Assessment/ Plan:  Acute hematuria, acute kidney injury  Continue CBI, hold Gigalo  Urology following, recommended continue antibiotics with cultures pending  Worsening serum creatinine today prompting CT abdomen that demonstrated bladder distention and bilateral hydronephrosis.  His catheter will be repositioned and irrigated manually, bladder scan will be performed to ensure that his bladder is draining completely.  New leukocytosis today, blood cultures ordered, continue IV antibiotics

## 2025-01-06 NOTE — PLAN OF CARE
Goal Outcome Evaluation:   Pt. Seen by urology, LIZ clamped at 1540. Dark yellow urine, continue fluids and antibiotics. Voiding trial tomorrow.                   Problem: Adult Inpatient Plan of Care  Goal: Plan of Care Review  Outcome: Progressing  Goal: Patient-Specific Goal (Individualized)  Outcome: Progressing  Goal: Absence of Hospital-Acquired Illness or Injury  Outcome: Progressing  Intervention: Identify and Manage Fall Risk  Recent Flowsheet Documentation  Taken 1/6/2025 1601 by Marcelo Sears RN  Safety Promotion/Fall Prevention:   activity supervised   fall prevention program maintained   lighting adjusted   safety round/check completed  Taken 1/6/2025 1045 by Marcelo Sears RN  Safety Promotion/Fall Prevention:   activity supervised   fall prevention program maintained   lighting adjusted   safety round/check completed  Taken 1/6/2025 0800 by Marcelo Sears RN  Safety Promotion/Fall Prevention:   activity supervised   fall prevention program maintained   lighting adjusted   safety round/check completed  Intervention: Prevent and Manage VTE (Venous Thromboembolism) Risk  Recent Flowsheet Documentation  Taken 1/6/2025 1045 by Marcelo Sears RN  VTE Prevention/Management:   SCDs (sequential compression devices) off   patient refused intervention  Intervention: Prevent Infection  Recent Flowsheet Documentation  Taken 1/6/2025 1601 by Marcelo Sears RN  Infection Prevention:   hand hygiene promoted   single patient room provided   rest/sleep promoted  Taken 1/6/2025 1045 by Marcelo Sears RN  Infection Prevention:   hand hygiene promoted   single patient room provided   rest/sleep promoted  Taken 1/6/2025 0800 by Marcelo Sears RN  Infection Prevention:   hand hygiene promoted   single patient room provided   rest/sleep promoted  Goal: Optimal Comfort and Wellbeing  Outcome: Progressing  Intervention: Provide Person-Centered Care  Recent Flowsheet Documentation  Taken 1/6/2025 1045 by Marcelo Sears  RN  Trust Relationship/Rapport:   care explained   questions answered   questions encouraged  Goal: Readiness for Transition of Care  Outcome: Progressing     Problem: Comorbidity Management  Goal: Blood Pressure in Desired Range  Outcome: Progressing  Intervention: Maintain Blood Pressure Management  Recent Flowsheet Documentation  Taken 1/6/2025 1601 by Marcelo Sears RN  Medication Review/Management: medications reviewed  Taken 1/6/2025 1045 by Marcelo Sears RN  Medication Review/Management: medications reviewed  Taken 1/6/2025 0800 by Marcelo Sears RN  Medication Review/Management: medications reviewed     Problem: Hemorrhagic Cystitis  Goal: Absence of Hematuria  Outcome: Progressing     Problem: Fall Injury Risk  Goal: Absence of Fall and Fall-Related Injury  Outcome: Progressing  Intervention: Identify and Manage Contributors  Recent Flowsheet Documentation  Taken 1/6/2025 1601 by Marcelo Sears RN  Medication Review/Management: medications reviewed  Taken 1/6/2025 1045 by Marcelo Sears RN  Medication Review/Management: medications reviewed  Taken 1/6/2025 0800 by Marcelo Sears RN  Medication Review/Management: medications reviewed  Intervention: Promote Injury-Free Environment  Recent Flowsheet Documentation  Taken 1/6/2025 1601 by Marcelo Sears RN  Safety Promotion/Fall Prevention:   activity supervised   fall prevention program maintained   lighting adjusted   safety round/check completed  Taken 1/6/2025 1045 by Marcelo Sears RN  Safety Promotion/Fall Prevention:   activity supervised   fall prevention program maintained   lighting adjusted   safety round/check completed  Taken 1/6/2025 0800 by Marcelo Sears, RN  Safety Promotion/Fall Prevention:   activity supervised   fall prevention program maintained   lighting adjusted   safety round/check completed

## 2025-01-06 NOTE — PROGRESS NOTES
Pt admitted to the observation unit from the emergency department with complaints of gross hematuria with difficulty urinating and unable to empty his bladder.  Some discomfort in his lower abdomen.  Has a three-way Paz in place currently and did get some relief immediately with the catheter placement but is having some discomfort from the catheter currently.  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     On exam,   General: No acute distress, nontoxic  HEENT: EOMI  Pulm: Symmetric chest rise, nonlabored breathing  CV: Regular rate and rhythm  : Paz catheter in place with light red urine in the bag  MSK: No deformity  Skin: Warm, dry  Neuro: Awake, alert, oriented x 4, moving all extremities, no focal deficits  Psych: Calm, cooperative    Vital signs and nursing notes reviewed.           Plan: Continuous bladder irrigation in progress, white blood cell count 14, stable renal function with creatinine 1.1 unchanged from the past couple months, expected UA findings though did get a dose of Rocephin in the ER for possible infection.  Urology consult, CBI, further monitoring overnight.         MD Attestation Note    SHARED VISIT: This visit was performed by BOTH a physician and an APC. The substantive portion of the medical decision making was performed by this attesting physician who made or approved the management plan and takes responsibility for patient management. All studies in the APC note (if performed) were independently interpreted by me.

## 2025-01-06 NOTE — CONSULTS
[facility]      Patient Identification:  NAME:  North Carcamo  Age:  74 y.o.   Sex:  male   :  1950   MRN:  [id number]       Chief complaint: Urinary Retention     History of present illness:  North Carcamo is a 74 y.o. man, well known to [facility], a patient of my partner Dr. Brian Russell. History of prostate cancer s/p RRP /salvage IMRT/ADT 2019-2020, bladder cancer s/p cystoscopy clot evac with fulguration on 10/2/2024, gross hematuria, and radiation cystitis. He has presented to the hospital multiple times with acute hematuria. He is typically on Eliquis, but this is being held. Catheter placed and started on Continuous Bladder Irrigation (CBI) with good clearance of hematuria. CT imaging shows cystitis, thickening of bladder wall, but no obvious clot burden or tumor.    Cr 2.03  WBC 24  UA Turbid, Blood+, Nit +, LE +            Past medical history:  Past Medical History:   Diagnosis Date    Allergic     Penicillin    Cataract 2022    Diverticulosis     E. coli pneumonia 2023    HOSPITALIZED    History of prostate cancer 2002    History of recent hospitalization 2023    Hyperglycemia     Hyperlipidemia     Hypertension     Kidney stone 2019    Has not been a problem.    On anticoagulant therapy     Paroxysmal atrial fibrillation        Past surgical history:  Past Surgical History:   Procedure Laterality Date    CATARACT EXTRACTION      WITH LENS IMPLANTS    COLONOSCOPY N/A 2023    Procedure: COLONOSCOPY TO CECUM AND TERMINAL ILEUM WITH COLD POLYPECTOMY;  Surgeon: Humberto Peña MD;  Location: [facility];  Service: Gastroenterology;  Laterality: N/A;  SCREENING  DIVERTICULOSIS, COLON POLYP, HEMORRHOIDS    CYSTOSCOPY BLADDER BIOPSY N/A 2019    Procedure: CYSTOSCOPY TUR BLADDER TUMOR;  Surgeon: Joel Russell MD;  Location: [facility] OR;  Service: Urology    CYSTOSCOPY BLADDER BIOPSY N/A 2024    Procedure:  CYSTOSCOPY BLADDER BIOPSY FULGURATION;  Surgeon: Joel Russell MD;  Location: [facility] OR;  Service: Urology;  Laterality: N/A;    CYSTOSCOPY WITH CLOT EVACUATION N/A 10/02/2024    Procedure: CYSTOSCOPY WITH CLOT EVACUATION, CAUTERY OF BLEEDING, DENG INSERTION;  Surgeon: Joel Russell MD;  Location: [facility] OR;  Service: Urology;  Laterality: N/A;    ELBOW PROCEDURE Left     FX    EYE SURGERY  03/2024    Cataract    PROSTATECTOMY      PROSTATECTOMY  03/01/2003    TONSILLECTOMY  1957    VASECTOMY         Allergies:  Penicillins    Home medications:  Medications Prior to Admission   Medication Sig Dispense Refill Last Dose/Taking    Calcium Carbonate-Vitamin D (CALCIUM 600+D PO) Take 1 tablet by mouth 2 (Two) Times a Day. PT HOLDING FOR SURGERY  Indications: Calcium Deficiency   1/5/2025    lisinopril (PRINIVIL,ZESTRIL) 40 MG tablet Take 1 tablet by mouth Daily. 90 tablet 1 1/5/2025 Morning    Multiple Vitamins-Minerals (DAILY MULTIVITAMIN PO) Take 1 tablet by mouth Daily.   1/5/2025 Morning    pravastatin (PRAVACHOL) 40 MG tablet Take 1 tablet by mouth Every Night. Indications: High Amount of Fats in the Blood 90 tablet 3 1/4/2025 Evening    dapagliflozin Propanediol (Farxiga) 10 MG tablet Take 10 mg by mouth Daily. 30 tablet 0     Eliquis 5 MG tablet tablet Take 1 tablet by mouth Every 12 (Twelve) Hours. (Patient not taking: Reported on 12/24/2024) 60 tablet 11         Hospital medications:  cefTRIAXone, 1,000 mg, Intravenous, Q24H  lisinopril, 40 mg, Oral, Daily  pravastatin, 40 mg, Oral, Nightly  sodium chloride, 10 mL, Intravenous, Q12H      sodium chloride, 75 mL/hr, Last Rate: 75 mL/hr (01/06/25 0600)        acetaminophen    melatonin    [COMPLETED] Insert Peripheral IV **AND** sodium chloride    sodium chloride    sodium chloride    Family history:  Family History   Problem Relation Age of Onset    Hypertension Mother     Hypertension Father     Prostate cancer Father     Malig Hyperthermia  Neg Hx        Social history:  Social History     Tobacco Use    Smoking status: Never     Passive exposure: Never    Smokeless tobacco: Never   Vaping Use    Vaping status: Never Used   Substance Use Topics    Alcohol use: Yes     Alcohol/week: 1.0 standard drink of alcohol     Types: 1 Drinks containing 0.5 oz of alcohol per week     Comment: Occasional beer or cocktail    Drug use: No       REVIEW OF SYSTEMS:  Constitutional - Negative for fevers/chills  Eyes/Ears/Nose/Mouth/Throat - Negative for changes in vision  Cardiovascular - Negative for chest pain, dysrhythmia  Respiratory - Negative for dyspnea  Gastrointestinal - Negative for nausea or vomiting  Genitourinary - ++ for dysuria  Hematologic/Lymphatic - Negative for bruising  Skin - Negative for erythema  Endocrine - Negative for history of diabetes    Objective:  TMax 24 hours:   Temp (24hrs), Av.3 °F (36.8 °C), Min:97.7 °F (36.5 °C), Max:98.6 °F (37 °C)      Vitals Ranges:   Temp:  [97.7 °F (36.5 °C)-98.6 °F (37 °C)] 98.1 °F (36.7 °C)  Heart Rate:  [] 88  Resp:  [16-20] 18  BP: (141-178)/() 145/83    Intake/Output Last 3 shifts:  I/O last 3 completed shifts:  In: -   Out: 3950 [Urine:3950]     Physical Exam:    General Appearance:    Alert, cooperative, NAD   HEENT:    No trauma, pupils reactive, hearing intact   Back:     No CVA tenderness   Lungs:     Respirations unlabored, no wheezing    Heart:    RRR, intact peripheral pulses   Abdomen:     Soft, NDNT, no masses, no guarding   :    Paz clear/yellow on low rate CBI   Extremities:   No edema, no deformity   Lymphatic:   No neck or groin LAD   Skin:   No bleeding, bruising or rashes   Neuro/Psych:   Orientation intact, mood/affect pleasant, no focal findings       Results review:   I reviewed the patient's new clinical results.    Data review:  Lab Results (last 24 hours)       Procedure Component Value Units Date/Time    Basic Metabolic Panel [[id number]]  (Abnormal) Collected:  01/06/25 0302    Specimen: Blood from Arm, Right Updated: 01/06/25 0426     Glucose 222 mg/dL      BUN 20 mg/dL      Creatinine 2.03 mg/dL      Sodium 137 mmol/L      Potassium 4.8 mmol/L      Chloride 99 mmol/L      CO2 21.9 mmol/L      Calcium 9.8 mg/dL      BUN/Creatinine Ratio 9.9     Anion Gap 16.1 mmol/L      eGFR 33.4 mL/min/1.73     Narrative:      GFR Categories in Chronic Kidney Disease (CKD)      GFR Category          GFR (mL/min/1.73)    Interpretation  G1                     90 or greater         Normal or high (1)  G2                      60-89                Mild decrease (1)  G3a                   45-59                Mild to moderate decrease  G3b                   30-44                Moderate to severe decrease  G4                    15-29                Severe decrease  G5                    14 or less           Kidney failure          (1)In the absence of evidence of kidney disease, neither GFR category G1 or G2 fulfill the criteria for CKD.    eGFR calculation 2021 CKD-EPI creatinine equation, which does not include race as a factor    CBC (No Diff) [[id number]]  (Abnormal) Collected: 01/06/25 0302    Specimen: Blood from Arm, Right Updated: 01/06/25 0329     WBC 24.00 10*3/mm3      RBC 4.89 10*6/mm3      Hemoglobin 15.4 g/dL      Hematocrit 45.3 %      MCV 92.6 fL      MCH 31.5 pg      MCHC 34.0 g/dL      RDW 12.7 %      RDW-SD 42.8 fl      MPV 11.2 fL      Platelets 254 10*3/mm3     Urinalysis With Culture If Indicated - Urine, Catheter [[id number]]  (Abnormal) Collected: 01/05/25 1740    Specimen: Urine, Catheter Updated: 01/05/25 1804     Color, UA Red     Comment: Any Substance that causes an abnormal urine color can alter the accuracy of the chemical reactions.        Appearance, UA Turbid     pH, UA 7.0     Specific Gravity, UA 1.015     Glucose, UA Negative     Ketones, UA Negative     Bilirubin, UA Negative     Blood, UA Moderate (2+)     Protein,  mg/dL (2+)     Leuk Esterase, UA  Moderate (2+)     Nitrite, UA Positive     Urobilinogen, UA 0.2 E.U./dL    Narrative:      In absence of clinical symptoms, the presence of pyuria, bacteria, and/or nitrites on the urinalysis result does not correlate with infection.    Urinalysis, Microscopic Only - Urine, Catheter [[id number]]  (Abnormal) Collected: 01/05/25 1740    Specimen: Urine, Catheter Updated: 01/05/25 1804     RBC, UA Too Numerous to Count /HPF      WBC, UA 11-20 /HPF      Bacteria, UA None Seen /HPF      Squamous Epithelial Cells, UA 0-2 /HPF      Hyaline Casts, UA None Seen /LPF      Methodology Automated Microscopy    Urine Culture - Urine, Urine, Catheter [[id number]] Collected: 01/05/25 1740    Specimen: Urine, Catheter Updated: 01/05/25 1804    Comprehensive Metabolic Panel [[id number]]  (Abnormal) Collected: 01/05/25 1623    Specimen: Blood Updated: 01/05/25 1656     Glucose 125 mg/dL      BUN 12 mg/dL      Creatinine 1.13 mg/dL      Sodium 137 mmol/L      Potassium 4.0 mmol/L      Chloride 101 mmol/L      CO2 22.0 mmol/L      Calcium 10.5 mg/dL      Total Protein 7.8 g/dL      Albumin 4.5 g/dL      ALT (SGPT) 29 U/L      AST (SGOT) 28 U/L      Alkaline Phosphatase 105 U/L      Total Bilirubin 0.9 mg/dL      Globulin 3.3 gm/dL      A/G Ratio 1.4 g/dL      BUN/Creatinine Ratio 10.6     Anion Gap 14.0 mmol/L      eGFR 67.4 mL/min/1.73     Narrative:      GFR Categories in Chronic Kidney Disease (CKD)      GFR Category          GFR (mL/min/1.73)    Interpretation  G1                     90 or greater         Normal or high (1)  G2                      60-89                Mild decrease (1)  G3a                   45-59                Mild to moderate decrease  G3b                   30-44                Moderate to severe decrease  G4                    15-29                Severe decrease  G5                    14 or less           Kidney failure          (1)In the absence of evidence of kidney disease, neither GFR category G1 or G2  fulfill the criteria for CKD.    eGFR calculation 2021 CKD-EPI creatinine equation, which does not include race as a factor    Protime-INR [[id number]]  (Normal) Collected: 01/05/25 1623    Specimen: Blood Updated: 01/05/25 1646     Protime 13.0 Seconds      INR 0.96    CBC & Differential [[id number]]  (Abnormal) Collected: 01/05/25 1623    Specimen: Blood Updated: 01/05/25 1636    Narrative:      The following orders were created for panel order CBC & Differential.  Procedure                               Abnormality         Status                     ---------                               -----------         ------                     CBC Auto Differential[[id number]]        Abnormal            Final result                 Please view results for these tests on the individual orders.    CBC Auto Differential [[id number]]  (Abnormal) Collected: 01/05/25 1623    Specimen: Blood Updated: 01/05/25 1636     WBC 14.19 10*3/mm3      RBC 5.22 10*6/mm3      Hemoglobin 16.8 g/dL      Hematocrit 47.2 %      MCV 90.4 fL      MCH 32.2 pg      MCHC 35.6 g/dL      RDW 12.3 %      RDW-SD 40.2 fl      MPV 10.9 fL      Platelets 264 10*3/mm3      Neutrophil % 83.2 %      Lymphocyte % 7.8 %      Monocyte % 8.2 %      Eosinophil % 0.1 %      Basophil % 0.3 %      Immature Grans % 0.4 %      Neutrophils, Absolute 11.83 10*3/mm3      Lymphocytes, Absolute 1.10 10*3/mm3      Monocytes, Absolute 1.16 10*3/mm3      Eosinophils, Absolute 0.01 10*3/mm3      Basophils, Absolute 0.04 10*3/mm3      Immature Grans, Absolute 0.05 10*3/mm3      nRBC 0.0 /100 WBC              Imaging:  Imaging Results (Last 24 Hours)       ** No results found for the last 24 hours. **               Assessment:       Hematuria    History of Prostate cancer s/p Prostatectomy and Salvage Radiation therapy  History of Bladder cancer  Radiation Cystitis  Hematuria  UTI    - This appears to be an acute UTI exacerbating radiation cystitis.   - He has been approved for  Hyperbaric Oxygen therapy and I believe this will be a good thing to try once we get this acute episode resolved     Plan:   - No acute Urologic surgical intervention   - Hold Eliquis until 3 days after urine remains clear/yellow.  - Antibiotics; awaiting final cultures  - Continue to titrate down CBI to low or off with clear/light pink urine efflux  - If urine output remains clear/light pink and no clots off CBI, than consider voiding trial tomorrow AM (1/7/25)  - Follow-up with Dr.Brooks Russell ([facility]) 1-2 weeks after Discharge - Schedulers messaged.  - Urology will follow    Hema Vora MD  01/06/25  08:03 EST

## 2025-01-06 NOTE — PLAN OF CARE
Goal Outcome Evaluation:            Pt admitted for CBI. Current rate is low, output has been clear with a slight pink ting. No clots at this time, patient has complained of bladder fullness all night, however, further questioning proves that it is not pressure or inherently painful. A+Ox4, VSS, RA, not on telemetry, assist x 1 due to catheter. Urology consult in the morning.

## 2025-01-07 LAB
ANION GAP SERPL CALCULATED.3IONS-SCNC: 8.5 MMOL/L (ref 5–15)
BACTERIA SPEC AEROBE CULT: NO GROWTH
BUN SERPL-MCNC: 24 MG/DL (ref 8–23)
BUN/CREAT SERPL: 17.9 (ref 7–25)
CALCIUM SPEC-SCNC: 8.7 MG/DL (ref 8.6–10.5)
CHLORIDE SERPL-SCNC: 106 MMOL/L (ref 98–107)
CO2 SERPL-SCNC: 20.5 MMOL/L (ref 22–29)
CREAT SERPL-MCNC: 1.34 MG/DL (ref 0.76–1.27)
DEPRECATED RDW RBC AUTO: 43.3 FL (ref 37–54)
EGFRCR SERPLBLD CKD-EPI 2021: 54.9 ML/MIN/1.73
ERYTHROCYTE [DISTWIDTH] IN BLOOD BY AUTOMATED COUNT: 12.5 % (ref 12.3–15.4)
GLUCOSE SERPL-MCNC: 129 MG/DL (ref 65–99)
HCT VFR BLD AUTO: 37.9 % (ref 37.5–51)
HGB BLD-MCNC: 12.7 G/DL (ref 13–17.7)
MCH RBC QN AUTO: 31.2 PG (ref 26.6–33)
MCHC RBC AUTO-ENTMCNC: 33.5 G/DL (ref 31.5–35.7)
MCV RBC AUTO: 93.1 FL (ref 79–97)
PLATELET # BLD AUTO: 205 10*3/MM3 (ref 140–450)
PMV BLD AUTO: 11.5 FL (ref 6–12)
POTASSIUM SERPL-SCNC: 4 MMOL/L (ref 3.5–5.2)
RBC # BLD AUTO: 4.07 10*6/MM3 (ref 4.14–5.8)
SODIUM SERPL-SCNC: 135 MMOL/L (ref 136–145)
WBC NRBC COR # BLD AUTO: 20.18 10*3/MM3 (ref 3.4–10.8)

## 2025-01-07 PROCEDURE — 80048 BASIC METABOLIC PNL TOTAL CA: CPT

## 2025-01-07 PROCEDURE — G0378 HOSPITAL OBSERVATION PER HR: HCPCS

## 2025-01-07 PROCEDURE — 25010000002 CEFTRIAXONE PER 250 MG: Performed by: PHYSICIAN ASSISTANT

## 2025-01-07 PROCEDURE — 25810000003 SODIUM CHLORIDE 0.9 % SOLUTION: Performed by: PHYSICIAN ASSISTANT

## 2025-01-07 PROCEDURE — 85027 COMPLETE CBC AUTOMATED: CPT

## 2025-01-07 RX ADMIN — Medication 10 ML: at 20:59

## 2025-01-07 RX ADMIN — SODIUM CHLORIDE 500 ML: 9 INJECTION, SOLUTION INTRAVENOUS at 19:49

## 2025-01-07 RX ADMIN — CEFTRIAXONE SODIUM 1000 MG: 1 INJECTION, POWDER, FOR SOLUTION INTRAMUSCULAR; INTRAVENOUS at 19:00

## 2025-01-07 RX ADMIN — PRAVASTATIN SODIUM 40 MG: 40 TABLET ORAL at 20:59

## 2025-01-07 NOTE — PROGRESS NOTES
ED OBSERVATION PROGRESS/DISCHARGE SUMMARY    Date of Admission: 1/5/2025   LOS: 0 days   PCP: Chloe Arriaga MD    Final Diagnosis pending      Subjective     Hospital Outcome:   74-year-old male admitted to the observation unit with a diagnosis of hematuria with urinary retention.  Three-way Paz catheter was placed and the patient was placed on CBI.  Initial lab work in the emergency department is unremarkable other than a glucose of 125, WBCs 14.19, urinalysis shows moderate blood, nitrate positive, moderate leukocytes, too numerous to count RBCs and 11-20 WBCs.  Urine culture is pending at this time.  He was started on Rocephin 1 g IV every 24 hours.       1/6/2025.     8:15 AM.  We are going to order a CT of the abdomen pelvis without contrast to ensure the patient's catheter is in place as his renal function has deteriorated overnight and the patient is complaining of discomfort.     10:15 AM.  Paz not in proper place.  RN staff to deflate the balloon and resituate.     10:45 AM.  Paz catheter is now in proper place patient states she is feeling significantly better.  A liter of IV fluids have been ordered in an attempt to correct BUN/creatinine/GFR.       3:45 PM.  Patient's urine is still bloody appears to be clearing.  Plan on holding overnight with urology to reevaluate in the morning.  Will get repeat CBC and BMP in the morning.  Will continue with IV Rocephin for UTI     1/7/2025:  No acute events overnight.  Anticipate voiding trial this a.m. and discharge home.    10:43 AM.  Patient's GFR significantly improved from 2.03-1.34 after remanipulation of his three-way catheter and a liter of IV fluids.  WBC remains 20,000.  Patient remains afebrile.  He continues to be treated for UTI with Rocephin.  Urology has evaluated.  No surgical intervention needed.  Continue to hold the Eliquis until 3 days after urine remains clear/yellow.  Keep CBI clamped, hand irrigate as needed 60 mL normal saline for  catheter obstruction.  Voiding trial on the morning of 1/8/2025.  If patient does well he be discharged home on oral antibiotics and follow-up with Dr. Brian Russell in 1 to 2 weeks.  Urology will continue to follow.    ROS:  General: no fevers, chills  Respiratory: no cough, dyspnea  Cardiovascular: no chest pain, palpitations  Abdomen: No abdominal pain, nausea, vomiting, or diarrhea  Neurologic: No focal weakness    Objective   Physical Exam:  I have reviewed the vital signs.  Temp:  [98.1 °F (36.7 °C)-99.5 °F (37.5 °C)] 98.8 °F (37.1 °C)  Heart Rate:  [76-98] 82  Resp:  [18] 18  BP: ()/(67-89) 118/78  General Appearance:    Alert, cooperative, no distress  Head:    Normocephalic, atraumatic  Eyes:    Sclerae anicteric  Neck:   Supple, no mass  Lungs: Clear to auscultation bilaterally, respirations unlabored  Heart: Regular rate and rhythm, S1 and S2 normal, no murmur, rub or gallop  Abdomen:  Soft, nontender, bowel sounds active, nondistended  Extremities: No clubbing, cyanosis, or edema to lower extremities  Pulses:  2+ and symmetric in distal lower extremities  Skin: No rashes   Neurologic: Oriented x3, Normal strength to extremities    Results Review:    I have reviewed the labs, radiology results and diagnostic studies.    Results from last 7 days   Lab Units 01/07/25  0344   WBC 10*3/mm3 20.18*   HEMOGLOBIN g/dL 12.7*   HEMATOCRIT % 37.9   PLATELETS 10*3/mm3 205     Results from last 7 days   Lab Units 01/07/25  0344 01/06/25  0302 01/05/25  1623   SODIUM mmol/L 135* 137 137   POTASSIUM mmol/L 4.0 4.8 4.0   CHLORIDE mmol/L 106 99 101   CO2 mmol/L 20.5* 21.9* 22.0   BUN mg/dL 24* 20 12   CREATININE mg/dL 1.34* 2.03* 1.13   CALCIUM mg/dL 8.7 9.8 10.5   BILIRUBIN mg/dL  --   --  0.9   ALK PHOS U/L  --   --  105   ALT (SGPT) U/L  --   --  29   AST (SGOT) U/L  --   --  28   GLUCOSE mg/dL 129* 222* 125*     Imaging Results (Last 24 Hours)       Procedure Component Value Units Date/Time    CT Abdomen Pelvis  Without Contrast [[id number]] Collected: 01/06/25 1028     Updated: 01/06/25 1353    Narrative:      CT ABDOMEN PELVIS WO CONTRAST-     HISTORY: 74 years of age, Male.  Hematuria; R31.0-Gross hematuria;  C67.9-Malignant neoplasm of bladder, unspecified     TECHNIQUE:  CT includes axial imaging from the lung bases to the  trochanters without intravenous contrast and without use of oral  contrast. Data reconstructed in coronal and sagittal planes. Radiation  dose reduction techniques were utilized, including automated exposure  control and exposure modulation based on body size.     COMPARISON: CT abdomen and pelvis 10/30/2024, 10/01/2024     FINDINGS: Mild wall thickening of the distal esophagus. Trace bilateral  pleural fluid, greater on the left.     Liver, spleen, adrenal glands, pancreas, gallbladder appear within  normal limits.     There is moderate urinary bladder distention and there is dense material  within the posterior aspect of urinary bladder consistent with  hemorrhage. Mild to moderate bilateral hydronephrosis with bilateral  perinephric stranding that is increased compared to the previous CT  10/30/2024. Two 9 mm left mid and upper pole renal stones are without  change. No right renal stone or ureteral stone. There are 2 small stones  in the urinary bladder measuring 2 mm and 4 mm. There is air within the  urinary bladder. No ascites. Previous prostatectomy.     Colonic diverticulosis without evidence for diverticulitis. No evidence  for appendicitis.       Impression:      1. Moderate urinary bladder distention which contains air as well as  dense material posteriorly consistent with hemorrhage. 2 small urinary  bladder stones without change.  2. Mild to moderate bilateral hydronephrosis with increased perinephric  stranding and hydroureter to the level of the bladder. Two 9 mm left  renal stones without change.  3. Colonic diverticulosis without evidence for diverticulitis.  4. Mild circumferential  distal esophageal wall thickening with small  pleural effusions.     Radiation dose reduction techniques were utilized, including automated  exposure control and exposure modulation based on body size.        This report was finalized on 1/6/2025 1:50 PM by Adan Malagon M.D on  Workstation: [id number]               I have reviewed the medications.     Discharge Medications        ASK your doctor about these medications        Instructions Start Date   CALCIUM 600+D PO   1 tablet, 2 Times Daily      DAILY MULTIVITAMIN PO   1 tablet, Daily      dapagliflozin Propanediol 10 MG tablet  Commonly known as: Farxiga   10 mg, Oral, Daily      Eliquis 5 MG tablet tablet  Generic drug: apixaban   5 mg, Oral, Every 12 Hours Scheduled      lisinopril 40 MG tablet  Commonly known as: PRINIVIL,ZESTRIL   40 mg, Oral, Daily      pravastatin 40 MG tablet  Commonly known as: PRAVACHOL   40 mg, Oral, Nightly              ---------------------------------------------------------------------------------------------  Assessment & Plan   Assessment/Problem List    Hematuria      Plan:  Hematuria  UTI  -Urology consult  -CBI initiated in the ED, continue  -Urology has evaluated with the plan above.  -Continue IV Rocephin  -Repeat CBC and BMP in the morning  -Plan as above     Hypertension  Hyperlipidemia  -Cont home meds   - VS per nursing     Disposition: Pending    Follow-up after Discharge: Pending    This note will serve as a progress note    Jose Enrique Ramirez III, PA 01/07/25 10:45 EST    I have worn appropriate PPE during this patient encounter, sanitized my hands both with entering and exiting patient's room.      37 minutes has been spent by [facility] providers in the care of this patient while under observation status

## 2025-01-07 NOTE — PROGRESS NOTES
"  [facility] UROLOGY DAILY PROGRESS NOTE      Name: North Carcamo  Age: 74 y.o.  Sex: male  :  1950  MRN: [id number]    Date: 2025             Subjective:  Interval History:     Doing well  CBI clear on clam    Wbc 20 from 24  Cr 1.34 from 2.03    Objective:    Vital signs in last 24 hours:  Temp:  [98.1 °F (36.7 °C)-99.5 °F (37.5 °C)] 98.8 °F (37.1 °C)  Heart Rate:  [76-98] 82  Resp:  [18] 18  BP: ()/(67-89) 118/78    Intake/Output:  No intake or output data in the 24 hours ending 25 0759    Exam:  /78 (BP Location: Right arm, Patient Position: Lying)   Pulse 82   Temp 98.8 °F (37.1 °C) (Oral)   Resp 18   Ht 175.3 cm (69\")   Wt 81.6 kg (180 lb)   SpO2 95%   BMI 26.58 kg/m²     General Appearance:    Alert, cooperative, no acute distress   Back:     Symmetric, no CVA tenderness   Lungs:     Respirations unlabored   Heart:    Regular rate and rhythm   Abdomen:    S, NT, ND, non-palpable bladder   Genitalia:   Normal male   Extremities:   Extremities normal, atraumatic, no cyanosis or edema   Skin:   Skin color, texture, turgor normal, no rashes or lesions        Assessment:    Hematuria      Plan:    Hematuria     History of Prostate cancer s/p Prostatectomy and Salvage Radiation therapy  History of Bladder cancer  Radiation Cystitis  Hematuria  UTI     - This appears to be an acute UTI exacerbating radiation cystitis.   - He has been approved for Hyperbaric Oxygen therapy and I believe this will be a good thing to try once we get this acute episode resolved      Plan:   - No acute Urologic surgical intervention   - Hold Eliquis until 3 days after urine remains clear/yellow.  - Antibiotics; awaiting final cultures  - keep CBI clamped, hand irrigate prn 60 mL normal saline for catheter obstruction  - would observe overnight tonight and do void trial in the morning 25  - Follow-up with Dr.Brooks Russell ([facility]) 1-2 weeks after Discharge - Schedulers messaged.  - Urology " will follow    Hema Castano MD  1/7/2025  07:59 EST

## 2025-01-07 NOTE — PLAN OF CARE
Goal Outcome Evaluation:            Patient is admitted to ED observation for CBI, Urine was initially clear to light pink, now dark raffi red with some clots. CBI unclamped and running and slow rate. Patient tolerating well and states no pain at all during this shift. VSS, he is Alert and Oriented x4. He will have voiding trial on 01/07/25.

## 2025-01-07 NOTE — PROGRESS NOTES
MD Attestation Note     SHARED VISIT: This visit was performed by BOTH a physician and an APC. The substantive portion of the medical decision making was performed by this attesting physician who made or approved the management plan and takes responsibility for patient management. All studies in the APC note (if performed) were independently interpreted by me.         My personal findings are:           Subjective:  Patient admitted for further management of acute hematuria, complicated by acute kidney injury yesterday.  He denies any symptoms currently.  His bladder irrigation has been clamped this morning.        Objective:  GENERAL: Awake, alert, in no acute distress.   HENT:  Normocephalic, atraumatic. Nares patent.   EYES: Pupils equal, reactive. Extraocular movements normal.   RESPIRATORY: Normal effort.   CARDIOVASCULAR: Regular rhythm, normal rate.   ABDOMEN:  Nontender. Abdomen nondistended.   : Paz catheter in place with light yellow urine.  CBI is currently clamped.  NEUROLOGIC: Cranial nerves II-XII normal.  Speech fluent and clear.  EXTREMITIES: No pedal edema. 2+ pedal pulses bilaterally. No deformity.   SKIN:  No rash, normal to inspection.        CT Abdomen Pelvis Without Contrast     Result Date: 1/6/2025  CT ABDOMEN PELVIS WO CONTRAST-  HISTORY: 74 years of age, Male.  Hematuria; R31.0-Gross hematuria; C67.9-Malignant neoplasm of bladder, unspecified  TECHNIQUE:  CT includes axial imaging from the lung bases to the trochanters without intravenous contrast and without use of oral contrast. Data reconstructed in coronal and sagittal planes. Radiation dose reduction techniques were utilized, including automated exposure control and exposure modulation based on body size.  COMPARISON: CT abdomen and pelvis 10/30/2024, 10/01/2024  FINDINGS: Mild wall thickening of the distal esophagus. Trace bilateral pleural fluid, greater on the left.  Liver, spleen, adrenal glands, pancreas, gallbladder appear  within normal limits.  There is moderate urinary bladder distention and there is dense material within the posterior aspect of urinary bladder consistent with hemorrhage. Mild to moderate bilateral hydronephrosis with bilateral perinephric stranding that is increased compared to the previous CT 10/30/2024. Two 9 mm left mid and upper pole renal stones are without change. No right renal stone or ureteral stone. There are 2 small stones in the urinary bladder measuring 2 mm and 4 mm. There is air within the urinary bladder. No ascites. Previous prostatectomy.  Colonic diverticulosis without evidence for diverticulitis. No evidence for appendicitis.       1. Moderate urinary bladder distention which contains air as well as dense material posteriorly consistent with hemorrhage. 2 small urinary bladder stones without change. 2. Mild to moderate bilateral hydronephrosis with increased perinephric stranding and hydroureter to the level of the bladder. Two 9 mm left renal stones without change. 3. Colonic diverticulosis without evidence for diverticulitis. 4. Mild circumferential distal esophageal wall thickening with small pleural effusions.  Radiation dose reduction techniques were utilized, including automated exposure control and exposure modulation based on body size.            Results for orders placed or performed during the hospital encounter of 01/05/25   Comprehensive Metabolic Panel    Collection Time: 01/05/25  4:23 PM    Specimen: Blood   Result Value Ref Range    Glucose 125 (H) 65 - 99 mg/dL    BUN 12 8 - 23 mg/dL    Creatinine 1.13 0.76 - 1.27 mg/dL    Sodium 137 136 - 145 mmol/L    Potassium 4.0 3.5 - 5.2 mmol/L    Chloride 101 98 - 107 mmol/L    CO2 22.0 22.0 - 29.0 mmol/L    Calcium 10.5 8.6 - 10.5 mg/dL    Total Protein 7.8 6.0 - 8.5 g/dL    Albumin 4.5 3.5 - 5.2 g/dL    ALT (SGPT) 29 1 - 41 U/L    AST (SGOT) 28 1 - 40 U/L    Alkaline Phosphatase 105 39 - 117 U/L    Total Bilirubin 0.9 0.0 - 1.2 mg/dL     Globulin 3.3 gm/dL    A/G Ratio 1.4 g/dL    BUN/Creatinine Ratio 10.6 7.0 - 25.0    Anion Gap 14.0 5.0 - 15.0 mmol/L    eGFR 67.4 >60.0 mL/min/1.73   Protime-INR    Collection Time: 01/05/25  4:23 PM    Specimen: Blood   Result Value Ref Range    Protime 13.0 11.7 - 14.2 Seconds    INR 0.96 0.90 - 1.10   CBC Auto Differential    Collection Time: 01/05/25  4:23 PM    Specimen: Blood   Result Value Ref Range    WBC 14.19 (H) 3.40 - 10.80 10*3/mm3    RBC 5.22 4.14 - 5.80 10*6/mm3    Hemoglobin 16.8 13.0 - 17.7 g/dL    Hematocrit 47.2 37.5 - 51.0 %    MCV 90.4 79.0 - 97.0 fL    MCH 32.2 26.6 - 33.0 pg    MCHC 35.6 31.5 - 35.7 g/dL    RDW 12.3 12.3 - 15.4 %    RDW-SD 40.2 37.0 - 54.0 fl    MPV 10.9 6.0 - 12.0 fL    Platelets 264 140 - 450 10*3/mm3    Neutrophil % 83.2 (H) 42.7 - 76.0 %    Lymphocyte % 7.8 (L) 19.6 - 45.3 %    Monocyte % 8.2 5.0 - 12.0 %    Eosinophil % 0.1 (L) 0.3 - 6.2 %    Basophil % 0.3 0.0 - 1.5 %    Immature Grans % 0.4 0.0 - 0.5 %    Neutrophils, Absolute 11.83 (H) 1.70 - 7.00 10*3/mm3    Lymphocytes, Absolute 1.10 0.70 - 3.10 10*3/mm3    Monocytes, Absolute 1.16 (H) 0.10 - 0.90 10*3/mm3    Eosinophils, Absolute 0.01 0.00 - 0.40 10*3/mm3    Basophils, Absolute 0.04 0.00 - 0.20 10*3/mm3    Immature Grans, Absolute 0.05 0.00 - 0.05 10*3/mm3    nRBC 0.0 0.0 - 0.2 /100 WBC   Urine Culture - Urine, Urine, Catheter    Collection Time: 01/05/25  5:40 PM    Specimen: Urine, Catheter   Result Value Ref Range    Urine Culture No growth    Urinalysis With Culture If Indicated - Urine, Catheter    Collection Time: 01/05/25  5:40 PM    Specimen: Urine, Catheter   Result Value Ref Range    Color, UA Red (A) Yellow, Straw    Appearance, UA Turbid (A) Clear    pH, UA 7.0 5.0 - 8.0    Specific Gravity, UA 1.015 1.005 - 1.030    Glucose, UA Negative Negative    Ketones, UA Negative Negative    Bilirubin, UA Negative Negative    Blood, UA Moderate (2+) (A) Negative    Protein,  mg/dL (2+) (A) Negative     Leuk Esterase, UA Moderate (2+) (A) Negative    Nitrite, UA Positive (A) Negative    Urobilinogen, UA 0.2 E.U./dL 0.2 - 1.0 E.U./dL   Urinalysis, Microscopic Only - Urine, Catheter    Collection Time: 01/05/25  5:40 PM    Specimen: Urine, Catheter   Result Value Ref Range    RBC, UA Too Numerous to Count (A) None Seen, 0-2 /HPF    WBC, UA 11-20 (A) None Seen, 0-2 /HPF    Bacteria, UA None Seen None Seen /HPF    Squamous Epithelial Cells, UA 0-2 None Seen, 0-2 /HPF    Hyaline Casts, UA None Seen None Seen /LPF    Methodology Automated Microscopy    CBC (No Diff)    Collection Time: 01/06/25  3:02 AM    Specimen: Arm, Right; Blood   Result Value Ref Range    WBC 24.00 (H) 3.40 - 10.80 10*3/mm3    RBC 4.89 4.14 - 5.80 10*6/mm3    Hemoglobin 15.4 13.0 - 17.7 g/dL    Hematocrit 45.3 37.5 - 51.0 %    MCV 92.6 79.0 - 97.0 fL    MCH 31.5 26.6 - 33.0 pg    MCHC 34.0 31.5 - 35.7 g/dL    RDW 12.7 12.3 - 15.4 %    RDW-SD 42.8 37.0 - 54.0 fl    MPV 11.2 6.0 - 12.0 fL    Platelets 254 140 - 450 10*3/mm3   Basic Metabolic Panel    Collection Time: 01/06/25  3:02 AM    Specimen: Arm, Right; Blood   Result Value Ref Range    Glucose 222 (H) 65 - 99 mg/dL    BUN 20 8 - 23 mg/dL    Creatinine 2.03 (H) 0.76 - 1.27 mg/dL    Sodium 137 136 - 145 mmol/L    Potassium 4.8 3.5 - 5.2 mmol/L    Chloride 99 98 - 107 mmol/L    CO2 21.9 (L) 22.0 - 29.0 mmol/L    Calcium 9.8 8.6 - 10.5 mg/dL    BUN/Creatinine Ratio 9.9 7.0 - 25.0    Anion Gap 16.1 (H) 5.0 - 15.0 mmol/L    eGFR 33.4 (L) >60.0 mL/min/1.73   Lactic Acid, Plasma    Collection Time: 01/06/25 12:07 PM    Specimen: Arm, Right; Blood   Result Value Ref Range    Lactate 1.4 0.5 - 2.0 mmol/L   Basic Metabolic Panel    Collection Time: 01/07/25  3:44 AM    Specimen: Arm, Right; Blood   Result Value Ref Range    Glucose 129 (H) 65 - 99 mg/dL    BUN 24 (H) 8 - 23 mg/dL    Creatinine 1.34 (H) 0.76 - 1.27 mg/dL    Sodium 135 (L) 136 - 145 mmol/L    Potassium 4.0 3.5 - 5.2 mmol/L    Chloride 106  98 - 107 mmol/L    CO2 20.5 (L) 22.0 - 29.0 mmol/L    Calcium 8.7 8.6 - 10.5 mg/dL    BUN/Creatinine Ratio 17.9 7.0 - 25.0    Anion Gap 8.5 5.0 - 15.0 mmol/L    eGFR 54.9 (L) >60.0 mL/min/1.73   CBC (No Diff)    Collection Time: 01/07/25  3:44 AM    Specimen: Arm, Right; Blood   Result Value Ref Range    WBC 20.18 (H) 3.40 - 10.80 10*3/mm3    RBC 4.07 (L) 4.14 - 5.80 10*6/mm3    Hemoglobin 12.7 (L) 13.0 - 17.7 g/dL    Hematocrit 37.9 37.5 - 51.0 %    MCV 93.1 79.0 - 97.0 fL    MCH 31.2 26.6 - 33.0 pg    MCHC 33.5 31.5 - 35.7 g/dL    RDW 12.5 12.3 - 15.4 %    RDW-SD 43.3 37.0 - 54.0 fl    MPV 11.5 6.0 - 12.0 fL    Platelets 205 140 - 450 10*3/mm3         Assessment/ Plan:  Acute hematuria, acute kidney injury  Continue to hold Eliquis  CBI is clamped with as needed manual irrigation per urology recommendations today  Continue empiric antibiotics, urine culture no growth to date.  Blood cultures pending as patient developed leukocytosis up to 24 yesterday, slightly improved at 20 today.  He remains afebrile.  Acute kidney injury improved today with creatinine 1.3 from 2.0 yesterday, likely secondary to catheter malpositioning and inadequate drainage yesterday.  Tentatively planning for voiding trial tomorrow

## 2025-01-08 ENCOUNTER — READMISSION MANAGEMENT (OUTPATIENT)
Dept: CALL CENTER | Facility: HOSPITAL | Age: 75
End: 2025-01-08
Payer: MEDICARE

## 2025-01-08 VITALS
WEIGHT: 180 LBS | RESPIRATION RATE: 16 BRPM | SYSTOLIC BLOOD PRESSURE: 141 MMHG | BODY MASS INDEX: 26.66 KG/M2 | TEMPERATURE: 98.1 F | OXYGEN SATURATION: 98 % | HEART RATE: 76 BPM | DIASTOLIC BLOOD PRESSURE: 84 MMHG | HEIGHT: 69 IN

## 2025-01-08 LAB
ANION GAP SERPL CALCULATED.3IONS-SCNC: 7.7 MMOL/L (ref 5–15)
BASOPHILS # BLD AUTO: 0.05 10*3/MM3 (ref 0–0.2)
BASOPHILS NFR BLD AUTO: 0.3 % (ref 0–1.5)
BUN SERPL-MCNC: 17 MG/DL (ref 8–23)
BUN/CREAT SERPL: 19.3 (ref 7–25)
CALCIUM SPEC-SCNC: 8.3 MG/DL (ref 8.6–10.5)
CHLORIDE SERPL-SCNC: 106 MMOL/L (ref 98–107)
CO2 SERPL-SCNC: 22.3 MMOL/L (ref 22–29)
CREAT SERPL-MCNC: 0.88 MG/DL (ref 0.76–1.27)
DEPRECATED RDW RBC AUTO: 43.1 FL (ref 37–54)
EGFRCR SERPLBLD CKD-EPI 2021: 89.1 ML/MIN/1.73
EOSINOPHIL # BLD AUTO: 0.15 10*3/MM3 (ref 0–0.4)
EOSINOPHIL NFR BLD AUTO: 1 % (ref 0.3–6.2)
ERYTHROCYTE [DISTWIDTH] IN BLOOD BY AUTOMATED COUNT: 12.3 % (ref 12.3–15.4)
GLUCOSE SERPL-MCNC: 114 MG/DL (ref 65–99)
HCT VFR BLD AUTO: 38.2 % (ref 37.5–51)
HGB BLD-MCNC: 12.7 G/DL (ref 13–17.7)
IMM GRANULOCYTES # BLD AUTO: 0.08 10*3/MM3 (ref 0–0.05)
IMM GRANULOCYTES NFR BLD AUTO: 0.5 % (ref 0–0.5)
LYMPHOCYTES # BLD AUTO: 1.62 10*3/MM3 (ref 0.7–3.1)
LYMPHOCYTES NFR BLD AUTO: 10.8 % (ref 19.6–45.3)
MCH RBC QN AUTO: 31.4 PG (ref 26.6–33)
MCHC RBC AUTO-ENTMCNC: 33.2 G/DL (ref 31.5–35.7)
MCV RBC AUTO: 94.3 FL (ref 79–97)
MONOCYTES # BLD AUTO: 1.97 10*3/MM3 (ref 0.1–0.9)
MONOCYTES NFR BLD AUTO: 13.1 % (ref 5–12)
NEUTROPHILS NFR BLD AUTO: 11.17 10*3/MM3 (ref 1.7–7)
NEUTROPHILS NFR BLD AUTO: 74.3 % (ref 42.7–76)
NRBC BLD AUTO-RTO: 0 /100 WBC (ref 0–0.2)
PLATELET # BLD AUTO: 187 10*3/MM3 (ref 140–450)
PMV BLD AUTO: 11 FL (ref 6–12)
POTASSIUM SERPL-SCNC: 3.6 MMOL/L (ref 3.5–5.2)
RBC # BLD AUTO: 4.05 10*6/MM3 (ref 4.14–5.8)
SODIUM SERPL-SCNC: 136 MMOL/L (ref 136–145)
WBC NRBC COR # BLD AUTO: 15.04 10*3/MM3 (ref 3.4–10.8)

## 2025-01-08 PROCEDURE — 85025 COMPLETE CBC W/AUTO DIFF WBC: CPT | Performed by: PHYSICIAN ASSISTANT

## 2025-01-08 PROCEDURE — 80048 BASIC METABOLIC PNL TOTAL CA: CPT | Performed by: PHYSICIAN ASSISTANT

## 2025-01-08 PROCEDURE — G0378 HOSPITAL OBSERVATION PER HR: HCPCS

## 2025-01-08 RX ORDER — CEFPODOXIME PROXETIL 200 MG/1
200 TABLET, FILM COATED ORAL EVERY 12 HOURS
Qty: 6 TABLET | Refills: 0 | Status: SHIPPED | OUTPATIENT
Start: 2025-01-08 | End: 2025-01-08

## 2025-01-08 RX ORDER — CEFPODOXIME PROXETIL 200 MG/1
200 TABLET, FILM COATED ORAL EVERY 12 HOURS
Qty: 6 TABLET | Refills: 0 | Status: SHIPPED | OUTPATIENT
Start: 2025-01-08 | End: 2025-01-11

## 2025-01-08 RX ADMIN — Medication 10 ML: at 08:50

## 2025-01-08 NOTE — PLAN OF CARE
Goal Outcome Evaluation:         CBI remains clamped overnight, output clear/yellow. No complaints of pain/discomfort throughout my shift. Urology following. Pt is alert and oriented x4, room air, vitals stable.         Problem: Adult Inpatient Plan of Care  Goal: Plan of Care Review  Outcome: Progressing  Goal: Patient-Specific Goal (Individualized)  Outcome: Progressing  Goal: Absence of Hospital-Acquired Illness or Injury  Outcome: Progressing  Intervention: Identify and Manage Fall Risk  Recent Flowsheet Documentation  Taken 1/8/2025 0407 by Ryan Cardozo RN  Safety Promotion/Fall Prevention:   safety round/check completed   room organization consistent   assistive device/personal items within reach   activity supervised  Taken 1/8/2025 0214 by Ryan Cardozo RN  Safety Promotion/Fall Prevention:   safety round/check completed   room organization consistent   assistive device/personal items within reach   activity supervised  Taken 1/8/2025 0001 by Ryan Cardozo RN  Safety Promotion/Fall Prevention:   safety round/check completed   room organization consistent   assistive device/personal items within reach   clutter free environment maintained  Taken 1/7/2025 2059 by Ryan Cardozo RN  Safety Promotion/Fall Prevention:   safety round/check completed   room organization consistent   clutter free environment maintained   assistive device/personal items within reach  Intervention: Prevent Skin Injury  Recent Flowsheet Documentation  Taken 1/8/2025 0001 by yRan Cardozo RN  Body Position: position changed independently  Taken 1/7/2025 2059 by Ryan Cardozo RN  Body Position: position changed independently  Goal: Optimal Comfort and Wellbeing  Outcome: Progressing  Intervention: Provide Person-Centered Care  Recent Flowsheet Documentation  Taken 1/7/2025 2059 by Ryan Cardozo RN  Trust Relationship/Rapport:   care explained   choices provided  Goal: Readiness for Transition of Care  Outcome: Progressing      Problem: Comorbidity Management  Goal: Blood Pressure in Desired Range  Outcome: Progressing     Problem: Hemorrhagic Cystitis  Goal: Absence of Hematuria  Outcome: Progressing     Problem: Fall Injury Risk  Goal: Absence of Fall and Fall-Related Injury  Outcome: Progressing  Intervention: Promote Injury-Free Environment  Recent Flowsheet Documentation  Taken 1/8/2025 0407 by Ryan Cardozo, RN  Safety Promotion/Fall Prevention:   safety round/check completed   room organization consistent   assistive device/personal items within reach   activity supervised  Taken 1/8/2025 0214 by Ryan Cardozo, RN  Safety Promotion/Fall Prevention:   safety round/check completed   room organization consistent   assistive device/personal items within reach   activity supervised  Taken 1/8/2025 0001 by Ryan Cardozo, RN  Safety Promotion/Fall Prevention:   safety round/check completed   room organization consistent   assistive device/personal items within reach   clutter free environment maintained  Taken 1/7/2025 2059 by Ryan Cardozo, RN  Safety Promotion/Fall Prevention:   safety round/check completed   room organization consistent   clutter free environment maintained   assistive device/personal items within reach

## 2025-01-08 NOTE — PROGRESS NOTES
MD ATTESTATION NOTE     SHARED VISIT: This visit was performed by BOTH a physician and an APC. The substantive portion of the medical decision making was performed by this attesting physician who made or approved the management plan and takes responsibility for patient management. All studies in the APC note (if performed) were independently interpreted by me.  The SINDI and I have discussed this patient's history, physical exam, and treatment plan. I have reviewed the documentation and personally had a face to face interaction with the patient. I affirm the documentation and agree with the treatment and plan. I provided a substantive portion of the care of the patient.  I personally performed the physical exam in its entirety, and below are my findings.      Brief HPI: Patient is resting comfortably.  Denies fever, chills, nausea, vomiting, flank pain, or abdominal pain.    PHYSICAL EXAM    GENERAL: Awake, alert, oriented x 3.  Nontoxic-appearing elderly male.  Resting comfortably in no acute distress  HENT: nares patent  EYES: no scleral icterus  CV: regular rhythm, normal rate  RESPIRATORY: normal effort, CTAB  ABDOMEN: soft, nontender; Paz catheter is draining light yellow urine  MUSCULOSKELETAL: no deformity  NEURO: alert, moves all extremities, follows commands  PSYCH:  calm, cooperative  SKIN: warm, dry    Vital signs and nursing notes reviewed.        Plan: Patient has been seen by urology.  Paz catheter has been clamped.  Will attempt voiding trial.  If successful, patient will be discharged on a short course of oral antibiotics.  He will follow-up with [facility].

## 2025-01-08 NOTE — PLAN OF CARE
Goal Outcome Evaluation:      Pt w/ no c/o pain on day shift. CBI remained clamped; draining raffi output w/ clots. Pt educated on reporting s/s of pain, pressure, and/or discomfort r/t catheter. Pt was able to ambulate/transfer to the C w/ assistance. Pt A&Ox4, on RA, VSS, resting in bed in NAD at this time. Plan for post-void trial tomorrow 1/8/24. Urology following.

## 2025-01-08 NOTE — OUTREACH NOTE
Prep Survey      Flowsheet Row Responses   [facility] patient discharged from? [place]   Is LACE score < 7 ? No   Eligibility [facility]   Date of Admission 01/05/25   Date of Discharge 01/08/25   Discharge Disposition Home or Self Care   Discharge diagnosis Hematuria, UTI   Does the patient have one of the following disease processes/diagnoses(primary or secondary)? Other   Does the patient have Home health ordered? No   Is there a DME ordered? No   Prep survey completed? Yes            Lillian COMBS - Registered Nurse

## 2025-01-08 NOTE — DISCHARGE SUMMARY
ED OBSERVATION PROGRESS/DISCHARGE SUMMARY    Date of Admission: 1/5/2025   LOS: 0 days   PCP: Chloe Arriaga MD    Final Diagnosis hematuria      Subjective     Hospital Outcome:   74-year-old male admitted to the observation unit with a diagnosis of hematuria with urinary retention.  Three-way Paz catheter was placed and the patient was placed on CBI.  Initial lab work in the emergency department is unremarkable other than a glucose of 125, WBCs 14.19, urinalysis shows moderate blood, nitrate positive, moderate leukocytes, too numerous to count RBCs and 11-20 WBCs.  Urine culture is pending at this time.  He was started on Rocephin 1 g IV every 24 hours.        1/6/2025.     8:15 AM.  We are going to order a CT of the abdomen pelvis without contrast to ensure the patient's catheter is in place as his renal function has deteriorated overnight and the patient is complaining of discomfort.     10:15 AM.  Paz not in proper place.  RN staff to deflate the balloon and resituate.     10:45 AM.  Paz catheter is now in proper place patient states she is feeling significantly better.  A liter of IV fluids have been ordered in an attempt to correct BUN/creatinine/GFR.       3:45 PM.  Patient's urine is still bloody appears to be clearing.  Plan on holding overnight with urology to reevaluate in the morning.  Will get repeat CBC and BMP in the morning.  Will continue with IV Rocephin for UTI     1/7/2025:  No acute events overnight.  Anticipate voiding trial this a.m. and discharge home.     10:43 AM.  Patient's GFR significantly improved from 2.03-1.34 after remanipulation of his three-way catheter and a liter of IV fluids.  WBC remains 20,000.  Patient remains afebrile.  He continues to be treated for UTI with Rocephin.  Urology has evaluated.  No surgical intervention needed.  Continue to hold the Eliquis until 3 days after urine remains clear/yellow.  Keep CBI clamped, hand irrigate as needed 60 mL normal saline  for catheter obstruction.  Voiding trial on the morning of 1/8/2025.  If patient does well he be discharged home on oral antibiotics and follow-up with Dr. Brian Russell in 1 to 2 weeks.  Urology will continue to follow.    1/8/2025  No acute events overnight.  Urine appears clear this morning.  CBI has been clamped and patient is undergoing a voiding trial, if able to urinate on his own catheter can be removed and patient can discharge home from urology standpoint.  Urology does recommend that the patient discharged home on oral antibiotic for few days to cover for his catheter, cephalosporin should be adequate per their recommendations.  Patient is being arranged for outpatient hyperbaric oxygen therapy and will continue to follow-up with urology outpatient.  Voiding trial successful.  All labs and imaging findings discussed with patient as well as specialist recommendations and patient is agreeable for discharge home at this time.    ROS:  General: no fevers, chills  Respiratory: no cough, dyspnea  Cardiovascular: no chest pain, palpitations  Abdomen: No abdominal pain, nausea, vomiting, or diarrhea  Neurologic: No focal weakness    Objective   Physical Exam:  I have reviewed the vital signs.  Temp:  [98.3 °F (36.8 °C)-99.2 °F (37.3 °C)] 98.3 °F (36.8 °C)  Heart Rate:  [63-82] 76  Resp:  [17-19] 18  BP: (113-154)/(56-98) 154/98  General Appearance:    Alert, cooperative, no distress  Head:    Normocephalic, atraumatic, normal hearing  Eyes:    Sclerae anicteric, EOMI  Neck:   Supple, nontender  Lungs: Clear to auscultation bilaterally, respirations unlabored on room air  Heart: Regular rate and rhythm, S1 and S2 normal, no murmur  Abdomen:  Soft, nontender, bowel sounds active, nondistended  Extremities: No clubbing, cyanosis, or edema to lower extremities  Pulses:  2+ and symmetric in distal lower extremities  Skin: No rashes   Neurologic: Oriented x3, Normal strength to extremities    Results Review:    I have  reviewed the labs, radiology results and diagnostic studies.    Results from last 7 days   Lab Units 01/08/25  0354   WBC 10*3/mm3 15.04*   HEMOGLOBIN g/dL 12.7*   HEMATOCRIT % 38.2   PLATELETS 10*3/mm3 187     Results from last 7 days   Lab Units 01/08/25  0354 01/07/25  0344 01/06/25  0302 01/05/25  1623   SODIUM mmol/L 136 135* 137 137   POTASSIUM mmol/L 3.6 4.0 4.8 4.0   CHLORIDE mmol/L 106 106 99 101   CO2 mmol/L 22.3 20.5* 21.9* 22.0   BUN mg/dL 17 24* 20 12   CREATININE mg/dL 0.88 1.34* 2.03* 1.13   CALCIUM mg/dL 8.3* 8.7 9.8 10.5   BILIRUBIN mg/dL  --   --   --  0.9   ALK PHOS U/L  --   --   --  105   ALT (SGPT) U/L  --   --   --  29   AST (SGOT) U/L  --   --   --  28   GLUCOSE mg/dL 114* 129* 222* 125*     Imaging Results (Last 24 Hours)       ** No results found for the last 24 hours. **            I have reviewed the medications.     Discharge Medications        ASK your doctor about these medications        Instructions Start Date   CALCIUM 600+D PO   1 tablet, 2 Times Daily      DAILY MULTIVITAMIN PO   1 tablet, Daily      dapagliflozin Propanediol 10 MG tablet  Commonly known as: Farxiga   10 mg, Oral, Daily      Eliquis 5 MG tablet tablet  Generic drug: apixaban   5 mg, Oral, Every 12 Hours Scheduled      lisinopril 40 MG tablet  Commonly known as: PRINIVIL,ZESTRIL   40 mg, Oral, Daily      pravastatin 40 MG tablet  Commonly known as: PRAVACHOL   40 mg, Oral, Nightly              ---------------------------------------------------------------------------------------------  Assessment & Plan   Assessment/Problem List    Hematuria      Plan:  Hematuria  UTI  -Urology consult; if patient can urinate on his own okay for discharge home on a few days of antibiotics and will follow-up outpatient for further hyperbaric oxygen therapy  -CBI initiated in the ED, currently clamped and patient is undergoing void trial  -Void trial successful  -Will discharge home on cephalosporin      Hypertension  Hyperlipidemia  -Cont home meds     Disposition: Discharge to home    Follow-up after Discharge: PCP in 1 to 2 weeks, urology in 1 to 2 weeks    This note will serve as a discharge summary    Shauna Negron PA-C 01/08/25 07:37 EST    I have worn appropriate PPE during this patient encounter, sanitized my hands both with entering and exiting patient's room.      38 minutes has been spent by [facility] providers in the care of this patient while under observation status

## 2025-01-08 NOTE — PROGRESS NOTES
"  [facility] Progress Note    Chief Complaint: None    She is to go home.  Doing well.  Urine is clear.  No further hematuria.  No clots.  Urine is very pale yellow this morning.  No flank pain.    Review of Systems:    The following systems were reviewed and negative;  respiratory and cardiovascular          Vital Signs  /80 (BP Location: Right arm, Patient Position: Lying)   Pulse 66   Temp 97.9 °F (36.6 °C) (Temporal)   Resp 16   Ht 175.3 cm (69\")   Wt 81.6 kg (180 lb)   SpO2 97%   BMI 26.58 kg/m²     Physical Exam:     General Appearance:    Alert, cooperative, NAD   HEENT:    No trauma, pupils reactive, hearing intact   Back:     No CVA tenderness   Lungs:     Respirations unlabored, no wheezing    Heart:    RRR, intact peripheral pulses   Abdomen:   Soft and benign   :  External genitalia normal   Extremities:   No edema, no deformity   Lymphatic:   No neck or groin LAD   Skin:   No bleeding, bruising or rashes   Neuro/Psych:   Orientation intact, mood/affect pleasant, no focal findings        Results Review:     I reviewed the patient's new clinical results.  Lab Results (last 24 hours)       Procedure Component Value Units Date/Time    Basic Metabolic Panel [[id number]]  (Abnormal) Collected: 01/08/25 0354    Specimen: Blood from Arm, Right Updated: 01/08/25 0450     Glucose 114 mg/dL      BUN 17 mg/dL      Creatinine 0.88 mg/dL      Sodium 136 mmol/L      Potassium 3.6 mmol/L      Chloride 106 mmol/L      CO2 22.3 mmol/L      Calcium 8.3 mg/dL      BUN/Creatinine Ratio 19.3     Anion Gap 7.7 mmol/L      eGFR 89.1 mL/min/1.73     Narrative:      GFR Categories in Chronic Kidney Disease (CKD)      GFR Category          GFR (mL/min/1.73)    Interpretation  G1                     90 or greater         Normal or high (1)  G2                      60-89                Mild decrease (1)  G3a                   45-59                Mild to moderate decrease  G3b                   30-44                " Moderate to severe decrease  G4                    15-29                Severe decrease  G5                    14 or less           Kidney failure          (1)In the absence of evidence of kidney disease, neither GFR category G1 or G2 fulfill the criteria for CKD.    eGFR calculation 2021 CKD-EPI creatinine equation, which does not include race as a factor    CBC & Differential [[id number]]  (Abnormal) Collected: 01/08/25 0354    Specimen: Blood from Arm, Right Updated: 01/08/25 0431    Narrative:      The following orders were created for panel order CBC & Differential.  Procedure                               Abnormality         Status                     ---------                               -----------         ------                     CBC Auto Differential[[id number]]        Abnormal            Final result                 Please view results for these tests on the individual orders.    CBC Auto Differential [[id number]]  (Abnormal) Collected: 01/08/25 0354    Specimen: Blood from Arm, Right Updated: 01/08/25 0431     WBC 15.04 10*3/mm3      RBC 4.05 10*6/mm3      Hemoglobin 12.7 g/dL      Hematocrit 38.2 %      MCV 94.3 fL      MCH 31.4 pg      MCHC 33.2 g/dL      RDW 12.3 %      RDW-SD 43.1 fl      MPV 11.0 fL      Platelets 187 10*3/mm3      Neutrophil % 74.3 %      Lymphocyte % 10.8 %      Monocyte % 13.1 %      Eosinophil % 1.0 %      Basophil % 0.3 %      Immature Grans % 0.5 %      Neutrophils, Absolute 11.17 10*3/mm3      Lymphocytes, Absolute 1.62 10*3/mm3      Monocytes, Absolute 1.97 10*3/mm3      Eosinophils, Absolute 0.15 10*3/mm3      Basophils, Absolute 0.05 10*3/mm3      Immature Grans, Absolute 0.08 10*3/mm3      nRBC 0.0 /100 WBC     Blood Culture - Blood, Arm, Right [[id number]]  (Normal) Collected: 01/06/25 1207    Specimen: Blood from Arm, Right Updated: 01/07/25 1215     Blood Culture No growth at 24 hours    Blood Culture - Blood, Arm, Left [[id number]]  (Normal) Collected: 01/06/25 1209     Specimen: Blood from Arm, Left Updated: 01/07/25 1215     Blood Culture No growth at 24 hours    Urine Culture - Urine, Urine, Catheter [[id number]]  (Normal) Collected: 01/05/25 1740    Specimen: Urine, Catheter Updated: 01/07/25 0926     Urine Culture No growth          Imaging Results (Last 24 Hours)       ** No results found for the last 24 hours. **            Medication Review:   I have personally reviewed    Current Facility-Administered Medications:     acetaminophen (TYLENOL) tablet 650 mg, 650 mg, Oral, Q6H PRN, Nolberto, Barbra S, APRN, 650 mg at 01/05/25 2108    cefTRIAXone (ROCEPHIN) 1,000 mg in sodium chloride 0.9 % 100 mL MBP, 1,000 mg, Intravenous, Q24H, Jose Enrique Ramirez III, PA, Last Rate: 200 mL/hr at 01/07/25 1900, 1,000 mg at 01/07/25 1900    [Held by provider] lisinopril (PRINIVIL,ZESTRIL) tablet 40 mg, 40 mg, Oral, Daily, Nolberto, Barbra S, APRN, 40 mg at 01/06/25 1045    melatonin tablet 5 mg, 5 mg, Oral, Nightly PRN, Nolberto, Barbra S, APRN, 5 mg at 01/05/25 2344    pravastatin (PRAVACHOL) tablet 40 mg, 40 mg, Oral, Nightly, Nolberto, Barbra S, APRN, 40 mg at 01/07/25 2059    [COMPLETED] Insert Peripheral IV, , , Once **AND** sodium chloride 0.9 % flush 10 mL, 10 mL, Intravenous, PRN, Nolberto, Barbra S, APRN    sodium chloride 0.9 % flush 10 mL, 10 mL, Intravenous, Q12H, Nolberto, Barbra S, APRN, 10 mL at 01/07/25 2059    sodium chloride 0.9 % flush 10 mL, 10 mL, Intravenous, PRN, Nolberto, Barbra S, APRN    sodium chloride 0.9 % infusion 40 mL, 40 mL, Intravenous, PRN, Nolberto, Barbra S, APRN    sodium chloride 0.9 % infusion, 75 mL/hr, Intravenous, Continuous, Nolberto, Barbra S, APRN, Last Rate: 75 mL/hr at 01/06/25 0600, 75 mL/hr at 01/06/25 0600    Allergies:    Penicillins    Assessment:    Active Problems:    Hematuria     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  Urine is clear today irrigation is minimal right.    Plan:    CADY Paz.  I am okay with patient being discharged today.  He has tentatively  been approved for hyperbaric oxygen therapy and will start this as soon as possible.  Would send him home with just a few days of an oral antibiotic to cover his catheter.  Cephalosporin should be adequate.      Robbi Horn MD    1/8/2025  08:14 EST

## 2025-01-09 ENCOUNTER — NURSE TRIAGE (OUTPATIENT)
Dept: CALL CENTER | Facility: HOSPITAL | Age: 75
End: 2025-01-09
Payer: MEDICARE

## 2025-01-09 ENCOUNTER — TRANSITIONAL CARE MANAGEMENT TELEPHONE ENCOUNTER (OUTPATIENT)
Dept: CALL CENTER | Facility: HOSPITAL | Age: 75
End: 2025-01-09
Payer: MEDICARE

## 2025-01-09 NOTE — TELEPHONE ENCOUNTER
I was in [facility] from 01/05 to 01/08, I had Cath in and was able to urinate before discharge, it is starting to where is only trickling out, some blood noted, no clots,no pain, but not able to urinate at this time much  only slight leaking noted . I have been taking my meds as ordered. I do not feel full, at this time, drinking well, just not much urine this afternoon. Told him if cannot urinate come back for evaluation. Otherwise [facility] and PCP as advised  Reason for Disposition   Patient sounds very sick or weak to the triager    Additional Information   Negative: Shock suspected (e.g., cold/pale/clammy skin, too weak to stand, low BP, rapid pulse)   Negative: Sounds like a life-threatening emergency to the triager   Negative: Followed a female genital area injury (e.g., labia, vagina, vulva)   Negative: Followed a male genital area injury (e.g., penis, scrotum)   Negative: Vaginal discharge   Negative: Pus (white, yellow) or bloody discharge from end of penis   Negative: [1] Taking antibiotic for urinary tract infection (UTI) AND [2] female   Negative: [1] Taking antibiotic for urinary tract infection (UTI) AND [2] male   Negative: [1] Pain or burning with passing urine (urination) AND [2] pregnant   Negative: [1] Pain or burning with passing urine (urination) AND [2] postpartum (from 0 to 6 weeks after delivery)   Negative: [1] Pain or burning with passing urine (urination) AND [2] female   Negative: [1] Pain or burning with passing urine (urination) AND [2] male   Negative: Pain or itching in the vulvar area   Negative: Pain in scrotum is main symptom   Negative: Blood in the urine is main symptom   Negative: Symptoms arising from use of a urinary catheter (e.g., Coude, Paz)   Negative: [1] Unable to urinate (or only a few drops) > 4 hours AND [2] bladder feels very full (e.g., palpable bladder or strong urge to urinate)   Negative: [1] Decreased urination and [2] drinking very little AND [2]  "dehydration suspected (e.g., dark urine, no urine > 12 hours, very dry mouth, very lightheaded)   Negative: Fever > 100.4 F (38.0 C)   Negative: Side (flank) or lower back pain present   Negative: Urinating more frequently than usual (i.e., frequency)   Negative: Bad or foul-smelling urine   Negative: [1] Can't control passage of urine (i.e., urinary incontinence) AND [2] new-onset (< 2 weeks) or worsening   Negative: [1] Can't control passage of urine (i.e., urinary incontinence, wetting self) AND [2] present > 2 weeks    Answer Assessment - Initial Assessment Questions  1. SYMPTOM: \"What's the main symptom you're concerned about?\" (e.g., frequency, incontinence)      Not able to urinate much  2. ONSET: \"When did the  issue  start?\"      Difficult to urinate this afternoon  3. PAIN: \"Is there any pain?\" If Yes, ask: \"How bad is it?\" (Scale: 1-10; mild, moderate, severe)      no  4. CAUSE: \"What do you think is causing the symptoms?\"      hematuria  5. OTHER SYMPTOMS: \"Do you have any other symptoms?\" (e.g., blood in urine, fever, flank pain, pain with urination)      Little blood in urine  6. PREGNANCY: \"Is there any chance you are pregnant?\" \"When was your last menstrual period?\"      no    Protocols used: Urinary Symptoms-ADULT-AH    "

## 2025-01-09 NOTE — CASE MANAGEMENT/SOCIAL WORK
Case Management Discharge Note      Final Note: Home         Selected Continued Care - Discharged on 1/8/2025 Admission date: 1/5/2025 - Discharge disposition: Home or Self Care      Destination    No services have been selected for the patient.                Durable Medical Equipment    No services have been selected for the patient.                Dialysis/Infusion    No services have been selected for the patient.                Home Medical Care    No services have been selected for the patient.                Therapy    No services have been selected for the patient.                Community Resources    No services have been selected for the patient.                Community & DME    No services have been selected for the patient.                         Final Discharge Disposition Code: 01 - home or self-care

## 2025-01-09 NOTE — OUTREACH NOTE
Call Center TCM Note      Flowsheet Row Responses   [facility] patient discharged from? [place]   Does the patient have one of the following disease processes/diagnoses(primary or secondary)? Other   TCM attempt successful? Yes   Call start time 1425   Call end time 1428   Discharge diagnosis Hematuria, UTI   Person spoke with today (if not patient) and relationship spouse   Meds reviewed with patient/caregiver? Yes   Is the patient having any side effects they believe may be caused by any medication additions or changes? No   Does the patient have all medications ordered at discharge? Yes   Is the patient taking all medications as directed (includes completed medication regime)? Yes   Comments Following up with pt's Urologist   Does the patient have an appointment with their PCP within 7-14 days of discharge? No   Nursing Interventions Patient declined scheduling/rescheduling appointment at this time, Routed TCM call to PCP office, PCP office requested to make appointment - message sent   Psychosocial issues? No   Did the patient receive a copy of their discharge instructions? Yes   Nursing interventions Reviewed instructions with patient   What is the patient's perception of their health status since discharge? Improving   Is the patient/caregiver able to teach back signs and symptoms related to disease process for when to call PCP? Yes   Is the patient/caregiver able to teach back signs and symptoms related to disease process for when to call 911? Yes   Is the patient/caregiver able to teach back the hierarchy of who to call/visit for symptoms/problems? PCP, Specialist, Home health nurse, Urgent Care, ED, 911 Yes   If the patient is a current smoker, are they able to teach back resources for cessation? Not a smoker   TCM call completed? Yes   Wrap up additional comments Spouse denies pt has blood in urine as spouse shares pt's says things are back to normal. Reviewed anitibiotic with spouse. Spouse  shares pt is not taking Eliquis r/t blood in urine. Pt has upcoming urology appt.   Call end time [id number]   Would this patient benefit from a Referral to [facility] Social Work? No   Is the patient interested in additional calls from an ambulatory ? No            Liat Luna RN    1/9/2025, 14:29 EST

## 2025-01-10 ENCOUNTER — HOSPITAL ENCOUNTER (EMERGENCY)
Facility: HOSPITAL | Age: 75
Discharge: HOME | End: 2025-01-10
Attending: EMERGENCY MEDICINE
Payer: MEDICARE

## 2025-01-10 VITALS
DIASTOLIC BLOOD PRESSURE: 93 MMHG | OXYGEN SATURATION: 98 % | WEIGHT: 180 LBS | HEART RATE: 75 BPM | RESPIRATION RATE: 20 BRPM | TEMPERATURE: 97.6 F | HEIGHT: 69 IN | BODY MASS INDEX: 26.66 KG/M2 | SYSTOLIC BLOOD PRESSURE: 156 MMHG

## 2025-01-10 DIAGNOSIS — R33.8 ACUTE URINARY RETENTION: Primary | ICD-10-CM

## 2025-01-10 LAB
ALBUMIN SERPL-MCNC: 3.6 G/DL (ref 3.5–5.2)
ALBUMIN/GLOB SERPL: 1.1 G/DL
ALP SERPL-CCNC: 82 U/L (ref 39–117)
ALT SERPL W P-5'-P-CCNC: 23 U/L (ref 1–41)
ANION GAP SERPL CALCULATED.3IONS-SCNC: 11 MMOL/L (ref 5–15)
AST SERPL-CCNC: 25 U/L (ref 1–40)
BASOPHILS # BLD AUTO: 0.05 10*3/MM3 (ref 0–0.2)
BASOPHILS NFR BLD AUTO: 0.4 % (ref 0–1.5)
BILIRUB SERPL-MCNC: 0.8 MG/DL (ref 0–1.2)
BUN SERPL-MCNC: 14 MG/DL (ref 8–23)
BUN/CREAT SERPL: 11.5 (ref 7–25)
CALCIUM SPEC-SCNC: 8.7 MG/DL (ref 8.6–10.5)
CHLORIDE SERPL-SCNC: 101 MMOL/L (ref 98–107)
CO2 SERPL-SCNC: 26 MMOL/L (ref 22–29)
CREAT SERPL-MCNC: 1.22 MG/DL (ref 0.76–1.27)
DEPRECATED RDW RBC AUTO: 41.5 FL (ref 37–54)
EGFRCR SERPLBLD CKD-EPI 2021: 61.4 ML/MIN/1.73
EOSINOPHIL # BLD AUTO: 0.25 10*3/MM3 (ref 0–0.4)
EOSINOPHIL NFR BLD AUTO: 2 % (ref 0.3–6.2)
ERYTHROCYTE [DISTWIDTH] IN BLOOD BY AUTOMATED COUNT: 12.3 % (ref 12.3–15.4)
GLOBULIN UR ELPH-MCNC: 3.2 GM/DL
GLUCOSE SERPL-MCNC: 122 MG/DL (ref 65–99)
HCT VFR BLD AUTO: 39 % (ref 37.5–51)
HGB BLD-MCNC: 13.4 G/DL (ref 13–17.7)
IMM GRANULOCYTES # BLD AUTO: 0.08 10*3/MM3 (ref 0–0.05)
IMM GRANULOCYTES NFR BLD AUTO: 0.6 % (ref 0–0.5)
LYMPHOCYTES # BLD AUTO: 0.92 10*3/MM3 (ref 0.7–3.1)
LYMPHOCYTES NFR BLD AUTO: 7.3 % (ref 19.6–45.3)
MCH RBC QN AUTO: 31.8 PG (ref 26.6–33)
MCHC RBC AUTO-ENTMCNC: 34.4 G/DL (ref 31.5–35.7)
MCV RBC AUTO: 92.4 FL (ref 79–97)
MONOCYTES # BLD AUTO: 1.37 10*3/MM3 (ref 0.1–0.9)
MONOCYTES NFR BLD AUTO: 10.8 % (ref 5–12)
NEUTROPHILS NFR BLD AUTO: 78.9 % (ref 42.7–76)
NEUTROPHILS NFR BLD AUTO: 9.96 10*3/MM3 (ref 1.7–7)
NRBC BLD AUTO-RTO: 0 /100 WBC (ref 0–0.2)
PLATELET # BLD AUTO: 224 10*3/MM3 (ref 140–450)
PMV BLD AUTO: 10.8 FL (ref 6–12)
POTASSIUM SERPL-SCNC: 3.7 MMOL/L (ref 3.5–5.2)
PROT SERPL-MCNC: 6.8 G/DL (ref 6–8.5)
RBC # BLD AUTO: 4.22 10*6/MM3 (ref 4.14–5.8)
SODIUM SERPL-SCNC: 138 MMOL/L (ref 136–145)
WBC NRBC COR # BLD AUTO: 12.63 10*3/MM3 (ref 3.4–10.8)

## 2025-01-10 PROCEDURE — 85025 COMPLETE CBC W/AUTO DIFF WBC: CPT | Performed by: PHYSICIAN ASSISTANT

## 2025-01-10 PROCEDURE — 99283 EMERGENCY DEPT VISIT LOW MDM: CPT

## 2025-01-10 PROCEDURE — 51702 INSERT TEMP BLADDER CATH: CPT

## 2025-01-10 PROCEDURE — 80053 COMPREHEN METABOLIC PANEL: CPT | Performed by: PHYSICIAN ASSISTANT

## 2025-01-10 PROCEDURE — 51798 US URINE CAPACITY MEASURE: CPT

## 2025-01-10 NOTE — DISCHARGE INSTRUCTIONS
Please follow-up with your Urologist    Although you are being discharged in the ED today, I encouraged her to return for worsening symptoms.  Things can, and do, change such a treatment at home with medication may not be adequate.  Specifically I recommend returning for chest pain or discomfort, difficulty breathing, persistent vomiting or difficulty holding down liquids or medications, fever > 102.0 F,  or any other worsening or alarming symptoms.     You have been evaluated in the emergency department for your presenting symptoms and deemed appropriate for discharge home.  Understand that your health care does not end here today.  It is important that your primary care physician be made aware of your visit.  I recommend calling your primary care provider in the next 48 hours to arrange follow-up care.  Your primary care provider needs to review your treatment and recovery to ensure that no further treatment is necessary.  Additional testing or procedures may be necessary as an outpatient at the discretion of your primary care provider.    I also recommend following up with your PCP for recheck of your blood pressure and treatment as needed.

## 2025-01-10 NOTE — ED PROVIDER NOTES
EMERGENCY DEPARTMENT ENCOUNTER  Room Number:  01/01  PCP: Chloe Arriaga MD  Independent Historians: Patient      HPI:  Chief Complaint: Urinary retention    A complete HPI/ROS/PMH/PSH/SH/FH are unobtainable due to: None    Chronic or social conditions impacting patient care (Social Determinants of Health): None      Context: [place] is a 74 y.o. male with a medical history of tension, hyperlipidemia, prostate cancer, bladder cancer, and radiation cystitis presents emergency department today complaining of urinary retention.  He says he last urinated around 3:00 yesterday.  He says he has had some dribbling and weak stream since then but has not completely emptied his bladder since then.  He says tonight he woke up with some pain in his penis and his lower abdomen and was unable to urinate which is what prompted his ED visit.  He says he did have some mild hematuria earlier today but passed no large clots.  He was recently admitted to the observation unit on CBI for gross hematuria and was seen by urology and they recommended treatment with antibiotics.  Patient says he has been urinating normally since then.  He denies fever or chills.  He denies nausea, vomiting or diarrhea.  He has been off his Eliquis since he developed radiation cystitis due to ongoing bleeding.      Review of prior external notes (non-ED) -and- Review of prior external test results outside of this encounter:   Patient was admitted to the observation unit on 1/5/2025 for hematuria.  Urology saw in consultation and said if the patient can urinate on his own he was okay for discharge on antibiotics.  He is to have outpatient hyperbaric oxygen therapy.  He had CBI while in the ED and that he was able to void successfully.  He was treated with cephalosporins.    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 coli pneumonia 08/01/2023    History of prostate cancer 2002    History of recent hospitalization 08/01/2023    Kidney stone 2019    On anticoagulant therapy          PAST SURGICAL HISTORY  Past Surgical History:   Procedure Laterality Date    CATARACT EXTRACTION      WITH LENS IMPLANTS    COLONOSCOPY N/A 09/13/2023    Procedure: COLONOSCOPY TO CECUM AND TERMINAL ILEUM WITH COLD POLYPECTOMY;  Surgeon: Humberto Peña MD;  Location: [facility] ENDOSCOPY;  Service: Gastroenterology;  Laterality: N/A;  SCREENING  DIVERTICULOSIS, COLON POLYP, HEMORRHOIDS    CYSTOSCOPY BLADDER BIOPSY N/A 06/12/2019    Procedure: CYSTOSCOPY TUR BLADDER TUMOR;  Surgeon: Joel Russell MD;  Location: [facility] OR;  Service: Urology    CYSTOSCOPY BLADDER BIOPSY N/A 05/31/2024    Procedure: CYSTOSCOPY BLADDER BIOPSY FULGURATION;  Surgeon: Joel Russell MD;  Location: [facility] OR;  Service: Urology;  Laterality: N/A;    CYSTOSCOPY WITH CLOT EVACUATION N/A 10/02/2024    Procedure: CYSTOSCOPY WITH CLOT EVACUATION, CAUTERY OF BLEEDING, DENG INSERTION;  Surgeon: Joel Russell MD;  Location: [facility] OR;  Service: Urology;  Laterality: N/A;    ELBOW PROCEDURE Left     FX    EYE SURGERY  03/2024    Cataract    PROSTATECTOMY      PROSTATECTOMY  03/01/2003    TONSILLECTOMY  1957    VASECTOMY           FAMILY HISTORY  Family History   Problem Relation Age of Onset    Hypertension Mother     Hypertension Father     Prostate cancer Father     [place]  Hyperthermia Neg Hx          SOCIAL HISTORY  Social History     Socioeconomic History    Marital status:    Tobacco Use    Smoking status: Never     Passive exposure: Never    Smokeless tobacco: Never   Vaping Use    Vaping status: Never Used   Substance and Sexual Activity    Alcohol use: Yes     Alcohol/week: 1.0 standard drink of alcohol     Types: 1 Drinks containing 0.5 oz of alcohol per week     Comment: Occasional beer or cocktail    Drug use: No    Sexual activity: Yes     Partners: Female     Birth control/protection: Vasectomy         ALLERGIES  Penicillins      REVIEW OF SYSTEMS  Included in HPI  All systems reviewed and negative except for those discussed in HPI.      PHYSICAL EXAM    I have reviewed the triage vital signs and nursing notes.    ED Triage Vitals [01/10/25 1528]   Temp Heart Rate Resp BP SpO2   97.6 °F (36.4 °C) 90 18 -- 94 %      Temp src Heart Rate Source Patient Position BP Location FiO2 (%)   Tympanic Monitor -- -- --       Physical Exam  Constitutional:       Appearance: Normal appearance.   HENT:      Head: Normocephalic and atraumatic.      Mouth/Throat:      Mouth: Mucous membranes are moist.   Eyes:      Extraocular Movements: Extraocular movements intact.      Pupils: Pupils are equal, round, and reactive to light.   Cardiovascular:      Rate and Rhythm: Normal rate and regular rhythm.      Pulses: Normal pulses.      Heart sounds: Normal heart sounds.   Pulmonary:      Effort: Pulmonary effort is normal. No respiratory distress.      Breath sounds: Normal breath sounds.   Abdominal:      General: Abdomen is flat. There is distension (suprapubic).      Palpations: Abdomen is soft.      Tenderness: There is abdominal tenderness (suprapubic).   Musculoskeletal:         General: Normal range of motion.      Cervical back: Normal range of motion and neck supple.   Skin:     General: Skin is warm and dry.      Capillary Refill: Capillary refill takes less than 2 seconds.    Neurological:      General: No focal deficit present.      Mental Status: He is alert and oriented to person, place, and time.   Psychiatric:         Mood and Affect: Mood normal.         Behavior: Behavior normal.         LAB RESULTS  Recent Results (from the past 24 hours)   Comprehensive Metabolic Panel    Collection Time: 01/10/25  4:39 AM    Specimen: Arm, Right; Blood   Result Value Ref Range    Glucose 122 (H) 65 - 99 mg/dL    BUN 14 8 - 23 mg/dL    Creatinine 1.22 0.76 - 1.27 mg/dL    Sodium 138 136 - 145 mmol/L    Potassium 3.7 3.5 - 5.2 mmol/L    Chloride 101 98 - 107 mmol/L    CO2 26.0 22.0 - 29.0 mmol/L    Calcium 8.7 8.6 - 10.5 mg/dL    Total Protein 6.8 6.0 - 8.5 g/dL    Albumin 3.6 3.5 - 5.2 g/dL    ALT (SGPT) 23 1 - 41 U/L    AST (SGOT) 25 1 - 40 U/L    Alkaline Phosphatase 82 39 - 117 U/L    Total Bilirubin 0.8 0.0 - 1.2 mg/dL    Globulin 3.2 gm/dL    A/G Ratio 1.1 g/dL    BUN/Creatinine Ratio 11.5 7.0 - 25.0    Anion Gap 11.0 5.0 - 15.0 mmol/L    eGFR 61.4 >60.0 mL/min/1.73   CBC Auto Differential    Collection Time: 01/10/25  4:39 AM    Specimen: Arm, Right; Blood   Result Value Ref Range    WBC 12.63 (H) 3.40 - 10.80 10*3/mm3    RBC 4.22 4.14 - 5.80 10*6/mm3    Hemoglobin 13.4 13.0 - 17.7 g/dL    Hematocrit 39.0 37.5 - 51.0 %    MCV 92.4 79.0 - 97.0 fL    MCH 31.8 26.6 - 33.0 pg    MCHC 34.4 31.5 - 35.7 g/dL    RDW 12.3 12.3 - 15.4 %    RDW-SD 41.5 37.0 - 54.0 fl    MPV 10.8 6.0 - 12.0 fL    Platelets 224 140 - 450 10*3/mm3    Neutrophil % 78.9 (H) 42.7 - 76.0 %    Lymphocyte % 7.3 (L) 19.6 - 45.3 %    Monocyte % 10.8 5.0 - 12.0 %    Eosinophil % 2.0 0.3 - 6.2 %    Basophil % 0.4 0.0 - 1.5 %    Immature Grans % 0.6 (H) 0.0 - 0.5 %    Neutrophils, Absolute 9.96 (H) 1.70 - 7.00 10*3/mm3    Lymphocytes, Absolute 0.92 0.70 - 3.10 10*3/mm3    Monocytes, Absolute 1.37 (H) 0.10 - 0.90 10*3/mm3    Eosinophils, Absolute 0.25 0.00 - 0.40 10*3/mm3    Basophils, Absolute 0.05 0.00 - 0.20 10*3/mm3     Immature Grans, Absolute 0.08 (H) 0.00 - 0.05 10*3/mm3    nRBC 0.0 0.0 - 0.2 /100 WBC         RADIOLOGY  No Radiology Exams Resulted Within Past 24 Hours      MEDICATIONS GIVEN IN ER  Medications - No data to display        OUTPATIENT MEDICATION MANAGEMENT:  No current Epic-ordered facility-administered medications on file.     Current Outpatient Medications Ordered in Epic   Medication Sig Dispense Refill    Calcium Carbonate-Vitamin D (CALCIUM 600+D PO) Take 1 tablet by mouth 2 (Two) Times a Day. PT HOLDING FOR SURGERY  Indications: Calcium Deficiency      cefpodoxime (VANTIN) 200 MG tablet Take 1 tablet by mouth Every 12 (Twelve) Hours for 3 days. 6 tablet 0    lisinopril (PRINIVIL,ZESTRIL) 40 MG tablet Take 1 tablet by mouth Daily. 90 tablet 1    Multiple Vitamins-Minerals (DAILY MULTIVITAMIN PO) Take 1 tablet by mouth Daily.      pravastatin (PRAVACHOL) 40 MG tablet Take 1 tablet by mouth Every Night. Indications: High Amount of Fats in the Blood 90 tablet 3    dapagliflozin Propanediol (Farxiga) 10 MG tablet Take 10 mg by mouth Daily. 30 tablet 0    Eliquis 5 MG tablet tablet Take 1 tablet by mouth Every 12 (Twelve) Hours. 60 tablet 11           PROGRESS, DATA ANALYSIS, CONSULTS, AND MEDICAL DECISION MAKING  ORDERS PLACED DURING THIS VISIT:  Orders Placed This Encounter   Procedures    Comprehensive Metabolic Panel    CBC Auto Differential    Bladder scan    Insert 3-way Paz Catheter    Assess Need for Indwelling Urinary Catheter - Follow Removal Protocol    Urinary Catheter Care    CBC & Differential       All labs have been independently interpreted by me.  All radiology studies have been reviewed by me. All EKG's have been independently viewed and interpreted by me.  Discussion below represents my analysis of pertinent findings related to patient's condition, differential diagnosis, treatment plan and final disposition.    Differential diagnosis includes but is not limited to:   Differential diagnosis for  hematuria includes but is not limited to:  - BPH  - hereditary hematuria  - DIC  - coagulopathy  - infection  - infarction  - instrumentation  - tumor  - trauma  - TB  - transient hematuria  - ureteral stones  - sickle cell  - scurvy  - hemorrhagic cystitis       ED Course:  ED Course as of 01/10/25 0520   Fri Christopher 10, 2025   0352 I discussed the case with Dr. Scott and he agrees to evaluate the patient at the bedside.    [CC]   0450 Patient had 468 mL on bladder scan, three-way Paz catheter will be placed. [CC]   0458 WBC(!): 12.63  Improved from prior [CC]   0510 Catheter was placed and patient had relief of symptoms.  Urine in the Paz bag is yellow without gross hematuria.  No clots were visualized.  Nursing staff reported that the patient did have what appeared to be a clot at the urethral meatus whenever the Paz was placed and the patient actually felt some relief and felt some urine passed before the catheter was placed so suspect it may have just been localized clot in the urethra.  Patient does not need CBI today.  We discussed leaving and the catheter in place versus removing it and a voiding trial here.  Patient says he would prefer to leave it in as he does not want to have to come back over the weekend for urinary retention.  He will follow-up with his urologist in the office.  CMP pending prior to discharge. [CC]   0514 Creatinine: 1.22  Baseline 0.88 [CC]      ED Course User Index  [CC] Rhianna Malin PA-C           AS OF 05:20 EST VITALS:    BP - 156/93  HR - 75  TEMP - 97.6 °F (36.4 °C) (Tympanic)  O2 SATS - 98%        DIAGNOSIS  Final diagnoses:   Acute urinary retention         DISPOSITION  ED Disposition       ED Disposition   Discharge    Condition   Stable    Comment   --                    Please note that portions of this document were completed with a voice recognition program.    Note Disclaimer: At [facility], we believe that sharing information builds trust and better  relationships. You are receiving this note because you recently visited [facility]. It is possible you will see health information before a provider has talked with you about it. This kind of information can be easy to misunderstand. To help you fully understand what it means for your health, we urge you to discuss this note with your provider.     Rhianna Malin PA-C  01/10/25 0520

## 2025-01-10 NOTE — ED PROVIDER NOTES
MD ATTESTATION NOTE    The SINDI and I have discussed this patient's history, physical exam, MDM, and treatment plan.  I have reviewed the documentation and personally had a face to face interaction with the patient. I affirm the documentation and agree with the treatment and plan.  The attached note describes my personal findings.      I provided a substantive portion of the medical decision making.        Brief HPI: Patient with history of prostate cancer, bladder cancer, radiation cystitis, presents with inability to urinate since around 3 PM yesterday afternoon.  He was just recently in the hospital for hematuria requiring Paz catheter placement and continuous bladder irrigation.  He states that he thought he was doing well until yesterday afternoon.  He denies having gross blood in his urine.  He denies fever.  He is not currently on anticoagulation.  He had previously been on Eliquis for paroxysmal atrial fibrillation but has held this due to recent issues with hematuria.    PHYSICAL EXAM  ED Triage Vitals   Temp Heart Rate Resp BP SpO2   01/10/25 0348 01/10/25 0348 01/10/25 0348 01/10/25 0354 01/10/25 0348   97.6 °F (36.4 °C) 90 18 139/80 94 %      Temp src Heart Rate Source Patient Position BP Location FiO2 (%)   01/10/25 0348 01/10/25 0348 01/10/25 0354 01/10/25 0354 --   Tympanic Monitor Sitting Left arm          GENERAL: Awake and alert, no acute distress  HENT: nares patent  EYES: no scleral icterus  CV: regular rhythm, normal rate  RESPIRATORY: normal effort  ABDOMEN: soft, mild suprapubic tenderness without rebound or guarding  MUSCULOSKELETAL: no deformity  NEURO: alert, moves all extremities, follows commands  PSYCH:  calm, cooperative  SKIN: warm, dry    Vital signs and nursing notes reviewed.        Medical Decision Making:  ED Course as of 01/10/25 0442   Fri Christopher 10, 2025   0352 I discussed the case with Dr. Scott and he agrees to evaluate the patient at the bedside.    [CC]      ED Course User  Index  [CC] Rhianna Malin PA-C Ritchie, Joseph David, MD  01/10/25 0448

## 2025-01-10 NOTE — TELEPHONE ENCOUNTER
Yes pt can check urology office if they can see him same day or otherwise he will need to go to ER if no or little urine is coming out and blood is also present in urine

## 2025-01-10 NOTE — TELEPHONE ENCOUNTER
Spoke with pt he went to the ER this am and they placed a new cath and urine was present and no bleeding he had trauma from the prev one will call urology for fup

## 2025-01-10 NOTE — ED NOTES
Patient ambulatory to ED for urinary retention. Last urination was around 9 pm yesterday evening. Patient reports bladder pressure and penis pain. Patient was here Sunday for the same issue.

## 2025-01-11 LAB
BACTERIA SPEC AEROBE CULT: NORMAL
BACTERIA SPEC AEROBE CULT: NORMAL

## 2025-01-15 ENCOUNTER — READMISSION MANAGEMENT (OUTPATIENT)
Dept: CALL CENTER | Facility: HOSPITAL | Age: 75
End: 2025-01-15
Payer: MEDICARE

## 2025-01-15 ENCOUNTER — APPOINTMENT (OUTPATIENT)
Dept: CT IMAGING | Facility: HOSPITAL | Age: 75
DRG: 176 | End: 2025-01-15
Payer: MEDICARE

## 2025-01-15 ENCOUNTER — APPOINTMENT (OUTPATIENT)
Dept: CARDIOLOGY | Facility: HOSPITAL | Age: 75
DRG: 176 | End: 2025-01-15
Payer: MEDICARE

## 2025-01-15 ENCOUNTER — HOSPITAL ENCOUNTER (INPATIENT)
Facility: HOSPITAL | Age: 75
LOS: 2 days | Discharge: HOME | DRG: 176 | End: 2025-01-17
Attending: EMERGENCY MEDICINE | Admitting: INTERNAL MEDICINE
Payer: MEDICARE

## 2025-01-15 DIAGNOSIS — R33.8 CLOT RETENTION OF URINE: Primary | ICD-10-CM

## 2025-01-15 DIAGNOSIS — R31.0 GROSS HEMATURIA: ICD-10-CM

## 2025-01-15 DIAGNOSIS — N30.40 RADIATION CYSTITIS: ICD-10-CM

## 2025-01-15 PROBLEM — I26.99 PULMONARY EMBOLI: Status: ACTIVE | Noted: 2025-01-15

## 2025-01-15 PROBLEM — R33.9 URINARY RETENTION: Status: ACTIVE | Noted: 2025-01-15

## 2025-01-15 PROBLEM — I26.99 ACUTE PULMONARY EMBOLISM WITHOUT ACUTE COR PULMONALE: Status: ACTIVE | Noted: 2025-01-15

## 2025-01-15 LAB
ANION GAP SERPL CALCULATED.3IONS-SCNC: 15.9 MMOL/L (ref 5–15)
BASOPHILS # BLD AUTO: 0.04 10*3/MM3 (ref 0–0.2)
BASOPHILS NFR BLD AUTO: 0.3 % (ref 0–1.5)
BUN SERPL-MCNC: 14 MG/DL (ref 8–23)
BUN/CREAT SERPL: 12.7 (ref 7–25)
CALCIUM SPEC-SCNC: 9.5 MG/DL (ref 8.6–10.5)
CHLORIDE SERPL-SCNC: 100 MMOL/L (ref 98–107)
CO2 SERPL-SCNC: 22.1 MMOL/L (ref 22–29)
CREAT SERPL-MCNC: 1.1 MG/DL (ref 0.76–1.27)
DEPRECATED RDW RBC AUTO: 41.8 FL (ref 37–54)
EGFRCR SERPLBLD CKD-EPI 2021: 69.6 ML/MIN/1.73
EOSINOPHIL # BLD AUTO: 0.16 10*3/MM3 (ref 0–0.4)
EOSINOPHIL NFR BLD AUTO: 1.2 % (ref 0.3–6.2)
ERYTHROCYTE [DISTWIDTH] IN BLOOD BY AUTOMATED COUNT: 12.5 % (ref 12.3–15.4)
GLUCOSE SERPL-MCNC: 140 MG/DL (ref 65–99)
HCT VFR BLD AUTO: 43.2 % (ref 37.5–51)
HGB BLD-MCNC: 15.1 G/DL (ref 13–17.7)
IMM GRANULOCYTES # BLD AUTO: 0.08 10*3/MM3 (ref 0–0.05)
IMM GRANULOCYTES NFR BLD AUTO: 0.6 % (ref 0–0.5)
INR PPP: 1.11 (ref 0.9–1.1)
LYMPHOCYTES # BLD AUTO: 1.11 10*3/MM3 (ref 0.7–3.1)
LYMPHOCYTES NFR BLD AUTO: 8.1 % (ref 19.6–45.3)
MCH RBC QN AUTO: 32.1 PG (ref 26.6–33)
MCHC RBC AUTO-ENTMCNC: 35 G/DL (ref 31.5–35.7)
MCV RBC AUTO: 91.9 FL (ref 79–97)
MONOCYTES # BLD AUTO: 1.27 10*3/MM3 (ref 0.1–0.9)
MONOCYTES NFR BLD AUTO: 9.2 % (ref 5–12)
NEUTROPHILS NFR BLD AUTO: 11.09 10*3/MM3 (ref 1.7–7)
NEUTROPHILS NFR BLD AUTO: 80.6 % (ref 42.7–76)
NRBC BLD AUTO-RTO: 0 /100 WBC (ref 0–0.2)
PLATELET # BLD AUTO: 300 10*3/MM3 (ref 140–450)
PMV BLD AUTO: 10.7 FL (ref 6–12)
POTASSIUM SERPL-SCNC: 4 MMOL/L (ref 3.5–5.2)
PROTHROMBIN TIME: 14.6 SECONDS (ref 11.7–14.2)
RBC # BLD AUTO: 4.7 10*6/MM3 (ref 4.14–5.8)
SODIUM SERPL-SCNC: 138 MMOL/L (ref 136–145)
WBC NRBC COR # BLD AUTO: 13.75 10*3/MM3 (ref 3.4–10.8)

## 2025-01-15 PROCEDURE — 71275 CT ANGIOGRAPHY CHEST: CPT

## 2025-01-15 PROCEDURE — 99285 EMERGENCY DEPT VISIT HI MDM: CPT

## 2025-01-15 PROCEDURE — 93005 ELECTROCARDIOGRAM TRACING: CPT | Performed by: NURSE PRACTITIONER

## 2025-01-15 PROCEDURE — 25510000001 IOPAMIDOL PER 1 ML: Performed by: EMERGENCY MEDICINE

## 2025-01-15 PROCEDURE — 74178 CT ABD&PLV WO CNTR FLWD CNTR: CPT

## 2025-01-15 PROCEDURE — 25510000001 IOPAMIDOL 61 % SOLUTION: Performed by: EMERGENCY MEDICINE

## 2025-01-15 PROCEDURE — 25010000002 ENOXAPARIN PER 10 MG: Performed by: NURSE PRACTITIONER

## 2025-01-15 PROCEDURE — 93010 ELECTROCARDIOGRAM REPORT: CPT | Performed by: INTERNAL MEDICINE

## 2025-01-15 PROCEDURE — 85610 PROTHROMBIN TIME: CPT | Performed by: NURSE PRACTITIONER

## 2025-01-15 PROCEDURE — 80048 BASIC METABOLIC PNL TOTAL CA: CPT | Performed by: EMERGENCY MEDICINE

## 2025-01-15 PROCEDURE — 25810000003 SODIUM CHLORIDE 0.9 % SOLUTION: Performed by: NURSE PRACTITIONER

## 2025-01-15 PROCEDURE — 85025 COMPLETE CBC W/AUTO DIFF WBC: CPT | Performed by: EMERGENCY MEDICINE

## 2025-01-15 RX ORDER — SODIUM CHLORIDE 0.9 % (FLUSH) 0.9 %
10 SYRINGE (ML) INJECTION EVERY 12 HOURS SCHEDULED
Status: DISCONTINUED | OUTPATIENT
Start: 2025-01-15 | End: 2025-01-17 | Stop reason: HOSPADM

## 2025-01-15 RX ORDER — SODIUM CHLORIDE 9 MG/ML
40 INJECTION, SOLUTION INTRAVENOUS AS NEEDED
Status: DISCONTINUED | OUTPATIENT
Start: 2025-01-15 | End: 2025-01-17 | Stop reason: HOSPADM

## 2025-01-15 RX ORDER — PRAVASTATIN SODIUM 40 MG
40 TABLET ORAL NIGHTLY
Status: DISCONTINUED | OUTPATIENT
Start: 2025-01-15 | End: 2025-01-17 | Stop reason: HOSPADM

## 2025-01-15 RX ORDER — BISACODYL 5 MG/1
5 TABLET, DELAYED RELEASE ORAL DAILY PRN
Status: DISCONTINUED | OUTPATIENT
Start: 2025-01-15 | End: 2025-01-17 | Stop reason: HOSPADM

## 2025-01-15 RX ORDER — ENOXAPARIN SODIUM 100 MG/ML
1 INJECTION SUBCUTANEOUS EVERY 12 HOURS
Status: DISCONTINUED | OUTPATIENT
Start: 2025-01-15 | End: 2025-01-17 | Stop reason: HOSPADM

## 2025-01-15 RX ORDER — LISINOPRIL 20 MG/1
40 TABLET ORAL DAILY
Status: DISCONTINUED | OUTPATIENT
Start: 2025-01-15 | End: 2025-01-17 | Stop reason: HOSPADM

## 2025-01-15 RX ORDER — BISACODYL 10 MG
10 SUPPOSITORY, RECTAL RECTAL DAILY PRN
Status: DISCONTINUED | OUTPATIENT
Start: 2025-01-15 | End: 2025-01-17 | Stop reason: HOSPADM

## 2025-01-15 RX ORDER — SODIUM CHLORIDE 0.9 % (FLUSH) 0.9 %
10 SYRINGE (ML) INJECTION AS NEEDED
Status: DISCONTINUED | OUTPATIENT
Start: 2025-01-15 | End: 2025-01-17 | Stop reason: HOSPADM

## 2025-01-15 RX ORDER — IOPAMIDOL 755 MG/ML
100 INJECTION, SOLUTION INTRAVASCULAR
Status: COMPLETED | OUTPATIENT
Start: 2025-01-15 | End: 2025-01-15

## 2025-01-15 RX ORDER — POLYETHYLENE GLYCOL 3350 17 G/17G
17 POWDER, FOR SOLUTION ORAL DAILY PRN
Status: DISCONTINUED | OUTPATIENT
Start: 2025-01-15 | End: 2025-01-17 | Stop reason: HOSPADM

## 2025-01-15 RX ORDER — IOPAMIDOL 612 MG/ML
85 INJECTION, SOLUTION INTRAVASCULAR
Status: COMPLETED | OUTPATIENT
Start: 2025-01-15 | End: 2025-01-15

## 2025-01-15 RX ORDER — AMOXICILLIN 250 MG
2 CAPSULE ORAL 2 TIMES DAILY PRN
Status: DISCONTINUED | OUTPATIENT
Start: 2025-01-15 | End: 2025-01-17 | Stop reason: HOSPADM

## 2025-01-15 RX ORDER — ONDANSETRON 2 MG/ML
4 INJECTION INTRAMUSCULAR; INTRAVENOUS EVERY 6 HOURS PRN
Status: DISCONTINUED | OUTPATIENT
Start: 2025-01-15 | End: 2025-01-17 | Stop reason: HOSPADM

## 2025-01-15 RX ORDER — ONDANSETRON 4 MG/1
4 TABLET, ORALLY DISINTEGRATING ORAL EVERY 6 HOURS PRN
Status: DISCONTINUED | OUTPATIENT
Start: 2025-01-15 | End: 2025-01-17 | Stop reason: HOSPADM

## 2025-01-15 RX ORDER — NITROGLYCERIN 0.4 MG/1
0.4 TABLET SUBLINGUAL
Status: DISCONTINUED | OUTPATIENT
Start: 2025-01-15 | End: 2025-01-17 | Stop reason: HOSPADM

## 2025-01-15 RX ADMIN — PRAVASTATIN SODIUM 40 MG: 40 TABLET ORAL at 20:59

## 2025-01-15 RX ADMIN — LISINOPRIL 40 MG: 20 TABLET ORAL at 15:45

## 2025-01-15 RX ADMIN — ENOXAPARIN SODIUM 80 MG: 100 INJECTION SUBCUTANEOUS at 18:13

## 2025-01-15 RX ADMIN — Medication 10 ML: at 08:21

## 2025-01-15 RX ADMIN — Medication 10 ML: at 21:01

## 2025-01-15 RX ADMIN — SODIUM CHLORIDE 1000 ML: 9 INJECTION, SOLUTION INTRAVENOUS at 15:46

## 2025-01-15 RX ADMIN — IOPAMIDOL 85 ML: 612 INJECTION, SOLUTION INTRAVENOUS at 11:53

## 2025-01-15 RX ADMIN — IOPAMIDOL 95 ML: 755 INJECTION, SOLUTION INTRAVENOUS at 14:55

## 2025-01-15 NOTE — ED PROVIDER NOTES
EMERGENCY DEPARTMENT ENCOUNTER    History  Chief Complaint   Patient presents with    Blood in Urine     History provided by: Patient    HPI:  Chief Complaint: Hematuria    Context: Pt is a 74 y.o. male who presents with concerns for hematuria.  He has a history of previous bladder cancer and radiation cystitis.  He has been having issues with hematuria over the past few months.  He required admission to the hospital 1/5 for continuous bladder irrigation a void trial was successful and the patient was discharged without a catheter.  On 1/10 he returned due to acute urinary retention.  A catheter  was subsequently replaced he was doing well.  However, has noted started to have decreased urinary output, clots in the Paz catheter bag and hematuria.      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 coli pneumonia 08/01/2023    History of prostate cancer 2002    History of recent hospitalization 08/01/2023    Kidney stone 2019    On anticoagulant therapy        Past Surgical History:   Procedure Laterality Date    CATARACT EXTRACTION      WITH LENS IMPLANTS    COLONOSCOPY N/A 09/13/2023    Procedure: COLONOSCOPY TO CECUM AND TERMINAL ILEUM WITH COLD POLYPECTOMY;  Surgeon: Humberto Peña MD;  Location: [facility] ENDOSCOPY;  Service: Gastroenterology;  Laterality: N/A;  SCREENING  DIVERTICULOSIS, COLON POLYP, HEMORRHOIDS    CYSTOSCOPY BLADDER BIOPSY N/A 06/12/2019    Procedure: CYSTOSCOPY TUR BLADDER  TUMOR;  Surgeon: Joel Russell MD;  Location: [facility] OR;  Service: Urology    CYSTOSCOPY BLADDER BIOPSY N/A 05/31/2024    Procedure: CYSTOSCOPY BLADDER BIOPSY FULGURATION;  Surgeon: Joel Russell MD;  Location: [facility] MAIN OR;  Service: Urology;  Laterality: N/A;    CYSTOSCOPY WITH CLOT EVACUATION N/A 10/02/2024    Procedure: CYSTOSCOPY WITH CLOT EVACUATION, CAUTERY OF BLEEDING, DENG INSERTION;  Surgeon: Joel Russell MD;  Location: [facility] MAIN OR;  Service: Urology;  Laterality: N/A;    ELBOW PROCEDURE Left     FX    EYE SURGERY  03/2024    Cataract    PROSTATECTOMY      PROSTATECTOMY  03/01/2003    TONSILLECTOMY  1957    VASECTOMY         Physical Exam  ED Triage Vitals [01/15/25 0343]   Temp Heart Rate Resp BP SpO2   96.6 °F (35.9 °C) 91 18 152/84 99 %      Temp src Heart Rate Source Patient Position BP Location FiO2 (%)   Tympanic -- -- -- --     Physical Exam  Constitutional:       Appearance: He is not ill-appearing.   Eyes:      Conjunctiva/sclera: Conjunctivae normal.   Pulmonary:      Effort: Pulmonary effort is normal. No respiratory distress.   Abdominal:      Tenderness: There is abdominal tenderness (Mild, suprapubic). There is no guarding or rebound.   Genitourinary:     Comments: Deng catheter in place, minimal output of red urine  Neurological:      Mental Status: He is alert and oriented to person, place, and time.         Medical Decision Making:    Differential Diagnosis includes but not limited to: Clot retention, bladder mass, urinary tract infection, JIMY, anemia      Medical Record Review: Reviewed discharge summary of hospitalization 1/5/2025    Labs Results:  Recent Results (from the past 24 hours)   Basic Metabolic Panel    Collection Time: 01/15/25  5:00 AM    Specimen: Blood   Result Value Ref Range    Glucose 140 (H) 65 - 99 mg/dL    BUN 14 8 - 23 mg/dL    Creatinine 1.10 0.76 - 1.27 mg/dL    Sodium 138 136 - 145 mmol/L    Potassium 4.0 3.5 - 5.2 mmol/L    Chloride  100 98 - 107 mmol/L    CO2 22.1 22.0 - 29.0 mmol/L    Calcium 9.5 8.6 - 10.5 mg/dL    BUN/Creatinine Ratio 12.7 7.0 - 25.0    Anion Gap 15.9 (H) 5.0 - 15.0 mmol/L    eGFR 69.6 >60.0 mL/min/1.73   CBC Auto Differential    Collection Time: 01/15/25  5:00 AM    Specimen: Blood   Result Value Ref Range    WBC 13.75 (H) 3.40 - 10.80 10*3/mm3    RBC 4.70 4.14 - 5.80 10*6/mm3    Hemoglobin 15.1 13.0 - 17.7 g/dL    Hematocrit 43.2 37.5 - 51.0 %    MCV 91.9 79.0 - 97.0 fL    MCH 32.1 26.6 - 33.0 pg    MCHC 35.0 31.5 - 35.7 g/dL    RDW 12.5 12.3 - 15.4 %    RDW-SD 41.8 37.0 - 54.0 fl    MPV 10.7 6.0 - 12.0 fL    Platelets 300 140 - 450 10*3/mm3    Neutrophil % 80.6 (H) 42.7 - 76.0 %    Lymphocyte % 8.1 (L) 19.6 - 45.3 %    Monocyte % 9.2 5.0 - 12.0 %    Eosinophil % 1.2 0.3 - 6.2 %    Basophil % 0.3 0.0 - 1.5 %    Immature Grans % 0.6 (H) 0.0 - 0.5 %    Neutrophils, Absolute 11.09 (H) 1.70 - 7.00 10*3/mm3    Lymphocytes, Absolute 1.11 0.70 - 3.10 10*3/mm3    Monocytes, Absolute 1.27 (H) 0.10 - 0.90 10*3/mm3    Eosinophils, Absolute 0.16 0.00 - 0.40 10*3/mm3    Basophils, Absolute 0.04 0.00 - 0.20 10*3/mm3    Immature Grans, Absolute 0.08 (H) 0.00 - 0.05 10*3/mm3    nRBC 0.0 0.0 - 0.2 /100 WBC     Lab Comments:  My independent interpretation of the above labs: Stable hemoglobin and kidney function      Medications given in ED:  Medications - No data to display    Rationale:  This is an overall well-appearing 74-year-old male who is presenting today secondary to complaints of urinary retention and hematuria.  He overall looks well.  He presents afebrile with unremarkable vital signs.  Has mild suprapubic tenderness on exam, but otherwise looks fairly well.  No evidence of peritonitis or other acute surgical exam findings.  Patient has suffered from radiation cystitis recently.  He had to have a continuous bladder irrigation recently, been complicated by acute urinary retention and now has a catheter in place.  There was  an  episode where the catheter was in the urethra, and this may have caused some urethral trauma.     Progress Notes:  6:41 AM EST: I saw and reevaluate the patient.  A catheter is being placed at time of my evaluation.  Notable for hematuria with clots.  We are going to start continuous bladder irrigation and urology.  Plan on observation.  I spoke with the patient about his ED work up and diagnosis. I informed the patient that he will be admitted for further evaluation/treatment. All questions answered.      Consult:  6:50 AM EST: Discussed case with Dr. Tee.  Reviewed history, exam, results and treatments.  Will consult  7:21 AM EST: Discussed case with SANDHYA Belle with ED obs. Will admit       Diagnosis:  Final diagnoses:   Clot retention of urine   Gross hematuria   Radiation cystitis                   EMR Dragon/Transcription disclaimer:  Some of this encounter note is an electronic transcription/translation of spoken language to printed text. The electronic translation of spoken language may permit erroneous, or at times, nonsensical words or phrases to be inadvertently transcribed; Although I have reviewed the note for such errors, some may still exist.        Provider Note Signed by:     Lavern Lugo MD  01/15/25 0722

## 2025-01-15 NOTE — PROGRESS NOTES
Patient Care Team:  Chloe Arriaga MD as PCP - General (Internal Medicine)  Manjeet, Brennen Giraldo MD as Consulting Physician (Cardiology)     REUBEN PALM H&P Note    I supervised care provided by the midlevel provider. We have discussed this patient's history, physical exam, and treatment plan. I have reviewed the midlevel provider's note and I agree with the midlevel provider's findings and plan of care.   SHARED VISIT: This visit was performed by BOTH a physician and an APC. The substantive portion of the medical decision making was performed by this attesting physician who made or approved the management plan and takes responsibility for patient management.   I have personally had a face to face encounter with the patient.   My personal findings are documented below:    History:  74-year-old male with history of bladder and prostate cancer, radiation cystitis, anticoagulated on Eliquis presents with hematuria and urinary retention.  Three-way catheter placed in the ED and CBI initiated, urology consulted.    Physical Exam:  General: No acute distress.  HENT: NCAT, PERRL, Nares patent.  Eyes: no scleral icterus.  Neck: trachea midline, no ROM limitations.  CV: regular rhythm, regular rate.  Respiratory: normal effort, CTAB.  Abdomen: soft, nondistended, NTTP, no rebound tenderness, no guarding or rigidity.  Musculoskeletal: no deformity.  Neuro: alert, moves all extremities, follows commands.  Skin: warm, dry.    Assessment and Plan:  Patient admitted to the observation unit, urology consulted, CBI, trending labs.

## 2025-01-15 NOTE — H&P
[facility]   HISTORY AND PHYSICAL    Patient Name: North Carcamo  : 1950  MRN: [id number]  Primary Care Physician:  Chloe Arriaga MD  Date of admission: 1/15/2025    Subjective   Subjective     Chief Complaint:   Chief Complaint   Patient presents with    Blood in Urine         HPI:    North Carcamo is a pleasant afebrile 74 y.o.  male with a past medical history of bladder and prostate cancer, hypertension, and paroxymal atrial fibrillation.     He presents to the emergency department at [facility] today with complaint of hematuria and urinary retention.  He has been admitted to the ED observation unit for further testing and evaluation.    Patient states he underwent prostatectomy in .  Is followed by Dr. Russell with the first urology team who reported in 2019 a suspicious lesion for which she received 38 days of radiation treatment and subsequently developed radiation cystitis.  Due to history of paroxysmal atrial fibrillation he was restarted back on his Eliquis in October, due to hematuria he has been off of it since then.    Patient states he is supposed to have upcoming hyperbaric oxygen therapy for his radiation cystitis.  Patient had Paz catheter placed on  and a CT abdomen pelvis performed in the ED at [facility] on  that showed that catheter was not fully in patient's bladder which was replaced.  He had a voiding trial and was discharged home without bladder catheter, Paz catheter was placed again on 1/10 when patient presented with acute urinary retention and was discharged home on 3 days of oral antibiotics which she has finished.    This morning patient reports that he had difficulty with irrigation and that his bladder did not feel to be emptying fully.  This was removed and patient was started on continuous bladder irrigation with good relief of discomfort.  He states he has been feeling well otherwise without any fevers or  chills.    A CT of patient's abdomen pelvis was obtained to rule out acute intra-abdominal process and advised by Dr. Laura with radiology service that there is a questionable segmental right lower lobe pulmonary embolus potentially.  Patient denies any chest pain or shortness of breath.  No hypoxia.  Given that he has been off of his anticoagulation since October we will plan to check a CTA while he is here and consult urology anticipating that they will continue CBI for spontaneous voiding trial in a.m.    Addendum: Unfortunately patient's CTA chest did confirm bilateral segmental and subsegmental pulmonary emboli.  I called and spoke with Dr. Jacob Young on-call for hematology who recommends patient starting on Lovenox.  Duplex of bilateral lower extremity ordered.    Given the patient still has at least grade 2-3 hematuria and will be started on anticoagulation for his acute pulmonary embolism as well as workup for evaluation of DVTs and further monitoring to ensure he remains hemodynamically stable patient meets for inpatient criteria.  I discussed case with Dr. Dumont on-call for [facility] who has graciously accepted to admit patient to a telemetry bed.    I have discussed case with Dr. Dumont      Review of Systems   All systems were reviewed and negative except for: What is mentioned above    Personal History     Past Medical History:   Diagnosis Date    Allergic     Penicillin    Cataract March 2022    Diverticulosis     E. coli pneumonia 08/01/2023    HOSPITALIZED    History of prostate cancer 2002    History of recent hospitalization 08/01/2023    Hyperglycemia     Hyperlipidemia     Hypertension     Kidney stone 2019    Has not been a problem.    On anticoagulant therapy     Paroxysmal atrial fibrillation        Past Surgical History:   Procedure Laterality Date    CATARACT EXTRACTION      WITH LENS IMPLANTS    COLONOSCOPY N/A 09/13/2023    Procedure: COLONOSCOPY TO CECUM AND TERMINAL ILEUM WITH COLD  POLYPECTOMY;  Surgeon: Humberto Peña MD;  Location: [facility] ENDOSCOPY;  Service: Gastroenterology;  Laterality: N/A;  SCREENING  DIVERTICULOSIS, COLON POLYP, HEMORRHOIDS    CYSTOSCOPY BLADDER BIOPSY N/A 06/12/2019    Procedure: CYSTOSCOPY TUR BLADDER TUMOR;  Surgeon: Joel Russell MD;  Location: [facility] MAIN OR;  Service: Urology    CYSTOSCOPY BLADDER BIOPSY N/A 05/31/2024    Procedure: CYSTOSCOPY BLADDER BIOPSY FULGURATION;  Surgeon: Joel Russell MD;  Location: [facility] MAIN OR;  Service: Urology;  Laterality: N/A;    CYSTOSCOPY WITH CLOT EVACUATION N/A 10/02/2024    Procedure: CYSTOSCOPY WITH CLOT EVACUATION, CAUTERY OF BLEEDING, DENG INSERTION;  Surgeon: Joel Russell MD;  Location: [facility] MAIN OR;  Service: Urology;  Laterality: N/A;    ELBOW PROCEDURE Left     FX    EYE SURGERY  03/2024    Cataract    PROSTATECTOMY      PROSTATECTOMY  03/01/2003    TONSILLECTOMY  1957    VASECTOMY         Family History: family history includes Hypertension in his father and mother; Prostate cancer in his father. Otherwise pertinent FHx was reviewed and not pertinent to current issue.    Social History:  reports that he has never smoked. He has never been exposed to tobacco smoke. He has never used smokeless tobacco. He reports current alcohol use of about 1.0 standard drink of alcohol per week. He reports that he does not use drugs.    Home Medications:  Calcium Carbonate-Vitamin D, Multiple Vitamins-Minerals, apixaban, dapagliflozin Propanediol, lisinopril, and pravastatin    Allergies:  Allergies   Allergen Reactions    Penicillins Rash     Thinks childhood reaction of rash        Objective   Objective     Vitals:   Temp:  [96.6 °F (35.9 °C)-98.7 °F (37.1 °C)] 98.7 °F (37.1 °C)  Heart Rate:  [65-91] 77  Resp:  [18] 18  BP: (137-173)/(78-98) 147/78  Physical Exam    Constitutional: Awake, alert   Eyes: PERRLA, sclerae anicteric, no conjunctival injection   HENT: NCAT, mucous membranes moist   Neck: Supple,  no thyromegaly, no lymphadenopathy, trachea midline   Respiratory: Clear to auscultation bilaterally, nonlabored respirations    Cardiovascular: RRR, no murmurs, rubs, or gallops, palpable pedal pulses bilaterally   Gastrointestinal: Positive bowel sounds, soft, nontender, nondistended  Genitourinary: Continuous bladder irrigation noted with mild bloody sediment in tubing and maroon-colored urinary output and catheter bag   Musculoskeletal: No bilateral ankle edema, no clubbing or cyanosis to extremities   Psychiatric: Appropriate affect, cooperative   Neurologic: Oriented x 3, strength symmetric in all extremities, Cranial Nerves grossly intact to confrontation, speech clear   Skin: No rashes     Result Review    Result Review:  I have personally reviewed the results from the time of this admission to 1/15/2025 08:37 EST and agree with these findings:  [x]  Laboratory list / accordion  []  Microbiology  [x]  Radiology  []  EKG/Telemetry   []  Cardiology/Vascular   []  Pathology  []  Old records  []  Other:  Most notable findings include: Serum WBC 13.7, blood glucose 140, hemoglobin 15.1,      Assessment & Plan   Assessment / Plan     Brief Patient Summary:  North Carcamo is a 74 y.o. male who is being evaluated for urinary retention with history of radiation cystitis and paroxysmal atrial fibrillation not currently on anticoagulation    Active Hospital Problems:  Active Hospital Problems    Diagnosis     **Urinary retention      Plan:     Urinary retention  Hematuria  Consult to urology  CBI titrate per nursing  CT abdomen pelvis shows bladder wall thickening with some pericystic edema, nonobstructing left nephrolithiasis and material in bladder apex consistent with hemorrhage    Acute pulmonary emboli  History of atrial fibrillation  Oral anticoagulation DC'd in October 2024  Incidental finding of patient's CT abdomen pelvis today  Consult to hematology/oncology  Pharmacy to dose Lovenox  Bilateral lower  extremity duplex pending  Telemetry    Hypertension  Vital signs every 4 hours  Continue home medications    VTE Prophylaxis:  Mechanical VTE prophylaxis orders are present.        CODE STATUS:    Level Of Support Discussed With: Patient  Code Status (Patient has no pulse and is not breathing): CPR (Attempt to Resuscitate)  Medical Interventions (Patient has pulse or is breathing): Full Support    This note serves as a transfer note.    Electronically signed by ARJUN Palacios, 01/15/25, 7:22 AM EST.        80 minutes has been spent by [facility] providers in the care of this patient while under observation status      I have worn appropriate PPE during this patient encounter, sanitized my hands both with entering and exiting patient's room.    I have discussed plan of care with patient including advance care plan and/or surrogate decision maker.  Patient advises that their Nanette will be their primary surrogate decision maker

## 2025-01-15 NOTE — PLAN OF CARE
Goal Outcome Evaluation:  Plan of Care Reviewed With: patient        Progress: improving  Outcome Evaluation: Pt. admitted for hematuria and clots in his urine. Pt. is A&Ox4, VSS, admitted to [facility] awaiting inpatient bed, CBI continued, output light pink with small clots, CT resulted with PEs. Provider aware and interventions started. Urology and hematology following. Pt. to go for bilateral duplex, pt has not complained of pain, pt. is resting, pt. has no further questions at this time.       Problem: Adult Inpatient Plan of Care  Goal: Plan of Care Review  Outcome: Progressing  Flowsheets (Taken 1/15/2025 1752)  Progress: improving  Outcome Evaluation: Pt. admitted for hematuria and clots in his urine. Pt. is A&Ox4, VSS, admitted to [facility] awaiting inpatient bed, CBI continued, output light pink with small clots, CT resulted with PEs. Provider aware and interventions started. Urology and hematology following. Pt. to go for bilateral duplex, pt has not complained of pain, pt. is resting, pt. has no further questions at this time.  Plan of Care Reviewed With: patient  Goal: Patient-Specific Goal (Individualized)  Outcome: Progressing  Goal: Absence of Hospital-Acquired Illness or Injury  Outcome: Progressing  Intervention: Identify and Manage Fall Risk  Recent Flowsheet Documentation  Taken 1/15/2025 1400 by Marcelo Sears, RN  Safety Promotion/Fall Prevention:   activity supervised   fall prevention program maintained   lighting adjusted   safety round/check completed  Taken 1/15/2025 1200 by Marcelo Sears, RN  Safety Promotion/Fall Prevention:   activity supervised   fall prevention program maintained   lighting adjusted   safety round/check completed  Taken 1/15/2025 1015 by Marcelo Sears, RN  Safety Promotion/Fall Prevention:   activity supervised   fall prevention program maintained   lighting adjusted   safety round/check completed  Taken 1/15/2025 0815 by Marcelo Sears, RN  Safety Promotion/Fall Prevention:    activity supervised   fall prevention program maintained   lighting adjusted   safety round/check completed  Intervention: Prevent Skin Injury  Recent Flowsheet Documentation  Taken 1/15/2025 0815 by Marcelo Sears RN  Body Position: position changed independently  Intervention: Prevent and Manage VTE (Venous Thromboembolism) Risk  Recent Flowsheet Documentation  Taken 1/15/2025 0815 by Marcelo Sears RN  VTE Prevention/Management:   SCDs (sequential compression devices) off   patient refused intervention  Intervention: Prevent Infection  Recent Flowsheet Documentation  Taken 1/15/2025 1400 by Marcelo Sears RN  Infection Prevention:   hand hygiene promoted   single patient room provided   rest/sleep promoted  Taken 1/15/2025 1200 by Marcelo Sears RN  Infection Prevention:   hand hygiene promoted   single patient room provided   rest/sleep promoted  Taken 1/15/2025 1015 by Marcelo Sears RN  Infection Prevention:   hand hygiene promoted   single patient room provided   rest/sleep promoted  Taken 1/15/2025 0815 by Marcelo Sears RN  Infection Prevention:   hand hygiene promoted   single patient room provided   rest/sleep promoted  Goal: Optimal Comfort and Wellbeing  Outcome: Progressing  Intervention: Provide Person-Centered Care  Recent Flowsheet Documentation  Taken 1/15/2025 0815 by Marcelo Sears RN  Trust Relationship/Rapport: care explained  Goal: Readiness for Transition of Care  Outcome: Progressing  Intervention: Mutually Develop Transition Plan  Recent Flowsheet Documentation  Taken 1/15/2025 0809 by Marcelo Sears RN  Transportation Anticipated: family or friend will provide  Patient/Family Anticipated Services at Transition: none  Patient/Family Anticipates Transition to: home with family  Taken 1/15/2025 0808 by Marcelo Sears RN  Equipment Currently Used at Home: none     Problem: Sepsis/Septic Shock  Goal: Optimal Coping  Outcome: Progressing  Intervention: Support Patient and Family Response  Recent  Flowsheet Documentation  Taken 1/15/2025 0815 by Marcelo Sears, RN  Supportive Measures: active listening utilized  Family/Support System Care: caregiver stress acknowledged  Goal: Absence of Bleeding  Outcome: Progressing  Goal: Blood Glucose Level Within Target Range  Outcome: Progressing  Goal: Absence of Infection Signs and Symptoms  Outcome: Progressing  Intervention: Initiate Sepsis Management  Recent Flowsheet Documentation  Taken 1/15/2025 1400 by Marcelo Sears RN  Infection Prevention:   hand hygiene promoted   single patient room provided   rest/sleep promoted  Taken 1/15/2025 1200 by Marcelo Sears RN  Infection Prevention:   hand hygiene promoted   single patient room provided   rest/sleep promoted  Taken 1/15/2025 1015 by Marcelo Sears RN  Infection Prevention:   hand hygiene promoted   single patient room provided   rest/sleep promoted  Taken 1/15/2025 0815 by Marcelo Sears RN  Infection Prevention:   hand hygiene promoted   single patient room provided   rest/sleep promoted  Intervention: Promote Stabilization  Recent Flowsheet Documentation  Taken 1/15/2025 0815 by Marcelo Sears RN  Fluid/Electrolyte Management: fluids provided  Intervention: Promote Recovery  Recent Flowsheet Documentation  Taken 1/15/2025 1400 by Marcelo Sears RN  Activity Management: bedrest  Taken 1/15/2025 1200 by Marcelo Sears RN  Activity Management: bedrest  Taken 1/15/2025 1015 by Marcelo Sears RN  Activity Management: bedrest  Taken 1/15/2025 0815 by Marcelo Sears, RN  Activity Management: bedrest  Sleep/Rest Enhancement:   awakenings minimized   consistent schedule promoted  Goal: Optimal Nutrition Delivery  Outcome: Progressing     Problem: Comorbidity Management  Goal: Blood Pressure in Desired Range  Outcome: Progressing  Intervention: Maintain Blood Pressure Management  Recent Flowsheet Documentation  Taken 1/15/2025 1400 by Marcelo Sears, RN  Medication Review/Management: medications reviewed  Taken 1/15/2025  1200 by Marcelo Sears, RN  Medication Review/Management: medications reviewed  Taken 1/15/2025 1015 by Marcelo Sears, RN  Medication Review/Management: medications reviewed  Taken 1/15/2025 0815 by Marcelo Sears, RN  Medication Review/Management: medications reviewed

## 2025-01-15 NOTE — OUTREACH NOTE
Medical Week 2 Survey      Flowsheet Row Responses   [facility] patient discharged from? [facility]   Does the patient have one of the following disease processes/diagnoses(primary or secondary)? Other   Week 2 attempt successful? No   Unsuccessful attempts Attempt 1   Revoke Readmitted            Kat COVARRUBIAS - Registered Nurse

## 2025-01-15 NOTE — CONSULTS
FIRST UROLOGY CONSULT      Patient Identification:  NAME:  North Carcamo  Age:  74 y.o.   Sex:  male   :  1950   MRN:  [id number]     Chief complaint: Hematuria    History of present illness:      74-year-old male history of prostate cancer status post radical prostatectomy XRT.  Has a history of radiation cystitis and intermittent hematuria.  Patient catheter intermittently over the past week and had it repositioned recently.  Patient stated last evening the catheter stopped draining with hematuria.  No nausea vomiting no fever or chills.  Patient is good currently.  Three-way irrigation catheter was in place draining light pink urine.  History of bladder cancer and is followed by Dr. Brian Russell.    In hospital:  -AVSS, good UOP  -WBC -13.75  -Creat -1.1  Asked to see    Past medical history:  Past Medical History:   Diagnosis Date    Allergic     Penicillin    Cataract 2022    Diverticulosis     E. coli pneumonia 2023    HOSPITALIZED    History of prostate cancer     History of recent hospitalization 2023    Hyperglycemia     Hyperlipidemia     Hypertension     Kidney stone 2019    Has not been a problem.    On anticoagulant therapy     Paroxysmal atrial fibrillation        Past surgical history:  Past Surgical History:   Procedure Laterality Date    CATARACT EXTRACTION      WITH LENS IMPLANTS    COLONOSCOPY N/A 2023    Procedure: COLONOSCOPY TO CECUM AND TERMINAL ILEUM WITH COLD POLYPECTOMY;  Surgeon: Humberto Peña MD;  Location: [facility] ENDOSCOPY;  Service: Gastroenterology;  Laterality: N/A;  SCREENING  DIVERTICULOSIS, COLON POLYP, HEMORRHOIDS    CYSTOSCOPY BLADDER BIOPSY N/A 2019    Procedure: CYSTOSCOPY TUR BLADDER TUMOR;  Surgeon: Joel Russell MD;  Location: [facility] OR;  Service: Urology    CYSTOSCOPY BLADDER BIOPSY N/A 2024    Procedure: CYSTOSCOPY BLADDER BIOPSY FULGURATION;  Surgeon: Joel Russell MD;  Location: [facility]  OR;  Service: Urology;  Laterality: N/A;    CYSTOSCOPY WITH CLOT EVACUATION N/A 10/02/2024    Procedure: CYSTOSCOPY WITH CLOT EVACUATION, CAUTERY OF BLEEDING, DENG INSERTION;  Surgeon: Joel Russell MD;  Location: [facility] OR;  Service: Urology;  Laterality: N/A;    ELBOW PROCEDURE Left     FX    EYE SURGERY  03/2024    Cataract    PROSTATECTOMY      PROSTATECTOMY  03/01/2003    TONSILLECTOMY  1957    VASECTOMY         Allergies:  Penicillins    Home medications:  Medications Prior to Admission   Medication Sig Dispense Refill Last Dose/Taking    Calcium Carbonate-Vitamin D (CALCIUM 600+D PO) Take 1 tablet by mouth 2 (Two) Times a Day. PT HOLDING FOR SURGERY  Indications: Calcium Deficiency   1/14/2025 Evening    lisinopril (PRINIVIL,ZESTRIL) 40 MG tablet Take 1 tablet by mouth Daily. 90 tablet 1 1/14/2025 Morning    Multiple Vitamins-Minerals (DAILY MULTIVITAMIN PO) Take 1 tablet by mouth Daily.   1/14/2025 Morning    pravastatin (PRAVACHOL) 40 MG tablet Take 1 tablet by mouth Every Night. Indications: High Amount of Fats in the Blood 90 tablet 3 1/14/2025 Evening    dapagliflozin Propanediol (Farxiga) 10 MG tablet Take 10 mg by mouth Daily. 30 tablet 0 Unknown    Eliquis 5 MG tablet tablet Take 1 tablet by mouth Every 12 (Twelve) Hours. 60 tablet 11         Hospital medications:  lisinopril, 40 mg, Oral, Daily  pravastatin, 40 mg, Oral, Nightly  sodium chloride, 10 mL, Intravenous, Q12H           senna-docusate sodium **AND** polyethylene glycol **AND** bisacodyl **AND** bisacodyl    nitroglycerin    ondansetron ODT **OR** ondansetron    sodium chloride    sodium chloride    Family history:  Family History   Problem Relation Age of Onset    Hypertension Mother     Hypertension Father     Prostate cancer Father     Malig Hyperthermia Neg Hx        Social history:  Social History     Tobacco Use    Smoking status: Never     Passive exposure: Never    Smokeless tobacco: Never   Vaping Use    Vaping status:  Never Used   Substance Use Topics    Alcohol use: Yes     Alcohol/week: 1.0 standard drink of alcohol     Types: 1 Drinks containing 0.5 oz of alcohol per week     Comment: Occasional beer or cocktail    Drug use: No       Review of systems:      Positive for: Hematuria  Negative for:  chest pain, cough, sob, o/w neg    Objective:  TMax 24 hours:   Temp (24hrs), Av.8 °F (36.6 °C), Min:96.6 °F (35.9 °C), Max:98.7 °F (37.1 °C)      Vitals Ranges:   Temp:  [96.6 °F (35.9 °C)-98.7 °F (37.1 °C)] 98.2 °F (36.8 °C)  Heart Rate:  [65-91] 77  Resp:  [16-18] 16  BP: (137-173)/(78-98) 150/81    Intake/Output Last 3 shifts:  No intake/output data recorded.     Physical Exam:    General Appearance:    Alert, cooperative, NAD   Back:     No CVA tenderness   Lungs:     Respirations unlabored, no wheezing    Heart:    RRR, intact peripheral pulses   Abdomen:     Soft, NDNT, no masses, no guarding   Neuro/Psych:   Orientation intact, mood/affect pleasant       Results review:   I reviewed the patient's new clinical results.    Data review:  Lab Results (last 24 hours)       Procedure Component Value Units Date/Time    Basic Metabolic Panel [[id number]]  (Abnormal) Collected: 01/15/25 0500    Specimen: Blood Updated: 01/15/25 0532     Glucose 140 mg/dL      BUN 14 mg/dL      Creatinine 1.10 mg/dL      Sodium 138 mmol/L      Potassium 4.0 mmol/L      Chloride 100 mmol/L      CO2 22.1 mmol/L      Calcium 9.5 mg/dL      BUN/Creatinine Ratio 12.7     Anion Gap 15.9 mmol/L      eGFR 69.6 mL/min/1.73     Narrative:      GFR Categories in Chronic Kidney Disease (CKD)      GFR Category          GFR (mL/min/1.73)    Interpretation  G1                     90 or greater         Normal or high (1)  G2                      60-89                Mild decrease (1)  G3a                   45-59                Mild to moderate decrease  G3b                   30-44                Moderate to severe decrease  G4                    15-29                 Severe decrease  G5                    14 or less           Kidney failure          (1)In the absence of evidence of kidney disease, neither GFR category G1 or G2 fulfill the criteria for CKD.    eGFR calculation 2021 CKD-EPI creatinine equation, which does not include race as a factor    CBC & Differential [[id number]]  (Abnormal) Collected: 01/15/25 0500    Specimen: Blood Updated: 01/15/25 0511    Narrative:      The following orders were created for panel order CBC & Differential.  Procedure                               Abnormality         Status                     ---------                               -----------         ------                     CBC Auto Differential[[id number]]        Abnormal            Final result                 Please view results for these tests on the individual orders.    CBC Auto Differential [[id number]]  (Abnormal) Collected: 01/15/25 0500    Specimen: Blood Updated: 01/15/25 0511     WBC 13.75 10*3/mm3      RBC 4.70 10*6/mm3      Hemoglobin 15.1 g/dL      Hematocrit 43.2 %      MCV 91.9 fL      MCH 32.1 pg      MCHC 35.0 g/dL      RDW 12.5 %      RDW-SD 41.8 fl      MPV 10.7 fL      Platelets 300 10*3/mm3      Neutrophil % 80.6 %      Lymphocyte % 8.1 %      Monocyte % 9.2 %      Eosinophil % 1.2 %      Basophil % 0.3 %      Immature Grans % 0.6 %      Neutrophils, Absolute 11.09 10*3/mm3      Lymphocytes, Absolute 1.11 10*3/mm3      Monocytes, Absolute 1.27 10*3/mm3      Eosinophils, Absolute 0.16 10*3/mm3      Basophils, Absolute 0.04 10*3/mm3      Immature Grans, Absolute 0.08 10*3/mm3      nRBC 0.0 /100 WBC              Imaging:  Imaging Results (Last 24 Hours)       Procedure Component Value Units Date/Time    CT Abdomen Pelvis With & Without Contrast [[id number]] Resulted: 01/15/25 1222     Updated: 01/15/25 1156               Assessment:     Radiation cystitis.  Hematuria.  History of bladder cancer    Plan:     Currently catheter is draining easily with very light pink  urine on CBI.  I would continue overnight and if the urine stays light pink in the morning I would do a voiding trial and allow him to go home without the catheter.  Will set up follow-up with Dr. Russell early next week.  He is scheduled for hyperbaric oxygen therapy as an outpatient.    Doe Daly MD  01/15/25  12:40 EST

## 2025-01-15 NOTE — PROGRESS NOTES
"[facility] Clinical Pharmacy Services: Enoxaparin Consult    [place] has a pharmacy consult to dose full-dose enoxaparin per Summer Urban's request.     Indication: DVT/PE (active thrombosis)  Home Anticoagulation: none     Relevant clinical data and objective history reviewed:  74 y.o. male 175.3 cm (69\") 81.6 kg (180 lb)   Body mass index is 26.58 kg/m².   Results from last 7 days   Lab Units 01/15/25  0500   PLATELETS 10*3/mm3 300     Estimated Creatinine Clearance: 65.9 mL/min (by C-G formula based on SCr of 1.1 mg/dL).    Assessment/Plan    Will start patient on  80 (1mg/kg) subcutaneous every 12 hours, adjusted for renal function. Consult order will be discontinued but pharmacy will continue to follow.     Porfirio Durant, [facility]  Clinical Pharmacist    "

## 2025-01-15 NOTE — NURSING NOTE
RN asked Summer DÍAZ if she wanted to follow sepsis protocol because pt flagged some of the sepsis markers. Healthcare provider said not to.

## 2025-01-15 NOTE — ED NOTES
"Nursing report ED to floor  [place]  74 y.o.  male    HPI :  HPI  Stated Reason for Visit: blood/ clots in urinary catheter, patient denies pressure/fullness at this time, patient reports decreased output x3 hours  History Obtained From: patient, EMS    Chief Complaint  Chief Complaint   Patient presents with    Blood in Urine       Admitting doctor:   Luke Gil MD    Admitting diagnosis:   The primary encounter diagnosis was Clot retention of urine. Diagnoses of Gross hematuria and Radiation cystitis were also pertinent to this visit.    Code status:   Current Code Status       Date Active Code Status Order ID Comments User Context       Prior            Allergies:   Penicillins    Isolation:   No active isolations    Intake and Output  No intake or output data in the 24 hours ending 01/15/25 0723    Weight:       01/15/25  0343   Weight: 81.6 kg (180 lb)       Most recent vitals:   Vitals:    01/15/25 0343 01/15/25 0501 01/15/25 0531   BP: 152/84 165/98 173/92   Pulse: 91 83 82   Resp: 18     Temp: 96.6 °F (35.9 °C)     TempSrc: Tympanic     SpO2: 99% 96% 95%   Weight: 81.6 kg (180 lb)     Height: 175.3 cm (69\")         Active LDAs/IV Access:   Lines, Drains & Airways       Active LDAs       Name Placement date Placement time Site Days    Peripheral IV 01/15/25 0459 Left;Posterior Forearm 01/15/25  0459  Forearm  less than 1    Urethral Catheter Silicone 20 Fr. 01/10/25  0504  -- 5                    Labs (abnormal labs have a star):   Labs Reviewed   BASIC METABOLIC PANEL - Abnormal; Notable for the following components:       Result Value    Glucose 140 (*)     Anion Gap 15.9 (*)     All other components within normal limits    Narrative:     GFR Categories in Chronic Kidney Disease (CKD)      GFR Category          GFR (mL/min/1.73)    Interpretation  G1                     90 or greater         Normal or high (1)  G2                      60-89                Mild decrease (1)  G3a               "     45-59                Mild to moderate decrease  G3b                   30-44                Moderate to severe decrease  G4                    15-29                Severe decrease  G5                    14 or less           Kidney failure          (1)In the absence of evidence of kidney disease, neither GFR category G1 or G2 fulfill the criteria for CKD.    eGFR calculation 2021 CKD-EPI creatinine equation, which does not include race as a factor   CBC WITH AUTO DIFFERENTIAL - Abnormal; Notable for the following components:    WBC 13.75 (*)     Neutrophil % 80.6 (*)     Lymphocyte % 8.1 (*)     Immature Grans % 0.6 (*)     Neutrophils, Absolute 11.09 (*)     Monocytes, Absolute 1.27 (*)     Immature Grans, Absolute 0.08 (*)     All other components within normal limits   CBC AND DIFFERENTIAL    Narrative:     The following orders were created for panel order CBC & Differential.  Procedure                               Abnormality         Status                     ---------                               -----------         ------                     CBC Auto Differential[[id number]]        Abnormal            Final result                 Please view results for these tests on the individual orders.       EKG:   No orders to display       Meds given in ED:   Medications - No data to display    Imaging results:  No radiology results for the last day    Ambulatory status:   - ad chhaya    Social issues:   Social History     Socioeconomic History    Marital status:    Tobacco Use    Smoking status: Never     Passive exposure: Never    Smokeless tobacco: Never   Vaping Use    Vaping status: Never Used   Substance and Sexual Activity    Alcohol use: Yes     Alcohol/week: 1.0 standard drink of alcohol     Types: 1 Drinks containing 0.5 oz of alcohol per week     Comment: Occasional beer or cocktail    Drug use: No    Sexual activity: Yes     Partners: Female     Birth control/protection: Vasectomy       Peripheral  Neurovascular       Neuro Cognitive       Learning       Respiratory       Abdominal Pain       Pain Assessments  Pain (Adult)  (0-10) Pain Rating: Rest: 5  Pain Location: groin, genitals    NIH Stroke Scale       Mekhi Horn RN  01/15/25 07:23 EST

## 2025-01-15 NOTE — ED NOTES
Patient to ED via EMS from home for blood in his urinary catheter. Patient has hx of prostate cancer. Patient reports decreased output the past 3 hours. Patient denies bladder pressure/ fullness. Clots are visible in the catheter.

## 2025-01-16 ENCOUNTER — APPOINTMENT (OUTPATIENT)
Dept: CARDIOLOGY | Facility: HOSPITAL | Age: 75
DRG: 176 | End: 2025-01-16
Payer: MEDICARE

## 2025-01-16 LAB
ALBUMIN SERPL-MCNC: 3.4 G/DL (ref 3.5–5.2)
ALBUMIN/GLOB SERPL: 1.2 G/DL
ALP SERPL-CCNC: 86 U/L (ref 39–117)
ALT SERPL W P-5'-P-CCNC: 16 U/L (ref 1–41)
ANION GAP SERPL CALCULATED.3IONS-SCNC: 6.3 MMOL/L (ref 5–15)
AST SERPL-CCNC: 15 U/L (ref 1–40)
BH CV LOW VAS LEFT PERONEAL VESSEL: 1
BH CV LOW VAS LEFT POSTERIOR TIBIAL VESSEL: 1
BH CV LOW VAS RIGHT PERONEAL VESSEL: 1
BH CV LOWER VASCULAR LEFT COMMON FEMORAL AUGMENT: NORMAL
BH CV LOWER VASCULAR LEFT COMMON FEMORAL COMPETENT: NORMAL
BH CV LOWER VASCULAR LEFT COMMON FEMORAL COMPRESS: NORMAL
BH CV LOWER VASCULAR LEFT COMMON FEMORAL PHASIC: NORMAL
BH CV LOWER VASCULAR LEFT COMMON FEMORAL SPONT: NORMAL
BH CV LOWER VASCULAR LEFT DISTAL FEMORAL COMPRESS: NORMAL
BH CV LOWER VASCULAR LEFT GASTRONEMIUS COMPRESS: NORMAL
BH CV LOWER VASCULAR LEFT GREATER SAPH AK COMPRESS: NORMAL
BH CV LOWER VASCULAR LEFT GREATER SAPH BK COMPRESS: NORMAL
BH CV LOWER VASCULAR LEFT LESSER SAPH COMPRESS: NORMAL
BH CV LOWER VASCULAR LEFT MID FEMORAL AUGMENT: NORMAL
BH CV LOWER VASCULAR LEFT MID FEMORAL COMPETENT: NORMAL
BH CV LOWER VASCULAR LEFT MID FEMORAL COMPRESS: NORMAL
BH CV LOWER VASCULAR LEFT MID FEMORAL PHASIC: NORMAL
BH CV LOWER VASCULAR LEFT MID FEMORAL SPONT: NORMAL
BH CV LOWER VASCULAR LEFT PERONEAL COMPRESS: NORMAL
BH CV LOWER VASCULAR LEFT PERONEAL THROMBUS: NORMAL
BH CV LOWER VASCULAR LEFT POPLITEAL AUGMENT: NORMAL
BH CV LOWER VASCULAR LEFT POPLITEAL COMPETENT: NORMAL
BH CV LOWER VASCULAR LEFT POPLITEAL COMPRESS: NORMAL
BH CV LOWER VASCULAR LEFT POPLITEAL PHASIC: NORMAL
BH CV LOWER VASCULAR LEFT POPLITEAL SPONT: NORMAL
BH CV LOWER VASCULAR LEFT POSTERIOR TIBIAL COMPRESS: NORMAL
BH CV LOWER VASCULAR LEFT POSTERIOR TIBIAL THROMBUS: NORMAL
BH CV LOWER VASCULAR LEFT PROFUNDA FEMORAL COMPRESS: NORMAL
BH CV LOWER VASCULAR LEFT PROXIMAL FEMORAL COMPRESS: NORMAL
BH CV LOWER VASCULAR LEFT SAPHENOFEMORAL JUNCTION COMPRESS: NORMAL
BH CV LOWER VASCULAR RIGHT COMMON FEMORAL AUGMENT: NORMAL
BH CV LOWER VASCULAR RIGHT COMMON FEMORAL COMPETENT: NORMAL
BH CV LOWER VASCULAR RIGHT COMMON FEMORAL COMPRESS: NORMAL
BH CV LOWER VASCULAR RIGHT COMMON FEMORAL PHASIC: NORMAL
BH CV LOWER VASCULAR RIGHT COMMON FEMORAL SPONT: NORMAL
BH CV LOWER VASCULAR RIGHT DISTAL FEMORAL COMPRESS: NORMAL
BH CV LOWER VASCULAR RIGHT GASTRONEMIUS COMPRESS: NORMAL
BH CV LOWER VASCULAR RIGHT GREATER SAPH AK COMPRESS: NORMAL
BH CV LOWER VASCULAR RIGHT GREATER SAPH BK COMPRESS: NORMAL
BH CV LOWER VASCULAR RIGHT LESSER SAPH COMPRESS: NORMAL
BH CV LOWER VASCULAR RIGHT MID FEMORAL AUGMENT: NORMAL
BH CV LOWER VASCULAR RIGHT MID FEMORAL COMPETENT: NORMAL
BH CV LOWER VASCULAR RIGHT MID FEMORAL COMPRESS: NORMAL
BH CV LOWER VASCULAR RIGHT MID FEMORAL PHASIC: NORMAL
BH CV LOWER VASCULAR RIGHT MID FEMORAL SPONT: NORMAL
BH CV LOWER VASCULAR RIGHT PERONEAL COMPRESS: NORMAL
BH CV LOWER VASCULAR RIGHT PERONEAL THROMBUS: NORMAL
BH CV LOWER VASCULAR RIGHT POPLITEAL AUGMENT: NORMAL
BH CV LOWER VASCULAR RIGHT POPLITEAL COMPETENT: NORMAL
BH CV LOWER VASCULAR RIGHT POPLITEAL COMPRESS: NORMAL
BH CV LOWER VASCULAR RIGHT POPLITEAL PHASIC: NORMAL
BH CV LOWER VASCULAR RIGHT POPLITEAL SPONT: NORMAL
BH CV LOWER VASCULAR RIGHT POSTERIOR TIBIAL COMPRESS: NORMAL
BH CV LOWER VASCULAR RIGHT PROFUNDA FEMORAL COMPRESS: NORMAL
BH CV LOWER VASCULAR RIGHT PROXIMAL FEMORAL COMPRESS: NORMAL
BH CV LOWER VASCULAR RIGHT SAPHENOFEMORAL JUNCTION COMPRESS: NORMAL
BH CV VAS PRELIMINARY FINDINGS SCRIPTING: 1
BILIRUB SERPL-MCNC: 0.4 MG/DL (ref 0–1.2)
BUN SERPL-MCNC: 14 MG/DL (ref 8–23)
BUN/CREAT SERPL: 14.6 (ref 7–25)
CALCIUM SPEC-SCNC: 8.5 MG/DL (ref 8.6–10.5)
CHLORIDE SERPL-SCNC: 104 MMOL/L (ref 98–107)
CO2 SERPL-SCNC: 25.7 MMOL/L (ref 22–29)
CREAT SERPL-MCNC: 0.96 MG/DL (ref 0.76–1.27)
DEPRECATED RDW RBC AUTO: 40.9 FL (ref 37–54)
EGFRCR SERPLBLD CKD-EPI 2021: 81.9 ML/MIN/1.73
ERYTHROCYTE [DISTWIDTH] IN BLOOD BY AUTOMATED COUNT: 12 % (ref 12.3–15.4)
GLOBULIN UR ELPH-MCNC: 2.8 GM/DL
GLUCOSE SERPL-MCNC: 117 MG/DL (ref 65–99)
HCT VFR BLD AUTO: 39.8 % (ref 37.5–51)
HGB BLD-MCNC: 13.8 G/DL (ref 13–17.7)
MCH RBC QN AUTO: 32 PG (ref 26.6–33)
MCHC RBC AUTO-ENTMCNC: 34.7 G/DL (ref 31.5–35.7)
MCV RBC AUTO: 92.3 FL (ref 79–97)
NT-PROBNP SERPL-MCNC: 104 PG/ML (ref 0–1800)
PLATELET # BLD AUTO: 281 10*3/MM3 (ref 140–450)
PMV BLD AUTO: 10.7 FL (ref 6–12)
POTASSIUM SERPL-SCNC: 4 MMOL/L (ref 3.5–5.2)
PROT SERPL-MCNC: 6.2 G/DL (ref 6–8.5)
QT INTERVAL: 414 MS
QTC INTERVAL: 450 MS
RBC # BLD AUTO: 4.31 10*6/MM3 (ref 4.14–5.8)
SODIUM SERPL-SCNC: 136 MMOL/L (ref 136–145)
TROPONIN T SERPL HS-MCNC: 10 NG/L
WBC NRBC COR # BLD AUTO: 9.14 10*3/MM3 (ref 3.4–10.8)

## 2025-01-16 PROCEDURE — 99222 1ST HOSP IP/OBS MODERATE 55: CPT | Performed by: INTERNAL MEDICINE

## 2025-01-16 PROCEDURE — 83880 ASSAY OF NATRIURETIC PEPTIDE: CPT | Performed by: STUDENT IN AN ORGANIZED HEALTH CARE EDUCATION/TRAINING PROGRAM

## 2025-01-16 PROCEDURE — 25010000002 ENOXAPARIN PER 10 MG: Performed by: NURSE PRACTITIONER

## 2025-01-16 PROCEDURE — 93970 EXTREMITY STUDY: CPT | Performed by: SURGERY

## 2025-01-16 PROCEDURE — 85027 COMPLETE CBC AUTOMATED: CPT | Performed by: NURSE PRACTITIONER

## 2025-01-16 PROCEDURE — 93970 EXTREMITY STUDY: CPT

## 2025-01-16 PROCEDURE — 80053 COMPREHEN METABOLIC PANEL: CPT | Performed by: NURSE PRACTITIONER

## 2025-01-16 PROCEDURE — 84484 ASSAY OF TROPONIN QUANT: CPT | Performed by: STUDENT IN AN ORGANIZED HEALTH CARE EDUCATION/TRAINING PROGRAM

## 2025-01-16 RX ADMIN — PRAVASTATIN SODIUM 40 MG: 40 TABLET ORAL at 21:09

## 2025-01-16 RX ADMIN — ENOXAPARIN SODIUM 80 MG: 100 INJECTION SUBCUTANEOUS at 17:58

## 2025-01-16 RX ADMIN — LISINOPRIL 40 MG: 20 TABLET ORAL at 08:57

## 2025-01-16 RX ADMIN — Medication 10 ML: at 08:57

## 2025-01-16 RX ADMIN — ENOXAPARIN SODIUM 80 MG: 100 INJECTION SUBCUTANEOUS at 03:30

## 2025-01-16 RX ADMIN — Medication 10 ML: at 21:09

## 2025-01-16 NOTE — CONSULTS
Subjective     REASON FOR CONSULTATION:    Evaluation and management for bilateral PE and bilateral lower extremity DVT                             REQUESTING PHYSICIAN:  Dr. Gala MD    RECORDS OBTAINED:  Records of the patients history including those obtained from the referring provider were reviewed and summarized in detail.    HISTORY OF PRESENT ILLNESS:  The patient is a 74 y.o. year old male with medical history significant for prostate cancer , [place] 6 (2003) s/p RP, local recurrent disease (4/2019) s/p IMRT + ADT x 18 months, history of superficial bladder cancer, radiation cystitis and A-fib had presented to [facility] ER on 1/15/2025 with gross hematuria.  He has had multiple recent ER/hospital visits for hematuria and passing blood clots in last few months.    A CT A/P noted hemorrhage in the bladder apex, bladder calculi, no large bladder mass, bladder wall thickening along with possible segmental/subsegmental PE in the right lower lobe.    A CTA chest was subsequently obtained which showed bilateral segmental and subsegmental pulmonary emboli.  No evidence of right heart strain.  No pulmonary infarction.  Doppler of the legs noted acute DVT in the right peroneal vein, acute DVT involving the left posterior tibial and peroneal veins.  Patient was started on therapeutic anticoagulation with Lovenox and is being transitioned to Eliquis.    Patient denies any recent long distance travel.  No family history of VTE's.  Of note, he was on Eliquis for history of A-fib up until October 2024, however had to be stopped due to hematuria.    Patient has history of prostate and bladder cancer for she follows up with urology-however no current evidence of active cancer.    Past Medical History:   Diagnosis Date    Allergic     Penicillin    Cataract March 2022    Diverticulosis     E. coli pneumonia 08/01/2023    HOSPITALIZED    History of prostate cancer 2002    History of recent hospitalization  08/01/2023    Hyperglycemia     Hyperlipidemia     Hypertension     Kidney stone 2019    Has not been a problem.    On anticoagulant therapy     Paroxysmal atrial fibrillation         Past Surgical History:   Procedure Laterality Date    CATARACT EXTRACTION      WITH LENS IMPLANTS    COLONOSCOPY N/A 09/13/2023    Procedure: COLONOSCOPY TO CECUM AND TERMINAL ILEUM WITH COLD POLYPECTOMY;  Surgeon: Humberto Peña MD;  Location: [facility] ENDOSCOPY;  Service: Gastroenterology;  Laterality: N/A;  SCREENING  DIVERTICULOSIS, COLON POLYP, HEMORRHOIDS    CYSTOSCOPY BLADDER BIOPSY N/A 06/12/2019    Procedure: CYSTOSCOPY TUR BLADDER TUMOR;  Surgeon: Joel Russell MD;  Location: [facility] OR;  Service: Urology    CYSTOSCOPY BLADDER BIOPSY N/A 05/31/2024    Procedure: CYSTOSCOPY BLADDER BIOPSY FULGURATION;  Surgeon: Joel Russell MD;  Location: [facility] MAIN OR;  Service: Urology;  Laterality: N/A;    CYSTOSCOPY WITH CLOT EVACUATION N/A 10/02/2024    Procedure: CYSTOSCOPY WITH CLOT EVACUATION, CAUTERY OF BLEEDING, DENG INSERTION;  Surgeon: Joel Russell MD;  Location: [facility] MAIN OR;  Service: Urology;  Laterality: N/A;    ELBOW PROCEDURE Left     FX    EYE SURGERY  03/2024    Cataract    PROSTATECTOMY      PROSTATECTOMY  03/01/2003    TONSILLECTOMY  1957    VASECTOMY          No current facility-administered medications on file prior to encounter.     Current Outpatient Medications on File Prior to Encounter   Medication Sig Dispense Refill    Calcium Carbonate-Vitamin D (CALCIUM 600+D PO) Take 1 tablet by mouth 2 (Two) Times a Day. PT HOLDING FOR SURGERY  Indications: Calcium Deficiency      lisinopril (PRINIVIL,ZESTRIL) 40 MG tablet Take 1 tablet by mouth Daily. 90 tablet 1    Multiple Vitamins-Minerals (DAILY MULTIVITAMIN PO) Take 1 tablet by mouth Daily.      pravastatin (PRAVACHOL) 40 MG tablet Take 1 tablet by mouth Every Night. Indications: High Amount of Fats in the Blood 90 tablet 3    dapagliflozin  Propanediol (Farxiga) 10 MG tablet Take 10 mg by mouth Daily. 30 tablet 0    Eliquis 5 MG tablet tablet Take 1 tablet by mouth Every 12 (Twelve) Hours. 60 tablet 11        ALLERGIES:    Allergies   Allergen Reactions    Penicillins Rash     Thinks childhood reaction of rash         Social History     Socioeconomic History    Marital status:    Tobacco Use    Smoking status: Never     Passive exposure: Never    Smokeless tobacco: Never   Vaping Use    Vaping status: Never Used   Substance and Sexual Activity    Alcohol use: Yes     Alcohol/week: 1.0 standard drink of alcohol     Types: 1 Drinks containing 0.5 oz of alcohol per week     Comment: Occasional beer or cocktail    Drug use: No    Sexual activity: Yes     Partners: Female     Birth control/protection: Vasectomy        Family History   Problem Relation Age of Onset    Hypertension Mother     Hypertension Father     Prostate cancer Father     Malig Hyperthermia Neg Hx         Review of Systems   As per HPI    Objective     Vitals:    01/15/25 1929 01/15/25 2357 01/16/25 0327 01/16/25 0748   BP: 124/84 138/78 126/79 139/73   BP Location: Right arm Right arm Right arm Right arm   Patient Position: Lying Lying Lying Lying   Pulse: 62 65 52 52   Resp: 16 16 16 18   Temp: 98.2 °F (36.8 °C) 98.4 °F (36.9 °C) 98.2 °F (36.8 °C) 97.8 °F (36.6 °C)   TempSrc: Oral Oral Oral Tympanic   SpO2: 98% 97% 98% 95%   Weight:       Height:              No data to display                Physical Exam    CONSTITUTIONAL:  Vital signs reviewed.  No distress, looks comfortable.  EYES:  Conjunctivae and lids unremarkable.    EARS,NOSE,MOUTH,THROAT:  Ears and nose appear unremarkable.    RESPIRATORY:  Normal respiratory effort.  Lungs clear to auscultation bilaterally.  CARDIOVASCULAR:  Normal S1, S2.  No murmurs rubs or gallops.  No significant lower extremity edema.  GASTROINTESTINAL: Abdomen appears unremarkable.  Nondistended   LYMPHATIC:  No cervical, supraclavicular  lymphadenopathy.  SKIN:  Warm.  No rashes.  PSYCHIATRIC:  Normal judgment and insight.  Normal mood and affect.  NEURO: AAOx3, no obvious focal deficits.      RECENT LABS:  Hematology WBC   Date Value Ref Range Status   01/16/2025 9.14 3.40 - 10.80 10*3/mm3 Final     RBC   Date Value Ref Range Status   01/16/2025 4.31 4.14 - 5.80 10*6/mm3 Final     Hemoglobin   Date Value Ref Range Status   01/16/2025 13.8 13.0 - 17.7 g/dL Final     Hematocrit   Date Value Ref Range Status   01/16/2025 39.8 37.5 - 51.0 % Final     Platelets   Date Value Ref Range Status   01/16/2025 281 140 - 450 10*3/mm3 Final          Assessment & Plan   Patient is a 74-year-old male with with medical history significant for prostate cancer , [place] 6 (2003) s/p RP, local recurrent disease (4/2019) s/p IMRT + ADT x 18 months, history of superficial bladder cancer, radiation cystitis and A-fib admitted with bilateral PE and lower extremity DVT.      # Bilateral PE and bilateral lower extremity DVT:  Incidentally noted to have RLL PE on the CT A/P being performed for hematuria evaluation.    A dedicated CTA chest was subsequently obtained which showed bilateral segmental and subsegmental pulmonary emboli.  No evidence of right heart strain.  No pulmonary infarction. Doppler of the legs noted acute DVT in the right peroneal vein, acute DVT involving the left posterior tibial and peroneal veins.   No long distance travel.  No family history of VTE's.  Although he has history of prostate and bladder cancers, no evidence of active malignancy.  I suspect this to be provoked due to frequent hospital/ER visits and prolonged immobilization.  Patient was started on therapeutic anticoagulation with Lovenox and is being transitioned to Eliquis. Will avoid loading dose due to recent hematuria.   Tentative plan to administer anticoagulation for at least 3 to 6 months.  Will follow up as outpatient.     # Gross hematuria related to radiation cystitis:   Patient  had presented to [facility] ER on 1/15/2025 with gross hematuria.  He has had multiple recent ER/hospital visits for hematuria and passing blood clots in last few months.   A CT A/P noted hemorrhage in the bladder apex, bladder calculi, no large bladder mass, bladder wall thickening along with possible segmental/subsegmental PE in the right lower lobe.  Seen by Dr. Daly, urology and started on CBI.  Hematuria improved.  Catheter has been removed now.  Urology plans to follow-up as outpatient    # History of prostate and bladder cancers: As per urology    Recommendations:  -Okay to discharge home on therapeutic anticoagulation with Eliquis 5 mg twice daily.  -Patient advised to return to the hospital/ER in case of hematuria  -Will follow-up closely as outpatient    Please call us with any questions          I spent 61 minutes on this encounter, before, during & after the visit evaluating the patient, reviewing records and writing orders.

## 2025-01-16 NOTE — NURSING NOTE
CBI discontinued and catheter pulled at 1200. Attending,Gala Arrington, DO made aware of positive DVTs. Pt is now resting comfortably with urinal and call light within reach.    good, to achieve stated therapy goals

## 2025-01-16 NOTE — H&P
HISTORY AND PHYSICAL   [facility]        Date of Admission: 1/15/2025  Patient Identification:  Name: North Carcamo  Age: 74 y.o.  Sex: male  :  1950  MRN: [id number]                     Primary Care Physician: Chloe Arriaga MD    Chief Complaint:  74 year old gentleman presented to the emergency room with hematuria; he has a history of prostate cancer and radiation and has had radiation cystitis; he was placed in the observation unit; he has a history of a fib but eliquis was stopped due to hematuria; workup revealed a pulmonary embolus so hematology was consulted and the patient was started on lovenox; admission was requested; he feels well at this time    History of Present Illness:   As above    Past Medical History:  Past Medical History:   Diagnosis Date    Allergic     Penicillin    Cataract 2022    Diverticulosis     E. coli pneumonia 2023    HOSPITALIZED    History of prostate cancer     History of recent hospitalization 2023    Hyperglycemia     Hyperlipidemia     Hypertension     Kidney stone 2019    Has not been a problem.    On anticoagulant therapy     Paroxysmal atrial fibrillation      Past Surgical History:  Past Surgical History:   Procedure Laterality Date    CATARACT EXTRACTION      WITH LENS IMPLANTS    COLONOSCOPY N/A 2023    Procedure: COLONOSCOPY TO CECUM AND TERMINAL ILEUM WITH COLD POLYPECTOMY;  Surgeon: Humberto Peña MD;  Location: [facility] ENDOSCOPY;  Service: Gastroenterology;  Laterality: N/A;  SCREENING  DIVERTICULOSIS, COLON POLYP, HEMORRHOIDS    CYSTOSCOPY BLADDER BIOPSY N/A 2019    Procedure: CYSTOSCOPY TUR BLADDER TUMOR;  Surgeon: Joel Russell MD;  Location: [facility] OR;  Service: Urology    CYSTOSCOPY BLADDER BIOPSY N/A 2024    Procedure: CYSTOSCOPY BLADDER BIOPSY FULGURATION;  Surgeon: Joel Russell MD;  Location: [facility] OR;  Service: Urology;  Laterality: N/A;    CYSTOSCOPY WITH CLOT  EVACUATION N/A 10/02/2024    Procedure: CYSTOSCOPY WITH CLOT EVACUATION, CAUTERY OF BLEEDING, DENG INSERTION;  Surgeon: Joel Russell MD;  Location: [facility];  Service: Urology;  Laterality: N/A;    ELBOW PROCEDURE Left     FX    EYE SURGERY  03/2024    Cataract    PROSTATECTOMY      PROSTATECTOMY  03/01/2003    TONSILLECTOMY  1957    VASECTOMY        Home Meds:  Medications Prior to Admission   Medication Sig Dispense Refill Last Dose/Taking    Calcium Carbonate-Vitamin D (CALCIUM 600+D PO) Take 1 tablet by mouth 2 (Two) Times a Day. PT HOLDING FOR SURGERY  Indications: Calcium Deficiency   1/14/2025 Evening    lisinopril (PRINIVIL,ZESTRIL) 40 MG tablet Take 1 tablet by mouth Daily. 90 tablet 1 1/14/2025 Morning    Multiple Vitamins-Minerals (DAILY MULTIVITAMIN PO) Take 1 tablet by mouth Daily.   1/14/2025 Morning    pravastatin (PRAVACHOL) 40 MG tablet Take 1 tablet by mouth Every Night. Indications: High Amount of Fats in the Blood 90 tablet 3 1/14/2025 Evening    dapagliflozin Propanediol (Farxiga) 10 MG tablet Take 10 mg by mouth Daily. 30 tablet 0 Unknown    Eliquis 5 MG tablet tablet Take 1 tablet by mouth Every 12 (Twelve) Hours. 60 tablet 11        Allergies:  Allergies   Allergen Reactions    Penicillins Rash     Thinks childhood reaction of rash      Immunizations:  Immunization History   Administered Date(s) Administered    ABRYSVO (RSV, 60+ or pregnant women 32-36 wks) 10/20/2023    COVID-19 (PFIZER) 12YRS+ (COMIRNATY) 10/20/2023, 10/07/2024    COVID-19 (PFIZER) BIVALENT 12+YRS 12/16/2022    COVID-19 (PFIZER) Purple Cap Monovalent 03/05/2021, 03/26/2021, 10/08/2021    COVID-19 (UNSPECIFIED) 10/20/2023    FLUAD TRI 65YR+ 12/18/2019    Fluad Quad 65+ 10/08/2020, 10/08/2021, 12/16/2022, 10/20/2023    Fluzone High-Dose 65+YRS 10/12/2016, 12/19/2017, 10/08/2021, 10/07/2024    Hepatitis A 09/27/2018, 12/31/2018    Pneumococcal Conjugate 13-Valent (PCV13) 12/01/2014    Pneumococcal Polysaccharide  (PPSV23) 2020    Pneumococcal, Unspecified 2013    Shingrix 2018, 2018    Tdap 2015     Social History:   Social History     Social History Narrative    Not on file     Social History     Socioeconomic History    Marital status:    Tobacco Use    Smoking status: Never     Passive exposure: Never    Smokeless tobacco: Never   Vaping Use    Vaping status: Never Used   Substance and Sexual Activity    Alcohol use: Yes     Alcohol/week: 1.0 standard drink of alcohol     Types: 1 Drinks containing 0.5 oz of alcohol per week     Comment: Occasional beer or cocktail    Drug use: No    Sexual activity: Yes     Partners: Female     Birth control/protection: Vasectomy       Family History:  Family History   Problem Relation Age of Onset    Hypertension Mother     Hypertension Father     Prostate cancer Father     Malig Hyperthermia Neg Hx         Review of Systems  See history of present illness and past medical history.  Patient denies headache, dizziness, syncope, falls, trauma, change in vision, change in hearing, change in taste, changes in weight, changes in appetite, focal weakness, numbness, or paresthesia.  Patient denies chest pain, palpitations, dyspnea, orthopnea, PND, cough, sinus pressure, rhinorrhea, epistaxis, hemoptysis, nausea, vomiting,hematemesis, diarrhea, constipation or hematochezia.  Denies cold or heat intolerance, polydipsia, polyuria, polyphagia. Denies hematuria, pyuria, dysuria, hesitancy, frequency or urgency. Denies consumption of raw and under cooked meats foods or change in water source.  Denies fever, chills, sweats, night sweats.  Denies missing any routine medications. Remainder of ROS is negative.    Objective:  T Max 24 hrs: Temp (24hrs), Av.1 °F (36.7 °C), Min:96.6 °F (35.9 °C), Max:98.8 °F (37.1 °C)    Vitals Ranges:   Temp:  [96.6 °F (35.9 °C)-98.8 °F (37.1 °C)] 98.2 °F (36.8 °C)  Heart Rate:  [62-91] 62  Resp:  [16-18] 16  BP: (124-173)/(77-98)  "124/84      Exam:  /84 (BP Location: Right arm, Patient Position: Lying)   Pulse 62   Temp 98.2 °F (36.8 °C) (Oral)   Resp 16   Ht 175.3 cm (69\")   Wt 81.6 kg (180 lb)   SpO2 98%   BMI 26.58 kg/m²     General Appearance:    Alert, cooperative, no distress, appears stated age   Head:    Normocephalic, without obvious abnormality, atraumatic   Eyes:    PERRL, conjunctivae/corneas clear, EOM's intact, both eyes   Ears:    Normal external ear canals, both ears   Nose:   Nares normal, septum midline, mucosa normal, no drainage    or sinus tenderness   Throat:   Lips, mucosa, and tongue normal   Neck:   Supple, symmetrical, trachea midline, no adenopathy;     thyroid:  no enlargement/tenderness/nodules; no carotid    bruit or JVD   Back:     Symmetric, no curvature, ROM normal, no CVA tenderness   Lungs:     Decreased breath sounds bilaterally, respirations unlabored   Chest Wall:    No tenderness or deformity    Heart:    Regular rate and rhythm, S1 and S2 normal, no murmur, rub   or gallop   Abdomen:     Soft, nontender, bowel sounds active all four quadrants,     no masses, no hepatomegaly, no splenomegaly   Extremities:   Extremities normal, atraumatic, no cyanosis or edema                       .    Data Review:  Labs in chart were reviewed.  WBC   Date Value Ref Range Status   01/15/2025 13.75 (H) 3.40 - 10.80 10*3/mm3 Final     Hemoglobin   Date Value Ref Range Status   01/15/2025 15.1 13.0 - 17.7 g/dL Final     Hematocrit   Date Value Ref Range Status   01/15/2025 43.2 37.5 - 51.0 % Final     Platelets   Date Value Ref Range Status   01/15/2025 300 140 - 450 10*3/mm3 Final     Sodium   Date Value Ref Range Status   01/15/2025 138 136 - 145 mmol/L Final     Potassium   Date Value Ref Range Status   01/15/2025 4.0 3.5 - 5.2 mmol/L Final     Chloride   Date Value Ref Range Status   01/15/2025 100 98 - 107 mmol/L Final     CO2   Date Value Ref Range Status   01/15/2025 22.1 22.0 - 29.0 mmol/L Final "     BUN   Date Value Ref Range Status   01/15/2025 14 8 - 23 mg/dL Final     Creatinine   Date Value Ref Range Status   01/15/2025 1.10 0.76 - 1.27 mg/dL Final     Glucose   Date Value Ref Range Status   01/15/2025 140 (H) 65 - 99 mg/dL Final     Calcium   Date Value Ref Range Status   01/15/2025 9.5 8.6 - 10.5 mg/dL Final                Imaging Results (All)       Procedure Component Value Units Date/Time    CT Angiogram Chest [[id number]] Collected: 01/15/25 1512     Updated: 01/15/25 1527    Narrative:      CT ANGIOGRAM CHEST-     INDICATION: Abnormal CT abdomen pelvis. Rule out pulmonary embolism.     COMPARISON: CT abdomen pelvis January 15, 2025     TECHNIQUE:  CTA chest with IV contrast. Coronal and sagittal reformats. Three  dimensional reconstructions. Radiation dose reduction techniques were  utilized, including automated exposure control and exposure modulation  based on body size.     FINDINGS:      Pulmonary arteries: Streak artifact from contrast in the venous system.  Segmental and subsegmental pulmonary embolism seen in the apical segment  right upper lobe, series 4, axial mage 49. Subsegmental pulmonary  embolism seen in the posterior segment right upper lobe, series 4, axial  mage 50. Subsegmental pulmonary embolism seen in the medial segment  right middle lobe, series 4, axial mage 78. Subsegmental pulmonary  embolism seen in the anterior segment right lower lobe, series 4, axial  mage 87. Segmental and subsegmental pulmonary embolism seen in the  posterior segment right lower lobe, series 4, axial mage 78. Question  some chronic pulmonary embolism seen in the anterior segment left upper  lobe, series 4, axial mage 58, eccentric along the vessel wall.  Subsegmental pulmonary embolism seen in the superior lingula, series 4,  axial mage 71.     Chest wall: No lymphadenopathy.     Mediastinum: Coronary artery atherosclerotic calcification. Heart is  normal in size. No pericardial effusion. No  mediastinal or hilar  lymphadenopathy.     Lungs/pleura: Trace left pleural fluid. Patent central airways.  Posterior dependent and bibasilar subsegmental atelectasis.     Upper abdomen: Colonic diverticulosis.     Osseous structures: No destructive osseous lesions.       Impression:       Bilateral segmental and subsegmental pulmonary emboli. No evidence of  right heart strain. No pulmonary infarct seen.     Call Report: Summer Urban was notified by telephone of the above findings  on January 15, 2025 at 3:23 p.m.     This report was finalized on 1/15/2025 3:24 PM by Dr. Vitaly Laura M.D on Workstation: [id number]       CT Abdomen Pelvis With & Without Contrast [[id number]] Collected: 01/15/25 1222     Updated: 01/15/25 1245    Narrative:      CT ABDOMEN PELVIS W WO CONTRAST-     INDICATION: Hematuria, abdominal distention     COMPARISON: CT abdomen pelvis January 6, 2025     TECHNIQUE:  CT abdomen/pelvis with and without IV contrast. Noncontrast,  nephrographic and excretory phases. Coronal and sagittal reformats.  Radiation dose reduction techniques were utilized, including automated  exposure control and exposure modulation based on body size.     FINDINGS:      Lung bases: Subsegmental atelectasis at the bases. Trace bilateral  pleural fluid. Coronary artery atherosclerotic calcification. Possible  segmental/subsegmental pulmonary embolism in the right lower lobe,  series 4, axial image 3 and series 4, axial mage 6.     ABDOMEN: Normal liver, gallbladder, spleen, pancreas and adrenal glands.     Right kidney: No urolithiasis. No hydronephrosis. Small fluid  attenuating cyst in the mid kidney, series 4, axial mage 68, measures 8  mm. Additional smaller cyst in the inferior pole. No solid-appearing  renal mass.     Left kidney: Nonobstructing nephrolithiasis. For example, nonobstructing  nephrolithiasis in the superior pole, series 2, axial image 84, measures  9 mm. For example, nonobstructing  nephrolithiasis in the superior pole,  series 2, axial mage 93, measures 10 mm. No hydronephrosis. No  ureterolithiasis. No renal mass.     Pelvis: Prostatectomy. Paz in the bladder. Some gas seen in the  nondependent bladder lumen, may be iatrogenic. Hyperattenuating material  seen in the bladder apex, suspect hemorrhage. Couple bladder calculi.  For example, calculus in the right posterior bladder lumen, series 2,  axial mage 197, measures 3 mm. For example, bladder calculus in the  right posterior bladder lumen, series 2, axial mage 200, measures 3 mm.  Bladder wall thickening, with under distention, with pericystic  stranding/edema and some trace fluid. No contrast seen in the bladder  lumen, limiting evaluation for small bladder masses. No large bladder  mass identified. Trace fluid seen in the rectovesicular pouch.     Bowel: No obstruction. Colonic diverticulosis. Normal appendix.     Abdominal wall: Rectus diastases. Pelvic wall scarring.     Retroperitoneum: No lymphadenopathy.     Vasculature: Patent. No abdominal aortic aneurysm. Mild aortoiliac  atherosclerotic calcification.     Osseous structures: Small amount of bilateral hip osteoarthritis.       Impression:         1. Hyperattenuating material seen in the bladder apex, consistent with  hemorrhage.  2. Nonobstructing left nephrolithiasis.  3. Couple bladder calculi.  4. No solid-appearing renal mass. No large bladder mass seen.  5. Bladder wall thickening, with some pericystic edema and ill-defined  fluid. Question cystitis in the appropriate clinical and laboratory  setting versus treatment changes. Bladder wall thickening may also be  related to underdistention and pseudo thickening or sequela of chronic  outlet obstruction.  6. Prostatectomy.  7. Colonic diverticulosis.  8. Possible segmental/subsegmental pulmonary embolism in the right lower  lobe.     Call Report: Summer Urban was notified by telephone of the above findings  on January 15, 2025  at 12:41 p.m.     This report was finalized on 1/15/2025 12:42 PM by Dr. Vitaly Laura M.D on Workstation: [id number]                 Assessment:  Active Hospital Problems    Diagnosis  POA    **Urinary retention [R33.9]  Yes    Acute pulmonary embolism without acute cor pulmonale [I26.99]  Unknown    Pulmonary emboli [I26.99]  Yes      Resolved Hospital Problems   No resolved problems to display.   Hematuria  Prostate cancer  Hypertension  A fib  hyperglycemia    Plan:  Continue lovenox  Hematology to see  Urology is following  Monitor urine   Trend labs  Dw patient and provider from the observation unit  Patient is full code and wife is gustavo Dawson Rodrigo Dumont MD  1/15/2025  20:53 EST

## 2025-01-16 NOTE — CASE MANAGEMENT/SOCIAL WORK
Discharge Planning Assessment  [facility]     Patient Name: North Carcamo  MRN: [id number]  Today's Date: 1/16/2025    Admit Date: 1/15/2025    Plan: home with family   Discharge Needs Assessment       Row Name 01/16/25 1213       Living Environment    People in Home spouse    Current Living Arrangements home    Potentially Unsafe Housing Conditions none    Primary Care Provided by self    Provides Primary Care For no one    Family Caregiver if Needed spouse    Quality of Family Relationships involved;helpful    Able to Return to Prior Arrangements yes       Resource/Environmental Concerns    Resource/Environmental Concerns none       Transition Planning    Patient/Family Anticipates Transition to home with family    Patient/Family Anticipated Services at Transition none    Transportation Anticipated family or friend will provide       Discharge Needs Assessment    Readmission Within the Last 30 Days no previous admission in last 30 days    Equipment Currently Used at Home none    Concerns to be Addressed denies needs/concerns at this time;no discharge needs identified    Equipment Needed After Discharge none    Provided Post Acute Provider List? N/A                   Discharge Plan       Row Name 01/16/25 1214       Plan    Plan home with family    Patient/Family in Agreement with Plan yes    Plan Comments Spoke with pt bedside. Confirmed facesheet correct. Pt lives with his spouse. He is IADLs no DME used and still drives. Pt has no HH/SNF history. Verified PCP and pharmacy. He plans home at d/c. CCP to follow for d/c needs.                  Continued Care and Services - Admitted Since 1/15/2025    No active coordination exists for this encounter.          Demographic Summary    No documentation.                  Functional Status    No documentation.                  Psychosocial    No documentation.                  Abuse/Neglect    No documentation.                  Legal    No documentation.                   Substance Abuse    No documentation.                  Patient Forms    No documentation.                     LIBAN Porter

## 2025-01-16 NOTE — PLAN OF CARE
Goal Outcome Evaluation: pt discontinued on CBI. Void trial started and passed. Pt CBI catheter removed at 1200 after returning from vascular. Pt was unable to void post removal of catheter. Pt had two bladder scans and started to have pain and discomfort after the second bladder scan. 16 Eston[place] catheter was placed and the pt had immediate relief and urine with small clots was removed. Pt was positive for DVTs bilaterally and was started back on Lovenox but will dc with eliquis. Pt requested to discharge in the morning. Pt will be observed throughout the night and will discharge in the AM per Urology and Dr. Cedeño. Pt is A/O x4, is agreeable to the plan of care and verbalizes understanding. Pt has call light within reach.     D/c order discontinued per Dr Cedeño.     Problem: Adult Inpatient Plan of Care  Goal: Plan of Care Review  Outcome: Progressing  Goal: Patient-Specific Goal (Individualized)  Outcome: Progressing  Goal: Absence of Hospital-Acquired Illness or Injury  Outcome: Progressing  Intervention: Identify and Manage Fall Risk  Recent Flowsheet Documentation  Taken 1/16/2025 1830 by Rachel Webb RN  Safety Promotion/Fall Prevention:   room organization consistent   safety round/check completed  Taken 1/16/2025 1634 by Rachel Webb RN  Safety Promotion/Fall Prevention:   room organization consistent   safety round/check completed  Taken 1/16/2025 1344 by Rachel Webb RN  Safety Promotion/Fall Prevention:   room organization consistent   safety round/check completed  Taken 1/16/2025 0940 by Rachel Webb RN  Safety Promotion/Fall Prevention:   room organization consistent   safety round/check completed  Taken 1/16/2025 0740 by Rachel Webb RN  Safety Promotion/Fall Prevention:   room organization consistent   safety round/check completed  Intervention: Prevent Skin Injury  Recent Flowsheet Documentation  Taken 1/16/2025 1830 by Rachel Webb RN  Body Position: position changed  independently  Taken 1/16/2025 1634 by Rachel Webb RN  Body Position: position changed independently  Taken 1/16/2025 1344 by Rachel Webb RN  Body Position: position changed independently  Taken 1/16/2025 1152 by Rachel Webb RN  Body Position: position changed independently  Taken 1/16/2025 0940 by Rachel Webb RN  Body Position: position changed independently  Taken 1/16/2025 0740 by Rachel Webb RN  Body Position: position changed independently  Intervention: Prevent and Manage VTE (Venous Thromboembolism) Risk  Recent Flowsheet Documentation  Taken 1/16/2025 0940 by Rachel Webb RN  VTE Prevention/Management: SCDs (sequential compression devices) off  Intervention: Prevent Infection  Recent Flowsheet Documentation  Taken 1/16/2025 1830 by Rachel Webb RN  Infection Prevention: single patient room provided  Taken 1/16/2025 1634 by Rachel Webb RN  Infection Prevention: single patient room provided  Taken 1/16/2025 1344 by Rachel Webb RN  Infection Prevention: single patient room provided  Taken 1/16/2025 1152 by Rachel Webb RN  Infection Prevention: single patient room provided  Taken 1/16/2025 0940 by Rachel Webb RN  Infection Prevention: single patient room provided  Taken 1/16/2025 0740 by Rachel Webb RN  Infection Prevention: single patient room provided  Goal: Optimal Comfort and Wellbeing  Outcome: Progressing  Intervention: Provide Person-Centered Care  Recent Flowsheet Documentation  Taken 1/16/2025 1344 by Rachel Webb RN  Trust Relationship/Rapport:   care explained   choices provided   questions answered   questions encouraged  Taken 1/16/2025 0940 by Rachel Webb RN  Trust Relationship/Rapport:   care explained   choices provided   questions encouraged   reassurance provided   thoughts/feelings acknowledged  Goal: Readiness for Transition of Care  Outcome: Progressing     Problem: Sepsis/Septic Shock  Goal: Optimal  Coping  Outcome: Progressing  Intervention: Support Patient and Family Response  Recent Flowsheet Documentation  Taken 1/16/2025 1344 by Rachel Webb RN  Supportive Measures: active listening utilized  Taken 1/16/2025 0940 by Rachel Webb RN  Supportive Measures: active listening utilized  Family/Support System Care:   caregiver stress acknowledged   self-care encouraged   support provided  Goal: Absence of Bleeding  Outcome: Progressing  Goal: Blood Glucose Level Within Target Range  Outcome: Progressing  Goal: Absence of Infection Signs and Symptoms  Outcome: Progressing  Intervention: Initiate Sepsis Management  Recent Flowsheet Documentation  Taken 1/16/2025 1830 by Rachel Webb RN  Infection Prevention: single patient room provided  Taken 1/16/2025 1634 by Rachel Webb RN  Infection Prevention: single patient room provided  Taken 1/16/2025 1344 by Rachel Webb RN  Infection Prevention: single patient room provided  Taken 1/16/2025 1152 by Rachel Webb RN  Infection Prevention: single patient room provided  Taken 1/16/2025 0940 by Rachel Webb RN  Infection Prevention: single patient room provided  Taken 1/16/2025 0740 by Rachel Webb RN  Infection Prevention: single patient room provided  Intervention: Promote Stabilization  Recent Flowsheet Documentation  Taken 1/16/2025 1344 by Rachel Webb RN  Fluid/Electrolyte Management: fluids provided  Intervention: Promote Recovery  Recent Flowsheet Documentation  Taken 1/16/2025 1830 by Rachel Webb RN  Activity Management:   activity encouraged   up ad chhaya  Taken 1/16/2025 1634 by Rachel Webb, RN  Activity Management:   activity encouraged   up ad chhaya  Taken 1/16/2025 1344 by Rachel Webb RN  Activity Management:   activity encouraged   up ad chhaya  Airway/Ventilation Management: airway patency maintained  Taken 1/16/2025 1152 by Rachel Webb, RN  Activity Management: activity encouraged  Taken 1/16/2025  0940 by Rachel Webb, RN  Activity Management: activity encouraged  Airway/Ventilation Management: airway patency maintained  Taken 1/16/2025 0740 by Rachel Webb, RN  Activity Management: activity encouraged  Goal: Optimal Nutrition Delivery  Outcome: Progressing     Problem: Comorbidity Management  Goal: Blood Pressure in Desired Range  Outcome: Progressing  Intervention: Maintain Blood Pressure Management  Recent Flowsheet Documentation  Taken 1/16/2025 1634 by Rachel Webb, RN  Medication Review/Management: medications reviewed  Taken 1/16/2025 1344 by Rachel Webb, RN  Medication Review/Management: medications reviewed  Taken 1/16/2025 0940 by Rachel Webb, RN  Medication Review/Management: medications reviewed     Problem: Urinary Retention  Goal: Effective Urinary Elimination  Outcome: Progressing

## 2025-01-16 NOTE — PLAN OF CARE
Problem: Adult Inpatient Plan of Care  Goal: Plan of Care Review  Outcome: Progressing  Flowsheets  Taken 1/16/2025 0036 by Enrique Bassett RN  Progress: no change  Taken 1/15/2025 1752 by Marcelo Sears RN  Outcome Evaluation: Pt. admitted for hematuria and clots in his urine. Pt. is A&Ox4, VSS, admitted to [facility] awaiting inpatient bed, CBI continued, output light pink with small clots, CT resulted with PEs. Provider aware and interventions started. Urology and hematology following. Pt. to go for bilateral duplex, pt has not complained of pain, pt. is resting, pt. has no further questions at this time.  Goal: Patient-Specific Goal (Individualized)  Outcome: Progressing  Goal: Absence of Hospital-Acquired Illness or Injury  Outcome: Progressing  Intervention: Identify and Manage Fall Risk  Recent Flowsheet Documentation  Taken 1/16/2025 0000 by Enrique Bassett RN  Safety Promotion/Fall Prevention:   activity supervised   clutter free environment maintained   fall prevention program maintained   nonskid shoes/slippers when out of bed   safety round/check completed   room organization consistent  Taken 1/15/2025 2200 by Enrique Bassett RN  Safety Promotion/Fall Prevention:   activity supervised   clutter free environment maintained   fall prevention program maintained   nonskid shoes/slippers when out of bed   safety round/check completed   room organization consistent  Taken 1/15/2025 1930 by Enrique Bassett RN  Safety Promotion/Fall Prevention: activity supervised  Intervention: Prevent Skin Injury  Recent Flowsheet Documentation  Taken 1/15/2025 1930 by Enrique Bassett RN  Body Position: position changed independently  Intervention: Prevent and Manage VTE (Venous Thromboembolism) Risk  Recent Flowsheet Documentation  Taken 1/15/2025 1930 by Enrique Bassett RN  VTE Prevention/Management:   SCDs (sequential compression devices) off   patient refused intervention  Intervention: Prevent Infection  Recent Flowsheet Documentation  Taken  1/16/2025 0000 by Enrique Bassett RN  Infection Prevention:   rest/sleep promoted   hand hygiene promoted   single patient room provided   personal protective equipment utilized  Taken 1/15/2025 2200 by Enrique Bassett RN  Infection Prevention:   rest/sleep promoted   hand hygiene promoted   single patient room provided   personal protective equipment utilized  Taken 1/15/2025 1930 by Enrique Bassett RN  Infection Prevention:   rest/sleep promoted   single patient room provided  Goal: Optimal Comfort and Wellbeing  Outcome: Progressing  Intervention: Provide Person-Centered Care  Recent Flowsheet Documentation  Taken 1/15/2025 1930 by Enrique Bassett RN  Trust Relationship/Rapport:   care explained   choices provided  Goal: Readiness for Transition of Care  Outcome: Progressing     Problem: Sepsis/Septic Shock  Goal: Optimal Coping  Outcome: Progressing  Intervention: Support Patient and Family Response  Recent Flowsheet Documentation  Taken 1/15/2025 1930 by Enrique Bassett RN  Supportive Measures: active listening utilized  Family/Support System Care: caregiver stress acknowledged  Goal: Absence of Bleeding  Outcome: Progressing  Goal: Blood Glucose Level Within Target Range  Outcome: Progressing  Goal: Absence of Infection Signs and Symptoms  Outcome: Progressing  Intervention: Initiate Sepsis Management  Recent Flowsheet Documentation  Taken 1/16/2025 0000 by Enrique Bassett RN  Infection Prevention:   rest/sleep promoted   hand hygiene promoted   single patient room provided   personal protective equipment utilized  Taken 1/15/2025 2200 by Enrique Bassett RN  Infection Prevention:   rest/sleep promoted   hand hygiene promoted   single patient room provided   personal protective equipment utilized  Taken 1/15/2025 1930 by Enrique Bassett RN  Infection Prevention:   rest/sleep promoted   single patient room provided  Intervention: Promote Stabilization  Recent Flowsheet Documentation  Taken 1/15/2025 1930 by Enrique Bassett  RN  Fluid/Electrolyte Management: fluids provided  Intervention: Promote Recovery  Recent Flowsheet Documentation  Taken 1/16/2025 0000 by Enrique Bassett, RN  Activity Management: bedrest  Taken 1/15/2025 2200 by Enrique Bassett, RN  Activity Management: bedrest  Taken 1/15/2025 1930 by Enrique Bassett, RN  Activity Management: bedrest  Goal: Optimal Nutrition Delivery  Outcome: Progressing     Problem: Comorbidity Management  Goal: Blood Pressure in Desired Range  Outcome: Progressing  Intervention: Maintain Blood Pressure Management  Recent Flowsheet Documentation  Taken 1/15/2025 1930 by Enrique Bassett, RN  Medication Review/Management: medications reviewed     Problem: Urinary Retention  Goal: Effective Urinary Elimination  Outcome: Progressing   Goal Outcome Evaluation:           Progress: no change

## 2025-01-16 NOTE — PROGRESS NOTES
Name: North Carcamo ADMIT: 1/15/2025   : 1950  PCP: Chloe Arriaga MD    MRN: [id number] LOS: 1 days   AGE/SEX: 74 y.o. male  ROOM: 119/1     Subjective   Subjective   2025  Paz clamped and removed for voiding trial as urine light pink, no gross hematuria.  Patient on room air and without chest pain and dyspnea.  Discussed with heme/onc and will plan Eliquis 5 mg p.o. twice daily at discharge.  Patient still has not yet urinated, awaiting outcome, may need to go home with Paz.       Objective   Objective   Vital Signs  Temp:  [97.8 °F (36.6 °C)-98.4 °F (36.9 °C)] 97.8 °F (36.6 °C)  Heart Rate:  [52-72] 72  Resp:  [16-18] 18  BP: (124-139)/(73-84) 134/80  SpO2:  [95 %-98 %] 97 %  on   ;   Device (Oxygen Therapy): room air  Body mass index is 26.58 kg/m².  Physical Exam  Constitutional:       General: He is not in acute distress.     Appearance: Normal appearance. He is not toxic-appearing.   HENT:      Head: Normocephalic and atraumatic.      Nose: Nose normal. No congestion.      Mouth/Throat:      Pharynx: Oropharynx is clear. No oropharyngeal exudate.   Eyes:      General: No scleral icterus.  Cardiovascular:      Rate and Rhythm: Normal rate and regular rhythm.      Heart sounds: No murmur heard.     No friction rub. No gallop.   Pulmonary:      Effort: No respiratory distress.      Breath sounds: No wheezing or rales.   Abdominal:      General: There is no distension.      Tenderness: There is no abdominal tenderness. There is no guarding.   Musculoskeletal:         General: No swelling or deformity.      Cervical back: Normal range of motion. No rigidity.      Right lower leg: No edema.      Left lower leg: No edema.   Skin:     Coloration: Skin is not jaundiced.      Findings: No bruising or lesion.   Neurological:      General: No focal deficit present.      Mental Status: He is alert and oriented to person, place, and time.      Motor: No weakness.       Results Review     I reviewed  "the patient's new clinical results.  Results from last 7 days   Lab Units 01/16/25  0336 01/15/25  0500 01/10/25  0439   WBC 10*3/mm3 9.14 13.75* 12.63*   HEMOGLOBIN g/dL 13.8 15.1 13.4   PLATELETS 10*3/mm3 281 300 224     Results from last 7 days   Lab Units 01/16/25  0336 01/15/25  0500 01/10/25  0439   SODIUM mmol/L 136 138 138   POTASSIUM mmol/L 4.0 4.0 3.7   CHLORIDE mmol/L 104 100 101   CO2 mmol/L 25.7 22.1 26.0   BUN mg/dL 14 14 14   CREATININE mg/dL 0.96 1.10 1.22   GLUCOSE mg/dL 117* 140* 122*   EGFR mL/min/1.73 81.9 69.6 61.4     Results from last 7 days   Lab Units 01/16/25  0336 01/10/25  0439   ALBUMIN g/dL 3.4* 3.6   BILIRUBIN mg/dL 0.4 0.8   ALK PHOS U/L 86 82   AST (SGOT) U/L 15 25   ALT (SGPT) U/L 16 23     Results from last 7 days   Lab Units 01/16/25  0336 01/15/25  0500 01/10/25  0439   CALCIUM mg/dL 8.5* 9.5 8.7   ALBUMIN g/dL 3.4*  --  3.6       No results found for: \"HGBA1C\", \"POCGLU\"    CT Angiogram Chest    Result Date: 1/15/2025   Bilateral segmental and subsegmental pulmonary emboli. No evidence of right heart strain. No pulmonary infarct seen.  Call Report: Summer Urban was notified by telephone of the above findings on January 15, 2025 at 3:23 p.m.  This report was finalized on 1/15/2025 3:24 PM by Dr. Vitaly Laura M.D on Workstation: [id number]      CT Abdomen Pelvis With & Without Contrast    Result Date: 1/15/2025   1. Hyperattenuating material seen in the bladder apex, consistent with hemorrhage. 2. Nonobstructing left nephrolithiasis. 3. Couple bladder calculi. 4. No solid-appearing renal mass. No large bladder mass seen. 5. Bladder wall thickening, with some pericystic edema and ill-defined fluid. Question cystitis in the appropriate clinical and laboratory setting versus treatment changes. Bladder wall thickening may also be related to underdistention and pseudo thickening or sequela of chronic outlet obstruction. 6. Prostatectomy. 7. Colonic diverticulosis. 8. Possible " segmental/subsegmental pulmonary embolism in the right lower lobe.  Call Report: Summer Urban was notified by telephone of the above findings on January 15, 2025 at 12:41 p.m.  This report was finalized on 1/15/2025 12:42 PM by Dr. Vitaly Laura M.D on Workstation: [id number]       I have personally reviewed all medications:  Scheduled Medications  enoxaparin, 1 mg/kg, Subcutaneous, Q12H  lisinopril, 40 mg, Oral, Daily  pravastatin, 40 mg, Oral, Nightly  sodium chloride, 10 mL, Intravenous, Q12H    Infusions   Diet  Diet: Cardiac; Healthy Heart (2-3 Na+); Fluid Consistency: Thin (IDDSI 0)    I have personally reviewed:  [x]  Laboratory   [x]  Microbiology   [x]  Radiology   [x]  EKG/Telemetry  [x]  Cardiology/Vascular   []  Pathology    []  Records       Assessment/Plan     Active Hospital Problems    Diagnosis  POA    **Urinary retention [R33.9]  Yes    Acute pulmonary embolism without acute cor pulmonale [I26.99]  Unknown    Pulmonary emboli [I26.99]  Yes      Resolved Hospital Problems   No resolved problems to display.       74 y.o. male admitted with Urinary retention.    #Urinary retention  #Pulmonary emboli  #Bilateral DVT  #A-fib  #Radiation cystitis  #Hematuria  #History of bladder cancer    -Urine remains light pink this morning, Paz clamped and removed for voiding trial, awaiting outcome    -Currently on therapeutic Lovenox, plan to transition to Eliquis 5 mg p.o. twice daily at discharge, case discussed with heme-onc who recommends to 5 mg p.o. twice daily dose in setting of previous hematuria    -Patient currently on room air    -CT PE without signs of heart strain and cardiac enzymes negative    -Duplex positive for bilateral DVTs    -Patient set up for hyperbaric oxygen therapy as outpatient    -He is to follow-[facility] Russell at [facility]    #Hyperlipidemia    -Continue statin    #Hypertension    -Continue lisinopril        Therapeutic Lovenox  for DVT prophylaxis.  Full  code.  Discussed with patient, nursing staff, and consulting provider.  Anticipate discharge home  today or tomorrow  Expected discharge date/ time has not been documented.      Nury Cedeño DO  [facility]  01/16/25  16:14 EST

## 2025-01-16 NOTE — DISCHARGE INSTRUCTIONS
-Follow up with PCP in 3-5 days  - Call [facility] and follow up next week for voiding trial  - Paz Placed  - Take Eliquis 5mg PO BID

## 2025-01-17 ENCOUNTER — READMISSION MANAGEMENT (OUTPATIENT)
Dept: CALL CENTER | Facility: HOSPITAL | Age: 75
End: 2025-01-17
Payer: MEDICARE

## 2025-01-17 ENCOUNTER — TELEPHONE (OUTPATIENT)
Dept: ONCOLOGY | Facility: CLINIC | Age: 75
End: 2025-01-17
Payer: MEDICARE

## 2025-01-17 VITALS
TEMPERATURE: 97.6 F | WEIGHT: 180 LBS | DIASTOLIC BLOOD PRESSURE: 72 MMHG | HEIGHT: 69 IN | RESPIRATION RATE: 18 BRPM | OXYGEN SATURATION: 96 % | SYSTOLIC BLOOD PRESSURE: 131 MMHG | HEART RATE: 56 BPM | BODY MASS INDEX: 26.66 KG/M2

## 2025-01-17 DIAGNOSIS — I26.99 ACUTE PULMONARY EMBOLISM WITHOUT ACUTE COR PULMONALE, UNSPECIFIED PULMONARY EMBOLISM TYPE: Primary | ICD-10-CM

## 2025-01-17 LAB
ANION GAP SERPL CALCULATED.3IONS-SCNC: 9.8 MMOL/L (ref 5–15)
BUN SERPL-MCNC: 14 MG/DL (ref 8–23)
BUN/CREAT SERPL: 13.7 (ref 7–25)
CALCIUM SPEC-SCNC: 8.4 MG/DL (ref 8.6–10.5)
CHLORIDE SERPL-SCNC: 103 MMOL/L (ref 98–107)
CO2 SERPL-SCNC: 25.2 MMOL/L (ref 22–29)
CREAT SERPL-MCNC: 1.02 MG/DL (ref 0.76–1.27)
DEPRECATED RDW RBC AUTO: 42 FL (ref 37–54)
EGFRCR SERPLBLD CKD-EPI 2021: 76.2 ML/MIN/1.73
ERYTHROCYTE [DISTWIDTH] IN BLOOD BY AUTOMATED COUNT: 12.2 % (ref 12.3–15.4)
GLUCOSE SERPL-MCNC: 110 MG/DL (ref 65–99)
HCT VFR BLD AUTO: 39.2 % (ref 37.5–51)
HGB BLD-MCNC: 13.6 G/DL (ref 13–17.7)
MCH RBC QN AUTO: 32.3 PG (ref 26.6–33)
MCHC RBC AUTO-ENTMCNC: 34.7 G/DL (ref 31.5–35.7)
MCV RBC AUTO: 93.1 FL (ref 79–97)
PLATELET # BLD AUTO: 300 10*3/MM3 (ref 140–450)
PMV BLD AUTO: 10.7 FL (ref 6–12)
POTASSIUM SERPL-SCNC: 3.9 MMOL/L (ref 3.5–5.2)
RBC # BLD AUTO: 4.21 10*6/MM3 (ref 4.14–5.8)
SODIUM SERPL-SCNC: 138 MMOL/L (ref 136–145)
WBC NRBC COR # BLD AUTO: 9.8 10*3/MM3 (ref 3.4–10.8)

## 2025-01-17 PROCEDURE — 85027 COMPLETE CBC AUTOMATED: CPT | Performed by: STUDENT IN AN ORGANIZED HEALTH CARE EDUCATION/TRAINING PROGRAM

## 2025-01-17 PROCEDURE — 80048 BASIC METABOLIC PNL TOTAL CA: CPT | Performed by: STUDENT IN AN ORGANIZED HEALTH CARE EDUCATION/TRAINING PROGRAM

## 2025-01-17 PROCEDURE — 25010000002 ENOXAPARIN PER 10 MG: Performed by: NURSE PRACTITIONER

## 2025-01-17 RX ADMIN — ENOXAPARIN SODIUM 80 MG: 100 INJECTION SUBCUTANEOUS at 03:45

## 2025-01-17 RX ADMIN — Medication 10 ML: at 08:25

## 2025-01-17 RX ADMIN — LISINOPRIL 40 MG: 20 TABLET ORAL at 08:24

## 2025-01-17 NOTE — PLAN OF CARE
Goal Outcome Evaluation:         Patient discharged with leg bag. Urine is pink. He voices understanding of discharge and follow up. He said daughter in an RN and will assist with catheter care. He has all belongings. Awaits ride.

## 2025-01-17 NOTE — PLAN OF CARE
Goal Outcome Evaluation:  Plan of Care Reviewed With: patient   Patient remains in obs unit overnight for management of urinary retention. Paz to bsd no clots noted at this time, urine dark red. Patient denies urinary discomforts, f/c irrigated x1.      Progress: improving

## 2025-01-17 NOTE — TELEPHONE ENCOUNTER
----- Message from Armando Alvarez sent at 1/17/2025 10:45 AM EST -----  I saw this patient in the hospital.  Please schedule a follow-up with me in around 3 months time with labs (CBC, CMP and D-dimer).  Thanks,

## 2025-01-17 NOTE — NURSING NOTE
Dr Cedeño phoned for updates this am, no new orders. Plans for discharge once Urology sees him and okays discharge.

## 2025-01-17 NOTE — DISCHARGE SUMMARY
Patient Name: North Carcamo  : 1950  MRN: [id number]    Date of Admission: 1/15/2025  Date of Discharge:  2025  Primary Care Physician: Chloe Arriaga MD      Chief Complaint:   Blood in Urine      Discharge Diagnoses     Active Hospital Problems    Diagnosis  POA    **Urinary retention [R33.9]  Yes    Acute pulmonary embolism without acute cor pulmonale [I26.99]  Unknown    Pulmonary emboli [I26.99]  Yes      Resolved Hospital Problems   No resolved problems to display.        Hospital Course     Mr. Carcamo is a 74 y.o. male with a history of A-fib, hypertension, hyperlipidemia, prostate cancer status post radical prostatectomy, radiation cystitis who presented to [facility] on 1/15 due to urinary retention and hematuria.  States that due to hematuria he has been off his Eliquis since October.  Recently had Paz replaced on 1/10 but had difficulty with irrigation on 1/15 along with discomfort and presented back to [facility].  Imaging revealed bilateral PEs in the setting of bilateral DVTs, CTA chest was negative for heart strain cardiac enzymes were negative.  Patient was started on therapeutic Lovenox and CBI was started with three-way Paz.  Urology consulted along with heme-onc.  On , urine was light pink and three-way Paz was removed and voiding trial could be attempted.  Patient failed voiding trial and Paz was placed.  Heme/unk was okay with transition to Eliquis, recommended 5 mg p.o. twice daily due to hematuria.  Urology cleared patient for discharge on  but patient was to stay overnight to make sure clotting did not recur.  On , Paz was draining with light pink urine, hemoglobin was stable, and patient was without distress.  Case discussed with urology again and cleared patient for discharge and early follow-up next week with Paz in place.  Patient discharged home in agreement with plan below    #Discharge plan  -Follow up with PCP in 3-5 days  -  Call [facility] and follow up next week for voiding trial  - Paz Placed  - Take Eliquis 5mg PO BID      Day of Discharge     Subjective:  Patient seen and examined at bedside.  Paz draining well, urine is light pink.  Case discussed personally with urology and he is cleared to follow-up next week.  Patient is at risk for recurrence of gross hematuria on Eliquis but in light of PE and DVT risk is considered acceptable.  Patient in agreement with discharge plan    Physical Exam:  Temp:  [97.6 °F (36.4 °C)-98.9 °F (37.2 °C)] 97.6 °F (36.4 °C)  Heart Rate:  [56-73] 56  Resp:  [17-18] 18  BP: (125-148)/(72-80) 131/72  Body mass index is 26.58 kg/m².  Physical Exam  Constitutional:       General: He is not in acute distress.     Appearance: Normal appearance. He is not toxic-appearing.   HENT:      Head: Normocephalic and atraumatic.      Nose: Nose normal. No congestion.      Mouth/Throat:      Pharynx: Oropharynx is clear. No oropharyngeal exudate.   Eyes:      General: No scleral icterus.  Cardiovascular:      Rate and Rhythm: Normal rate and regular rhythm.      Heart sounds: No murmur heard.     No friction rub. No gallop.   Pulmonary:      Effort: No respiratory distress.      Breath sounds: No wheezing or rales.   Abdominal:      General: There is no distension.      Tenderness: There is no abdominal tenderness. There is no guarding.   Genitourinary:     Comments: Paz present  Musculoskeletal:         General: No swelling or deformity.      Cervical back: Normal range of motion. No rigidity.      Right lower leg: No edema.      Left lower leg: No edema.   Skin:     Coloration: Skin is not jaundiced.      Findings: No bruising or lesion.   Neurological:      General: No focal deficit present.      Mental Status: He is alert and oriented to person, place, and time.      Motor: No weakness.         Consultants     Consult Orders (all) (From admission, onward)       Start     Ordered    01/17/25 8771   Inpatient Urology Consult  Once        Specialty:  Urology  Provider:  Doe Daly MD    01/17/25 0626    01/15/25 1615  Inpatient Hospitalist Consult  Once        Specialty:  Hospitalist  Provider:  Eunice Dumont MD    01/15/25 1614    01/15/25 1526  Hematology & Oncology Inpatient Consult  Once        Specialty:  Hematology and Oncology  Provider:  Porfirio Baker MD    01/15/25 1526    01/15/25 0746  Inpatient Urology Consult  Once        Specialty:  Urology  Provider:  Doe Daly MD    01/15/25 0746    01/15/25 0641  Urology (on-call MD unless specified)  Once        Specialty:  Urology  Provider:  Doe Daly MD    01/15/25 0640                  Procedures     * Surgery not found *    Imaging Results (All)       Procedure Component Value Units Date/Time    CT Angiogram Chest [[id number]] Collected: 01/15/25 1512     Updated: 01/15/25 1527    Narrative:      CT ANGIOGRAM CHEST-     INDICATION: Abnormal CT abdomen pelvis. Rule out pulmonary embolism.     COMPARISON: CT abdomen pelvis January 15, 2025     TECHNIQUE:  CTA chest with IV contrast. Coronal and sagittal reformats. Three  dimensional reconstructions. Radiation dose reduction techniques were  utilized, including automated exposure control and exposure modulation  based on body size.     FINDINGS:      Pulmonary arteries: Streak artifact from contrast in the venous system.  Segmental and subsegmental pulmonary embolism seen in the apical segment  right upper lobe, series 4, axial mage 49. Subsegmental pulmonary  embolism seen in the posterior segment right upper lobe, series 4, axial  mage 50. Subsegmental pulmonary embolism seen in the medial segment  right middle lobe, series 4, axial mage 78. Subsegmental pulmonary  embolism seen in the anterior segment right lower lobe, series 4, axial  mage 87. Segmental and subsegmental pulmonary embolism seen in the  posterior segment right lower lobe, series 4, axial  mage 78. Question  some chronic pulmonary embolism seen in the anterior segment left upper  lobe, series 4, axial mage 58, eccentric along the vessel wall.  Subsegmental pulmonary embolism seen in the superior lingula, series 4,  axial mage 71.     Chest wall: No lymphadenopathy.     Mediastinum: Coronary artery atherosclerotic calcification. Heart is  normal in size. No pericardial effusion. No mediastinal or hilar  lymphadenopathy.     Lungs/pleura: Trace left pleural fluid. Patent central airways.  Posterior dependent and bibasilar subsegmental atelectasis.     Upper abdomen: Colonic diverticulosis.     Osseous structures: No destructive osseous lesions.       Impression:       Bilateral segmental and subsegmental pulmonary emboli. No evidence of  right heart strain. No pulmonary infarct seen.     Call Report: Summer Urban was notified by telephone of the above findings  on January 15, 2025 at 3:23 p.m.     This report was finalized on 1/15/2025 3:24 PM by Dr. Vitaly Laura M.D on Workstation: [id number]       CT Abdomen Pelvis With & Without Contrast [[id number]] Collected: 01/15/25 1222     Updated: 01/15/25 1245    Narrative:      CT ABDOMEN PELVIS W WO CONTRAST-     INDICATION: Hematuria, abdominal distention     COMPARISON: CT abdomen pelvis January 6, 2025     TECHNIQUE:  CT abdomen/pelvis with and without IV contrast. Noncontrast,  nephrographic and excretory phases. Coronal and sagittal reformats.  Radiation dose reduction techniques were utilized, including automated  exposure control and exposure modulation based on body size.     FINDINGS:      Lung bases: Subsegmental atelectasis at the bases. Trace bilateral  pleural fluid. Coronary artery atherosclerotic calcification. Possible  segmental/subsegmental pulmonary embolism in the right lower lobe,  series 4, axial image 3 and series 4, axial mage 6.     ABDOMEN: Normal liver, gallbladder, spleen, pancreas and adrenal glands.     Right kidney: No  urolithiasis. No hydronephrosis. Small fluid  attenuating cyst in the mid kidney, series 4, axial mage 68, measures 8  mm. Additional smaller cyst in the inferior pole. No solid-appearing  renal mass.     Left kidney: Nonobstructing nephrolithiasis. For example, nonobstructing  nephrolithiasis in the superior pole, series 2, axial image 84, measures  9 mm. For example, nonobstructing nephrolithiasis in the superior pole,  series 2, axial mage 93, measures 10 mm. No hydronephrosis. No  ureterolithiasis. No renal mass.     Pelvis: Prostatectomy. Paz in the bladder. Some gas seen in the  nondependent bladder lumen, may be iatrogenic. Hyperattenuating material  seen in the bladder apex, suspect hemorrhage. Couple bladder calculi.  For example, calculus in the right posterior bladder lumen, series 2,  axial mage 197, measures 3 mm. For example, bladder calculus in the  right posterior bladder lumen, series 2, axial mage 200, measures 3 mm.  Bladder wall thickening, with under distention, with pericystic  stranding/edema and some trace fluid. No contrast seen in the bladder  lumen, limiting evaluation for small bladder masses. No large bladder  mass identified. Trace fluid seen in the rectovesicular pouch.     Bowel: No obstruction. Colonic diverticulosis. Normal appendix.     Abdominal wall: Rectus diastases. Pelvic wall scarring.     Retroperitoneum: No lymphadenopathy.     Vasculature: Patent. No abdominal aortic aneurysm. Mild aortoiliac  atherosclerotic calcification.     Osseous structures: Small amount of bilateral hip osteoarthritis.       Impression:         1. Hyperattenuating material seen in the bladder apex, consistent with  hemorrhage.  2. Nonobstructing left nephrolithiasis.  3. Couple bladder calculi.  4. No solid-appearing renal mass. No large bladder mass seen.  5. Bladder wall thickening, with some pericystic edema and ill-defined  fluid. Question cystitis in the appropriate clinical and  "laboratory  setting versus treatment changes. Bladder wall thickening may also be  related to underdistention and pseudo thickening or sequela of chronic  outlet obstruction.  6. Prostatectomy.  7. Colonic diverticulosis.  8. Possible segmental/subsegmental pulmonary embolism in the right lower  lobe.     Call Report: Summer Urban was notified by telephone of the above findings  on January 15, 2025 at 12:41 p.m.     This report was finalized on 1/15/2025 12:42 PM by Dr. Vitaly Laura M.D on Workstation: [id number]             Results for orders placed during the hospital encounter of 01/15/25    Duplex Venous Lower Extremity - Bilateral CAR    Interpretation Summary    Acute deep vein thrombosis involving the right peroneal vein.    Acute deep vein thrombosis involving the left posterial tibial and peroneal veins.    All other veins appeared normal bilaterally.      Pertinent Labs     Results from last 7 days   Lab Units 01/17/25  0353 01/16/25  0336 01/15/25  0500   WBC 10*3/mm3 9.80 9.14 13.75*   HEMOGLOBIN g/dL 13.6 13.8 15.1   PLATELETS 10*3/mm3 300 281 300     Results from last 7 days   Lab Units 01/17/25  0406 01/16/25  0336 01/15/25  0500   SODIUM mmol/L 138 136 138   POTASSIUM mmol/L 3.9 4.0 4.0   CHLORIDE mmol/L 103 104 100   CO2 mmol/L 25.2 25.7 22.1   BUN mg/dL 14 14 14   CREATININE mg/dL 1.02 0.96 1.10   GLUCOSE mg/dL 110* 117* 140*   EGFR mL/min/1.73 76.2 81.9 69.6     Results from last 7 days   Lab Units 01/16/25  0336   ALBUMIN g/dL 3.4*   BILIRUBIN mg/dL 0.4   ALK PHOS U/L 86   AST (SGOT) U/L 15   ALT (SGPT) U/L 16     Results from last 7 days   Lab Units 01/17/25  0406 01/16/25  0336 01/15/25  0500   CALCIUM mg/dL 8.4* 8.5* 9.5   ALBUMIN g/dL  --  3.4*  --        Results from last 7 days   Lab Units 01/16/25  0336   HSTROP T ng/L 10   PROBNP pg/mL 104.0           Invalid input(s): \"LDLCALC\"          Test Results Pending at Discharge     Pending Results       Procedure [Order ID] Specimen - " Date/Time    High Sensitivity Troponin T [[id number]]     Specimen: Blood     High Sensitivity Troponin T 1Hr [[id number]]     Specimen: Blood               Discharge Details        Discharge Medications        Continue These Medications        Instructions Start Date   CALCIUM 600+D PO   1 tablet, 2 Times Daily      DAILY MULTIVITAMIN PO   1 tablet, Daily      dapagliflozin Propanediol 10 MG tablet  Commonly known as: Farxiga   10 mg, Oral, Daily      Eliquis 5 MG tablet tablet  Generic drug: apixaban   5 mg, Oral, Every 12 Hours Scheduled      lisinopril 40 MG tablet  Commonly known as: PRINIVIL,ZESTRIL   40 mg, Oral, Daily      pravastatin 40 MG tablet  Commonly known as: PRAVACHOL   40 mg, Oral, Nightly               Allergies   Allergen Reactions    Penicillins Rash     Thinks childhood reaction of rash        Discharge Disposition:  Home or Self Care      Discharge Diet:  Diet Order   Procedures    Diet: Cardiac; Healthy Heart (2-3 Na+); Fluid Consistency: Thin (IDDSI 0)       Discharge Activity:       CODE STATUS:    Code Status and Medical Interventions: CPR (Attempt to Resuscitate); Full Support   Ordered at: 01/15/25 0837     Level Of Support Discussed With:    Patient     Code Status (Patient has no pulse and is not breathing):    CPR (Attempt to Resuscitate)     Medical Interventions (Patient has pulse or is breathing):    Full Support       Future Appointments   Date Time Provider Department Center   6/27/2025  9:00 AM [facility]   7/3/2025 10:45 AM Chloe Arriaga MD [facility]   9/4/2025 10:30 AM Brennen Burroughs MD [id number] None      Follow-up Information       Doe Daly MD Follow up.    Specialty: Urology  Contact information:  [address] [place] [id number] [phone]               Doe Daly MD .    Specialty: Urology  Contact information:  [address] [place] [address] [phone]                Chloe Arriaga MD .    Specialty: Internal Medicine  Contact information:  [address] [address]  [facility] [place] [address]  [phone]                             Time Spent on Discharge: 35 minutes      Nury Cedeño DO  [facility]  01/17/25  08:33 EST

## 2025-01-17 NOTE — OUTREACH NOTE
Prep Survey      Flowsheet Row Responses   [facility] patient discharged from? [place]   Is LACE score < 7 ? No   Eligibility [facility]   Date of Admission 01/15/25   Date of Discharge 01/17/25   Discharge Disposition Home or Self Care   Discharge diagnosis Urinary retention, Acute pulmonary embolism   Does the patient have one of the following disease processes/diagnoses(primary or secondary)? Other   Does the patient have Home health ordered? No   Is there a DME ordered? No   Prep survey completed? Yes            Lillian COMBS - Registered Nurse

## 2025-01-20 ENCOUNTER — TRANSITIONAL CARE MANAGEMENT TELEPHONE ENCOUNTER (OUTPATIENT)
Dept: CALL CENTER | Facility: HOSPITAL | Age: 75
End: 2025-01-20
Payer: MEDICARE

## 2025-01-20 ENCOUNTER — OFFICE VISIT (OUTPATIENT)
Dept: WOUND CARE | Facility: HOSPITAL | Age: 75
End: 2025-01-20
Payer: MEDICARE

## 2025-01-20 PROCEDURE — G0463 HOSPITAL OUTPT CLINIC VISIT: HCPCS

## 2025-01-20 NOTE — OUTREACH NOTE
Call Center TCM Note      Flowsheet Row Responses   [facility] patient discharged from? [place]   Does the patient have one of the following disease processes/diagnoses(primary or secondary)? Other   TCM attempt successful? Yes   Call start time 1000   Call end time 1003   Discharge diagnosis Urinary retention, Acute pulmonary embolism   Person spoke with today (if not patient) and relationship Patient   Meds reviewed with patient/caregiver? Yes   Does the patient have all medications ordered at discharge? Yes   Prescription comments No concerns or questions noted.   Is the patient taking all medications as directed (includes completed medication regime)? Yes   Comments 1/23/2025 11:00 AM HOSPITAL FOLLOW UP 30 min [facility] Chloe Arriaga MD   Does the patient have an appointment with their PCP within 7-14 days of discharge? No   Nursing Interventions Assisted patient with making appointment per protocol   Has home health visited the patient within 72 hours of discharge? N/A   Psychosocial issues? No   Did the patient receive a copy of their discharge instructions? Yes   Nursing interventions Reviewed instructions with patient   What is the patient's perception of their health status since discharge? Improving   Is the patient/caregiver able to teach back signs and symptoms related to disease process for when to call PCP? Yes   Is the patient/caregiver able to teach back signs and symptoms related to disease process for when to call 911? Yes   Is the patient/caregiver able to teach back the hierarchy of who to call/visit for symptoms/problems? PCP, Specialist, Home health nurse, Urgent Care, ED, 911 Yes   TCM call completed? Yes   Wrap up additional comments Patient reports doing well. No concerns or questions noted. FU Clot- Patient on Eliquis. Patient going to outpatient PT. Will be following with urology.   Call end time 1003   Would this patient benefit from a Referral  to Amb Social Work? No   Is the patient interested in additional calls from an ambulatory ? No            Kat Ellis RN    1/20/2025, 10:08 EST

## 2025-01-21 ENCOUNTER — OFFICE VISIT (OUTPATIENT)
Dept: WOUND CARE | Facility: HOSPITAL | Age: 75
End: 2025-01-21
Payer: MEDICARE

## 2025-01-22 ENCOUNTER — HOSPITAL ENCOUNTER (EMERGENCY)
Facility: HOSPITAL | Age: 75
Discharge: HOME | End: 2025-01-22
Attending: EMERGENCY MEDICINE
Payer: MEDICARE

## 2025-01-22 ENCOUNTER — OFFICE VISIT (OUTPATIENT)
Dept: WOUND CARE | Facility: HOSPITAL | Age: 75
End: 2025-01-22
Payer: MEDICARE

## 2025-01-22 VITALS
OXYGEN SATURATION: 100 % | TEMPERATURE: 96.8 F | SYSTOLIC BLOOD PRESSURE: 162 MMHG | RESPIRATION RATE: 16 BRPM | HEIGHT: 69 IN | DIASTOLIC BLOOD PRESSURE: 86 MMHG | HEART RATE: 82 BPM | BODY MASS INDEX: 26.66 KG/M2 | WEIGHT: 180 LBS

## 2025-01-22 DIAGNOSIS — T83.018A URINARY CATHETER DYSFUNCTION, INITIAL ENCOUNTER: Primary | ICD-10-CM

## 2025-01-22 PROCEDURE — 51702 INSERT TEMP BLADDER CATH: CPT

## 2025-01-22 PROCEDURE — 99282 EMERGENCY DEPT VISIT SF MDM: CPT

## 2025-01-22 NOTE — ED PROVIDER NOTES
EMERGENCY DEPARTMENT ENCOUNTER  Room Number:  06/06  PCP: Chloe Arriaga MD  Independent Historians: Patient      HPI:  Chief Complaint: had concerns including urinary catheter problem.     A complete HPI/ROS/PMH/PSH/SH/FH are unobtainable due to: None    Chronic or social conditions impacting patient care (Social Determinants of Health): None      Context: [place] is a 74 y.o. male with a medical history of bladder cancer, paroxysmal A-fib, pulmonary embolism anticoagulated on Eliquis, hypertension, and hyperlipidemia presents emergency department with leaking around his Paz catheter.  Patient is leaking urine at the urethra around the Paz catheter that was placed 5 days ago.  Patient says it has been draining normally at home until a few hours ago when it started leaking.  He denies that he is having any pain.  He has had an history hematuria and urinary outlet obstruction secondary to hematuria but says this has been improving.  He is currently doing hyperbaric chamber treatment.  He is anticoagulated on Eliquis.      Review of prior external notes (non-ED) -and- Review of prior external test results outside of this encounter:   Patient was admitted to the hospital on 1/15/2025 and discharged on 1/17/2025 for urinary retention and hematuria.  He had catheter placed on 1/10/2025 and had difficulty with irrigation on 1/15/2025 along with discomfort and presented back to the hospital.  Imaging revealed bilateral pulmonary emboli in the setting of bilateral DVTs, patient was started on Lovenox and CBI was initiated with three-way Paz.  Patient failed voiding trial and Paz was placed.  He was transition to Eliquis.  Paz has been draining light pink urine throughout the hospitalization and at the time of discharge.    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 coli pneumonia 08/01/2023    History of prostate cancer 2002    History of recent hospitalization 08/01/2023    Kidney stone 2019    On anticoagulant therapy          PAST SURGICAL HISTORY  Past Surgical History:   Procedure Laterality Date    CATARACT EXTRACTION      WITH LENS IMPLANTS    COLONOSCOPY N/A 09/13/2023    Procedure: COLONOSCOPY TO CECUM AND TERMINAL ILEUM WITH COLD POLYPECTOMY;  Surgeon: Humberto Peña MD;  Location: [facility] ENDOSCOPY;  Service: Gastroenterology;  Laterality: N/A;  SCREENING  DIVERTICULOSIS, COLON POLYP, HEMORRHOIDS    CYSTOSCOPY BLADDER BIOPSY N/A 06/12/2019    Procedure: CYSTOSCOPY TUR BLADDER TUMOR;  Surgeon: Joel Russell MD;  Location: [facility] OR;  Service: Urology    CYSTOSCOPY BLADDER BIOPSY N/A 05/31/2024    Procedure: CYSTOSCOPY BLADDER BIOPSY FULGURATION;  Surgeon: Joel Russell MD;  Location: [facility] OR;  Service: Urology;  Laterality: N/A;    CYSTOSCOPY WITH CLOT EVACUATION N/A 10/02/2024    Procedure: CYSTOSCOPY WITH CLOT EVACUATION, CAUTERY OF BLEEDING, DENG INSERTION;  Surgeon: Joel Russell MD;  Location: [facility] OR;  Service: Urology;  Laterality: N/A;    ELBOW PROCEDURE Left     FX    EYE SURGERY  03/2024    Cataract    PROSTATECTOMY      PROSTATECTOMY  03/01/2003    TONSILLECTOMY  1957    VASECTOMY           FAMILY HISTORY  Family  History   Problem Relation Age of Onset    Hypertension Mother     Hypertension Father     Prostate cancer Father     Malig Hyperthermia Neg Hx          SOCIAL HISTORY  Social History     Socioeconomic History    Marital status:    Tobacco Use    Smoking status: Never     Passive exposure: Never    Smokeless tobacco: Never   Vaping Use    Vaping status: Never Used   Substance and Sexual Activity    Alcohol use: Yes     Alcohol/week: 1.0 standard drink of alcohol     Types: 1 Drinks containing 0.5 oz of alcohol per week     Comment: Occasional beer or cocktail    Drug use: No    Sexual activity: Yes     Partners: Female     Birth control/protection: Vasectomy         ALLERGIES  Penicillins      REVIEW OF SYSTEMS  Included in HPI  All systems reviewed and negative except for those discussed in HPI.      PHYSICAL EXAM    I have reviewed the triage vital signs and nursing notes.    ED Triage Vitals   Temp Heart Rate Resp BP SpO2   01/22/25 0050 01/22/25 0050 01/22/25 0050 01/22/25 0058 01/22/25 0050   96.8 °F (36 °C) 90 18 161/93 99 %      Temp src Heart Rate Source Patient Position BP Location FiO2 (%)   01/22/25 0050 01/22/25 0050 01/22/25 0058 01/22/25 0058 --   Tympanic Monitor Lying Left arm        Physical Exam  Constitutional:       Appearance: Normal appearance.   HENT:      Head: Normocephalic and atraumatic.      Mouth/Throat:      Mouth: Mucous membranes are moist.   Eyes:      Extraocular Movements: Extraocular movements intact.      Pupils: Pupils are equal, round, and reactive to light.   Cardiovascular:      Rate and Rhythm: Normal rate and regular rhythm.      Pulses: Normal pulses.      Heart sounds: Normal heart sounds.   Pulmonary:      Effort: Pulmonary effort is normal. No respiratory distress.      Breath sounds: Normal breath sounds.   Abdominal:      General: Abdomen is flat. There is no distension.      Palpations: Abdomen is soft.      Tenderness: There is no abdominal tenderness.    Genitourinary:     Comments: Pink-tinged urine in the Paz bag.  Urine is leaking at the urethral meatus around the catheter site.  No large clots in the tubing or in the catheter bag  Musculoskeletal:         General: Normal range of motion.      Cervical back: Normal range of motion and neck supple.   Skin:     General: Skin is warm and dry.      Capillary Refill: Capillary refill takes less than 2 seconds.   Neurological:      General: No focal deficit present.      Mental Status: He is alert and oriented to person, place, and time.   Psychiatric:         Mood and Affect: Mood normal.         Behavior: Behavior normal.           OUTPATIENT MEDICATION MANAGEMENT:  No current Epic-ordered facility-administered medications on file.     Current Outpatient Medications Ordered in Epic   Medication Sig Dispense Refill    Calcium Carbonate-Vitamin D (CALCIUM 600+D PO) Take 1 tablet by mouth 2 (Two) Times a Day. PT HOLDING FOR SURGERY  Indications: Calcium Deficiency      dapagliflozin Propanediol (Farxiga) 10 MG tablet Take 10 mg by mouth Daily. 30 tablet 0    Eliquis 5 MG tablet tablet Take 1 tablet by mouth Every 12 (Twelve) Hours. 60 tablet 11    lisinopril (PRINIVIL,ZESTRIL) 40 MG tablet Take 1 tablet by mouth Daily. 90 tablet 1    Multiple Vitamins-Minerals (DAILY MULTIVITAMIN PO) Take 1 tablet by mouth Daily.      pravastatin (PRAVACHOL) 40 MG tablet Take 1 tablet by mouth Every Night. Indications: High Amount of Fats in the Blood 90 tablet 3         PROGRESS, DATA ANALYSIS, CONSULTS, AND MEDICAL DECISION MAKING  ORDERS PLACED DURING THIS VISIT:  No orders of the defined types were placed in this encounter.      All labs have been independently interpreted by me.  All radiology studies have been reviewed by me. All EKG's have been independently viewed and interpreted by me.  Discussion below represents my analysis of pertinent findings related to patient's condition, differential diagnosis, treatment plan and  final disposition.    Differential diagnosis includes but is not limited to:   Differential diagnosis for hematuria includes but is not limited to:  - BPH  - hereditary hematuria  - DIC  - coagulopathy  - infection  - infarction  - instrumentation  - tumor  - trauma  - TB  - transient hematuria  - ureteral stones  - sickle cell  - scurvy  - hemorrhagic cystitis       ED Course:  ED Course as of 01/22/25 0357   Wed Jan 22, 2025   0134 I discussed the case with Dr. Lugo and she agrees to evaluate the patient at the bedside.    [CC]   0210 New Paz catheter has been placed and is no longer leaking.  He has pink urine in the Paz bag.  I did consider labs but as he has no pain and he just appeared to be leaking labs are not indicated.  Hematuria is not new or worsening today and he did not have acute urinary retention.  Patient will be discharged with catheter in place for follow-up at his previously scheduled urology appointments.  All discharge instructions were discussed. [CC]   0324 Initial catheter was placed started leaking when the patient got up to leave.  A new larger catheter was placed and is still having some leakage.  The nurse did perform a bladder scan and there is 300 cc of urine in the bladder.  She is going to try to manually irrigate as she did have some small clots passed through the tubing. [CC]   0357 Nursing staff placed a larger bore Paz catheter and a large clot was manually irrigated.  He had 800 cc of urinary output.  I did not initially suspect urinary retention but it does appear that he was retaining some urine.  Paz catheter is now draining appropriately and no longer leaking.  There are no other large clots visualized.  He will be discharged with outpatient follow-up. [CC]      ED Course User Index  [CC] Rhianna Malin PA-C           AS OF 03:57 EST VITALS:    BP - 162/86  HR - 90  TEMP - 96.8 °F (36 °C) (Tympanic)  O2 SATS - 99%        DIAGNOSIS  Final diagnoses:   Urinary  catheter dysfunction, initial encounter         DISPOSITION  ED Disposition       ED Disposition   Discharge    Condition   Stable    Comment   --                      Please note that portions of this document were completed with a voice recognition program.    Note Disclaimer: At [facility], we believe that sharing information builds trust and better relationships. You are receiving this note because you recently visited [facility]. It is possible you will see health information before a provider has talked with you about it. This kind of information can be easy to misunderstand. To help you fully understand what it means for your health, we urge you to discuss this note with your provider.     Rhianna Malin PA-C  01/22/25 0215       Rhianna Malin PA-C  01/22/25 0357

## 2025-01-22 NOTE — ED PROVIDER NOTES
I have personally performed a face-to-face diagnostic evaluation of the patient.  I have reviewed and agree with the care plan as outlined by NP/PA.  My findings are as follows:    HPI:  Patient is a 74 y.o. male who presents with leakage of urine around his indwelling Paz catheter.      PE:  Physical Exam  Constitutional:       Appearance: He is not ill-appearing.   Eyes:      Conjunctiva/sclera: Conjunctivae normal.   Pulmonary:      Effort: Pulmonary effort is normal. No respiratory distress.   Abdominal:      Tenderness: There is no abdominal tenderness. There is no guarding or rebound.   Genitourinary:     Comments: Paz catheter draining raffi-colored urine and, some leakage of urine around the catheter from the meatus  Neurological:      Mental Status: He is alert and oriented to person, place, and time.           MDM:  I provided a substantiate portion of the care of this patient. I personally performed the medical decision making.    Medical records are reviewed in [facility] Everywhere, if applicable    Paz catheter replaced with improvement in symptoms.    Impression:  Final diagnoses:   Urinary catheter dysfunction, initial encounter         Disposition:  ED Disposition       ED Disposition   Discharge    Condition   Stable    Comment   --                Lavern Lugo MD  01/22/25 0221

## 2025-01-23 ENCOUNTER — OFFICE VISIT (OUTPATIENT)
Dept: WOUND CARE | Facility: HOSPITAL | Age: 75
End: 2025-01-23
Payer: MEDICARE

## 2025-01-23 ENCOUNTER — OFFICE VISIT (OUTPATIENT)
Dept: FAMILY MEDICINE CLINIC | Facility: CLINIC | Age: 75
End: 2025-01-23
Payer: MEDICARE

## 2025-01-23 VITALS
WEIGHT: 183.6 LBS | HEART RATE: 68 BPM | RESPIRATION RATE: 16 BRPM | BODY MASS INDEX: 27.19 KG/M2 | HEIGHT: 69 IN | TEMPERATURE: 98.2 F | SYSTOLIC BLOOD PRESSURE: 128 MMHG | DIASTOLIC BLOOD PRESSURE: 80 MMHG | OXYGEN SATURATION: 97 %

## 2025-01-23 DIAGNOSIS — E78.5 HYPERLIPIDEMIA, UNSPECIFIED HYPERLIPIDEMIA TYPE: ICD-10-CM

## 2025-01-23 DIAGNOSIS — I48.0 PAROXYSMAL ATRIAL FIBRILLATION: ICD-10-CM

## 2025-01-23 DIAGNOSIS — H61.23 BILATERAL IMPACTED CERUMEN: ICD-10-CM

## 2025-01-23 DIAGNOSIS — I26.99 ACUTE PULMONARY EMBOLISM WITHOUT ACUTE COR PULMONALE, UNSPECIFIED PULMONARY EMBOLISM TYPE: Primary | ICD-10-CM

## 2025-01-23 DIAGNOSIS — I10 PRIMARY HYPERTENSION: ICD-10-CM

## 2025-01-23 PROCEDURE — 1159F MED LIST DOCD IN RCRD: CPT | Performed by: STUDENT IN AN ORGANIZED HEALTH CARE EDUCATION/TRAINING PROGRAM

## 2025-01-23 PROCEDURE — 99214 OFFICE O/P EST MOD 30 MIN: CPT | Performed by: STUDENT IN AN ORGANIZED HEALTH CARE EDUCATION/TRAINING PROGRAM

## 2025-01-23 PROCEDURE — 1160F RVW MEDS BY RX/DR IN RCRD: CPT | Performed by: STUDENT IN AN ORGANIZED HEALTH CARE EDUCATION/TRAINING PROGRAM

## 2025-01-23 PROCEDURE — G0277 HBOT, FULL BODY CHAMBER, 30M: HCPCS

## 2025-01-23 PROCEDURE — 3074F SYST BP LT 130 MM HG: CPT | Performed by: STUDENT IN AN ORGANIZED HEALTH CARE EDUCATION/TRAINING PROGRAM

## 2025-01-23 PROCEDURE — 3079F DIAST BP 80-89 MM HG: CPT | Performed by: STUDENT IN AN ORGANIZED HEALTH CARE EDUCATION/TRAINING PROGRAM

## 2025-01-23 PROCEDURE — 1126F AMNT PAIN NOTED NONE PRSNT: CPT | Performed by: STUDENT IN AN ORGANIZED HEALTH CARE EDUCATION/TRAINING PROGRAM

## 2025-01-24 ENCOUNTER — HOSPITAL ENCOUNTER (EMERGENCY)
Facility: HOSPITAL | Age: 75
Discharge: HOME | End: 2025-01-24
Attending: EMERGENCY MEDICINE
Payer: MEDICARE

## 2025-01-24 ENCOUNTER — OFFICE VISIT (OUTPATIENT)
Dept: WOUND CARE | Facility: HOSPITAL | Age: 75
End: 2025-01-24
Payer: MEDICARE

## 2025-01-24 VITALS
RESPIRATION RATE: 17 BRPM | HEIGHT: 69 IN | TEMPERATURE: 97.2 F | WEIGHT: 183.64 LBS | SYSTOLIC BLOOD PRESSURE: 138 MMHG | DIASTOLIC BLOOD PRESSURE: 87 MMHG | BODY MASS INDEX: 27.2 KG/M2 | HEART RATE: 62 BPM | OXYGEN SATURATION: 95 %

## 2025-01-24 VITALS
DIASTOLIC BLOOD PRESSURE: 85 MMHG | OXYGEN SATURATION: 96 % | TEMPERATURE: 97.1 F | SYSTOLIC BLOOD PRESSURE: 136 MMHG | RESPIRATION RATE: 16 BRPM | HEART RATE: 76 BPM

## 2025-01-24 DIAGNOSIS — T83.9XXA FOLEY CATHETER PROBLEM, INITIAL ENCOUNTER: Primary | ICD-10-CM

## 2025-01-24 DIAGNOSIS — Z79.01 CHRONIC ANTICOAGULATION: ICD-10-CM

## 2025-01-24 DIAGNOSIS — N30.40 CHRONIC RADIATION CYSTITIS: ICD-10-CM

## 2025-01-24 DIAGNOSIS — R31.0 GROSS HEMATURIA: Primary | ICD-10-CM

## 2025-01-24 PROCEDURE — 99282 EMERGENCY DEPT VISIT SF MDM: CPT

## 2025-01-24 PROCEDURE — G0277 HBOT, FULL BODY CHAMBER, 30M: HCPCS

## 2025-01-24 PROCEDURE — 51798 US URINE CAPACITY MEASURE: CPT

## 2025-01-24 NOTE — ED PROVIDER NOTES
EMERGENCY DEPARTMENT ENCOUNTER  Room Number:  06/06  PCP: Chloe Arriaga MD  Independent Historians: Patient      HPI:  Chief Complaint: had concerns including Blood in Urine.     A complete HPI/ROS/PMH/PSH/SH/FH are unobtainable due to: None    Chronic or social conditions impacting patient care (Social Determinants of Health): None      Context: The patient is a 74 y.o. male with a medical history of radiation cystitis, urinary retention, PE and DVT on Eliquis who presents to the ED c/o acute concerns about his catheter leaking.  He has had multiple issues with hematuria and a leaking catheter.  States he woke up this morning and his catheter was flowing normally with bloody urine and some small clots but he noticed a little bit of blood-tinged liquid around the urethral meatus and he was concerned that it was going to start leaking.  He did not have any leaking of urine otherwise.  He has had recurrent issues with leaking, which resolved with placing a larger catheter as well as intermittent hematuria, occasionally with obstruction.  He denies any pain, does not feel obstructed this time.  Denies any fevers vomiting back pain or other urinary symptoms.  His urologist is Dr. Russell.  He has a hyperbaric treatment scheduled today at 8 AM.      Review of prior external notes (non-ED) -and- Review of prior external test results outside of this encounter:  Patient was admitted 1/15/2025 to 1/17/2025 due to hematuria and acute urinary retention.  He had been off Eliquis since October, previously anticoagulated due to history of paroxysmal A-fib.  Imaging during this evaluation revealed bilateral DVTs and PEs without heart strain and he was started back on anticoagulation.  He is anticoagulated and transition to Eliquis 5 mg twice daily, voiding trial failed and Paz was continued.    ER visit on 1/22/2025 for leaking around his catheter, large bore Paz was replaced and urine retention and leaking  resolved.    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 coli pneumonia 08/01/2023    History of prostate cancer 2002    History of recent hospitalization 08/01/2023    Kidney stone 2019    On anticoagulant therapy          PAST SURGICAL HISTORY  Past Surgical History:   Procedure Laterality Date    CATARACT EXTRACTION      WITH LENS IMPLANTS    COLONOSCOPY N/A 09/13/2023    Procedure: COLONOSCOPY TO CECUM AND TERMINAL ILEUM WITH COLD POLYPECTOMY;  Surgeon: Humberto Peña MD;  Location: [facility] ENDOSCOPY;  Service: Gastroenterology;  Laterality: N/A;  SCREENING  DIVERTICULOSIS, COLON POLYP, HEMORRHOIDS    CYSTOSCOPY BLADDER BIOPSY N/A 06/12/2019    Procedure: CYSTOSCOPY TUR BLADDER TUMOR;  Surgeon: Joel Russell MD;  Location: [facility] OR;  Service: Urology    CYSTOSCOPY BLADDER BIOPSY N/A 05/31/2024    Procedure: CYSTOSCOPY BLADDER BIOPSY FULGURATION;  Surgeon: Joel Russell MD;  Location: [facility] OR;  Service: Urology;  Laterality: N/A;    CYSTOSCOPY WITH CLOT EVACUATION N/A 10/02/2024    Procedure: CYSTOSCOPY WITH CLOT EVACUATION, CAUTERY OF BLEEDING, DENG INSERTION;  Surgeon: Joel Russell MD;  Location: [facility] OR;  Service: Urology;  Laterality: N/A;    ELBOW PROCEDURE Left     FX    EYE SURGERY  03/2024    Cataract     PROSTATECTOMY      PROSTATECTOMY  03/01/2003    TONSILLECTOMY  1957    VASECTOMY           FAMILY HISTORY  Family History   Problem Relation Age of Onset    Hypertension Mother     Hypertension Father     Prostate cancer Father     Malig Hyperthermia Neg Hx          SOCIAL HISTORY  Social History     Socioeconomic History    Marital status:    Tobacco Use    Smoking status: Never     Passive exposure: Never    Smokeless tobacco: Never   Vaping Use    Vaping status: Never Used   Substance and Sexual Activity    Alcohol use: Yes     Alcohol/week: 1.0 standard drink of alcohol     Types: 1 Drinks containing 0.5 oz of alcohol per week     Comment: Occasional beer or cocktail    Drug use: No    Sexual activity: Yes     Partners: Female     Birth control/protection: Vasectomy         ALLERGIES  Penicillins      REVIEW OF SYSTEMS  Review of Systems  Included in HPI  All systems reviewed and negative except for those discussed in HPI.      PHYSICAL EXAM    I have reviewed the triage vital signs and nursing notes.    ED Triage Vitals [01/24/25 0527]   Temp Heart Rate Resp BP SpO2   97.2 °F (36.2 °C) 101 17 153/86 96 %      Temp src Heart Rate Source Patient Position BP Location FiO2 (%)   Tympanic -- Sitting Right arm --       Physical Exam  GENERAL: alert, no acute distress  SKIN: Warm, dry  HENT: Normocephalic, atraumatic  EYES: no scleral icterus  CV: regular rhythm, regular rate  RESPIRATORY: normal effort, lungs clear  ABDOMEN: nondistended soft nontender normal bowel sounds no guarding or rigidity, no leakage around the catheter, there is bloody urine in the catheter bag with few small clots  MUSCULOSKELETAL: no deformity  NEURO: alert, moves all extremities, follows commands            LAB RESULTS  No results found for this or any previous visit (from the past 24 hours).      RADIOLOGY  No Radiology Exams Resulted Within Past 24 Hours      MEDICATIONS GIVEN IN ER  Medications - No data to display      ORDERS  PLACED DURING THIS VISIT:  No orders of the defined types were placed in this encounter.        OUTPATIENT MEDICATION MANAGEMENT:  No current Epic-ordered facility-administered medications on file.     Current Outpatient Medications Ordered in Epic   Medication Sig Dispense Refill    Calcium Carbonate-Vitamin D (CALCIUM 600+D PO) Take 1 tablet by mouth 2 (Two) Times a Day. PT HOLDING FOR SURGERY  Indications: Calcium Deficiency      dapagliflozin Propanediol (Farxiga) 10 MG tablet Take 10 mg by mouth Daily. (Patient not taking: Reported on 1/23/2025) 30 tablet 0    Eliquis 5 MG tablet tablet Take 1 tablet by mouth Every 12 (Twelve) Hours. 60 tablet 11    lisinopril (PRINIVIL,ZESTRIL) 40 MG tablet Take 1 tablet by mouth Daily. 90 tablet 1    Multiple Vitamins-Minerals (DAILY MULTIVITAMIN PO) Take 1 tablet by mouth Daily.      pravastatin (PRAVACHOL) 40 MG tablet Take 1 tablet by mouth Every Night. Indications: High Amount of Fats in the Blood 90 tablet 3         PROCEDURES  Procedures            PROGRESS, DATA ANALYSIS, CONSULTS, AND MEDICAL DECISION MAKING  All labs have been independently interpreted by me.  All radiology studies have been reviewed by me. All EKG's have been independently viewed and interpreted by me.  Discussion below represents my analysis of pertinent findings related to patient's condition, differential diagnosis, treatment plan and final disposition.    DIFFERENTIAL        Clinical Scores:                  ED Course as of 01/24/25 1518   Fri Jan 24, 2025   0649 RN will irrigate the catheter to see if the urine clears up or if three-way insertion and CBI is indicated. [KA]   0731 After irrigation the urine appears straw-colored [KA]      ED Course User Index  [KA] Yesica Feliciano PA-C     Urine continue to be clear prior to discharge.  He has a follow-up appointment with his urologist in 2 business days, recommended he keep that appointment, return precautions discussed.  He has a bariatric  appointment at 8 AM and he will be discharged to go to his appointment.  He has no symptoms of infection and had no obstruction.        AS OF 15:18 EST VITALS:    BP - 138/87  HR - 62  TEMP - 97.2 °F (36.2 °C) (Tympanic)  O2 SATS - 95%    COMPLEXITY OF CARE  Admission was considered but after careful review of the patient's presentation, physical examination, diagnostic results, and response to treatment the patient may be safely discharged with outpatient follow-up.      DIAGNOSIS  Final diagnoses:   Gross hematuria   Chronic anticoagulation   Chronic radiation cystitis         DISPOSITION  ED Disposition       ED Disposition   Discharge    Condition   Good    Comment   --                  FOLLOW UP  Chloe Arriaga MD  [id number] [id number] [phone]          Joel Russell MD  [id number] [phone]      tuesday as scheduled or sooner as needed        Prescribed Medications     Medication List      No changes were made to your prescriptions during this visit.                   Please note that portions of this document were completed with a voice recognition program.    Note Disclaimer: At [facility], we believe that sharing information builds trust and better relationships. You are receiving this note because you recently visited [facility]. It is possible you will see health information before a provider has talked with you about it. This kind of information can be easy to misunderstand. To help you fully understand what it means for your health, we urge you to discuss this note with your provider.         Yesica Feliciano PA-C  01/24/25 [id number]

## 2025-01-24 NOTE — ED PROVIDER NOTES
EMERGENCY DEPARTMENT ENCOUNTER  Room Number:  19/19  PCP: Chloe Arriaga MD  Independent Historians: Patient  Date of encounter:  1/24/2025  Patient Care Team:  Chloe Arriaga MD as PCP - General (Internal Medicine)  Manjeet, Brennen Giraldo MD as Consulting Physician (Cardiology)  Armando Alvarez MD as Consulting Physician (Hematology and Oncology)     HPI:  Chief Complaint: had concerns including Paz Catheter Problem.     A complete HPI/ROS/PMH/PSH/SH/FH are unobtainable due to: None    Chronic or social conditions impacting patient care (Social Determinants of Health): None      Context: [place] is a 74 y.o. male with a medical history of DVT/PE, prostate cancer, radiation cystitis who presents to the ED c/o acute leaking from Paz catheter.  Patient was seen this morning and had bladder irrigation from his indwelling Paz catheter.  After leaving, he noted some small amount of urinary drainage from the tip of his penis.  He states that it is not severe.  Catheter was exchanged a few days ago.  He denies any discomfort or change in urinary character.    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 coli pneumonia 08/01/2023    History of prostate cancer 2002    History of recent hospitalization 08/01/2023    Kidney stone 2019    On  anticoagulant therapy        PAST SURGICAL HISTORY  Past Surgical History:   Procedure Laterality Date    CATARACT EXTRACTION      WITH LENS IMPLANTS    COLONOSCOPY N/A 09/13/2023    Procedure: COLONOSCOPY TO CECUM AND TERMINAL ILEUM WITH COLD POLYPECTOMY;  Surgeon: Humberto Peña MD;  Location: [facility] ENDOSCOPY;  Service: Gastroenterology;  Laterality: N/A;  SCREENING  DIVERTICULOSIS, COLON POLYP, HEMORRHOIDS    CYSTOSCOPY BLADDER BIOPSY N/A 06/12/2019    Procedure: CYSTOSCOPY TUR BLADDER TUMOR;  Surgeon: Joel Russell MD;  Location: [facility] MAIN OR;  Service: Urology    CYSTOSCOPY BLADDER BIOPSY N/A 05/31/2024    Procedure: CYSTOSCOPY BLADDER BIOPSY FULGURATION;  Surgeon: Joel Russell MD;  Location: [facility] MAIN OR;  Service: Urology;  Laterality: N/A;    CYSTOSCOPY WITH CLOT EVACUATION N/A 10/02/2024    Procedure: CYSTOSCOPY WITH CLOT EVACUATION, CAUTERY OF BLEEDING, DENG INSERTION;  Surgeon: Joel Russell MD;  Location: [facility] MAIN OR;  Service: Urology;  Laterality: N/A;    ELBOW PROCEDURE Left     FX    EYE SURGERY  03/2024    Cataract    PROSTATECTOMY      PROSTATECTOMY  03/01/2003    TONSILLECTOMY  1957    VASECTOMY         FAMILY HISTORY  Family History   Problem Relation Age of Onset    Hypertension Mother     Hypertension Father     Prostate cancer Father     Malig Hyperthermia Neg Hx        SOCIAL HISTORY  Social History     Socioeconomic History    Marital status:    Tobacco Use    Smoking status: Never     Passive exposure: Never    Smokeless tobacco: Never   Vaping Use    Vaping status: Never Used   Substance and Sexual Activity    Alcohol use: Yes     Alcohol/week: 1.0 standard drink of alcohol     Types: 1 Drinks containing 0.5 oz of alcohol per week     Comment: Occasional beer or cocktail    Drug use: No    Sexual activity: Yes     Partners: Female     Birth control/protection: Vasectomy       ALLERGIES  Penicillins    REVIEW OF SYSTEMS  Review of Systems  Included in  HPI  All systems reviewed and negative except for those discussed in HPI.    PHYSICAL EXAM    I have reviewed the triage vital signs and nursing notes.    ED Triage Vitals   Temp Heart Rate Resp BP SpO2   01/24/25 1428 01/24/25 1428 01/24/25 1428 01/24/25 1430 01/24/25 1428   97.1 °F (36.2 °C) 103 16 152/82 98 %      Temp src Heart Rate Source Patient Position BP Location FiO2 (%)   01/24/25 1428 01/24/25 1428 -- -- --   Tympanic Monitor          Physical Exam    LAB RESULTS  No results found for this or any previous visit (from the past 24 hours).    RADIOLOGY  No Radiology Exams Resulted Within Past 24 Hours    MEDICATIONS GIVEN IN ER  Medications - No data to display    ORDERS PLACED DURING THIS VISIT:  Orders Placed This Encounter   Procedures    Bladder scan       OUTPATIENT MEDICATION MANAGEMENT:  No current Epic-ordered facility-administered medications on file.     Current Outpatient Medications Ordered in Epic   Medication Sig Dispense Refill    Calcium Carbonate-Vitamin D (CALCIUM 600+D PO) Take 1 tablet by mouth 2 (Two) Times a Day. PT HOLDING FOR SURGERY  Indications: Calcium Deficiency      dapagliflozin Propanediol (Farxiga) 10 MG tablet Take 10 mg by mouth Daily. (Patient not taking: Reported on 1/23/2025) 30 tablet 0    Eliquis 5 MG tablet tablet Take 1 tablet by mouth Every 12 (Twelve) Hours. 60 tablet 11    lisinopril (PRINIVIL,ZESTRIL) 40 MG tablet Take 1 tablet by mouth Daily. 90 tablet 1    Multiple Vitamins-Minerals (DAILY MULTIVITAMIN PO) Take 1 tablet by mouth Daily.      pravastatin (PRAVACHOL) 40 MG tablet Take 1 tablet by mouth Every Night. Indications: High Amount of Fats in the Blood 90 tablet 3       PROCEDURES  Procedures    PROGRESS, DATA ANALYSIS, CONSULTS, AND MEDICAL DECISION MAKING  All labs have been independently interpreted by me.  All radiology studies have been reviewed by me. All EKG's have been independently viewed and interpreted by me.  Discussion below represents my  analysis of pertinent findings related to patient's condition, differential diagnosis, treatment plan and final disposition.    Differential diagnosis includes but is not limited to Paz obstruction, UTI.    Clinical Scores:                   ED Course as of 01/24/25 1647   Fri Jan 24, 2025   1626 Bladder scan of 0, Paz catheter draining normally [AB]   1646 Patient reassured.  Will change Paz bag to a larger bag per his request.  Discharged with primary care and urology follow-up. [AB]      ED Course User Index  [AB] Pierre Woodruff MD             AS OF 16:47 EST VITALS:    BP - 136/85  HR - 76  TEMP - 97.1 °F (36.2 °C) (Tympanic)  O2 SATS - 96%    COMPLEXITY OF CARE  Admission was considered but after careful review of the patient's presentation, physical examination, diagnostic results, and response to treatment the patient may be safely discharged with outpatient follow-up.      DIAGNOSIS  Final diagnoses:   Paz catheter problem, initial encounter         DISPOSITION  ED Disposition       ED Disposition   Discharge    Condition   Stable    Comment   --                Please note that portions of this document were completed with a voice recognition program.    Note Disclaimer: At [facility], we believe that sharing information builds trust and better relationships. You are receiving this note because you recently visited [facility]. It is possible you will see health information before a provider has talked with you about it. This kind of information can be easy to misunderstand. To help you fully understand what it means for your health, we urge you to discuss this note with your provider.         Pierre Woodruff MD  01/24/25 9987

## 2025-01-24 NOTE — ED PROVIDER NOTES
MD ATTESTATION NOTE    The SINDI and I have discussed this patient's history, physical exam, MDM, and treatment plan.  I have reviewed the documentation and personally had a face to face interaction with the patient. I affirm the documentation and agree with the treatment and plan.  The attached note describes my personal findings.      I provided a substantive portion of the medical decision making.        Brief HPI: Patient with history of radiation cystitis, recurrent hematuria, anticoagulation on Eliquis due to recent diagnosis of DVT and PE, presents with concern for his catheter leaking.  He states that he woke up this morning and he had some bloody urine and some small clots in the tubing, then noticed some blood-tinged liquid around his urethral meatus.  Symptoms have now resolved and he is eager to make his appointment for hyperbaric oxygen therapy this morning.    PHYSICAL EXAM  ED Triage Vitals [01/24/25 0527]   Temp Heart Rate Resp BP SpO2   97.2 °F (36.2 °C) 101 17 153/86 96 %      Temp src Heart Rate Source Patient Position BP Location FiO2 (%)   Tympanic -- Sitting Right arm --         GENERAL: Awake and alert, no acute distress  HENT: nares patent  EYES: no scleral icterus  CV: regular rhythm, normal rate  RESPIRATORY: normal effort  : Paz catheter in place with light yellow urine in the catheter tubing, normal-appearing phallus and scrotum, no leakage of urine around the catheter  MUSCULOSKELETAL: no deformity  NEURO: alert, moves all extremities, follows commands  PSYCH:  calm, cooperative  SKIN: warm, dry    Vital signs and nursing notes reviewed.        Medical Decision Making:  ED Course as of 01/24/25 0757   Fri Jan 24, 2025   0649 RN will irrigate the catheter to see if the urine clears up or if three-way insertion and CBI is indicated. [KA]   0731 After irrigation the urine appears straw-colored [KA]      ED Course User Index  [KA] Yesica Feliciano PA-C Ritchie, Joseph David,  MD  01/24/25 0758

## 2025-01-27 ENCOUNTER — OFFICE VISIT (OUTPATIENT)
Dept: WOUND CARE | Facility: HOSPITAL | Age: 75
End: 2025-01-27
Payer: MEDICARE

## 2025-01-27 PROCEDURE — G0277 HBOT, FULL BODY CHAMBER, 30M: HCPCS

## 2025-01-28 ENCOUNTER — OFFICE VISIT (OUTPATIENT)
Dept: WOUND CARE | Facility: HOSPITAL | Age: 75
End: 2025-01-28
Payer: MEDICARE

## 2025-01-28 ENCOUNTER — READMISSION MANAGEMENT (OUTPATIENT)
Dept: CALL CENTER | Facility: HOSPITAL | Age: 75
End: 2025-01-28
Payer: MEDICARE

## 2025-01-28 ENCOUNTER — HOSPITAL ENCOUNTER (EMERGENCY)
Facility: HOSPITAL | Age: 75
Discharge: HOME | End: 2025-01-28
Attending: EMERGENCY MEDICINE | Admitting: EMERGENCY MEDICINE
Payer: MEDICARE

## 2025-01-28 VITALS
HEIGHT: 69 IN | DIASTOLIC BLOOD PRESSURE: 91 MMHG | HEART RATE: 97 BPM | TEMPERATURE: 97.4 F | RESPIRATION RATE: 18 BRPM | SYSTOLIC BLOOD PRESSURE: 157 MMHG | BODY MASS INDEX: 26.66 KG/M2 | OXYGEN SATURATION: 99 % | WEIGHT: 180 LBS

## 2025-01-28 DIAGNOSIS — T83.091A OBSTRUCTION OF FOLEY CATHETER, INITIAL ENCOUNTER: Primary | ICD-10-CM

## 2025-01-28 LAB
ALBUMIN SERPL-MCNC: 3.1 G/DL (ref 3.5–5.2)
ALBUMIN/GLOB SERPL: 0.9 G/DL
ALP SERPL-CCNC: 86 U/L (ref 39–117)
ALT SERPL W P-5'-P-CCNC: 16 U/L (ref 1–41)
ANION GAP SERPL CALCULATED.3IONS-SCNC: 12.2 MMOL/L (ref 5–15)
AST SERPL-CCNC: 15 U/L (ref 1–40)
BACTERIA UR QL AUTO: ABNORMAL /HPF
BASOPHILS # BLD AUTO: 0.05 10*3/MM3 (ref 0–0.2)
BASOPHILS NFR BLD AUTO: 0.6 % (ref 0–1.5)
BILIRUB SERPL-MCNC: 0.3 MG/DL (ref 0–1.2)
BILIRUB UR QL STRIP: NEGATIVE
BUN SERPL-MCNC: 10 MG/DL (ref 8–23)
BUN/CREAT SERPL: 11.1 (ref 7–25)
CALCIUM SPEC-SCNC: 9 MG/DL (ref 8.6–10.5)
CHLORIDE SERPL-SCNC: 102 MMOL/L (ref 98–107)
CLARITY UR: ABNORMAL
CO2 SERPL-SCNC: 23.8 MMOL/L (ref 22–29)
COLOR UR: ABNORMAL
CREAT SERPL-MCNC: 0.9 MG/DL (ref 0.76–1.27)
DEPRECATED RDW RBC AUTO: 42.3 FL (ref 37–54)
EGFRCR SERPLBLD CKD-EPI 2021: 88.5 ML/MIN/1.73
EOSINOPHIL # BLD AUTO: 0.18 10*3/MM3 (ref 0–0.4)
EOSINOPHIL NFR BLD AUTO: 2.2 % (ref 0.3–6.2)
ERYTHROCYTE [DISTWIDTH] IN BLOOD BY AUTOMATED COUNT: 12.3 % (ref 12.3–15.4)
GLOBULIN UR ELPH-MCNC: 3.5 GM/DL
GLUCOSE SERPL-MCNC: 160 MG/DL (ref 65–99)
GLUCOSE UR STRIP-MCNC: NEGATIVE MG/DL
HCT VFR BLD AUTO: 37.1 % (ref 37.5–51)
HGB BLD-MCNC: 12.3 G/DL (ref 13–17.7)
HGB UR QL STRIP.AUTO: ABNORMAL
HYALINE CASTS UR QL AUTO: ABNORMAL /LPF
IMM GRANULOCYTES # BLD AUTO: 0.02 10*3/MM3 (ref 0–0.05)
IMM GRANULOCYTES NFR BLD AUTO: 0.2 % (ref 0–0.5)
KETONES UR QL STRIP: NEGATIVE
LEUKOCYTE ESTERASE UR QL STRIP.AUTO: ABNORMAL
LYMPHOCYTES # BLD AUTO: 0.98 10*3/MM3 (ref 0.7–3.1)
LYMPHOCYTES NFR BLD AUTO: 12.1 % (ref 19.6–45.3)
MCH RBC QN AUTO: 31.2 PG (ref 26.6–33)
MCHC RBC AUTO-ENTMCNC: 33.2 G/DL (ref 31.5–35.7)
MCV RBC AUTO: 94.2 FL (ref 79–97)
MONOCYTES # BLD AUTO: 1.12 10*3/MM3 (ref 0.1–0.9)
MONOCYTES NFR BLD AUTO: 13.8 % (ref 5–12)
NEUTROPHILS NFR BLD AUTO: 5.78 10*3/MM3 (ref 1.7–7)
NEUTROPHILS NFR BLD AUTO: 71.1 % (ref 42.7–76)
NITRITE UR QL STRIP: NEGATIVE
NRBC BLD AUTO-RTO: 0 /100 WBC (ref 0–0.2)
PH UR STRIP.AUTO: 6.5 [PH] (ref 5–8)
PLATELET # BLD AUTO: 260 10*3/MM3 (ref 140–450)
PMV BLD AUTO: 11.5 FL (ref 6–12)
POTASSIUM SERPL-SCNC: 3.5 MMOL/L (ref 3.5–5.2)
PROT SERPL-MCNC: 6.6 G/DL (ref 6–8.5)
PROT UR QL STRIP: ABNORMAL
RBC # BLD AUTO: 3.94 10*6/MM3 (ref 4.14–5.8)
RBC # UR STRIP: ABNORMAL /HPF
REF LAB TEST METHOD: ABNORMAL
SODIUM SERPL-SCNC: 138 MMOL/L (ref 136–145)
SP GR UR STRIP: 1.01 (ref 1–1.03)
SQUAMOUS #/AREA URNS HPF: ABNORMAL /HPF
UROBILINOGEN UR QL STRIP: ABNORMAL
WBC # UR STRIP: ABNORMAL /HPF
WBC NRBC COR # BLD AUTO: 8.13 10*3/MM3 (ref 3.4–10.8)

## 2025-01-28 PROCEDURE — 51798 US URINE CAPACITY MEASURE: CPT

## 2025-01-28 PROCEDURE — 87077 CULTURE AEROBIC IDENTIFY: CPT | Performed by: EMERGENCY MEDICINE

## 2025-01-28 PROCEDURE — 99283 EMERGENCY DEPT VISIT LOW MDM: CPT

## 2025-01-28 PROCEDURE — 87086 URINE CULTURE/COLONY COUNT: CPT | Performed by: EMERGENCY MEDICINE

## 2025-01-28 PROCEDURE — 51702 INSERT TEMP BLADDER CATH: CPT

## 2025-01-28 PROCEDURE — 87186 SC STD MICRODIL/AGAR DIL: CPT | Performed by: EMERGENCY MEDICINE

## 2025-01-28 PROCEDURE — G0277 HBOT, FULL BODY CHAMBER, 30M: HCPCS

## 2025-01-28 PROCEDURE — 80053 COMPREHEN METABOLIC PANEL: CPT | Performed by: EMERGENCY MEDICINE

## 2025-01-28 PROCEDURE — 85025 COMPLETE CBC W/AUTO DIFF WBC: CPT | Performed by: EMERGENCY MEDICINE

## 2025-01-28 PROCEDURE — 36415 COLL VENOUS BLD VENIPUNCTURE: CPT

## 2025-01-28 PROCEDURE — 81001 URINALYSIS AUTO W/SCOPE: CPT | Performed by: EMERGENCY MEDICINE

## 2025-01-28 NOTE — ED PROVIDER NOTES
EMERGENCY DEPARTMENT ENCOUNTER  Room Number:  15/15  PCP: Chloe Arriaga MD  Independent Historians: Patient  Date of encounter:  1/28/2025  Patient Care Team:  Chloe Arriaga MD as PCP - General (Internal Medicine)  Brennen Burroughs MD as Consulting Physician (Cardiology)  Armando Alvarez MD as Consulting Physician (Hematology and Oncology)     HPI:  Chief Complaint: had concerns including Urinary Catheter Problem.     A complete HPI/ROS/PMH/PSH/SH/FH are unobtainable due to: None    Chronic or social conditions impacting patient care (Social Determinants of Health): None      Context: [place] is a 74 y.o. male with a medical history of PE/DVT on Eliquis, atrial fibrillation, prostate cancer, hypertension who presents to the ED c/o acute hematuria and concern for clogged Paz catheter.  Patient has a Paz catheter that has been in place for a few weeks.  He will have intermittent hematuria secondary to prior radiation therapy.  He woke this morning around 3 AM and had some discomfort at the tip of his penis and felt that the catheter was not draining fully.  No some small clots within the lumen of the catheter though none were obstructive.  He has a mild amount of suprapubic discomfort.  No nausea, vomiting or fever.  Patient has scheduled follow-up with the first urology at 9 AM and is scheduled hyperbaric oxygen therapy chamber at 1015.  Anticoagulated on Eliquis.    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 coli pneumonia 08/01/2023    History of prostate cancer 2002    History of recent hospitalization 08/01/2023    Kidney stone 2019    On anticoagulant therapy        PAST SURGICAL HISTORY  Past Surgical History:   Procedure Laterality Date    CATARACT EXTRACTION      WITH LENS IMPLANTS    COLONOSCOPY N/A 09/13/2023    Procedure: COLONOSCOPY TO CECUM AND TERMINAL ILEUM WITH COLD POLYPECTOMY;  Surgeon: Humberto Peña MD;  Location: [facility] ENDOSCOPY;  Service: Gastroenterology;  Laterality: N/A;  SCREENING  DIVERTICULOSIS, COLON POLYP, HEMORRHOIDS    CYSTOSCOPY BLADDER BIOPSY N/A 06/12/2019    Procedure: CYSTOSCOPY TUR BLADDER TUMOR;  Surgeon: Joel Russell MD;  Location: [facility] OR;  Service: Urology    CYSTOSCOPY BLADDER BIOPSY N/A 05/31/2024    Procedure: CYSTOSCOPY BLADDER BIOPSY FULGURATION;  Surgeon: Joel Russell MD;  Location: [facility] MAIN OR;  Service: Urology;  Laterality: N/A;    CYSTOSCOPY WITH CLOT EVACUATION N/A 10/02/2024    Procedure: CYSTOSCOPY WITH CLOT EVACUATION, CAUTERY OF BLEEDING, DENG INSERTION;  Surgeon: Joel Russell MD;  Location: [facility] MAIN OR;  Service: Urology;  Laterality: N/A;    ELBOW PROCEDURE Left     FX    EYE SURGERY  03/2024    Cataract    PROSTATECTOMY      PROSTATECTOMY  03/01/2003    TONSILLECTOMY  1957    VASECTOMY         FAMILY HISTORY  Family History   Problem Relation Age of Onset    Hypertension Mother     Hypertension Father     Prostate cancer Father     Malig Hyperthermia Neg Hx        SOCIAL HISTORY  Social History     Socioeconomic History    Marital status:    Tobacco Use    Smoking status: Never     Passive exposure: Never    Smokeless tobacco: Never   Vaping Use    Vaping status: Never Used   Substance and Sexual Activity    Alcohol use: Yes      Alcohol/week: 1.0 standard drink of alcohol     Types: 1 Drinks containing 0.5 oz of alcohol per week     Comment: Occasional beer or cocktail    Drug use: No    Sexual activity: Yes     Partners: Female     Birth control/protection: Vasectomy       ALLERGIES  Penicillins    REVIEW OF SYSTEMS  Review of Systems  Included in HPI  All systems reviewed and negative except for those discussed in HPI.    PHYSICAL EXAM    I have reviewed the triage vital signs and nursing notes.    ED Triage Vitals   Temp Heart Rate Resp BP SpO2   01/28/25 0439 01/28/25 0439 01/28/25 0439 01/28/25 0444 01/28/25 0439   97.4 °F (36.3 °C) 97 18 (!) 150/104 99 %      Temp src Heart Rate Source Patient Position BP Location FiO2 (%)   01/28/25 0439 01/28/25 0439 -- 01/28/25 0444 --   Tympanic Monitor  Right arm        Physical Exam  Constitutional:       General: He is not in acute distress.     Appearance: Normal appearance. He is not ill-appearing, toxic-appearing or diaphoretic.   HENT:      Head: Normocephalic and atraumatic.      Nose: Nose normal.      Mouth/Throat:      Mouth: Mucous membranes are moist.      Pharynx: Oropharynx is clear.   Eyes:      Conjunctiva/sclera: Conjunctivae normal.      Pupils: Pupils are equal, round, and reactive to light.   Cardiovascular:      Rate and Rhythm: Normal rate and regular rhythm.      Pulses: Normal pulses.   Pulmonary:      Effort: Pulmonary effort is normal.   Abdominal:      General: Abdomen is flat. There is no distension.      Palpations: Abdomen is soft.      Tenderness: There is abdominal tenderness (Mild suprapubic). There is no guarding or rebound.   Genitourinary:     Comments: Paz catheter in place with bloody urine drainage, small nonobstructive clots within the catheter lumen  Skin:     General: Skin is warm and dry.   Neurological:      General: No focal deficit present.      Mental Status: He is alert and oriented to person, place, and time. Mental status is at baseline.    Psychiatric:         Mood and Affect: Mood normal.         Behavior: Behavior normal.         Thought Content: Thought content normal.         Judgment: Judgment normal.         LAB RESULTS  Recent Results (from the past 24 hours)   Comprehensive Metabolic Panel    Collection Time: 01/28/25  6:05 AM    Specimen: Blood   Result Value Ref Range    Glucose 160 (H) 65 - 99 mg/dL    BUN 10 8 - 23 mg/dL    Creatinine 0.90 0.76 - 1.27 mg/dL    Sodium 138 136 - 145 mmol/L    Potassium 3.5 3.5 - 5.2 mmol/L    Chloride 102 98 - 107 mmol/L    CO2 23.8 22.0 - 29.0 mmol/L    Calcium 9.0 8.6 - 10.5 mg/dL    Total Protein 6.6 6.0 - 8.5 g/dL    Albumin 3.1 (L) 3.5 - 5.2 g/dL    ALT (SGPT) 16 1 - 41 U/L    AST (SGOT) 15 1 - 40 U/L    Alkaline Phosphatase 86 39 - 117 U/L    Total Bilirubin 0.3 0.0 - 1.2 mg/dL    Globulin 3.5 gm/dL    A/G Ratio 0.9 g/dL    BUN/Creatinine Ratio 11.1 7.0 - 25.0    Anion Gap 12.2 5.0 - 15.0 mmol/L    eGFR 88.5 >60.0 mL/min/1.73   CBC Auto Differential    Collection Time: 01/28/25  6:05 AM    Specimen: Blood   Result Value Ref Range    WBC 8.13 3.40 - 10.80 10*3/mm3    RBC 3.94 (L) 4.14 - 5.80 10*6/mm3    Hemoglobin 12.3 (L) 13.0 - 17.7 g/dL    Hematocrit 37.1 (L) 37.5 - 51.0 %    MCV 94.2 79.0 - 97.0 fL    MCH 31.2 26.6 - 33.0 pg    MCHC 33.2 31.5 - 35.7 g/dL    RDW 12.3 12.3 - 15.4 %    RDW-SD 42.3 37.0 - 54.0 fl    MPV 11.5 6.0 - 12.0 fL    Platelets 260 140 - 450 10*3/mm3    Neutrophil % 71.1 42.7 - 76.0 %    Lymphocyte % 12.1 (L) 19.6 - 45.3 %    Monocyte % 13.8 (H) 5.0 - 12.0 %    Eosinophil % 2.2 0.3 - 6.2 %    Basophil % 0.6 0.0 - 1.5 %    Immature Grans % 0.2 0.0 - 0.5 %    Neutrophils, Absolute 5.78 1.70 - 7.00 10*3/mm3    Lymphocytes, Absolute 0.98 0.70 - 3.10 10*3/mm3    Monocytes, Absolute 1.12 (H) 0.10 - 0.90 10*3/mm3    Eosinophils, Absolute 0.18 0.00 - 0.40 10*3/mm3    Basophils, Absolute 0.05 0.00 - 0.20 10*3/mm3    Immature Grans, Absolute 0.02 0.00 - 0.05 10*3/mm3    nRBC 0.0 0.0 -  0.2 /100 WBC   Urinalysis With Culture If Indicated - Indwelling Urethral Catheter    Collection Time: 01/28/25  6:20 AM    Specimen: Indwelling Urethral Catheter; Urine   Result Value Ref Range    Color, UA Red (A) Yellow, Straw    Appearance, UA Cloudy (A) Clear    pH, UA 6.5 5.0 - 8.0    Specific Gravity, UA 1.008 1.005 - 1.030    Glucose, UA Negative Negative    Ketones, UA Negative Negative    Bilirubin, UA Negative Negative    Blood, UA Large (3+) (A) Negative    Protein,  mg/dL (2+) (A) Negative    Leuk Esterase, UA Small (1+) (A) Negative    Nitrite, UA Negative Negative    Urobilinogen, UA 1.0 E.U./dL 0.2 - 1.0 E.U./dL   Urinalysis, Microscopic Only - Indwelling Urethral Catheter    Collection Time: 01/28/25  6:20 AM    Specimen: Indwelling Urethral Catheter; Urine   Result Value Ref Range    RBC, UA Too Numerous to Count (A) None Seen, 0-2 /HPF    WBC, UA 11-20 (A) None Seen, 0-2 /HPF    Bacteria, UA None Seen None Seen /HPF    Squamous Epithelial Cells, UA 0-2 None Seen, 0-2 /HPF    Hyaline Casts, UA None Seen None Seen /LPF    Methodology Automated Microscopy        RADIOLOGY  No Radiology Exams Resulted Within Past 24 Hours    MEDICATIONS GIVEN IN ER  Medications - No data to display    ORDERS PLACED DURING THIS VISIT:  Orders Placed This Encounter   Procedures    Urine Culture - Urine,    Urinalysis With Culture If Indicated - Indwelling Urethral Catheter    Comprehensive Metabolic Panel    CBC Auto Differential    Urinalysis, Microscopic Only - Urine, Clean Catch    Bladder scan    Replace Paz Catheter    Assess Need for Indwelling Urinary Catheter - Follow Removal Protocol    Urinary Catheter Care    CBC & Differential       OUTPATIENT MEDICATION MANAGEMENT:  No current Epic-ordered facility-administered medications on file.     Current Outpatient Medications Ordered in Epic   Medication Sig Dispense Refill    Calcium Carbonate-Vitamin D (CALCIUM 600+D PO) Take 1 tablet by mouth 2 (Two) Times  a Day. PT HOLDING FOR SURGERY  Indications: Calcium Deficiency      dapagliflozin Propanediol (Farxiga) 10 MG tablet Take 10 mg by mouth Daily. (Patient not taking: Reported on 1/23/2025) 30 tablet 0    Eliquis 5 MG tablet tablet Take 1 tablet by mouth Every 12 (Twelve) Hours. 60 tablet 11    lisinopril (PRINIVIL,ZESTRIL) 40 MG tablet Take 1 tablet by mouth Daily. 90 tablet 1    Multiple Vitamins-Minerals (DAILY MULTIVITAMIN PO) Take 1 tablet by mouth Daily.      pravastatin (PRAVACHOL) 40 MG tablet Take 1 tablet by mouth Every Night. Indications: High Amount of Fats in the Blood 90 tablet 3       PROCEDURES  Procedures    PROGRESS, DATA ANALYSIS, CONSULTS, AND MEDICAL DECISION MAKING  All labs have been independently interpreted by me.  All radiology studies have been reviewed by me. All EKG's have been independently viewed and interpreted by me.  Discussion below represents my analysis of pertinent findings related to patient's condition, differential diagnosis, treatment plan and final disposition.    Differential diagnosis includes but is not limited to UTI, clogged Paz catheter.    Clinical Scores:                   ED Course as of 01/28/25 0659   Tue Jan 28, 2025   0541 Patient has 400 cc in his bladder on bladder scan.  Nurse attempted to irrigate without any fluid return.  Will replace catheter.  Checking basic lab work. [AB]   0638 WBC: 8.13 [AB]   0638 Hemoglobin(!): 12.3 [AB]   0639 Blood, UA(!): Large (3+) [AB]   0658 Creatinine: 0.90 [AB]   0659 Updated patient on results.  He feels much improved after the Paz catheter was replaced.  Discharged with urology follow-up later this morning. [AB]      ED Course User Index  [AB] Pierre Woodruff MD             AS OF 06:59 EST VITALS:    BP - 157/91  HR - 97  TEMP - 97.4 °F (36.3 °C) (Tympanic)  O2 SATS - 99%    COMPLEXITY OF CARE  Admission was considered but after careful review of the patient's presentation, physical examination, diagnostic results, and  response to treatment the patient may be safely discharged with outpatient follow-up.      DIAGNOSIS  Final diagnoses:   Obstruction of Paz catheter, initial encounter         DISPOSITION  ED Disposition       ED Disposition   Discharge    Condition   Stable    Comment   --                Please note that portions of this document were completed with a voice recognition program.    Note Disclaimer: At [facility], we believe that sharing information builds trust and better relationships. You are receiving this note because you recently visited [facility]. It is possible you will see health information before a provider has talked with you about it. This kind of information can be easy to misunderstand. To help you fully understand what it means for your health, we urge you to discuss this note with your provider.         Pierre Woodruff MD  01/28/25 0659

## 2025-01-28 NOTE — OUTREACH NOTE
Medical Week 2 Survey      Flowsheet Row Responses   [facility] patient discharged from? [place]   Does the patient have one of the following disease processes/diagnoses(primary or secondary)? Other   Week 2 attempt successful? Yes   Call start time 1203   Discharge diagnosis Urinary retention, Acute pulmonary embolism   Call end time 1212   Person spoke with today (if not patient) and relationship Patient   Meds reviewed with patient/caregiver? Yes   Is the patient having any side effects they believe may be caused by any medication additions or changes? No   Does the patient have all medications ordered at discharge? Yes   Is the patient taking all medications as directed (includes completed medication regime)? Yes   Does the patient have a primary care provider?  Yes   Does the patient have an appointment with their PCP within 7 days of discharge? Yes   Has the patient kept scheduled appointments due by today? Yes   Psychosocial issues? No   What is the patient's perception of their health status since discharge? Improving   Is the patient/caregiver able to teach back signs and symptoms related to disease process for when to call PCP? Yes   Is the patient/caregiver able to teach back signs and symptoms related to disease process for when to call 911? Yes   Is the patient/caregiver able to teach back the hierarchy of who to call/visit for symptoms/problems? PCP, Specialist, Home health nurse, Urgent Care, ED, 911 Yes   Week 2 Call Completed? Yes   Is the patient interested in additional calls from an ambulatory ? No   Would this patient benefit from a Referral to Amb Social Work? No   Wrap up additional comments Pt reports that he had to go to the ER r/t mcclellan cath had obstruction and was replaced. Pt verified taking eliquis r/t PE.   Call end time 1212            Liat COMBS - Registered Nurse

## 2025-01-29 ENCOUNTER — OFFICE VISIT (OUTPATIENT)
Dept: WOUND CARE | Facility: HOSPITAL | Age: 75
End: 2025-01-29
Payer: MEDICARE

## 2025-01-29 PROCEDURE — G0277 HBOT, FULL BODY CHAMBER, 30M: HCPCS

## 2025-01-30 ENCOUNTER — OFFICE VISIT (OUTPATIENT)
Dept: WOUND CARE | Facility: HOSPITAL | Age: 75
End: 2025-01-30
Payer: MEDICARE

## 2025-01-30 ENCOUNTER — APPOINTMENT (OUTPATIENT)
Dept: WOUND CARE | Facility: HOSPITAL | Age: 75
End: 2025-01-30
Payer: MEDICARE

## 2025-01-30 LAB — BACTERIA SPEC AEROBE CULT: ABNORMAL

## 2025-01-30 PROCEDURE — G0277 HBOT, FULL BODY CHAMBER, 30M: HCPCS

## 2025-01-31 ENCOUNTER — OFFICE VISIT (OUTPATIENT)
Dept: WOUND CARE | Facility: HOSPITAL | Age: 75
End: 2025-01-31
Payer: MEDICARE

## 2025-01-31 PROCEDURE — G0277 HBOT, FULL BODY CHAMBER, 30M: HCPCS

## 2025-01-31 PROCEDURE — 69209 REMOVE IMPACTED EAR WAX UNI: CPT | Performed by: STUDENT IN AN ORGANIZED HEALTH CARE EDUCATION/TRAINING PROGRAM

## 2025-01-31 NOTE — PROGRESS NOTES
Chief Complaint  Hospital Follow Up Visit (01/15/2025, CLOT RETENTION URINE. ) and Ear Fullness (CLEANING.)    Subjective    History of Present Illness  The patient is a 74-year-old gentleman who presents for a hospital follow-up visit.    He has a history of bladder cancer, paroxysmal atrial fibrillation, pulmonary embolism, hypertension, and hyperlipidemia. He was admitted to the hospital twice, with the most recent admission involving the placement of a Paz catheter. The Paz catheter has been draining raffi-colored urine from the meatus, with leakage of urine around the catheter noted. The catheter was placed 5 days ago and had been functioning normally at home until a few hours ago when the leakage started. He reports no associated pain. He has a history of hematuria and urinary outlet obstruction secondary to hematuria, but notes that these symptoms have been improving. He is currently undergoing hyperbaric chamber treatment and has noticed a difference after 3 treatments. He has a follow-up appointment with his urologist on Tuesday. He reports no current urinary issues or symptoms. He does note occasional blood in his urine during bowel movements, but his stools are soft and regular. He reports no blood in his stools.    He has been experiencing blockage in his left ear, while his right ear remains unaffected. He initiated treatment yesterday and perceives an improvement in his condition. He had his ears irrigated by Dr. Cueto prior to his senior living.    He was reinitiated on Eliquis due to inactivity from 01/05/2025 until recently because of cystitis. A subsequent scan revealed suspicious findings, leading to further investigation for clots. Clots were discovered in his legs and chest, prompting the resumption of Eliquis. He had previously discontinued Eliquis due to a condition he had. His vitals and labs have been stable since then. He did not experience any shortness of breath during this period. He  "consulted with his cardiologist, who determined that his individual risk was not high, but his age and hypertension necessitated the continuation of Eliquis. When they examined his chest, there were no signs of clots that they could hear, but it was only when they did the scan that they found them.    MEDICATIONS  Current: Krystin Carcamo presents to [facility]  Ear Fullness         Objective   Vital Signs:  /80 (BP Location: Left arm, Patient Position: Sitting, Cuff Size: Adult)   Pulse 68   Temp 98.2 °F (36.8 °C) (Oral)   Resp 16   Ht 175.3 cm (69\")   Wt 83.3 kg (183 lb 9.6 oz)   SpO2 97%   BMI 27.11 kg/m²   Estimated body mass index is 27.11 kg/m² as calculated from the following:    Height as of this encounter: 175.3 cm (69\").    Weight as of this encounter: 83.3 kg (183 lb 9.6 oz).            Physical Exam   Result Review :         Ear Cerumen Removal    Date/Time: 1/31/2025 12:27 AM    Performed by: Chloe Arriaga MD  Authorized by: Chloe Arriaga MD  Consent: Verbal consent obtained.  Consent given by: patient  Patient understanding: patient states understanding of the procedure being performed    Anesthesia:  Local Anesthetic: none  Location details: left ear and right ear  Patient tolerance: patient tolerated the procedure well with no immediate complications  Procedure type: irrigation   Sedation:  Patient sedated: no              Assessment and Plan   Diagnoses and all orders for this visit:    1. Acute pulmonary embolism without acute cor pulmonale, unspecified pulmonary embolism type (Primary)    2. Paroxysmal atrial fibrillation    3. Primary hypertension    4. Hyperlipidemia, unspecified hyperlipidemia type    Other orders  -     Ear Cerumen Removal        Assessment & Plan  1. Post-hospitalization follow-up.  He was admitted twice recently and had a Paz catheter placed 5 days ago. The catheter had been draining normally until a few " hours ago when it started leaking urine around the urethra. He reports no pain and has a history of hematuria and urinary outlet obstruction secondary to hematuria, which has been improving. He is currently undergoing hyperbaric oxygen therapy at [facility] and has noticed some improvement after three treatments. He will follow up with his urologist on Tuesday.    2. Paroxysmal Atrial Fibrillation.  He was previously on Eliquis for atrial fibrillation but had stopped it due to inactivity caused by cystitis. He was restarted on Eliquis after clots were found in his legs and chest during his recent hospitalization. He reports no symptoms like shortness of breath and states that his vitals and labs were excellent while in the hospital. He is advised to continue Eliquis as prescribed and to be cautious of potential bleeding, especially given his condition.    3. Pulmonary Embolism.  Clots were found in his chest during his recent hospitalization. He reports no symptoms like shortness of breath. He is currently on Eliquis and is advised to continue this medication to manage the clots.    4. Left Ear Blockage.  He reported that his left ear was blocked and had started a treatment yesterday, which he feels has improved the condition. Ear irrigation was performed today using hot water and hydrogen peroxide. He was informed that the ear might appear red and feel sore due to the peroxide, but these symptoms should resolve within 24 hours. He was also advised that he might experience a sensation of water in the ears, which should also resolve within 24 hours.    PROCEDURE  The patient had a Paz catheter placed during his recent hospitalization.            Follow Up   No follow-ups on file.  Patient was given instructions and counseling regarding his condition or for health maintenance advice. Please see specific information pulled into the AVS if appropriate.     Patient or patient representative verbalized consent  for the use of Ambient Listening during the visit with  Chloe Arriaga MD for chart documentation. 1/31/2025  00:25 EST

## 2025-02-03 ENCOUNTER — OFFICE VISIT (OUTPATIENT)
Dept: WOUND CARE | Facility: HOSPITAL | Age: 75
End: 2025-02-03
Payer: MEDICARE

## 2025-02-03 PROCEDURE — G0277 HBOT, FULL BODY CHAMBER, 30M: HCPCS

## 2025-02-04 ENCOUNTER — OFFICE VISIT (OUTPATIENT)
Dept: WOUND CARE | Facility: HOSPITAL | Age: 75
End: 2025-02-04
Payer: MEDICARE

## 2025-02-04 PROCEDURE — G0277 HBOT, FULL BODY CHAMBER, 30M: HCPCS

## 2025-02-05 ENCOUNTER — OFFICE VISIT (OUTPATIENT)
Dept: WOUND CARE | Facility: HOSPITAL | Age: 75
End: 2025-02-05
Payer: MEDICARE

## 2025-02-05 PROCEDURE — G0277 HBOT, FULL BODY CHAMBER, 30M: HCPCS

## 2025-02-06 ENCOUNTER — OFFICE VISIT (OUTPATIENT)
Dept: WOUND CARE | Facility: HOSPITAL | Age: 75
End: 2025-02-06
Payer: MEDICARE

## 2025-02-06 ENCOUNTER — READMISSION MANAGEMENT (OUTPATIENT)
Dept: CALL CENTER | Facility: HOSPITAL | Age: 75
End: 2025-02-06
Payer: MEDICARE

## 2025-02-06 PROCEDURE — G0277 HBOT, FULL BODY CHAMBER, 30M: HCPCS

## 2025-02-06 NOTE — OUTREACH NOTE
Medical Week 3 Survey      Flowsheet Row Responses   [facility] patient discharged from? [place]   Does the patient have one of the following disease processes/diagnoses(primary or secondary)? Other   Week 3 attempt successful? Yes   Call start time 1351   Call end time 1354   Discharge diagnosis Urinary retention, Acute pulmonary embolism   Person spoke with today (if not patient) and relationship Patient's spouse   Does the patient have a primary care provider?  Yes   Comments regarding PCP Patient has seen PCP   Psychosocial issues? No   What is the patient's perception of their health status since discharge? Improving   Is the patient/caregiver able to teach back signs and symptoms related to disease process for when to call PCP? Yes   Is the patient/caregiver able to teach back signs and symptoms related to disease process for when to call 911? Yes   Is the patient/caregiver able to teach back the hierarchy of who to call/visit for symptoms/problems? PCP, Specialist, Home health nurse, Urgent Care, ED, 911 Yes   If the patient is a current smoker, are they able to teach back resources for cessation? Not a smoker   Week 3 Call Completed? Yes   Graduated Yes   Is the patient interested in additional calls from an ambulatory ? No   Would this patient benefit from a Referral to Amb Social Work? No   Wrap up additional comments Spouse states patient is doing well. No questions or concerns at this time.   Call end time 1354            MILLIE FRASER - Registered Nurse

## 2025-02-07 ENCOUNTER — OFFICE VISIT (OUTPATIENT)
Dept: WOUND CARE | Facility: HOSPITAL | Age: 75
End: 2025-02-07
Payer: MEDICARE

## 2025-02-07 PROCEDURE — G0277 HBOT, FULL BODY CHAMBER, 30M: HCPCS

## 2025-02-08 ENCOUNTER — HOSPITAL ENCOUNTER (INPATIENT)
Facility: HOSPITAL | Age: 75
LOS: 2 days | Discharge: HOME | DRG: 700 | End: 2025-02-12
Attending: EMERGENCY MEDICINE | Admitting: INTERNAL MEDICINE
Payer: MEDICARE

## 2025-02-08 DIAGNOSIS — T83.9XXA PROBLEM WITH FOLEY CATHETER, INITIAL ENCOUNTER: Primary | ICD-10-CM

## 2025-02-08 DIAGNOSIS — R31.0 GROSS HEMATURIA: ICD-10-CM

## 2025-02-08 DIAGNOSIS — Z79.01 ANTICOAGULATED: ICD-10-CM

## 2025-02-08 LAB
ALBUMIN SERPL-MCNC: 4 G/DL (ref 3.5–5.2)
ALBUMIN/GLOB SERPL: 1.4 G/DL
ALP SERPL-CCNC: 104 U/L (ref 39–117)
ALT SERPL W P-5'-P-CCNC: 18 U/L (ref 1–41)
ANION GAP SERPL CALCULATED.3IONS-SCNC: 12 MMOL/L (ref 5–15)
APTT PPP: 28.2 SECONDS (ref 22.7–35.4)
AST SERPL-CCNC: 20 U/L (ref 1–40)
BASOPHILS # BLD AUTO: 0.04 10*3/MM3 (ref 0–0.2)
BASOPHILS NFR BLD AUTO: 0.3 % (ref 0–1.5)
BILIRUB SERPL-MCNC: 0.6 MG/DL (ref 0–1.2)
BUN SERPL-MCNC: 13 MG/DL (ref 8–23)
BUN/CREAT SERPL: 13.5 (ref 7–25)
CALCIUM SPEC-SCNC: 9.1 MG/DL (ref 8.6–10.5)
CHLORIDE SERPL-SCNC: 103 MMOL/L (ref 98–107)
CO2 SERPL-SCNC: 24 MMOL/L (ref 22–29)
CREAT SERPL-MCNC: 0.96 MG/DL (ref 0.76–1.27)
DEPRECATED RDW RBC AUTO: 44.3 FL (ref 37–54)
EGFRCR SERPLBLD CKD-EPI 2021: 81.9 ML/MIN/1.73
EOSINOPHIL # BLD AUTO: 0.06 10*3/MM3 (ref 0–0.4)
EOSINOPHIL NFR BLD AUTO: 0.4 % (ref 0.3–6.2)
ERYTHROCYTE [DISTWIDTH] IN BLOOD BY AUTOMATED COUNT: 12.5 % (ref 12.3–15.4)
GLOBULIN UR ELPH-MCNC: 2.9 GM/DL
GLUCOSE SERPL-MCNC: 119 MG/DL (ref 65–99)
HCT VFR BLD AUTO: 40.6 % (ref 37.5–51)
HGB BLD-MCNC: 13.2 G/DL (ref 13–17.7)
IMM GRANULOCYTES # BLD AUTO: 0.07 10*3/MM3 (ref 0–0.05)
IMM GRANULOCYTES NFR BLD AUTO: 0.5 % (ref 0–0.5)
INR PPP: 1.14 (ref 0.9–1.1)
LYMPHOCYTES # BLD AUTO: 1.2 10*3/MM3 (ref 0.7–3.1)
LYMPHOCYTES NFR BLD AUTO: 7.8 % (ref 19.6–45.3)
MCH RBC QN AUTO: 31.3 PG (ref 26.6–33)
MCHC RBC AUTO-ENTMCNC: 32.5 G/DL (ref 31.5–35.7)
MCV RBC AUTO: 96.2 FL (ref 79–97)
MONOCYTES # BLD AUTO: 1.2 10*3/MM3 (ref 0.1–0.9)
MONOCYTES NFR BLD AUTO: 7.8 % (ref 5–12)
NEUTROPHILS NFR BLD AUTO: 12.87 10*3/MM3 (ref 1.7–7)
NEUTROPHILS NFR BLD AUTO: 83.2 % (ref 42.7–76)
NRBC BLD AUTO-RTO: 0 /100 WBC (ref 0–0.2)
PLATELET # BLD AUTO: 259 10*3/MM3 (ref 140–450)
PMV BLD AUTO: 11.1 FL (ref 6–12)
POTASSIUM SERPL-SCNC: 3.9 MMOL/L (ref 3.5–5.2)
PROT SERPL-MCNC: 6.9 G/DL (ref 6–8.5)
PROTHROMBIN TIME: 14.5 SECONDS (ref 11.7–14.2)
RBC # BLD AUTO: 4.22 10*6/MM3 (ref 4.14–5.8)
SODIUM SERPL-SCNC: 139 MMOL/L (ref 136–145)
WBC NRBC COR # BLD AUTO: 15.44 10*3/MM3 (ref 3.4–10.8)

## 2025-02-08 PROCEDURE — 51798 US URINE CAPACITY MEASURE: CPT

## 2025-02-08 PROCEDURE — 85730 THROMBOPLASTIN TIME PARTIAL: CPT | Performed by: EMERGENCY MEDICINE

## 2025-02-08 PROCEDURE — 85025 COMPLETE CBC W/AUTO DIFF WBC: CPT | Performed by: EMERGENCY MEDICINE

## 2025-02-08 PROCEDURE — 85610 PROTHROMBIN TIME: CPT | Performed by: EMERGENCY MEDICINE

## 2025-02-08 PROCEDURE — 99285 EMERGENCY DEPT VISIT HI MDM: CPT

## 2025-02-08 PROCEDURE — 80053 COMPREHEN METABOLIC PANEL: CPT | Performed by: EMERGENCY MEDICINE

## 2025-02-08 RX ORDER — SODIUM CHLORIDE 0.9 % (FLUSH) 0.9 %
10 SYRINGE (ML) INJECTION AS NEEDED
Status: DISCONTINUED | OUTPATIENT
Start: 2025-02-08 | End: 2025-02-12 | Stop reason: HOSPADM

## 2025-02-09 ENCOUNTER — APPOINTMENT (OUTPATIENT)
Dept: CT IMAGING | Facility: HOSPITAL | Age: 75
DRG: 700 | End: 2025-02-09
Payer: MEDICARE

## 2025-02-09 ENCOUNTER — ANESTHESIA (OUTPATIENT)
Dept: PERIOP | Facility: HOSPITAL | Age: 75
End: 2025-02-09
Payer: MEDICARE

## 2025-02-09 ENCOUNTER — ANESTHESIA EVENT (OUTPATIENT)
Dept: PERIOP | Facility: HOSPITAL | Age: 75
End: 2025-02-09
Payer: MEDICARE

## 2025-02-09 PROBLEM — N30.40 RADIATION CYSTITIS: Status: ACTIVE | Noted: 2025-02-09

## 2025-02-09 LAB
ANION GAP SERPL CALCULATED.3IONS-SCNC: 10 MMOL/L (ref 5–15)
APTT PPP: 26.8 SECONDS (ref 22.7–35.4)
APTT PPP: 26.9 SECONDS (ref 22.7–35.4)
BUN SERPL-MCNC: 12 MG/DL (ref 8–23)
BUN/CREAT SERPL: 13.3 (ref 7–25)
CALCIUM SPEC-SCNC: 8.8 MG/DL (ref 8.6–10.5)
CHLORIDE SERPL-SCNC: 103 MMOL/L (ref 98–107)
CO2 SERPL-SCNC: 23 MMOL/L (ref 22–29)
CREAT SERPL-MCNC: 0.9 MG/DL (ref 0.76–1.27)
DEPRECATED RDW RBC AUTO: 40.4 FL (ref 37–54)
EGFRCR SERPLBLD CKD-EPI 2021: 88.5 ML/MIN/1.73
ERYTHROCYTE [DISTWIDTH] IN BLOOD BY AUTOMATED COUNT: 11.8 % (ref 12.3–15.4)
GLUCOSE SERPL-MCNC: 183 MG/DL (ref 65–99)
HCT VFR BLD AUTO: 33.5 % (ref 37.5–51)
HGB BLD-MCNC: 11.6 G/DL (ref 13–17.7)
INR PPP: 1.12 (ref 0.9–1.1)
MCH RBC QN AUTO: 32.1 PG (ref 26.6–33)
MCHC RBC AUTO-ENTMCNC: 34.6 G/DL (ref 31.5–35.7)
MCV RBC AUTO: 92.8 FL (ref 79–97)
PLATELET # BLD AUTO: 210 10*3/MM3 (ref 140–450)
PMV BLD AUTO: 11.5 FL (ref 6–12)
POTASSIUM SERPL-SCNC: 3.6 MMOL/L (ref 3.5–5.2)
PROTHROMBIN TIME: 14.3 SECONDS (ref 11.7–14.2)
RBC # BLD AUTO: 3.61 10*6/MM3 (ref 4.14–5.8)
SODIUM SERPL-SCNC: 136 MMOL/L (ref 136–145)
WBC NRBC COR # BLD AUTO: 8.43 10*3/MM3 (ref 3.4–10.8)

## 2025-02-09 PROCEDURE — G0378 HOSPITAL OBSERVATION PER HR: HCPCS

## 2025-02-09 PROCEDURE — 25010000002 PROPOFOL 10 MG/ML EMULSION: Performed by: NURSE ANESTHETIST, CERTIFIED REGISTERED

## 2025-02-09 PROCEDURE — 25010000002 LIDOCAINE 2% SOLUTION: Performed by: NURSE ANESTHETIST, CERTIFIED REGISTERED

## 2025-02-09 PROCEDURE — 85610 PROTHROMBIN TIME: CPT | Performed by: INTERNAL MEDICINE

## 2025-02-09 PROCEDURE — 25010000002 CEFAZOLIN PER 500 MG: Performed by: UROLOGY

## 2025-02-09 PROCEDURE — 85730 THROMBOPLASTIN TIME PARTIAL: CPT | Performed by: INTERNAL MEDICINE

## 2025-02-09 PROCEDURE — 80048 BASIC METABOLIC PNL TOTAL CA: CPT

## 2025-02-09 PROCEDURE — 36415 COLL VENOUS BLD VENIPUNCTURE: CPT

## 2025-02-09 PROCEDURE — 74176 CT ABD & PELVIS W/O CONTRAST: CPT

## 2025-02-09 PROCEDURE — 25810000003 SODIUM CHLORIDE 0.9 % SOLUTION

## 2025-02-09 PROCEDURE — 25010000002 PHENYLEPHRINE 10 MG/ML SOLUTION: Performed by: NURSE ANESTHETIST, CERTIFIED REGISTERED

## 2025-02-09 PROCEDURE — 25810000003 LACTATED RINGERS PER 1000 ML: Performed by: NURSE ANESTHETIST, CERTIFIED REGISTERED

## 2025-02-09 PROCEDURE — 25010000002 DEXAMETHASONE PER 1 MG: Performed by: NURSE ANESTHETIST, CERTIFIED REGISTERED

## 2025-02-09 PROCEDURE — 85027 COMPLETE CBC AUTOMATED: CPT

## 2025-02-09 PROCEDURE — 0TCB8ZZ EXTIRPATION OF MATTER FROM BLADDER, VIA NATURAL OR ARTIFICIAL OPENING ENDOSCOPIC: ICD-10-PCS | Performed by: UROLOGY

## 2025-02-09 PROCEDURE — 25010000002 FENTANYL CITRATE (PF) 50 MCG/ML SOLUTION: Performed by: NURSE ANESTHETIST, CERTIFIED REGISTERED

## 2025-02-09 PROCEDURE — 25010000002 HEPARIN (PORCINE) 25000-0.45 UT/250ML-% SOLUTION: Performed by: UROLOGY

## 2025-02-09 PROCEDURE — 25010000002 ONDANSETRON PER 1 MG: Performed by: NURSE ANESTHETIST, CERTIFIED REGISTERED

## 2025-02-09 RX ORDER — NITROGLYCERIN 0.4 MG/1
0.4 TABLET SUBLINGUAL
Status: DISCONTINUED | OUTPATIENT
Start: 2025-02-09 | End: 2025-02-12 | Stop reason: HOSPADM

## 2025-02-09 RX ORDER — SODIUM CHLORIDE 9 MG/ML
50 INJECTION, SOLUTION INTRAVENOUS CONTINUOUS
Status: ACTIVE | OUTPATIENT
Start: 2025-02-09 | End: 2025-02-09

## 2025-02-09 RX ORDER — HEPARIN SODIUM 5000 [USP'U]/ML
40-80 INJECTION, SOLUTION INTRAVENOUS; SUBCUTANEOUS EVERY 6 HOURS PRN
Status: DISCONTINUED | OUTPATIENT
Start: 2025-02-09 | End: 2025-02-12

## 2025-02-09 RX ORDER — PROMETHAZINE HYDROCHLORIDE 25 MG/1
25 TABLET ORAL ONCE AS NEEDED
Status: DISCONTINUED | OUTPATIENT
Start: 2025-02-09 | End: 2025-02-09 | Stop reason: HOSPADM

## 2025-02-09 RX ORDER — HEPARIN SODIUM 10000 [USP'U]/100ML
18 INJECTION, SOLUTION INTRAVENOUS
Status: DISCONTINUED | OUTPATIENT
Start: 2025-02-09 | End: 2025-02-12

## 2025-02-09 RX ORDER — NALOXONE HCL 0.4 MG/ML
0.2 VIAL (ML) INJECTION AS NEEDED
Status: DISCONTINUED | OUTPATIENT
Start: 2025-02-09 | End: 2025-02-09 | Stop reason: HOSPADM

## 2025-02-09 RX ORDER — IPRATROPIUM BROMIDE AND ALBUTEROL SULFATE 2.5; .5 MG/3ML; MG/3ML
3 SOLUTION RESPIRATORY (INHALATION) ONCE AS NEEDED
Status: DISCONTINUED | OUTPATIENT
Start: 2025-02-09 | End: 2025-02-09 | Stop reason: HOSPADM

## 2025-02-09 RX ORDER — SODIUM CHLORIDE, SODIUM LACTATE, POTASSIUM CHLORIDE, CALCIUM CHLORIDE 600; 310; 30; 20 MG/100ML; MG/100ML; MG/100ML; MG/100ML
INJECTION, SOLUTION INTRAVENOUS CONTINUOUS PRN
Status: DISCONTINUED | OUTPATIENT
Start: 2025-02-09 | End: 2025-02-09 | Stop reason: SURG

## 2025-02-09 RX ORDER — ONDANSETRON 2 MG/ML
4 INJECTION INTRAMUSCULAR; INTRAVENOUS ONCE AS NEEDED
Status: DISCONTINUED | OUTPATIENT
Start: 2025-02-09 | End: 2025-02-09 | Stop reason: HOSPADM

## 2025-02-09 RX ORDER — ATROPINE SULFATE 0.4 MG/ML
0.4 INJECTION, SOLUTION INTRAMUSCULAR; INTRAVENOUS; SUBCUTANEOUS ONCE AS NEEDED
Status: DISCONTINUED | OUTPATIENT
Start: 2025-02-09 | End: 2025-02-09 | Stop reason: HOSPADM

## 2025-02-09 RX ORDER — DIPHENHYDRAMINE HYDROCHLORIDE 50 MG/ML
12.5 INJECTION INTRAMUSCULAR; INTRAVENOUS
Status: DISCONTINUED | OUTPATIENT
Start: 2025-02-09 | End: 2025-02-09 | Stop reason: HOSPADM

## 2025-02-09 RX ORDER — FENTANYL CITRATE 50 UG/ML
INJECTION, SOLUTION INTRAMUSCULAR; INTRAVENOUS AS NEEDED
Status: DISCONTINUED | OUTPATIENT
Start: 2025-02-09 | End: 2025-02-09 | Stop reason: SURG

## 2025-02-09 RX ORDER — PRAVASTATIN SODIUM 40 MG
40 TABLET ORAL NIGHTLY
Status: DISCONTINUED | OUTPATIENT
Start: 2025-02-09 | End: 2025-02-12 | Stop reason: HOSPADM

## 2025-02-09 RX ORDER — LISINOPRIL 20 MG/1
40 TABLET ORAL DAILY
Status: DISCONTINUED | OUTPATIENT
Start: 2025-02-09 | End: 2025-02-12 | Stop reason: HOSPADM

## 2025-02-09 RX ORDER — FENTANYL CITRATE 50 UG/ML
25 INJECTION, SOLUTION INTRAMUSCULAR; INTRAVENOUS
Status: DISCONTINUED | OUTPATIENT
Start: 2025-02-09 | End: 2025-02-09 | Stop reason: HOSPADM

## 2025-02-09 RX ORDER — FLUTICASONE PROPIONATE 50 MCG
2 SPRAY, SUSPENSION (ML) NASAL DAILY PRN
COMMUNITY

## 2025-02-09 RX ORDER — FLUMAZENIL 0.1 MG/ML
0.2 INJECTION INTRAVENOUS AS NEEDED
Status: DISCONTINUED | OUTPATIENT
Start: 2025-02-09 | End: 2025-02-09 | Stop reason: HOSPADM

## 2025-02-09 RX ORDER — PROPOFOL 10 MG/ML
VIAL (ML) INTRAVENOUS AS NEEDED
Status: DISCONTINUED | OUTPATIENT
Start: 2025-02-09 | End: 2025-02-09 | Stop reason: SURG

## 2025-02-09 RX ORDER — BISACODYL 5 MG/1
5 TABLET, DELAYED RELEASE ORAL DAILY PRN
Status: DISCONTINUED | OUTPATIENT
Start: 2025-02-09 | End: 2025-02-12 | Stop reason: HOSPADM

## 2025-02-09 RX ORDER — POLYETHYLENE GLYCOL 3350 17 G/17G
17 POWDER, FOR SOLUTION ORAL DAILY PRN
Status: DISCONTINUED | OUTPATIENT
Start: 2025-02-09 | End: 2025-02-12 | Stop reason: HOSPADM

## 2025-02-09 RX ORDER — ONDANSETRON 4 MG/1
4 TABLET, ORALLY DISINTEGRATING ORAL EVERY 6 HOURS PRN
Status: DISCONTINUED | OUTPATIENT
Start: 2025-02-09 | End: 2025-02-12 | Stop reason: HOSPADM

## 2025-02-09 RX ORDER — HYDROCODONE BITARTRATE AND ACETAMINOPHEN 5; 325 MG/1; MG/1
1 TABLET ORAL ONCE AS NEEDED
Status: COMPLETED | OUTPATIENT
Start: 2025-02-09 | End: 2025-02-09

## 2025-02-09 RX ORDER — HYDROCODONE BITARTRATE AND ACETAMINOPHEN 7.5; 325 MG/1; MG/1
1 TABLET ORAL EVERY 4 HOURS PRN
Status: DISCONTINUED | OUTPATIENT
Start: 2025-02-09 | End: 2025-02-09 | Stop reason: HOSPADM

## 2025-02-09 RX ORDER — HYDROMORPHONE HYDROCHLORIDE 1 MG/ML
0.25 INJECTION, SOLUTION INTRAMUSCULAR; INTRAVENOUS; SUBCUTANEOUS
Status: DISCONTINUED | OUTPATIENT
Start: 2025-02-09 | End: 2025-02-09 | Stop reason: HOSPADM

## 2025-02-09 RX ORDER — AMOXICILLIN 250 MG
2 CAPSULE ORAL 2 TIMES DAILY PRN
Status: DISCONTINUED | OUTPATIENT
Start: 2025-02-09 | End: 2025-02-12 | Stop reason: HOSPADM

## 2025-02-09 RX ORDER — LIDOCAINE HYDROCHLORIDE 20 MG/ML
INJECTION, SOLUTION INFILTRATION; PERINEURAL AS NEEDED
Status: DISCONTINUED | OUTPATIENT
Start: 2025-02-09 | End: 2025-02-09 | Stop reason: SURG

## 2025-02-09 RX ORDER — PROMETHAZINE HYDROCHLORIDE 25 MG/1
25 SUPPOSITORY RECTAL ONCE AS NEEDED
Status: DISCONTINUED | OUTPATIENT
Start: 2025-02-09 | End: 2025-02-09 | Stop reason: HOSPADM

## 2025-02-09 RX ORDER — BISACODYL 10 MG
10 SUPPOSITORY, RECTAL RECTAL DAILY PRN
Status: DISCONTINUED | OUTPATIENT
Start: 2025-02-09 | End: 2025-02-12 | Stop reason: HOSPADM

## 2025-02-09 RX ORDER — EPHEDRINE SULFATE 50 MG/ML
5 INJECTION, SOLUTION INTRAVENOUS ONCE AS NEEDED
Status: DISCONTINUED | OUTPATIENT
Start: 2025-02-09 | End: 2025-02-09 | Stop reason: HOSPADM

## 2025-02-09 RX ORDER — ONDANSETRON 2 MG/ML
INJECTION INTRAMUSCULAR; INTRAVENOUS AS NEEDED
Status: DISCONTINUED | OUTPATIENT
Start: 2025-02-09 | End: 2025-02-09 | Stop reason: SURG

## 2025-02-09 RX ORDER — PHENYLEPHRINE HYDROCHLORIDE 10 MG/ML
INJECTION INTRAVENOUS AS NEEDED
Status: DISCONTINUED | OUTPATIENT
Start: 2025-02-09 | End: 2025-02-09 | Stop reason: SURG

## 2025-02-09 RX ORDER — DEXAMETHASONE SODIUM PHOSPHATE 4 MG/ML
INJECTION, SOLUTION INTRA-ARTICULAR; INTRALESIONAL; INTRAMUSCULAR; INTRAVENOUS; SOFT TISSUE AS NEEDED
Status: DISCONTINUED | OUTPATIENT
Start: 2025-02-09 | End: 2025-02-09 | Stop reason: SURG

## 2025-02-09 RX ORDER — ACETAMINOPHEN 325 MG/1
650 TABLET ORAL EVERY 4 HOURS PRN
Status: DISCONTINUED | OUTPATIENT
Start: 2025-02-09 | End: 2025-02-12 | Stop reason: HOSPADM

## 2025-02-09 RX ORDER — HYDRALAZINE HYDROCHLORIDE 20 MG/ML
5 INJECTION INTRAMUSCULAR; INTRAVENOUS
Status: DISCONTINUED | OUTPATIENT
Start: 2025-02-09 | End: 2025-02-09 | Stop reason: HOSPADM

## 2025-02-09 RX ORDER — LABETALOL HYDROCHLORIDE 5 MG/ML
5 INJECTION, SOLUTION INTRAVENOUS
Status: DISCONTINUED | OUTPATIENT
Start: 2025-02-09 | End: 2025-02-09 | Stop reason: HOSPADM

## 2025-02-09 RX ORDER — ONDANSETRON 2 MG/ML
4 INJECTION INTRAMUSCULAR; INTRAVENOUS EVERY 6 HOURS PRN
Status: DISCONTINUED | OUTPATIENT
Start: 2025-02-09 | End: 2025-02-12 | Stop reason: HOSPADM

## 2025-02-09 RX ADMIN — PHENYLEPHRINE HYDROCHLORIDE 200 MCG: 10 INJECTION INTRAVENOUS at 17:15

## 2025-02-09 RX ADMIN — PRAVASTATIN SODIUM 40 MG: 40 TABLET ORAL at 21:08

## 2025-02-09 RX ADMIN — LIDOCAINE HYDROCHLORIDE 80 MG: 20 INJECTION, SOLUTION INFILTRATION; PERINEURAL at 16:54

## 2025-02-09 RX ADMIN — PROPOFOL 170 MG: 10 INJECTION, EMULSION INTRAVENOUS at 16:54

## 2025-02-09 RX ADMIN — SODIUM CHLORIDE, POTASSIUM CHLORIDE, SODIUM LACTATE AND CALCIUM CHLORIDE: 600; 310; 30; 20 INJECTION, SOLUTION INTRAVENOUS at 16:41

## 2025-02-09 RX ADMIN — FENTANYL CITRATE 50 MCG: 50 INJECTION, SOLUTION INTRAMUSCULAR; INTRAVENOUS at 16:54

## 2025-02-09 RX ADMIN — SODIUM CHLORIDE 50 ML/HR: 9 INJECTION, SOLUTION INTRAVENOUS at 03:32

## 2025-02-09 RX ADMIN — DEXAMETHASONE SODIUM PHOSPHATE 8 MG: 4 INJECTION, SOLUTION INTRA-ARTICULAR; INTRALESIONAL; INTRAMUSCULAR; INTRAVENOUS; SOFT TISSUE at 17:06

## 2025-02-09 RX ADMIN — HEPARIN SODIUM 18 UNITS/KG/HR: 10000 INJECTION, SOLUTION INTRAVENOUS at 21:24

## 2025-02-09 RX ADMIN — HYDROCODONE BITARTRATE AND ACETAMINOPHEN 1 TABLET: 5; 325 TABLET ORAL at 17:51

## 2025-02-09 RX ADMIN — CEFAZOLIN 2000 MG: 2 INJECTION, POWDER, FOR SOLUTION INTRAMUSCULAR; INTRAVENOUS at 16:44

## 2025-02-09 RX ADMIN — PHENYLEPHRINE HYDROCHLORIDE 200 MCG: 10 INJECTION INTRAVENOUS at 17:04

## 2025-02-09 RX ADMIN — ONDANSETRON 4 MG: 2 INJECTION, SOLUTION INTRAMUSCULAR; INTRAVENOUS at 17:07

## 2025-02-09 NOTE — ED PROVIDER NOTES
EMERGENCY DEPARTMENT ENCOUNTER    Room Number:  E557/1  PCP: Chloe Arriaga MD  Independent Historians: Patient    HPI:  Chief Complaint: had concerns including urine retention.      A complete HPI/ROS/PMH/PSH/SH/FH are unobtainable due to: None    Chronic or social conditions impacting patient care (Social Determinants of Health): None      Context: [place] is a 74 y.o. male with a medical history of PE/DVT, A-fib on Eliquis, prostate cancer, hypertension who presents to the ED c/o acute Paz catheter clogged with urine retention.  Pt with h/o radical prostatectomy XRT with prostate ca with radiation cystitis currently starting HBO therapy for recurrent hematuria and urine retention.        Review of prior external notes (non-ED) -and- Review of prior external test results outside of this encounter: Patient's hemoglobin 12.3 and creatinine 0.9 on January 28 when patient presented with similar issues with his Paz catheter.    Patient admitted January 15 for continuous gross hematuria with blood clots requiring continuous bladder irrigation.  During that admission patient had new bilateral PEs confirmed with CT chest.    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 coli pneumonia  08/01/2023    History of prostate cancer 2002    History of recent hospitalization 08/01/2023    Kidney stone 2019    On anticoagulant therapy          PAST SURGICAL HISTORY  Past Surgical History:   Procedure Laterality Date    CATARACT EXTRACTION      WITH LENS IMPLANTS    COLONOSCOPY N/A 09/13/2023    Procedure: COLONOSCOPY TO CECUM AND TERMINAL ILEUM WITH COLD POLYPECTOMY;  Surgeon: Humberto Peña MD;  Location: [facility] ENDOSCOPY;  Service: Gastroenterology;  Laterality: N/A;  SCREENING  DIVERTICULOSIS, COLON POLYP, HEMORRHOIDS    CYSTOSCOPY BLADDER BIOPSY N/A 06/12/2019    Procedure: CYSTOSCOPY TUR BLADDER TUMOR;  Surgeon: Joel Russell MD;  Location: [facility] OR;  Service: Urology    CYSTOSCOPY BLADDER BIOPSY N/A 05/31/2024    Procedure: CYSTOSCOPY BLADDER BIOPSY FULGURATION;  Surgeon: Joel Russell MD;  Location: [facility] MAIN OR;  Service: Urology;  Laterality: N/A;    CYSTOSCOPY WITH CLOT EVACUATION N/A 10/02/2024    Procedure: CYSTOSCOPY WITH CLOT EVACUATION, CAUTERY OF BLEEDING, DENG INSERTION;  Surgeon: Joel Russell MD;  Location: [facility] MAIN OR;  Service: Urology;  Laterality: N/A;    ELBOW PROCEDURE Left     FX    EYE SURGERY  03/2024    Cataract    PROSTATECTOMY      PROSTATECTOMY  03/01/2003    TONSILLECTOMY  1957    VASECTOMY           FAMILY HISTORY  Family History   Problem Relation Age of Onset    Hypertension Mother     Hypertension Father     Prostate cancer Father     Malig Hyperthermia Neg Hx          SOCIAL HISTORY  Social History     Socioeconomic History    Marital status:    Tobacco Use    Smoking status: Never     Passive exposure: Never    Smokeless tobacco: Never   Vaping Use    Vaping status: Never Used   Substance and Sexual Activity    Alcohol use: Yes     Alcohol/week: 1.0 standard drink of alcohol     Types: 1 Drinks containing 0.5 oz of alcohol per week     Comment: Occasional beer or cocktail    Drug use: No    Sexual activity: Yes     Partners:  Female     Birth control/protection: Vasectomy         ALLERGIES  Penicillins        REVIEW OF SYSTEMS  Review of Systems  Included in HPI  All systems reviewed and negative except for those discussed in HPI.      PHYSICAL EXAM    I have reviewed the triage vital signs and nursing notes.    ED Triage Vitals   Temp Heart Rate Resp BP SpO2   02/08/25 1923 02/08/25 1923 02/08/25 1923 02/08/25 1936 02/08/25 1923   97.8 °F (36.6 °C) 93 16 180/96 96 %      Temp src Heart Rate Source Patient Position BP Location FiO2 (%)   -- -- -- -- --              Physical Exam  GENERAL: cooperative and conversant M standing at bedside changing into gown, alert, no acute distress  SKIN: Warm, dry  HENT: Normocephalic, atraumatic  ABDOMEN: soft, nontender, nondistended, mcclellan cath in place with dark blood in mcclellan tubing and gross blood withurine leaking around tube at urethral meatus  MUSCULOSKELETAL: no deformity  NEURO: alert, moves all extremities, follows commands                                                                   LAB RESULTS  Recent Results (from the past 24 hours)   Comprehensive Metabolic Panel    Collection Time: 02/08/25 10:03 PM    Specimen: Blood   Result Value Ref Range    Glucose 119 (H) 65 - 99 mg/dL    BUN 13 8 - 23 mg/dL    Creatinine 0.96 0.76 - 1.27 mg/dL    Sodium 139 136 - 145 mmol/L    Potassium 3.9 3.5 - 5.2 mmol/L    Chloride 103 98 - 107 mmol/L    CO2 24.0 22.0 - 29.0 mmol/L    Calcium 9.1 8.6 - 10.5 mg/dL    Total Protein 6.9 6.0 - 8.5 g/dL    Albumin 4.0 3.5 - 5.2 g/dL    ALT (SGPT) 18 1 - 41 U/L    AST (SGOT) 20 1 - 40 U/L    Alkaline Phosphatase 104 39 - 117 U/L    Total Bilirubin 0.6 0.0 - 1.2 mg/dL    Globulin 2.9 gm/dL    A/G Ratio 1.4 g/dL    BUN/Creatinine Ratio 13.5 7.0 - 25.0    Anion Gap 12.0 5.0 - 15.0 mmol/L    eGFR 81.9 >60.0 mL/min/1.73   CBC Auto Differential    Collection Time: 02/08/25 10:03 PM    Specimen: Blood   Result Value Ref Range    WBC 15.44 (H) 3.40 - 10.80 10*3/mm3     RBC 4.22 4.14 - 5.80 10*6/mm3    Hemoglobin 13.2 13.0 - 17.7 g/dL    Hematocrit 40.6 37.5 - 51.0 %    MCV 96.2 79.0 - 97.0 fL    MCH 31.3 26.6 - 33.0 pg    MCHC 32.5 31.5 - 35.7 g/dL    RDW 12.5 12.3 - 15.4 %    RDW-SD 44.3 37.0 - 54.0 fl    MPV 11.1 6.0 - 12.0 fL    Platelets 259 140 - 450 10*3/mm3    Neutrophil % 83.2 (H) 42.7 - 76.0 %    Lymphocyte % 7.8 (L) 19.6 - 45.3 %    Monocyte % 7.8 5.0 - 12.0 %    Eosinophil % 0.4 0.3 - 6.2 %    Basophil % 0.3 0.0 - 1.5 %    Immature Grans % 0.5 0.0 - 0.5 %    Neutrophils, Absolute 12.87 (H) 1.70 - 7.00 10*3/mm3    Lymphocytes, Absolute 1.20 0.70 - 3.10 10*3/mm3    Monocytes, Absolute 1.20 (H) 0.10 - 0.90 10*3/mm3    Eosinophils, Absolute 0.06 0.00 - 0.40 10*3/mm3    Basophils, Absolute 0.04 0.00 - 0.20 10*3/mm3    Immature Grans, Absolute 0.07 (H) 0.00 - 0.05 10*3/mm3    nRBC 0.0 0.0 - 0.2 /100 WBC   Protime-INR    Collection Time: 02/08/25 10:42 PM    Specimen: Blood   Result Value Ref Range    Protime 14.5 (H) 11.7 - 14.2 Seconds    INR 1.14 (H) 0.90 - 1.10   aPTT    Collection Time: 02/08/25 10:42 PM    Specimen: Blood   Result Value Ref Range    PTT 28.2 22.7 - 35.4 seconds         RADIOLOGY  No Radiology Exams Resulted Within Past 24 Hours      MEDICATIONS GIVEN IN ER  Medications   sodium chloride 0.9 % flush 10 mL (has no administration in time range)   nitroglycerin (NITROSTAT) SL tablet 0.4 mg (has no administration in time range)   acetaminophen (TYLENOL) tablet 650 mg (has no administration in time range)   sennosides-docusate (PERICOLACE) 8.6-50 MG per tablet 2 tablet (has no administration in time range)     And   polyethylene glycol (MIRALAX) packet 17 g (has no administration in time range)     And   bisacodyl (DULCOLAX) EC tablet 5 mg (has no administration in time range)     And   bisacodyl (DULCOLAX) suppository 10 mg (has no administration in time range)   ondansetron ODT (ZOFRAN-ODT) disintegrating tablet 4 mg (has no administration in time range)      Or   ondansetron (ZOFRAN) injection 4 mg (has no administration in time range)   sodium chloride 0.9 % infusion (has no administration in time range)         ORDERS PLACED DURING THIS VISIT:  Orders Placed This Encounter   Procedures    Comprehensive Metabolic Panel    Protime-INR    aPTT    CBC Auto Differential    Basic Metabolic Panel    CBC (No Diff)    Diet: Cardiac; Healthy Heart (2-3 Na+); Fluid Consistency: Thin (IDDSI 0)    Assess Need for Indwelling Urinary Catheter - Follow Removal Protocol    Urinary Catheter Care    Continuous Bladder Irrigation    Insert 3-Way Urinary Catheter    May Irrigate Catheter    Vital Signs    Maintain IV Access    Telemetry - Place Orders & Notify Provider of Results When Patient Experiences Acute Chest Pain, Dysrhythmia or Respiratory Distress    May Be Off Telemetry for Tests    Daily Weights    Oral Care    Place Sequential Compression Device    Maintain Sequential Compression Device    Continuous Pulse Oximetry    Up with assistance    Strict Intake & Output    Code Status and Medical Interventions: CPR (Attempt to Resuscitate); Full    [facility] (on-call MD unless specified) Details    Inpatient Urology Consult    Incentive Spirometry    Oxygen Therapy- Nasal Cannula; Titrate 1-6 LPM Per SpO2; 90 - 95%    Telemetry Scan    Insert Peripheral IV    Initiate Observation Status    CBC & Differential         OUTPATIENT MEDICATION MANAGEMENT:  Current Facility-Administered Medications Ordered in Epic   Medication Dose Route Frequency Provider Last Rate Last Admin    acetaminophen (TYLENOL) tablet 650 mg  650 mg Oral Q4H PRN Tanna Bond, APRN        sennosides-docusate (PERICOLACE) 8.6-50 MG per tablet 2 tablet  2 tablet Oral BID PRN Tanna Bond, APRN        And    polyethylene glycol (MIRALAX) packet 17 g  17 g Oral Daily PRN Tanna Bond APRN        And    bisacodyl (DULCOLAX) EC tablet 5 mg  5 mg Oral Daily PRN Tanna Bond, APRN        And    bisacodyl  (DULCOLAX) suppository 10 mg  10 mg Rectal Daily PRN Tanna Bond, APRN        nitroglycerin (NITROSTAT) SL tablet 0.4 mg  0.4 mg Sublingual Q5 Min PRN Tanna Bond APRTOYIN        ondansetron ODT (ZOFRAN-ODT) disintegrating tablet 4 mg  4 mg Oral Q6H PRN Tanna Bond APRN        Or    ondansetron (ZOFRAN) injection 4 mg  4 mg Intravenous Q6H PRN Tanna Bond, APRN        sodium chloride 0.9 % flush 10 mL  10 mL Intravenous PRN Rachel Laird MD        sodium chloride 0.9 % infusion  50 mL/hr Intravenous Continuous Tanna Bond APRN         No current [facility]-ordered outpatient medications on file.         PROCEDURES  Procedures            PROGRESS, DATA ANALYSIS, CONSULTS, AND MEDICAL DECISION MAKING  All labs have been independently interpreted by me.  All radiology studies have been reviewed by me. All EKG's have been independently viewed and interpreted by me.  Discussion below represents my analysis of pertinent findings related to patient's condition, differential diagnosis, treatment plan and final disposition.    Mult attempts at bedside irrigation and flushing of some blood clots.  Pt with no urine flow after just a few minutes with recurrent discomfort and clots with obstruction. Plan for 3 way mcclellan for CBI.                 AS OF 03:24 EST VITALS:    BP - 141/78  HR - 84  TEMP - 97.4 °F (36.3 °C) (Oral)  O2 SATS - 97%      COMPLEXITY OF CARE  The patient requires admission.    DIAGNOSIS  Final diagnoses:   Problem with Mcclellan catheter, initial encounter   Anticoagulated   Gross hematuria         DISPOSITION  ED Disposition       ED Disposition   Decision to Admit    Condition   --    Comment   Level of Care: Telemetry [5]   Diagnosis: Gross hematuria [599.71.ICD-9-CM]   Admitting Physician: RICHIE BYRNE [[id number]]   Attending Physician: RICHIE BYRNE [[id number]]   Is patient appropriate for Inpatient Observation Unit?: No [0]                  Please note that portions  of this document were completed with a voice recognition program.    Note Disclaimer: At [facility], we believe that sharing information builds trust and better relationships. You are receiving this note because you recently visited [facility]. It is possible you will see health information before a provider has talked with you about it. This kind of information can be easy to misunderstand. To help you fully understand what it means for your health, we urge you to discuss this note with your provider.         Rachel Laird MD  02/09/25 [id number]

## 2025-02-09 NOTE — CONSULTS
[facility] Consult  Patient Identification:  NAME:  North Carcamo  Age:  74 y.o.   Sex:  male   :  1950   MRN:  [id number]     Chief complaint: gross hematuria    History of present illness:    Pt is a 74 y.o. male with a history of prostate cancer s/p radical prostatecomy and XRT. He follows with Dr. Brian Russell with [facility]. The patient has a history of radiation cystitis and intermittent hematuria. He was hospitalized last month with a similar issue. He has been undergoing hyperbaric oxygen treatments for his radiation cystitis and he believes they have helped reduce the frequency of his hematuria.  Urology has been consulted to see him for his gross hematuria.  Patient underwent CT abd/pelvis without contrast prior to my assessment this morning. CT demonstrates increased blood in the urinary bladder compared to previous imaging.Previous bladder stones are no longer kayode. There is non-obstructive left nephrolithiasis. No hydronephrosis.   Patient has a three-way irrigation catheter in place with CBI. RN just evacuated a significant amount of clot and patient reports feeling improvement in discomfort and abdominal pain. CBI is running at a steady rate with pink tinged urine output.  Patient reports he is on Eliquis for recently diagnosed DVT/PE in 2025. He does admit that his hematuria worsened since restarting on Eliquis (he had been on it previously but stopped in October).     In hospital:  -AVSS, good UOP  -WBC - 8.43 (15.44)  -Creat - 0.90 (0.96)  - Hgb - 11.6 (13.2)    Past medical history:  Past Medical History:   Diagnosis Date    Allergic     Penicillin    Cataract 2022    Diverticulosis     E. coli pneumonia 2023    HOSPITALIZED    History of prostate cancer     History of recent hospitalization 2023    Hyperglycemia     Hyperlipidemia     Hypertension     Kidney stone 2019    Has not been a problem.    On anticoagulant therapy     Paroxysmal  atrial fibrillation        Past surgical history:  Past Surgical History:   Procedure Laterality Date    CATARACT EXTRACTION      WITH LENS IMPLANTS    COLONOSCOPY N/A 09/13/2023    Procedure: COLONOSCOPY TO CECUM AND TERMINAL ILEUM WITH COLD POLYPECTOMY;  Surgeon: Humberto Peña MD;  Location: [facility] ENDOSCOPY;  Service: Gastroenterology;  Laterality: N/A;  SCREENING  DIVERTICULOSIS, COLON POLYP, HEMORRHOIDS    CYSTOSCOPY BLADDER BIOPSY N/A 06/12/2019    Procedure: CYSTOSCOPY TUR BLADDER TUMOR;  Surgeon: Joel Russell MD;  Location: [facility] MAIN OR;  Service: Urology    CYSTOSCOPY BLADDER BIOPSY N/A 05/31/2024    Procedure: CYSTOSCOPY BLADDER BIOPSY FULGURATION;  Surgeon: Joel Russell MD;  Location: [facility] MAIN OR;  Service: Urology;  Laterality: N/A;    CYSTOSCOPY WITH CLOT EVACUATION N/A 10/02/2024    Procedure: CYSTOSCOPY WITH CLOT EVACUATION, CAUTERY OF BLEEDING, DENG INSERTION;  Surgeon: Joel Russell MD;  Location: [facility] MAIN OR;  Service: Urology;  Laterality: N/A;    ELBOW PROCEDURE Left     FX    EYE SURGERY  03/2024    Cataract    PROSTATECTOMY      PROSTATECTOMY  03/01/2003    TONSILLECTOMY  1957    VASECTOMY         Allergies:  Penicillins    Home medications:  Medications Prior to Admission   Medication Sig Dispense Refill Last Dose/Taking    Calcium Carbonate-Vitamin D (CALCIUM 600+D PO) Take 1 tablet by mouth 2 (Two) Times a Day. PT HOLDING FOR SURGERY  Indications: Calcium Deficiency   Taking    Eliquis 5 MG tablet tablet Take 1 tablet by mouth Every 12 (Twelve) Hours. 60 tablet 11 Taking    fluticasone (FLONASE) 50 MCG/ACT nasal spray Administer 2 sprays into the nostril(s) as directed by provider Daily As Needed for Rhinitis.   Taking As Needed    lisinopril (PRINIVIL,ZESTRIL) 40 MG tablet Take 1 tablet by mouth Daily. 90 tablet 1 Taking    Multiple Vitamins-Minerals (DAILY MULTIVITAMIN PO) Take 1 tablet by mouth Daily.   Taking    pravastatin (PRAVACHOL) 40 MG tablet Take 1  tablet by mouth Every Night. Indications: High Amount of Fats in the Blood 90 tablet 3 Taking    dapagliflozin Propanediol (Farxiga) 10 MG tablet Take 10 mg by mouth Daily. (Patient not taking: Reported on 2025) 30 tablet 0         Hospital medications:  lisinopril, 40 mg, Oral, Daily  pravastatin, 40 mg, Oral, Nightly      sodium chloride, 50 mL/hr, Last Rate: 50 mL/hr (25 0750)        acetaminophen    senna-docusate sodium **AND** polyethylene glycol **AND** bisacodyl **AND** bisacodyl    nitroglycerin    ondansetron ODT **OR** ondansetron    [COMPLETED] Insert Peripheral IV **AND** sodium chloride    Family history:  Family History   Problem Relation Age of Onset    Hypertension Mother     Hypertension Father     Prostate cancer Father     Malig Hyperthermia Neg Hx        Social history:  Social History     Tobacco Use    Smoking status: Never     Passive exposure: Never    Smokeless tobacco: Never   Vaping Use    Vaping status: Never Used   Substance Use Topics    Alcohol use: Yes     Alcohol/week: 1.0 standard drink of alcohol     Types: 1 Drinks containing 0.5 oz of alcohol per week     Comment: Occasional beer or cocktail    Drug use: No       Review of Systems:     12 point negative except as in HPI    Objective:  TMax 24 hours:   Temp (24hrs), Av.8 °F (36.6 °C), Min:97.4 °F (36.3 °C), Max:98.1 °F (36.7 °C)      Vitals Ranges:   Temp:  [97.4 °F (36.3 °C)-98.1 °F (36.7 °C)] 98.1 °F (36.7 °C)  Heart Rate:  [66-95] 66  Resp:  [16] 16  BP: (132-180)/(78-99) 137/81    Intake/Output Last 3 shifts:  I/O last 3 completed shifts:  In: 240 [P.O.:240]  Out: 9595 [Urine:9595]     Physical Exam:    General Appearance:    Alert, cooperative, NAD   Back:     No CVA tenderness   Lungs:     Respirations unlabored, no wheezing   Abdomen:  :      Soft, NDNT, no palpable bladder distension, nontender    Three way 22 [place] Paz catheter in place draining light pink urine; penis normal   Neuro/Psych:    Orientation intact, mood/affect pleasant       Results review:   I reviewed the patient's new clinical results.    Data review:  Lab Results (last 24 hours)       Procedure Component Value Units Date/Time    Basic Metabolic Panel [[id number]]  (Abnormal) Collected: 02/09/25 0533    Specimen: Blood Updated: 02/09/25 0703     Glucose 183 mg/dL      BUN 12 mg/dL      Creatinine 0.90 mg/dL      Sodium 136 mmol/L      Potassium 3.6 mmol/L      Comment: Slight hemolysis detected by analyzer. Result may be falsely elevated.        Chloride 103 mmol/L      CO2 23.0 mmol/L      Calcium 8.8 mg/dL      BUN/Creatinine Ratio 13.3     Anion Gap 10.0 mmol/L      eGFR 88.5 mL/min/1.73     Narrative:      GFR Categories in Chronic Kidney Disease (CKD)      GFR Category          GFR (mL/min/1.73)    Interpretation  G1                     90 or greater         Normal or high (1)  G2                      60-89                Mild decrease (1)  G3a                   45-59                Mild to moderate decrease  G3b                   30-44                Moderate to severe decrease  G4                    15-29                Severe decrease  G5                    14 or less           Kidney failure          (1)In the absence of evidence of kidney disease, neither GFR category G1 or G2 fulfill the criteria for CKD.    eGFR calculation 2021 CKD-EPI creatinine equation, which does not include race as a factor    CBC (No Diff) [[id number]]  (Abnormal) Collected: 02/09/25 0534    Specimen: Blood Updated: 02/09/25 0648     WBC 8.43 10*3/mm3      RBC 3.61 10*6/mm3      Hemoglobin 11.6 g/dL      Hematocrit 33.5 %      MCV 92.8 fL      MCH 32.1 pg      MCHC 34.6 g/dL      RDW 11.8 %      RDW-SD 40.4 fl      MPV 11.5 fL      Platelets 210 10*3/mm3     aPTT [[id number]]  (Normal) Collected: 02/08/25 2242    Specimen: Blood Updated: 02/08/25 2306     PTT 28.2 seconds     Protime-INR [[id number]]  (Abnormal) Collected: 02/08/25 2242    Specimen: Blood  Updated: 02/08/25 2305     Protime 14.5 Seconds      INR 1.14    Comprehensive Metabolic Panel [[id number]]  (Abnormal) Collected: 02/08/25 2203    Specimen: Blood Updated: 02/08/25 2245     Glucose 119 mg/dL      BUN 13 mg/dL      Creatinine 0.96 mg/dL      Sodium 139 mmol/L      Potassium 3.9 mmol/L      Chloride 103 mmol/L      CO2 24.0 mmol/L      Calcium 9.1 mg/dL      Total Protein 6.9 g/dL      Albumin 4.0 g/dL      ALT (SGPT) 18 U/L      AST (SGOT) 20 U/L      Alkaline Phosphatase 104 U/L      Total Bilirubin 0.6 mg/dL      Globulin 2.9 gm/dL      A/G Ratio 1.4 g/dL      BUN/Creatinine Ratio 13.5     Anion Gap 12.0 mmol/L      eGFR 81.9 mL/min/1.73     Narrative:      GFR Categories in Chronic Kidney Disease (CKD)      GFR Category          GFR (mL/min/1.73)    Interpretation  G1                     90 or greater         Normal or high (1)  G2                      60-89                Mild decrease (1)  G3a                   45-59                Mild to moderate decrease  G3b                   30-44                Moderate to severe decrease  G4                    15-29                Severe decrease  G5                    14 or less           Kidney failure          (1)In the absence of evidence of kidney disease, neither GFR category G1 or G2 fulfill the criteria for CKD.    eGFR calculation 2021 CKD-EPI creatinine equation, which does not include race as a factor    CBC & Differential [[id number]]  (Abnormal) Collected: 02/08/25 2203    Specimen: Blood Updated: 02/08/25 2224    Narrative:      The following orders were created for panel order CBC & Differential.  Procedure                               Abnormality         Status                     ---------                               -----------         ------                     CBC Auto Differential[[id number]]        Abnormal            Final result                 Please view results for these tests on the individual orders.    CBC Auto Differential  [[id number]]  (Abnormal) Collected: 02/08/25 2203    Specimen: Blood Updated: 02/08/25 2224     WBC 15.44 10*3/mm3      RBC 4.22 10*6/mm3      Hemoglobin 13.2 g/dL      Hematocrit 40.6 %      MCV 96.2 fL      MCH 31.3 pg      MCHC 32.5 g/dL      RDW 12.5 %      RDW-SD 44.3 fl      MPV 11.1 fL      Platelets 259 10*3/mm3      Neutrophil % 83.2 %      Lymphocyte % 7.8 %      Monocyte % 7.8 %      Eosinophil % 0.4 %      Basophil % 0.3 %      Immature Grans % 0.5 %      Neutrophils, Absolute 12.87 10*3/mm3      Lymphocytes, Absolute 1.20 10*3/mm3      Monocytes, Absolute 1.20 10*3/mm3      Eosinophils, Absolute 0.06 10*3/mm3      Basophils, Absolute 0.04 10*3/mm3      Immature Grans, Absolute 0.07 10*3/mm3      nRBC 0.0 /100 WBC              Imaging:  Imaging Results (Last 24 Hours)       Procedure Component Value Units Date/Time    CT Abdomen Pelvis Without Contrast [[id number]] Collected: 02/09/25 0745     Updated: 02/09/25 0756    Narrative:      CT ABDOMEN PELVIS WO CONTRAST-     INDICATIONS: Hematuria     TECHNIQUE: Radiation dose reduction techniques were utilized, including  automated exposure control and exposure modulation based on body size.  Unenhanced ABDOMEN AND PELVIS CT     COMPARISON: 1/15/2025     FINDINGS:     The urinary bladder, which is catheterized, and is only partly filled,  contains blood (more than on the prior exam) and foci of gas (that could  be from catheterization or could be evidence of infection). Previous  bladder stones are no longer seen. Nonobstructive left nephrolithiasis  is again demonstrated. No hydronephrosis.     Prostatectomy changes again noted.     Otherwise unremarkable unenhanced appearance of the liver, gallbladder,  spleen, adrenal glands, pancreas, kidneys, bladder.     No bowel obstruction or abnormal bowel thickening is identified. Colonic  diverticula are seen that do not appear inflamed. The appendix does not  appear inflamed. Minimal hiatal hernia is apparent.      No free intraperitoneal gas or free fluid.     Scattered small mesenteric and para-aortic lymph nodes are seen that are  not significant by size criteria.     Abdominal aorta is not aneurysmal. Aortic and other arterial  calcifications are present.     The lung bases show minimal atelectasis.     Degenerative changes are seen in the spine. No acute fracture is  identified.             Impression:         Increased blood and gas in the urinary bladder. Nonobstructive left  nephrolithiasis. Consider further evaluation to exclude the possibility  of an occult urinary lesion, such as direct visualization.     This report was finalized on 2/9/2025 7:53 AM by Dr. Miguel A Peterson M.D on Workstation: [employer]                Assessment:     Gross hematuria   Radiation cystitis    Plan:     - Reviewed CT imaging with patient and Dr. Mosqueda. Appears to be significant clot burden within the bladder.    - NPO  - Plan for cystoscopy with clot evacuation today with Dr. Mosqueda. Risks, benefits and alternatives discussed with patient and his spouse. All questions were answered to the best of my ability. Patient also understands procedure will be under general anesthesia. He would like to proceed.   - Continue CBI, wean to maintain a clear/light pink output  - Manually irrigate PRN for suspected catheter obstruction s/t blood clot  - Urology will continue to follow    ARJUN Murray  02/09/25  09:25 EST    Plan reviewed and discussed with Dr. Lebron Mosqueda.

## 2025-02-09 NOTE — ED NOTES
Nursing report ED to floor  [place] Carcamo  74 y.o.  male    HPI :  HPI  Stated Reason for Visit: urine retentions with cath  History Obtained From: patient    Chief Complaint  Chief Complaint   Patient presents with    urine retention       Admitting doctor:   Mark Cowan MD    Admitting diagnosis:   The primary encounter diagnosis was Problem with Paz catheter, initial encounter. Diagnoses of Anticoagulated and Gross hematuria were also pertinent to this visit.    Code status:   Current Code Status       Date Active Code Status Order ID Comments User Context       Prior            Allergies:   Penicillins    Isolation:   No active isolations    Intake and Output    Intake/Output Summary (Last 24 hours) at 2/9/2025 0040  Last data filed at 2/8/2025 2354  Gross per 24 hour   Intake --   Output 1245 ml   Net -1245 ml       Weight:   There were no vitals filed for this visit.    Most recent vitals:   Vitals:    02/08/25 2229 02/08/25 2301 02/08/25 2309 02/09/25 0001   BP: 140/99 132/84  141/91   Pulse: 78 95 81    Resp:       Temp:       SpO2: 96% 97% 97%        Active LDAs/IV Access:   Lines, Drains & Airways       Active LDAs       Name Placement date Placement time Site Days    Peripheral IV 02/08/25 2204 Anterior;Right Forearm 02/08/25 2204  Forearm  less than 1    Continuous Bladder Irrigation Triple-lumen 22 Fr 02/08/25 2225  Triple-lumen  less than 1                    Labs (abnormal labs have a star):   Labs Reviewed   COMPREHENSIVE METABOLIC PANEL - Abnormal; Notable for the following components:       Result Value    Glucose 119 (*)     All other components within normal limits    Narrative:     GFR Categories in Chronic Kidney Disease (CKD)      GFR Category          GFR (mL/min/1.73)    Interpretation  G1                     90 or greater         Normal or high (1)  G2                      60-89                Mild decrease (1)  G3a                   45-59                Mild to moderate  decrease  G3b                   30-44                Moderate to severe decrease  G4                    15-29                Severe decrease  G5                    14 or less           Kidney failure          (1)In the absence of evidence of kidney disease, neither GFR category G1 or G2 fulfill the criteria for CKD.    eGFR calculation 2021 CKD-EPI creatinine equation, which does not include race as a factor   PROTIME-INR - Abnormal; Notable for the following components:    Protime 14.5 (*)     INR 1.14 (*)     All other components within normal limits   CBC WITH AUTO DIFFERENTIAL - Abnormal; Notable for the following components:    WBC 15.44 (*)     Neutrophil % 83.2 (*)     Lymphocyte % 7.8 (*)     Neutrophils, Absolute 12.87 (*)     Monocytes, Absolute 1.20 (*)     Immature Grans, Absolute 0.07 (*)     All other components within normal limits   APTT - Normal   CBC AND DIFFERENTIAL    Narrative:     The following orders were created for panel order CBC & Differential.  Procedure                               Abnormality         Status                     ---------                               -----------         ------                     CBC Auto Differential[[id number]]        Abnormal            Final result                 Please view results for these tests on the individual orders.       EKG:   No orders to display       Meds given in ED:   Medications   sodium chloride 0.9 % flush 10 mL (has no administration in time range)       Imaging results:  No radiology results for the last day    Ambulatory status:   - ad chhaya but w/ CBI    Social issues:   Social History     Socioeconomic History    Marital status:    Tobacco Use    Smoking status: Never     Passive exposure: Never    Smokeless tobacco: Never   Vaping Use    Vaping status: Never Used   Substance and Sexual Activity    Alcohol use: Yes     Alcohol/week: 1.0 standard drink of alcohol     Types: 1 Drinks containing 0.5 oz of alcohol per week      Comment: Occasional beer or cocktail    Drug use: No    Sexual activity: Yes     Partners: Female     Birth control/protection: Vasectomy       Peripheral Neurovascular  Peripheral Neurovascular (Adult)  Peripheral Neurovascular WDL: WDL, neurovascular assessment upper, neurovascular assessment lower  LUE Neurovascular Assessment  Temperature LUE: warm  Color LUE: no discoloration  Sensation LUE: no tenderness, no tingling, no numbness  RUE Neurovascular Assessment  Temperature RUE: warm  Color RUE: no discoloration  Sensation RUE: no numbness, no tenderness, no tingling  LLE Neurovascular Assessment  Temperature LLE: warm  Color LLE: no discoloration  Sensation LLE: no numbness, no tenderness, no tingling  RLE Neurovascular Assessment  Temperature RLE: warm  Color RLE: no discoloration  Sensation RLE: no numbness, no tenderness, no tingling    Neuro Cognitive  Neuro Cognitive (Adult)  Cognitive/Neuro/Behavioral WDL: WDL, orientation  Orientation: oriented x 4    Learning  Learning Assessment  Learning Readiness and Ability: no barriers identified  Education Provided  Person Taught: patient, spouse  Teaching Method: verbal instruction  Teaching Focus: symptom/problem overview  Education Outcome Evaluation: eager to learn, acceptance expressed, verbalizes understanding    Respiratory  Respiratory  Airway WDL: WDL  Respiratory WDL  Respiratory WDL: WDL, rhythm/pattern  Rhythm/Pattern, Respiratory: no shortness of breath reported, depth regular, pattern regular, unlabored    Abdominal Pain       Pain Assessments  Pain (Adult)  (0-10) Pain Rating: Rest: 5    NIH Stroke Scale       Rashad Isaacs RN  02/09/25 00:40 EST

## 2025-02-09 NOTE — ANESTHESIA POSTPROCEDURE EVALUATION
Patient: [place] Carcamo    Procedure Summary       Date: 02/09/25 Room / Location: [facility] OR 01 / [facility] MAIN OR    Anesthesia Start: 1647 Anesthesia Stop: 1725    Procedure: CYSTOSCOPY WITH CLOT EVACUATION Diagnosis:       Gross hematuria      (Gross hematuria [R31.0])    Surgeons: Lebron Mosqueda MD Provider: Thelma Apodaca MD    Anesthesia Type: general ASA Status: 3            Anesthesia Type: general    Vitals  Vitals Value Taken Time   /86 02/09/25 1745   Temp 36.8 °C (98.2 °F) 02/09/25 1723   Pulse 58 02/09/25 1758   Resp 12 02/09/25 1730   SpO2 99 % 02/09/25 1758   Vitals shown include unfiled device data.        Post Anesthesia Care and Evaluation    Patient location during evaluation: bedside  Patient participation: complete - patient participated  Level of consciousness: awake  Pain management: adequate    Airway patency: patent  Anesthetic complications: No anesthetic complications    Cardiovascular status: acceptable  Respiratory status: acceptable  Hydration status: acceptable    Comments: /73   Pulse 60   Temp 36.8 °C (98.2 °F) (Oral)   Resp 12   Wt 80.6 kg (177 lb 11.1 oz)   SpO2 100%   BMI 26.24 kg/m²

## 2025-02-09 NOTE — ANESTHESIA PROCEDURE NOTES
Airway  Urgency: elective    Date/Time: 2/9/2025 4:59 PM  Airway not difficult    General Information and Staff    Patient location during procedure: OR  CRNA/CAA: Cecelia Valentino CRNA    Indications and Patient Condition  Indications for airway management: airway protection    Preoxygenated: yes  MILS not maintained throughout  Mask difficulty assessment: 1 - vent by mask    Final Airway Details  Final airway type: supraglottic airway      Successful airway: classic and LMA  Size 5     Number of attempts at approach: 1  Assessment: lips, teeth, and gum same as pre-op    Additional Comments  Inflated to seal.

## 2025-02-09 NOTE — H&P
[facility] Admission H&P    Patient Care Team:  Chloe Arriaga MD as PCP - General (Internal Medicine)  Brennen Burroughs MD as Consulting Physician (Cardiology)  Armando Alvarez MD as Consulting Physician (Hematology and Oncology)    Chief complaint: Bloody urine    History of Present Illness    This is a very pleasant 74-year-old male with history of recently diagnosed PE and bilateral DVTs around the middle of January, bladder and prostate cancer, radiation cystitis, paroxysmal atrial fibrillation, hypertension who presented to the emergency room with complaints of bloody urine.  He has been on Eliquis for the clots.  Three-way catheter was placed and bladder irrigation initiated in the emergency room and he is having pink-colored urine with clots.  CT scan this morning showed blood and gas in the bladder.  Urology has evaluated and are planning on cystoscopy and clot evacuation today.  Patient has no complaints of chest pain, shortness of breath.  No abdominal discomfort.  Denies fevers or chills.    Past Medical History:   Diagnosis Date    Allergic     Penicillin    Cataract March 2022    Diverticulosis     E. coli pneumonia 08/01/2023    HOSPITALIZED    History of prostate cancer 2002    History of recent hospitalization 08/01/2023    Hyperglycemia     Hyperlipidemia     Hypertension     Kidney stone 2019    Has not been a problem.    On anticoagulant therapy     Paroxysmal atrial fibrillation      Past Surgical History:   Procedure Laterality Date    CATARACT EXTRACTION      WITH LENS IMPLANTS    COLONOSCOPY N/A 09/13/2023    Procedure: COLONOSCOPY TO CECUM AND TERMINAL ILEUM WITH COLD POLYPECTOMY;  Surgeon: Humberto Peña MD;  Location: [facility] ENDOSCOPY;  Service: Gastroenterology;  Laterality: N/A;  SCREENING  DIVERTICULOSIS, COLON POLYP, HEMORRHOIDS    CYSTOSCOPY BLADDER BIOPSY N/A 06/12/2019    Procedure: CYSTOSCOPY TUR BLADDER TUMOR;  Surgeon: Joel Russell MD;  Location: [facility] OR;   Service: Urology    CYSTOSCOPY BLADDER BIOPSY N/A 05/31/2024    Procedure: CYSTOSCOPY BLADDER BIOPSY FULGURATION;  Surgeon: Joel Russell MD;  Location: [facility] OR;  Service: Urology;  Laterality: N/A;    CYSTOSCOPY WITH CLOT EVACUATION N/A 10/02/2024    Procedure: CYSTOSCOPY WITH CLOT EVACUATION, CAUTERY OF BLEEDING, DENG INSERTION;  Surgeon: Joel Russell MD;  Location: [facility] OR;  Service: Urology;  Laterality: N/A;    ELBOW PROCEDURE Left     FX    EYE SURGERY  03/2024    Cataract    PROSTATECTOMY      PROSTATECTOMY  03/01/2003    TONSILLECTOMY  1957    VASECTOMY       Family History   Problem Relation Age of Onset    Hypertension Mother     Hypertension Father     Prostate cancer Father     Malig Hyperthermia Neg Hx      Social History     Tobacco Use    Smoking status: Never     Passive exposure: Never    Smokeless tobacco: Never   Vaping Use    Vaping status: Never Used   Substance Use Topics    Alcohol use: Yes     Alcohol/week: 1.0 standard drink of alcohol     Types: 1 Drinks containing 0.5 oz of alcohol per week     Comment: Occasional beer or cocktail    Drug use: No     Medications Prior to Admission   Medication Sig Dispense Refill Last Dose/Taking    Calcium Carbonate-Vitamin D (CALCIUM 600+D PO) Take 1 tablet by mouth 2 (Two) Times a Day. PT HOLDING FOR SURGERY  Indications: Calcium Deficiency   Taking    Eliquis 5 MG tablet tablet Take 1 tablet by mouth Every 12 (Twelve) Hours. 60 tablet 11 Taking    fluticasone (FLONASE) 50 MCG/ACT nasal spray Administer 2 sprays into the nostril(s) as directed by provider Daily As Needed for Rhinitis.   Taking As Needed    lisinopril (PRINIVIL,ZESTRIL) 40 MG tablet Take 1 tablet by mouth Daily. 90 tablet 1 Taking    Multiple Vitamins-Minerals (DAILY MULTIVITAMIN PO) Take 1 tablet by mouth Daily.   Taking    pravastatin (PRAVACHOL) 40 MG tablet Take 1 tablet by mouth Every Night. Indications: High Amount of Fats in the Blood 90 tablet 3 Taking     dapagliflozin Propanediol (Farxiga) 10 MG tablet Take 10 mg by mouth Daily. (Patient not taking: Reported on 2025) 30 tablet 0      Allergies:  Penicillins    Review of Systems   Constitutional:  Negative for chills, fatigue and fever.   HENT:  Negative for congestion, sore throat and trouble swallowing.    Eyes:  Negative for visual disturbance.   Respiratory:  Negative for cough, chest tightness and shortness of breath.    Cardiovascular:  Negative for chest pain, palpitations and leg swelling.   Gastrointestinal:  Negative for abdominal pain, blood in stool, constipation, diarrhea, nausea and vomiting.   Endocrine: Negative for polydipsia and polyuria.   Genitourinary:  Positive for difficulty urinating and hematuria. Negative for dysuria, frequency and urgency.   Musculoskeletal:  Negative for arthralgias, joint swelling and myalgias.   Skin:  Negative for rash and wound.   Neurological:  Negative for dizziness, weakness, light-headedness and numbness.        PHYSICAL EXAM    Vital Signs  tMax 24 hrs:  Temp (24hrs), Av °F (36.7 °C), Min:97.4 °F (36.3 °C), Max:98.5 °F (36.9 °C)    Vitals Ranges:  Temp:  [97.4 °F (36.3 °C)-98.5 °F (36.9 °C)] 98.5 °F (36.9 °C)  Heart Rate:  [58-95] 58  Resp:  [16] 16  BP: (132-180)/(74-99) 134/74    Physical Exam  Vitals and nursing note reviewed.   Constitutional:       General: He is not in acute distress.     Appearance: He is well-developed. He is not ill-appearing.   HENT:      Head: Normocephalic and atraumatic.      Nose: Nose normal.      Mouth/Throat:      Mouth: Mucous membranes are not dry.   Eyes:      Conjunctiva/sclera: Conjunctivae normal.      Pupils: Pupils are equal, round, and reactive to light.   Neck:      Vascular: No JVD.   Cardiovascular:      Rate and Rhythm: Normal rate and regular rhythm.      Heart sounds: No murmur heard.     No gallop.   Pulmonary:      Effort: Pulmonary effort is normal. No accessory muscle usage or respiratory distress.       Breath sounds: No decreased breath sounds or wheezing.   Abdominal:      General: Bowel sounds are normal. There is no distension.      Palpations: Abdomen is soft.      Tenderness: There is no abdominal tenderness. There is no guarding or rebound.   Genitourinary:     Comments: Three-way catheter in place with pink-colored urine and clots present  Musculoskeletal:         General: No deformity. Normal range of motion.      Cervical back: Normal range of motion and neck supple.   Lymphadenopathy:      Cervical: No cervical adenopathy.   Skin:     General: Skin is warm and dry.      Findings: No erythema or rash.   Neurological:      Mental Status: He is alert and oriented to person, place, and time.      Cranial Nerves: No cranial nerve deficit.         Results Review:  Results from last 7 days   Lab Units 02/09/25  0534   WBC 10*3/mm3 8.43   HEMOGLOBIN g/dL 11.6*   HEMATOCRIT % 33.5*   PLATELETS 10*3/mm3 210     Results from last 7 days   Lab Units 02/09/25  0533 02/08/25  2203   SODIUM mmol/L 136 139   POTASSIUM mmol/L 3.6 3.9   CHLORIDE mmol/L 103 103   CO2 mmol/L 23.0 24.0   BUN mg/dL 12 13   CREATININE mg/dL 0.90 0.96   CALCIUM mg/dL 8.8 9.1   BILIRUBIN mg/dL  --  0.6   ALK PHOS U/L  --  104   ALT (SGPT) U/L  --  18   AST (SGOT) U/L  --  20   GLUCOSE mg/dL 183* 119*        I reviewed the patient's new clinical results.  I reviewed the patient's new imaging results and agree with the interpretation.        Active Hospital Problems    Diagnosis  POA    **Gross hematuria [R31.0]  Yes    Radiation cystitis [N30.40]  Unknown    Pulmonary emboli [I26.99]  Yes    Paroxysmal atrial fibrillation [I48.0]  Yes    Bladder cancer [C67.9]  Yes    Prostate cancer [C61]  Yes    Hypertension [I10]  Yes    Hyperlipidemia [E78.5]  Yes      Resolved Hospital Problems   No resolved problems to display.       Assessment & Plan    The patient was admitted overnight last night for the gross hematuria with clot formation.  I have  discussed with urology.  They are going to take him for cystoscopy and clot evacuation.  Thereafter I am going to start him on a heparin drip.  He is hemodynamically stable with no complaints of chest pain or shortness of breath.  He is not hypoxic.  His lab work all looks stable thus far.  Additional plans based on his clinical course.    I discussed the patients findings and my recommendations with patient and family      Mekhi Ly MD  02/09/25  11:27 EST

## 2025-02-09 NOTE — PLAN OF CARE
Goal Outcome Evaluation:  Plan of Care Reviewed With: patient        Progress: improving  Outcome Evaluation: Pt admitted from ER, A&Ox4, VSS, room air. NSR w/PVCs on monitor.  CBI via 3 way catheter progressing well. IVF@50cc/hr. Urology to see this morning. Plan of care ongoing.

## 2025-02-09 NOTE — OP NOTE
Operative Note      North Carcamo  74 y.o.  1950  male  [id number]      2/9/2025  Surgeons and Role:     * Lebron Mosqueda MD - Primary   Pre-operative Diagnosis: Urinary retention  Radiation cystitis    Post-operative Diagnosis: Same  Complications:None    History: This is a 74-year-old gentleman he is a patient of my partner Dr. Brian Russell.  He had a history of prostate cancer.  He has had a radical prostatectomy in the past.  He had radiation therapy after prostatectomy now has radiation cystitis.  He had a single episode of A-fib in the past.  He has a history of a DVT and a PE and is on chronic anticoagulation with Eliquis.  He has had multiple episodes of gross hematuria and clot retention.  He is currently undergoing hyperbaric oxygen therapy.  He presented the emergency room with gross hematuria and had a three-way Paz catheter placed and clots irrigated.  We elected to take him the operating room to reevaluate his bladder and to ensure all the clots were removed.    Description of procedure:    After consent was obtained the patient was taken to the operating room and placed supine on the operating room table.  After general anesthesia was administered he was prepped and draped in the standard fashion.  He was in the dorsolithotomy position.  A cystoscope was passed per urethra to his bladder.  He had some small clots in his bladder that I was able to easily irrigate free.  He had findings consistent with radiation cystitis but no active bleeding areas.  I replaced a 22 [place] three-way hematuria catheter.  Is extubated in the operating room taken the recovery room stable.  Procedure(s) and Anesthesia Type:     * CYSTOSCOPY WITH CLOT EVACUATION - General    Specimens: None      Estimated Blood Loss:    Minimal  Lebron Mosqueda MD  2/9/2025

## 2025-02-09 NOTE — ANESTHESIA PREPROCEDURE EVALUATION
Anesthesia Evaluation     Patient summary reviewed and Nursing notes reviewed   no history of anesthetic complications:   NPO Solid Status: > 8 hours  NPO Liquid Status: > 2 hours           Airway   Mallampati: II  Dental - normal exam     Pulmonary    (+) pulmonary embolism (1/2025),  Cardiovascular   Exercise tolerance: good (4-7 METS)    ECG reviewed  PT is on anticoagulation therapy  Rhythm: regular    (+) hypertension, dysrhythmias Atrial Fib, hyperlipidemia      Neuro/Psych- negative ROS  GI/Hepatic/Renal/Endo    (+) renal disease- stones    Musculoskeletal (-) negative ROS    Abdominal    Substance History - negative use     OB/GYN negative ob/gyn ROS         Other      history of cancer remission                      Anesthesia Plan    ASA 3     general     (  I have reviewed the patient's history with the patient and the chart, including all pertinent laboratory results and imaging. I have explained the risks of anesthesia including but not limited to dental damage, corneal abrasion, nerve injury, MI, stroke, and death.    )  intravenous induction     Anesthetic plan, risks, benefits, and alternatives have been provided, discussed and informed consent has been obtained with: patient.      CODE STATUS:    Code Status (Patient has no pulse and is not breathing): CPR (Attempt to Resuscitate)  Medical Interventions (Patient has pulse or is breathing): Full

## 2025-02-09 NOTE — PLAN OF CARE
Goal Outcome Evaluation:  Plan of Care Reviewed With: patient, spouse        Progress: improving  Outcome Evaluation: Monitor pain,labs,and vitals. Cystoscopy with clot evacuation procedure today. Patient continues on CBI-tolerating well. Will continue to monitor.

## 2025-02-10 LAB
ANION GAP SERPL CALCULATED.3IONS-SCNC: 9.1 MMOL/L (ref 5–15)
APTT PPP: 60.3 SECONDS (ref 22.7–35.4)
APTT PPP: 63 SECONDS (ref 22.7–35.4)
APTT PPP: 85.8 SECONDS (ref 22.7–35.4)
BASOPHILS # BLD AUTO: 0.01 10*3/MM3 (ref 0–0.2)
BASOPHILS NFR BLD AUTO: 0.1 % (ref 0–1.5)
BUN SERPL-MCNC: 19 MG/DL (ref 8–23)
BUN/CREAT SERPL: 15.3 (ref 7–25)
CALCIUM SPEC-SCNC: 8.7 MG/DL (ref 8.6–10.5)
CHLORIDE SERPL-SCNC: 102 MMOL/L (ref 98–107)
CO2 SERPL-SCNC: 23.9 MMOL/L (ref 22–29)
CREAT SERPL-MCNC: 1.24 MG/DL (ref 0.76–1.27)
DEPRECATED RDW RBC AUTO: 40.1 FL (ref 37–54)
EGFRCR SERPLBLD CKD-EPI 2021: 60.3 ML/MIN/1.73
EOSINOPHIL # BLD AUTO: 0 10*3/MM3 (ref 0–0.4)
EOSINOPHIL NFR BLD AUTO: 0 % (ref 0.3–6.2)
ERYTHROCYTE [DISTWIDTH] IN BLOOD BY AUTOMATED COUNT: 11.9 % (ref 12.3–15.4)
GLUCOSE SERPL-MCNC: 170 MG/DL (ref 65–99)
HCT VFR BLD AUTO: 33.5 % (ref 37.5–51)
HGB BLD-MCNC: 11.6 G/DL (ref 13–17.7)
IMM GRANULOCYTES # BLD AUTO: 0.05 10*3/MM3 (ref 0–0.05)
IMM GRANULOCYTES NFR BLD AUTO: 0.5 % (ref 0–0.5)
LYMPHOCYTES # BLD AUTO: 0.58 10*3/MM3 (ref 0.7–3.1)
LYMPHOCYTES NFR BLD AUTO: 5.3 % (ref 19.6–45.3)
MCH RBC QN AUTO: 31.9 PG (ref 26.6–33)
MCHC RBC AUTO-ENTMCNC: 34.6 G/DL (ref 31.5–35.7)
MCV RBC AUTO: 92 FL (ref 79–97)
MONOCYTES # BLD AUTO: 0.29 10*3/MM3 (ref 0.1–0.9)
MONOCYTES NFR BLD AUTO: 2.7 % (ref 5–12)
NEUTROPHILS NFR BLD AUTO: 9.93 10*3/MM3 (ref 1.7–7)
NEUTROPHILS NFR BLD AUTO: 91.4 % (ref 42.7–76)
NRBC BLD AUTO-RTO: 0 /100 WBC (ref 0–0.2)
PLATELET # BLD AUTO: 268 10*3/MM3 (ref 140–450)
PMV BLD AUTO: 11.3 FL (ref 6–12)
POTASSIUM SERPL-SCNC: 5.1 MMOL/L (ref 3.5–5.2)
RBC # BLD AUTO: 3.64 10*6/MM3 (ref 4.14–5.8)
SODIUM SERPL-SCNC: 135 MMOL/L (ref 136–145)
WBC NRBC COR # BLD AUTO: 10.86 10*3/MM3 (ref 3.4–10.8)

## 2025-02-10 PROCEDURE — 85730 THROMBOPLASTIN TIME PARTIAL: CPT | Performed by: INTERNAL MEDICINE

## 2025-02-10 PROCEDURE — 25010000002 HEPARIN (PORCINE) 25000-0.45 UT/250ML-% SOLUTION: Performed by: UROLOGY

## 2025-02-10 PROCEDURE — 80048 BASIC METABOLIC PNL TOTAL CA: CPT | Performed by: UROLOGY

## 2025-02-10 PROCEDURE — 25010000002 HEPARIN (PORCINE) PER 1000 UNITS: Performed by: UROLOGY

## 2025-02-10 PROCEDURE — 85025 COMPLETE CBC W/AUTO DIFF WBC: CPT | Performed by: UROLOGY

## 2025-02-10 RX ADMIN — LISINOPRIL 40 MG: 20 TABLET ORAL at 08:59

## 2025-02-10 RX ADMIN — PRAVASTATIN SODIUM 40 MG: 40 TABLET ORAL at 20:43

## 2025-02-10 RX ADMIN — SENNOSIDES AND DOCUSATE SODIUM 2 TABLET: 50; 8.6 TABLET ORAL at 20:39

## 2025-02-10 RX ADMIN — HEPARIN SODIUM 20 UNITS/KG/HR: 10000 INJECTION, SOLUTION INTRAVENOUS at 11:38

## 2025-02-10 RX ADMIN — HEPARIN SODIUM 3200 UNITS: 5000 INJECTION INTRAVENOUS; SUBCUTANEOUS at 04:53

## 2025-02-10 NOTE — PROGRESS NOTES
LOS: 0 days     Name: North Carcamo  Age: 74 y.o.  Sex: male  :  1950  MRN: [id number]         Primary Care Physician: Chloe Arriaga MD    Subjective   Subjective  He has no complaints.  Urine is clear.  No acute overnight events    Objective   Vital Signs  Temp:  [97.2 °F (36.2 °C)-98.5 °F (36.9 °C)] 98.3 °F (36.8 °C)  Heart Rate:  [50-86] 72  Resp:  [12-19] 16  BP: (109-139)/(62-86) 130/70  Body mass index is 26.63 kg/m².    Objective:  General Appearance:  Comfortable and in no acute distress.    Vital signs: (most recent): Blood pressure 130/70, pulse 72, temperature 98.3 °F (36.8 °C), temperature source Oral, resp. rate 16, weight 81.8 kg (180 lb 5.4 oz), SpO2 96%.    Lungs:  Normal effort and normal respiratory rate.  He is not in respiratory distress.  No decreased breath sounds.    Heart: Normal rate.  Regular rhythm.    Abdomen: Abdomen is soft.  Bowel sounds are normal.   There is no abdominal tenderness.     Extremities: There is no dependent edema or local swelling.    Neurological: Patient is alert and oriented to person, place and time.    Skin:  Warm and dry.            : Three-way catheter in place.  Urine is crystal clear    Results Review:       I reviewed the patient's new clinical results.    Results from last 7 days   Lab Units 02/10/25  0322 25  0534 25  2203   WBC 10*3/mm3 10.86* 8.43 15.44*   HEMOGLOBIN g/dL 11.6* 11.6* 13.2   PLATELETS 10*3/mm3 268 210 259     Results from last 7 days   Lab Units 02/10/25  0321 25  0533 25  2203   SODIUM mmol/L 135* 136 139   POTASSIUM mmol/L 5.1 3.6 3.9   CHLORIDE mmol/L 102 103 103   CO2 mmol/L 23.9 23.0 24.0   BUN mg/dL 19 12 13   CREATININE mg/dL 1.24 0.90 0.96   CALCIUM mg/dL 8.7 8.8 9.1   GLUCOSE mg/dL 170* 183* 119*     Results from last 7 days   Lab Units 25  1214 25  2242   INR  1.12* 1.14*             Scheduled Meds:   lisinopril, 40 mg, Oral, Daily  pravastatin, 40 mg, Oral, Nightly      PRN  Meds:     acetaminophen    senna-docusate sodium **AND** polyethylene glycol **AND** bisacodyl **AND** bisacodyl    heparin (porcine)    nitroglycerin    ondansetron ODT **OR** ondansetron    [COMPLETED] Insert Peripheral IV **AND** sodium chloride  Continuous Infusions:  heparin, 18 Units/kg/hr, Last Rate: 20 Units/kg/hr (02/10/25 6723)        Assessment & Plan   Active Hospital Problems    Diagnosis  POA    **Gross hematuria [R31.0]  Yes    Radiation cystitis [N30.40]  Unknown    Pulmonary emboli [I26.99]  Yes    Paroxysmal atrial fibrillation [I48.0]  Yes    Bladder cancer [C67.9]  Yes    Prostate cancer [C61]  Yes    Hypertension [I10]  Yes    Hyperlipidemia [E78.5]  Yes      Resolved Hospital Problems   No resolved problems to display.       Assessment & Plan    -POD 1 from cystoscopy with clot evacuation  -Urology plans noted to DC CBI and perform as needed manual catheter irrigation  -Continue heparin drip with eventual transition back to Eliquis  -Blood pressure stable    Dispo  Possible discharge tomorrow    Expected discharge date/ time has not been documented.     Mekhi Ly MD  [facility]  02/10/25  08:25 EST

## 2025-02-10 NOTE — PLAN OF CARE
Goal Outcome Evaluation:              Outcome Evaluation: CBI continous, no c/o pain or discomfort noted this shift,Light pink urine in catheter bag, heparin gtt started and titrated per md order. compliant with plan of care. Plan of care ongoing.

## 2025-02-10 NOTE — PLAN OF CARE
Problem: Adult Inpatient Plan of Care  Goal: Plan of Care Review  Outcome: Progressing  Flowsheets  Taken 2/10/2025 1702 by Gurdeep Pierce RN  Outcome Evaluation: VSS. Room Air. No reports of pain. BM this afternoon. CBI still going, may stop later today or tomorrow, and then try void trial if no issues. Still working towards theraoeutic Hep gtt. ambulated around the nursing station 10+ times today.  Plan of Care Reviewed With: patient  Taken 2/9/2025 1806 by Yenni Dunaway RN  Progress: improving  Goal: Patient-Specific Goal (Individualized)  Outcome: Progressing  Goal: Absence of Hospital-Acquired Illness or Injury  Outcome: Progressing  Intervention: Identify and Manage Fall Risk  Recent Flowsheet Documentation  Taken 2/10/2025 1536 by Gurdeep Pierce RN  Safety Promotion/Fall Prevention:   activity supervised   assistive device/personal items within reach   clutter free environment maintained   fall prevention program maintained   nonskid shoes/slippers when out of bed   gait belt   room organization consistent   safety round/check completed  Taken 2/10/2025 1405 by Gurdeep Pierce RN  Safety Promotion/Fall Prevention:   activity supervised   assistive device/personal items within reach   clutter free environment maintained   fall prevention program maintained   nonskid shoes/slippers when out of bed   gait belt   room organization consistent   safety round/check completed  Taken 2/10/2025 1200 by Gurdeep Pierce RN  Safety Promotion/Fall Prevention:   activity supervised   assistive device/personal items within reach   clutter free environment maintained   fall prevention program maintained   nonskid shoes/slippers when out of bed   gait belt   room organization consistent   safety round/check completed  Taken 2/10/2025 1015 by Gurdeep Pierce RN  Safety Promotion/Fall Prevention:   activity supervised   assistive device/personal items within reach   clutter free environment maintained   fall prevention  program maintained   nonskid shoes/slippers when out of bed   gait belt   room organization consistent   safety round/check completed  Taken 2/10/2025 0800 by Gurdeep Pierce RN  Safety Promotion/Fall Prevention:   activity supervised   assistive device/personal items within reach   clutter free environment maintained   fall prevention program maintained   nonskid shoes/slippers when out of bed   gait belt   room organization consistent   safety round/check completed  Intervention: Prevent Skin Injury  Recent Flowsheet Documentation  Taken 2/10/2025 1405 by Gurdeep Pierce RN  Body Position: position changed independently  Taken 2/10/2025 0800 by Gurdeep Pierce RN  Body Position: position changed independently  Goal: Optimal Comfort and Wellbeing  Outcome: Progressing  Goal: Readiness for Transition of Care  Outcome: Progressing     Problem: Comorbidity Management  Goal: Blood Pressure in Desired Range  Outcome: Progressing   Goal Outcome Evaluation:  Plan of Care Reviewed With: patient           Outcome Evaluation: VSS. Room Air. No reports of pain. BM this afternoon. CBI still going, may stop later today or tomorrow, and then try void trial if no issues. Still working towards theraoeutic Hep gtt. ambulated around the nursing station 10+ times today.                              no deformity, pain or tenderness. no restriction of movement

## 2025-02-10 NOTE — PROGRESS NOTES
Urology Follow up for Gross Hematuria    Patient reports no problems overnight after clot evacuation. Heparin gtt running.    AVSS  Good UO  NAD, A&Ox3  Paz intact, urine yellow and clear on trickle CBI    WBC 10.8, Hb 11.6  PTT 63  Cr 1.24    Plan:  - d/c CBI  - manually irrigate the catheter prn  - continue heparin gtt  - he will need to cancel hyperbaric oxygen today  - will follow

## 2025-02-10 NOTE — PAYOR COMM NOTE
"North Mendoza (74 y.o. Male)     ATTN: NURSE REVIEWER  RE: INITIAL INPT AUTH CLINICALS   REF# [id number]  PLS REPLY TO SHABBIR CARPENTER [phone] OR FAX# [phone]      Date of Birth   1950    Social Security Number       Address   [address] [place] [address]    Home Phone   [phone]    MRN   [id number]       Cheondoism   None    Marital Status                               Admission Date   2/8/25    Admission Type   Emergency    Admitting Provider   Mekhi Ly MD    Attending Provider   Mekhi Ly MD    Department, Room/Bed   [address], E557/1       Discharge Date       Discharge Disposition       Discharge Destination                                 Attending Provider: Mekhi Ly MD    Allergies: Penicillins    Isolation: None   Infection: None   Code Status: CPR    Ht: 175.3 cm (69\")   Wt: 81.8 kg (180 lb 5.4 oz)    Admission Cmt: None   Principal Problem: Gross hematuria [R31.0]                   Active Insurance as of 2/8/2025       Primary Coverage       Payor Plan Insurance Group Employer/Plan Group    ANTHEM MEDICARE REPLACEMENT ANTHEM MED ADV HMO [id number]       Payor Plan Address Payor Plan Phone Number Payor Plan Fax Number Effective Dates    [address] [phone]  1/1/2025 - None Entered    [facility] [id number]         Subscriber Name Subscriber Birth Date Member ID       NORTH MENDOZA 1950 [id number]                     Emergency Contacts        (Rel.) Home Phone Work Phone Mobile Phone    Nanette Mendoza (Spouse) [phone] -- [phone]                 History & Physical        Mekhi Ly MD at 02/09/25 1127          A Admission H&P    Patient Care Team:  hCloe Arriaga MD as PCP - General (Internal Medicine)  Brennen Burroughs MD as Consulting Physician (Cardiology)  Armando Alvarez MD as Consulting Physician (Hematology and " Oncology)    Chief complaint: Bloody urine    History of Present Illness    This is a very pleasant 74-year-old male with history of recently diagnosed PE and bilateral DVTs around the middle of January, bladder and prostate cancer, radiation cystitis, paroxysmal atrial fibrillation, hypertension who presented to the emergency room with complaints of bloody urine.  He has been on Eliquis for the clots.  Three-way catheter was placed and bladder irrigation initiated in the emergency room and he is having pink-colored urine with clots.  CT scan this morning showed blood and gas in the bladder.  Urology has evaluated and are planning on cystoscopy and clot evacuation today.  Patient has no complaints of chest pain, shortness of breath.  No abdominal discomfort.  Denies fevers or chills.    Past Medical History:   Diagnosis Date    Allergic     Penicillin    Cataract March 2022    Diverticulosis     E. coli pneumonia 08/01/2023    HOSPITALIZED    History of prostate cancer 2002    History of recent hospitalization 08/01/2023    Hyperglycemia     Hyperlipidemia     Hypertension     Kidney stone 2019    Has not been a problem.    On anticoagulant therapy     Paroxysmal atrial fibrillation      Past Surgical History:   Procedure Laterality Date    CATARACT EXTRACTION      WITH LENS IMPLANTS    COLONOSCOPY N/A 09/13/2023    Procedure: COLONOSCOPY TO CECUM AND TERMINAL ILEUM WITH COLD POLYPECTOMY;  Surgeon: Humberto Peña MD;  Location: [facility] ENDOSCOPY;  Service: Gastroenterology;  Laterality: N/A;  SCREENING  DIVERTICULOSIS, COLON POLYP, HEMORRHOIDS    CYSTOSCOPY BLADDER BIOPSY N/A 06/12/2019    Procedure: CYSTOSCOPY TUR BLADDER TUMOR;  Surgeon: Joel Russell MD;  Location: [facility] OR;  Service: Urology    CYSTOSCOPY BLADDER BIOPSY N/A 05/31/2024    Procedure: CYSTOSCOPY BLADDER BIOPSY FULGURATION;  Surgeon: Joel Russell MD;  Location: [facility] OR;  Service: Urology;  Laterality: N/A;    CYSTOSCOPY  WITH CLOT EVACUATION N/A 10/02/2024    Procedure: CYSTOSCOPY WITH CLOT EVACUATION, CAUTERY OF BLEEDING, DENG INSERTION;  Surgeon: Joel Russell MD;  Location: [facility];  Service: Urology;  Laterality: N/A;    ELBOW PROCEDURE Left     FX    EYE SURGERY  03/2024    Cataract    PROSTATECTOMY      PROSTATECTOMY  03/01/2003    TONSILLECTOMY  1957    VASECTOMY       Family History   Problem Relation Age of Onset    Hypertension Mother     Hypertension Father     Prostate cancer Father     Malig Hyperthermia Neg Hx      Social History     Tobacco Use    Smoking status: Never     Passive exposure: Never    Smokeless tobacco: Never   Vaping Use    Vaping status: Never Used   Substance Use Topics    Alcohol use: Yes     Alcohol/week: 1.0 standard drink of alcohol     Types: 1 Drinks containing 0.5 oz of alcohol per week     Comment: Occasional beer or cocktail    Drug use: No     Medications Prior to Admission   Medication Sig Dispense Refill Last Dose/Taking    Calcium Carbonate-Vitamin D (CALCIUM 600+D PO) Take 1 tablet by mouth 2 (Two) Times a Day. PT HOLDING FOR SURGERY  Indications: Calcium Deficiency   Taking    Eliquis 5 MG tablet tablet Take 1 tablet by mouth Every 12 (Twelve) Hours. 60 tablet 11 Taking    fluticasone (FLONASE) 50 MCG/ACT nasal spray Administer 2 sprays into the nostril(s) as directed by provider Daily As Needed for Rhinitis.   Taking As Needed    lisinopril (PRINIVIL,ZESTRIL) 40 MG tablet Take 1 tablet by mouth Daily. 90 tablet 1 Taking    Multiple Vitamins-Minerals (DAILY MULTIVITAMIN PO) Take 1 tablet by mouth Daily.   Taking    pravastatin (PRAVACHOL) 40 MG tablet Take 1 tablet by mouth Every Night. Indications: High Amount of Fats in the Blood 90 tablet 3 Taking    dapagliflozin Propanediol (Farxiga) 10 MG tablet Take 10 mg by mouth Daily. (Patient not taking: Reported on 1/23/2025) 30 tablet 0      Allergies:  Penicillins    Review of Systems   Constitutional:  Negative for chills,  fatigue and fever.   HENT:  Negative for congestion, sore throat and trouble swallowing.    Eyes:  Negative for visual disturbance.   Respiratory:  Negative for cough, chest tightness and shortness of breath.    Cardiovascular:  Negative for chest pain, palpitations and leg swelling.   Gastrointestinal:  Negative for abdominal pain, blood in stool, constipation, diarrhea, nausea and vomiting.   Endocrine: Negative for polydipsia and polyuria.   Genitourinary:  Positive for difficulty urinating and hematuria. Negative for dysuria, frequency and urgency.   Musculoskeletal:  Negative for arthralgias, joint swelling and myalgias.   Skin:  Negative for rash and wound.   Neurological:  Negative for dizziness, weakness, light-headedness and numbness.        PHYSICAL EXAM    Vital Signs  tMax 24 hrs:  Temp (24hrs), Av °F (36.7 °C), Min:97.4 °F (36.3 °C), Max:98.5 °F (36.9 °C)    Vitals Ranges:  Temp:  [97.4 °F (36.3 °C)-98.5 °F (36.9 °C)] 98.5 °F (36.9 °C)  Heart Rate:  [58-95] 58  Resp:  [16] 16  BP: (132-180)/(74-99) 134/74    Physical Exam  Vitals and nursing note reviewed.   Constitutional:       General: He is not in acute distress.     Appearance: He is well-developed. He is not ill-appearing.   HENT:      Head: Normocephalic and atraumatic.      Nose: Nose normal.      Mouth/Throat:      Mouth: Mucous membranes are not dry.   Eyes:      Conjunctiva/sclera: Conjunctivae normal.      Pupils: Pupils are equal, round, and reactive to light.   Neck:      Vascular: No JVD.   Cardiovascular:      Rate and Rhythm: Normal rate and regular rhythm.      Heart sounds: No murmur heard.     No gallop.   Pulmonary:      Effort: Pulmonary effort is normal. No accessory muscle usage or respiratory distress.      Breath sounds: No decreased breath sounds or wheezing.   Abdominal:      General: Bowel sounds are normal. There is no distension.      Palpations: Abdomen is soft.      Tenderness: There is no abdominal tenderness. There  is no guarding or rebound.   Genitourinary:     Comments: Three-way catheter in place with pink-colored urine and clots present  Musculoskeletal:         General: No deformity. Normal range of motion.      Cervical back: Normal range of motion and neck supple.   Lymphadenopathy:      Cervical: No cervical adenopathy.   Skin:     General: Skin is warm and dry.      Findings: No erythema or rash.   Neurological:      Mental Status: He is alert and oriented to person, place, and time.      Cranial Nerves: No cranial nerve deficit.         Results Review:  Results from last 7 days   Lab Units 02/09/25  0534   WBC 10*3/mm3 8.43   HEMOGLOBIN g/dL 11.6*   HEMATOCRIT % 33.5*   PLATELETS 10*3/mm3 210     Results from last 7 days   Lab Units 02/09/25  0533 02/08/25  2203   SODIUM mmol/L 136 139   POTASSIUM mmol/L 3.6 3.9   CHLORIDE mmol/L 103 103   CO2 mmol/L 23.0 24.0   BUN mg/dL 12 13   CREATININE mg/dL 0.90 0.96   CALCIUM mg/dL 8.8 9.1   BILIRUBIN mg/dL  --  0.6   ALK PHOS U/L  --  104   ALT (SGPT) U/L  --  18   AST (SGOT) U/L  --  20   GLUCOSE mg/dL 183* 119*        I reviewed the patient's new clinical results.  I reviewed the patient's new imaging results and agree with the interpretation.        Active Hospital Problems    Diagnosis  POA    **Gross hematuria [R31.0]  Yes    Radiation cystitis [N30.40]  Unknown    Pulmonary emboli [I26.99]  Yes    Paroxysmal atrial fibrillation [I48.0]  Yes    Bladder cancer [C67.9]  Yes    Prostate cancer [C61]  Yes    Hypertension [I10]  Yes    Hyperlipidemia [E78.5]  Yes      Resolved Hospital Problems   No resolved problems to display.       Assessment & Plan    The patient was admitted overnight last night for the gross hematuria with clot formation.  I have discussed with urology.  They are going to take him for cystoscopy and clot evacuation.  Thereafter I am going to start him on a heparin drip.  He is hemodynamically stable with no complaints of chest pain or shortness of  breath.  He is not hypoxic.  His lab work all looks stable thus far.  Additional plans based on his clinical course.    I discussed the patients findings and my recommendations with patient and family      Mekhi Ly MD  02/09/25  11:27 EST              Electronically signed by Mekhi Ly MD at 02/09/25 1131       Facility-Administered Medications as of 2/10/2025   Medication Dose Route Frequency Provider Last Rate Last Admin    acetaminophen (TYLENOL) tablet 650 mg  650 mg Oral Q4H PRN Lebron Mosqueda MD        sennosides-docusate (PERICOLACE) 8.6-50 MG per tablet 2 tablet  2 tablet Oral BID PRN Lebron Mosqueda MD        And    polyethylene glycol (MIRALAX) packet 17 g  17 g Oral Daily PRN Lebron Mosqueda MD        And    bisacodyl (DULCOLAX) EC tablet 5 mg  5 mg Oral Daily PRN Lebron Mosqudea MD        And    bisacodyl (DULCOLAX) suppository 10 mg  10 mg Rectal Daily PRN Lebron Mosqueda MD        [COMPLETED] ceFAZolin 2000 mg IVPB in 100 mL NS (MBP)  2,000 mg Intravenous Once Lebron Mosqueda MD   Stopped at 02/09/25 1841    heparin (porcine) 5000 UNIT/ML injection 3,200-6,400 Units  40-80 Units/kg Intravenous Q6H PRN Lebron Mosqueda MD   3,200 Units at 02/10/25 0453    heparin 53504 units/250 mL (100 units/mL) in 0.45 % NaCl infusion  18 Units/kg/hr Intravenous Titrated Lebron Mosqueda MD 17.73 mL/hr at 02/10/25 1409 22 Units/kg/hr at 02/10/25 1409    [COMPLETED] HYDROcodone-acetaminophen (NORCO) 5-325 MG per tablet 1 tablet  1 tablet Oral Once PRN Cecelia Valentino CRNA   1 tablet at 02/09/25 1751    lisinopril (PRINIVIL,ZESTRIL) tablet 40 mg  40 mg Oral Daily Lebron Mosqueda MD   40 mg at 02/10/25 0859    nitroglycerin (NITROSTAT) SL tablet 0.4 mg  0.4 mg Sublingual Q5 Min PRN Lebron Mosqueda MD        ondansetron ODT (ZOFRAN-ODT) disintegrating tablet 4 mg  4 mg Oral Q6H PRN Lebron Mosqueda MD        Or    ondansetron (ZOFRAN) injection 4 mg  4  mg Intravenous Q6H PRN Lebron Mosqueda MD        pravastatin (PRAVACHOL) tablet 40 mg  40 mg Oral Nightly Lebron Mosqueda MD   40 mg at 25 2108    sodium chloride 0.9 % flush 10 mL  10 mL Intravenous PRN Lebron Mosqueda MD        [] sodium chloride 0.9 % infusion  50 mL/hr Intravenous Continuous Tanna Bond APRN   Stopped at 25 1641     Lab Results (last 24 hours)       Procedure Component Value Units Date/Time    aPTT [[id number]]  (Abnormal) Collected: 02/10/25 1107    Specimen: Blood from Arm, Left Updated: 02/10/25 1159     PTT 60.3 seconds     aPTT [[id number]]  (Abnormal) Collected: 02/10/25 0321    Specimen: Blood from Hand, Left Updated: 02/10/25 0438     PTT 63.0 seconds     Basic Metabolic Panel [[id number]]  (Abnormal) Collected: 02/10/25 0321    Specimen: Blood Updated: 02/10/25 0425     Glucose 170 mg/dL      BUN 19 mg/dL      Creatinine 1.24 mg/dL      Sodium 135 mmol/L      Potassium 5.1 mmol/L      Chloride 102 mmol/L      CO2 23.9 mmol/L      Calcium 8.7 mg/dL      BUN/Creatinine Ratio 15.3     Anion Gap 9.1 mmol/L      eGFR 60.3 mL/min/1.73     Narrative:      GFR Categories in Chronic Kidney Disease (CKD)      GFR Category          GFR (mL/min/1.73)    Interpretation  G1                     90 or greater         Normal or high (1)  G2                      60-89                Mild decrease (1)  G3a                   45-59                Mild to moderate decrease  G3b                   30-44                Moderate to severe decrease  G4                    15-29                Severe decrease  G5                    14 or less           Kidney failure          (1)In the absence of evidence of kidney disease, neither GFR category G1 or G2 fulfill the criteria for CKD.    eGFR calculation  CKD-EPI creatinine equation, which does not include race as a factor    CBC & Differential [[id number]]  (Abnormal) Collected: 02/10/25 0322    Specimen: Blood Updated: 02/10/25  0404    Narrative:      The following orders were created for panel order CBC & Differential.  Procedure                               Abnormality         Status                     ---------                               -----------         ------                     CBC Auto Differential[[id number]]        Abnormal            Final result                 Please view results for these tests on the individual orders.    CBC Auto Differential [[id number]]  (Abnormal) Collected: 02/10/25 0322    Specimen: Blood Updated: 02/10/25 0404     WBC 10.86 10*3/mm3      RBC 3.64 10*6/mm3      Hemoglobin 11.6 g/dL      Hematocrit 33.5 %      MCV 92.0 fL      MCH 31.9 pg      MCHC 34.6 g/dL      RDW 11.9 %      RDW-SD 40.1 fl      MPV 11.3 fL      Platelets 268 10*3/mm3      Neutrophil % 91.4 %      Lymphocyte % 5.3 %      Monocyte % 2.7 %      Eosinophil % 0.0 %      Basophil % 0.1 %      Immature Grans % 0.5 %      Neutrophils, Absolute 9.93 10*3/mm3      Lymphocytes, Absolute 0.58 10*3/mm3      Monocytes, Absolute 0.29 10*3/mm3      Eosinophils, Absolute 0.00 10*3/mm3      Basophils, Absolute 0.01 10*3/mm3      Immature Grans, Absolute 0.05 10*3/mm3      nRBC 0.0 /100 WBC     aPTT [[id number]]  (Normal) Collected: 02/09/25 2119    Specimen: Blood Updated: 02/09/25 2142     PTT 26.8 seconds           Imaging Results (Last 24 Hours)       ** No results found for the last 24 hours. **          ECG/EMG Results (last 24 hours)       Procedure Component Value Units Date/Time    Telemetry Scan [[id number]] Resulted: 02/08/25     Updated: 02/10/25 0826    Telemetry Scan [[id number]] Resulted: 02/08/25     Updated: 02/10/25 0826          Orders (last 24 hrs)        Start     Ordered    02/11/25 0600  Basic Metabolic Panel  Morning Draw         02/10/25 0827    02/10/25 1421  Inpatient Admission  Once         02/10/25 1421    02/10/25 1100  aPTT  Timed         02/10/25 0457    02/10/25 0600  Basic Metabolic Panel  Morning Draw          "02/09/25 1127    02/10/25 0600  CBC Auto Differential  PROCEDURE ONCE,   Status:  Canceled        Comments: Discontinue After Heparin Stopped      02/09/25 2201    02/10/25 0430  aPTT  Daily      Comments: Cancel If Patient Has Infusion Changes      02/09/25 1127    02/10/25 0430  CBC & Differential  Daily      Comments: Discontinue After Heparin Stopped      02/09/25 1127    02/10/25 0430  CBC Auto Differential  PROCEDURE ONCE        Comments: Discontinue After Heparin Stopped      02/09/25 2030    02/10/25 0330  aPTT  Timed         02/09/25 2244    02/09/25 2111  aPTT  STAT         02/09/25 2110    02/09/25 2100  pravastatin (PRAVACHOL) tablet 40 mg  Nightly         02/09/25 0907    02/09/25 1833  Advance Diet As Tolerated -  Until Discontinued         02/09/25 1832 02/09/25 1833  Continuous Bladder Irrigation  Continuous        Placed in \"And\" Linked Group    02/09/25 1832 02/09/25 1833  Assess Need for Indwelling Urinary Catheter - Follow Removal Protocol  Continuous        Comments: Indwelling Urinary Catheter Removal Criteria  Discontinue Indwelling Urinary Catheter Unless One of the Following is Present:  Urinary Retention or Obstruction  Chronic Urinary Catheter Use  End of Life  Critical Illness with Strict I/O   Tract or Abdominal Surgery  Stage 3/4 Sacral / Perineal Wound  Required Activity Restriction: Trauma  Required Activity Restriction: Spine Surgery  If Patient is Being Followed by Urology Contact Them PRIOR to Removal  Do Not Remove Indwelling Urinary Catheter Order is Present with a CLINICAL REASON to Maintain the Catheter. Provider is Required to Include a Clinical Reason to Maintain a Urinary Catheter    Patient Admitted With Indwelling Urinary Catheter (Not Placed at [facility] [place], operated by [facility])  Assess for Continued Need & Document Medical Necessity  If Infection is Suspected, Contact the Provider       Placed in \"And\" Linked Group    02/09/25 1832 02/09/25 1833  Diet: Regular/House; Fluid " "Consistency: Thin (IDDSI 0)  Diet Effective Now         02/09/25 1833    02/09/25 1832  Urinary Catheter Care  Every Shift      Placed in \"And\" Linked Group    02/09/25 1832 02/09/25 1749  Continuous Pulse Oximetry  Continuous         02/09/25 1748 02/09/25 1749  POC Glucose Once  Once,   Status:  Canceled        Comments: Post op glucose check on all diabetic patients...Notify Anesthesia if blood sugar is less than 80 mg/dL or greater than 220 mg/dL.      02/09/25 1748 02/09/25 1749  Vital signs every 5 minutes for 15 minutes, every 15 minutes thereafter.  Once,   Status:  Canceled         02/09/25 1748 02/09/25 1749  Call Anesthesiologist for additional IV Fluid bolus for Hypotension/Tachycardia  Until Discontinued,   Status:  Canceled         02/09/25 1748 02/09/25 1749  Notify Anesthesia of Any Acute Changes in Patient Condition  Until Discontinued,   Status:  Canceled         02/09/25 1748 02/09/25 1749  Notify Anesthesia for Unrelieved Pain  Until Discontinued,   Status:  Canceled         02/09/25 1748 02/09/25 1749  Once DC criteria to floor met, follow surgeon's orders.  Until Discontinued,   Status:  Canceled         02/09/25 1748 02/09/25 1749  Discharge patient from PACU when discharge criteria is met.  Until Discontinued,   Status:  Canceled         02/09/25 1748 02/09/25 1748  HYDROcodone-acetaminophen (NORCO) 5-325 MG per tablet 1 tablet  Once As Needed         02/09/25 1748 02/09/25 1748  HYDROmorphone (DILAUDID) injection 0.25 mg  Every 5 Minutes PRN,   Status:  Discontinued         02/09/25 1748 02/09/25 1748  HYDROcodone-acetaminophen (NORCO) 7.5-325 MG per tablet 1 tablet  Every 4 Hours PRN,   Status:  Discontinued         02/09/25 1748 02/09/25 1748  fentaNYL citrate (PF) (SUBLIMAZE) injection 25 mcg  Every 5 Minutes PRN,   Status:  Discontinued         02/09/25 1748 02/09/25 1748  naloxone (NARCAN) injection 0.2 mg  As Needed,   Status:  Discontinued      " "   02/09/25 1748    02/09/25 1748  flumazenil (ROMAZICON) injection 0.2 mg  As Needed,   Status:  Discontinued         02/09/25 1748    02/09/25 1748  ondansetron (ZOFRAN) injection 4 mg  Once As Needed,   Status:  Discontinued         02/09/25 1748 02/09/25 1748  promethazine (PHENERGAN) suppository 25 mg  Once As Needed,   Status:  Discontinued        Placed in \"Or\" Linked Group    02/09/25 1748    02/09/25 1748  promethazine (PHENERGAN) tablet 25 mg  Once As Needed,   Status:  Discontinued        Placed in \"Or\" Linked Group    02/09/25 1748    02/09/25 1748  labetalol (NORMODYNE,TRANDATE) injection 5 mg  Every 5 Minutes PRN,   Status:  Discontinued         02/09/25 1748 02/09/25 1748  hydrALAZINE (APRESOLINE) injection 5 mg  Every 10 Minutes PRN,   Status:  Discontinued         02/09/25 1748    02/09/25 1748  ePHEDrine injection 5 mg  Once As Needed,   Status:  Discontinued         02/09/25 1748 02/09/25 1748  Atropine Sulfate (PF) injection 0.4 mg  Once As Needed,   Status:  Discontinued         02/09/25 1748 02/09/25 1748  diphenhydrAMINE (BENADRYL) injection 12.5 mg  Every 15 Minutes PRN,   Status:  Discontinued         02/09/25 1748    02/09/25 1748  ipratropium-albuterol (DUO-NEB) nebulizer solution 3 mL  Once As Needed,   Status:  Discontinued         02/09/25 1748    02/09/25 1243  ceFAZolin 2000 mg IVPB in 100 mL NS (MBP)  Once         02/09/25 1241    02/09/25 1215  heparin 11370 units/250 mL (100 units/mL) in 0.45 % NaCl infusion  Titrated         02/09/25 1127    02/09/25 1127  heparin (porcine) 5000 UNIT/ML injection 3,200-6,400 Units  Every 6 Hours PRN         02/09/25 1127    02/09/25 1000  lisinopril (PRINIVIL,ZESTRIL) tablet 40 mg  Daily         02/09/25 0907    02/09/25 0800  Oral Care  2 Times Daily       02/09/25 0159    02/09/25 0600  Incentive Spirometry  Every 4 Hours While Awake       02/09/25 0159    02/09/25 0400  Vital Signs  Every 4 Hours      Comments: Per per hospital " "policy    02/09/25 0159 02/09/25 0216  sodium chloride 0.9 % infusion  Continuous         02/09/25 0200 02/09/25 0200  Daily Weights  Daily       02/09/25 0159 02/09/25 0200  Strict Intake & Output  Every Hour       02/09/25 0159 02/09/25 0157  acetaminophen (TYLENOL) tablet 650 mg  Every 4 Hours PRN         02/09/25 0159 02/09/25 0157  sennosides-docusate (PERICOLACE) 8.6-50 MG per tablet 2 tablet  2 Times Daily PRN        Placed in \"And\" Linked Group    02/09/25 0159 02/09/25 0157  polyethylene glycol (MIRALAX) packet 17 g  Daily PRN        Placed in \"And\" Linked Group    02/09/25 0159 02/09/25 0157  bisacodyl (DULCOLAX) EC tablet 5 mg  Daily PRN        Placed in \"And\" Linked Group    02/09/25 0159 02/09/25 0157  bisacodyl (DULCOLAX) suppository 10 mg  Daily PRN        Placed in \"And\" Linked Group    02/09/25 0159 02/09/25 0157  ondansetron ODT (ZOFRAN-ODT) disintegrating tablet 4 mg  Every 6 Hours PRN        Placed in \"Or\" Linked Group    02/09/25 0159 02/09/25 0157  ondansetron (ZOFRAN) injection 4 mg  Every 6 Hours PRN        Placed in \"Or\" Linked Group    02/09/25 0159 02/09/25 0157  nitroglycerin (NITROSTAT) SL tablet 0.4 mg  Every 5 Minutes PRN         02/09/25 0159 02/08/25 2134  sodium chloride 0.9 % flush 10 mL  As Needed        Placed in \"And\" Linked Group    02/08/25 2134 02/08/25 2000  Urinary Catheter Care  Every Shift,   Status:  Canceled      Placed in \"And\" Linked Group    02/08/25 1959    Unscheduled  May Irrigate Catheter  As Needed      Placed in \"And\" Linked Group    02/08/25 2229    Unscheduled  Up with assistance  As Needed       02/09/25 0159    Unscheduled  Oxygen Therapy- Nasal Cannula; Titrate 1-6 LPM Per SpO2; 90 - 95%  Continuous PRN       02/09/25 0159    Unscheduled  aPTT  As Needed       02/09/25 1127    Unscheduled  Oxygen Therapy- Nasal Cannula; Titrate 1-6 LPM Per SpO2; 90 - 95%  Continuous PRN       02/09/25 1748    Unscheduled  May Irrigate " "Catheter  As Needed      Placed in \"And\" Linked Group    02/09/25 1832    --  fluticasone (FLONASE) 50 MCG/ACT nasal spray  Daily PRN         02/09/25 0216                     Operative/Procedure Notes (last 24 hours)        eLbron Mosqueda MD at 02/09/25 1703          Operative Note      [place]  74 y.o.  1950  male  [id number]      2/9/2025  Surgeons and Role:     * Lebron Mosqueda MD - Primary   Pre-operative Diagnosis: Urinary retention  Radiation cystitis    Post-operative Diagnosis: Same  Complications:None    History: This is a 74-year-old gentleman he is a patient of my partner Dr. Brian Russell.  He had a history of prostate cancer.  He has had a radical prostatectomy in the past.  He had radiation therapy after prostatectomy now has radiation cystitis.  He had a single episode of A-fib in the past.  He has a history of a DVT and a PE and is on chronic anticoagulation with Eliquis.  He has had multiple episodes of gross hematuria and clot retention.  He is currently undergoing hyperbaric oxygen therapy.  He presented the emergency room with gross hematuria and had a three-way Paz catheter placed and clots irrigated.  We elected to take him the operating room to reevaluate his bladder and to ensure all the clots were removed.    Description of procedure:    After consent was obtained the patient was taken to the operating room and placed supine on the operating room table.  After general anesthesia was administered he was prepped and draped in the standard fashion.  He was in the dorsolithotomy position.  A cystoscope was passed per urethra to his bladder.  He had some small clots in his bladder that I was able to easily irrigate free.  He had findings consistent with radiation cystitis but no active bleeding areas.  I replaced a 22 French three-way hematuria catheter.  Is extubated in the operating room taken the recovery room stable.  Procedure(s) and Anesthesia Type:     * CYSTOSCOPY " WITH CLOT EVACUATION - General    Specimens: None      Estimated Blood Loss:    Minimal  Lebron Mosqueda MD  2025    Electronically signed by Lebron Mosqueda MD at 25 1715          Physician Progress Notes (last 24 hours)        Mekhi Ly MD at 02/10/25 0810           LOS: 0 days     Name: North Carcamo  Age: 74 y.o.  Sex: male  :  1950  MRN: [id number]         Primary Care Physician: Chloe Arriaga MD    Subjective   Subjective  He has no complaints.  Urine is clear.  No acute overnight events    Objective   Vital Signs  Temp:  [97.2 °F (36.2 °C)-98.5 °F (36.9 °C)] 98.3 °F (36.8 °C)  Heart Rate:  [50-86] 72  Resp:  [12-19] 16  BP: (109-139)/(62-86) 130/70  Body mass index is 26.63 kg/m².    Objective:  General Appearance:  Comfortable and in no acute distress.    Vital signs: (most recent): Blood pressure 130/70, pulse 72, temperature 98.3 °F (36.8 °C), temperature source Oral, resp. rate 16, weight 81.8 kg (180 lb 5.4 oz), SpO2 96%.    Lungs:  Normal effort and normal respiratory rate.  He is not in respiratory distress.  No decreased breath sounds.    Heart: Normal rate.  Regular rhythm.    Abdomen: Abdomen is soft.  Bowel sounds are normal.   There is no abdominal tenderness.     Extremities: There is no dependent edema or local swelling.    Neurological: Patient is alert and oriented to person, place and time.    Skin:  Warm and dry.            : Three-way catheter in place.  Urine is crystal clear    Results Review:       I reviewed the patient's new clinical results.    Results from last 7 days   Lab Units 02/10/25  0322 25  0534 25  2203   WBC 10*3/mm3 10.86* 8.43 15.44*   HEMOGLOBIN g/dL 11.6* 11.6* 13.2   PLATELETS 10*3/mm3 268 210 259     Results from last 7 days   Lab Units 02/10/25  0321 25  0533 25  2203   SODIUM mmol/L 135* 136 139   POTASSIUM mmol/L 5.1 3.6 3.9   CHLORIDE mmol/L 102 103 103   CO2 mmol/L 23.9 23.0 24.0   BUN mg/dL  19 12 13   CREATININE mg/dL 1.24 0.90 0.96   CALCIUM mg/dL 8.7 8.8 9.1   GLUCOSE mg/dL 170* 183* 119*     Results from last 7 days   Lab Units 02/09/25  1214 02/08/25  2242   INR  1.12* 1.14*             Scheduled Meds:   lisinopril, 40 mg, Oral, Daily  pravastatin, 40 mg, Oral, Nightly      PRN Meds:     acetaminophen    senna-docusate sodium **AND** polyethylene glycol **AND** bisacodyl **AND** bisacodyl    heparin (porcine)    nitroglycerin    ondansetron ODT **OR** ondansetron    [COMPLETED] Insert Peripheral IV **AND** sodium chloride  Continuous Infusions:  heparin, 18 Units/kg/hr, Last Rate: 20 Units/kg/hr (02/10/25 0455)        Assessment & Plan   Active Hospital Problems    Diagnosis  POA    **Gross hematuria [R31.0]  Yes    Radiation cystitis [N30.40]  Unknown    Pulmonary emboli [I26.99]  Yes    Paroxysmal atrial fibrillation [I48.0]  Yes    Bladder cancer [C67.9]  Yes    Prostate cancer [C61]  Yes    Hypertension [I10]  Yes    Hyperlipidemia [E78.5]  Yes      Resolved Hospital Problems   No resolved problems to display.       Assessment & Plan    -POD 1 from cystoscopy with clot evacuation  -Urology plans noted to DC CBI and perform as needed manual catheter irrigation  -Continue heparin drip with eventual transition back to Eliquis  -Blood pressure stable    Dispo  Possible discharge tomorrow    Expected discharge date/ time has not been documented.     Mekhi Ly MD  [facility]  02/10/25  08:25 EST      Electronically signed by Mekhi Ly MD at 02/10/25 0846       Davian José Jr., MD at 02/10/25 0756          Urology Follow up for Gross Hematuria    Patient reports no problems overnight after clot evacuation. Heparin gtt running.    AVSS  Good UO  NAD, A&Ox3  Paz intact, urine yellow and clear on trickle CBI    WBC 10.8, Hb 11.6  PTT 63  Cr 1.24    Plan:  - d/c CBI  - manually irrigate the catheter prn  - continue heparin gtt  - he will  need to cancel hyperbaric oxygen today  - will follow    Electronically signed by Davian José Jr., MD at 02/10/25 5357       Consult Notes (last 24 hours)  Notes from 02/09/25 1426 through 02/10/25 1426   No notes of this type exist for this encounter.

## 2025-02-11 LAB
ANION GAP SERPL CALCULATED.3IONS-SCNC: 7.7 MMOL/L (ref 5–15)
APTT PPP: 100.6 SECONDS (ref 22.7–35.4)
APTT PPP: 82.3 SECONDS (ref 22.7–35.4)
BASOPHILS # BLD AUTO: 0.02 10*3/MM3 (ref 0–0.2)
BASOPHILS NFR BLD AUTO: 0.2 % (ref 0–1.5)
BUN SERPL-MCNC: 22 MG/DL (ref 8–23)
BUN/CREAT SERPL: 24.7 (ref 7–25)
CALCIUM SPEC-SCNC: 8.5 MG/DL (ref 8.6–10.5)
CHLORIDE SERPL-SCNC: 106 MMOL/L (ref 98–107)
CO2 SERPL-SCNC: 22.3 MMOL/L (ref 22–29)
CREAT SERPL-MCNC: 0.89 MG/DL (ref 0.76–1.27)
DEPRECATED RDW RBC AUTO: 41 FL (ref 37–54)
EGFRCR SERPLBLD CKD-EPI 2021: 88.8 ML/MIN/1.73
EOSINOPHIL # BLD AUTO: 0.1 10*3/MM3 (ref 0–0.4)
EOSINOPHIL NFR BLD AUTO: 0.9 % (ref 0.3–6.2)
ERYTHROCYTE [DISTWIDTH] IN BLOOD BY AUTOMATED COUNT: 12.1 % (ref 12.3–15.4)
GLUCOSE SERPL-MCNC: 114 MG/DL (ref 65–99)
HCT VFR BLD AUTO: 32 % (ref 37.5–51)
HGB BLD-MCNC: 11.1 G/DL (ref 13–17.7)
IMM GRANULOCYTES # BLD AUTO: 0.07 10*3/MM3 (ref 0–0.05)
IMM GRANULOCYTES NFR BLD AUTO: 0.7 % (ref 0–0.5)
LYMPHOCYTES # BLD AUTO: 1.47 10*3/MM3 (ref 0.7–3.1)
LYMPHOCYTES NFR BLD AUTO: 13.7 % (ref 19.6–45.3)
MCH RBC QN AUTO: 32.2 PG (ref 26.6–33)
MCHC RBC AUTO-ENTMCNC: 34.7 G/DL (ref 31.5–35.7)
MCV RBC AUTO: 92.8 FL (ref 79–97)
MONOCYTES # BLD AUTO: 1.03 10*3/MM3 (ref 0.1–0.9)
MONOCYTES NFR BLD AUTO: 9.6 % (ref 5–12)
NEUTROPHILS NFR BLD AUTO: 74.9 % (ref 42.7–76)
NEUTROPHILS NFR BLD AUTO: 8.07 10*3/MM3 (ref 1.7–7)
NRBC BLD AUTO-RTO: 0 /100 WBC (ref 0–0.2)
PLATELET # BLD AUTO: 241 10*3/MM3 (ref 140–450)
PMV BLD AUTO: 11.1 FL (ref 6–12)
POTASSIUM SERPL-SCNC: 4.1 MMOL/L (ref 3.5–5.2)
RBC # BLD AUTO: 3.45 10*6/MM3 (ref 4.14–5.8)
SODIUM SERPL-SCNC: 136 MMOL/L (ref 136–145)
WBC NRBC COR # BLD AUTO: 10.76 10*3/MM3 (ref 3.4–10.8)

## 2025-02-11 PROCEDURE — 85730 THROMBOPLASTIN TIME PARTIAL: CPT | Performed by: UROLOGY

## 2025-02-11 PROCEDURE — 25010000002 HEPARIN (PORCINE) 25000-0.45 UT/250ML-% SOLUTION: Performed by: UROLOGY

## 2025-02-11 PROCEDURE — 85730 THROMBOPLASTIN TIME PARTIAL: CPT | Performed by: INTERNAL MEDICINE

## 2025-02-11 PROCEDURE — 85025 COMPLETE CBC W/AUTO DIFF WBC: CPT | Performed by: UROLOGY

## 2025-02-11 PROCEDURE — 80048 BASIC METABOLIC PNL TOTAL CA: CPT | Performed by: INTERNAL MEDICINE

## 2025-02-11 RX ADMIN — LISINOPRIL 40 MG: 20 TABLET ORAL at 09:27

## 2025-02-11 RX ADMIN — SENNOSIDES AND DOCUSATE SODIUM 2 TABLET: 50; 8.6 TABLET ORAL at 13:11

## 2025-02-11 RX ADMIN — HEPARIN SODIUM 20 UNITS/KG/HR: 10000 INJECTION, SOLUTION INTRAVENOUS at 16:58

## 2025-02-11 RX ADMIN — PRAVASTATIN SODIUM 40 MG: 40 TABLET ORAL at 20:11

## 2025-02-11 RX ADMIN — HEPARIN SODIUM 22 UNITS/KG/HR: 10000 INJECTION, SOLUTION INTRAVENOUS at 01:40

## 2025-02-11 NOTE — PROGRESS NOTES
UROLOGY PROGRESS NOTE    Naeo. Cbi was continuing on slow drip.    Lab Results   Component Value Date    CREATININE 0.89 02/11/2025    WBC 10.76 02/11/2025    HGB 11.1 (L) 02/11/2025      Urine Culture          10/30/2024    11:13 1/5/2025    17:40 1/28/2025    06:20   Urine Culture   Urine Culture No growth  No growth  >100,000 CFU/mL Enterococcus faecalis       Results for orders placed or performed during the hospital encounter of 01/05/25   Blood Culture - Blood, Arm, Left    Specimen: Arm, Left; Blood   Result Value Ref Range    Blood Culture No growth at 5 days          Intake/Output:    Intake/Output Summary (Last 24 hours) at 2/11/2025 0749  Last data filed at 2/11/2025 0523  Gross per 24 hour   Intake 720 ml   Output 0 ml   Net 720 ml       Exam:  /75 (BP Location: Right arm, Patient Position: Lying)   Pulse 55   Temp 98 °F (36.7 °C) (Oral)   Resp 18   Wt 81.8 kg (180 lb 5.4 oz)   SpO2 100%   BMI 26.63 kg/m²       : 3 way mcclellan with cbi on slow drip; clamped on rounds     Assessment:    Gross hematuria    Hyperlipidemia    Hypertension    Prostate cancer    Bladder cancer    Paroxysmal atrial fibrillation    Pulmonary emboli    Radiation cystitis    Gross hematuria  Prostate cancer  Radiation cystitis      Plan:    - CBI clamped on rounds  - D/c mcclellan catheter in 2-3 hours if urine stays clear  - ok to transition patient back to oral AC after voiding trial  - Patient may continue hyperbaric O2 tomorrow if able  - OK to DC from urology standpoint if he passes VT today  - Will schedule follow up in 2-3 weeks - schedulers messaged.    Hemalatha Zambrano MD

## 2025-02-11 NOTE — PLAN OF CARE
Problem: Adult Inpatient Plan of Care  Goal: Plan of Care Review  Outcome: Progressing  Flowsheets (Taken 2/11/2025 6783)  Progress: improving  Outcome Evaluation: SR, RA, A/Ox4, standby to bathroom. Heparin gtt. CBI and Paz d/c per MD orders. Plans to d/c home pending medical clearance.  Plan of Care Reviewed With: patient   Goal Outcome Evaluation:                                             Patient is interested in scheduling colonoscopy. The nurse will review chart and a  will contact patient with in 72 hrs of review.

## 2025-02-11 NOTE — PROGRESS NOTES
LOS: 1 day     Name: North Carcamo  Age: 74 y.o.  Sex: male  :  1950  MRN: [id number]         Primary Care Physician: Chloe Arriaga MD    Subjective   Subjective  No new complaints or acute overnight events.  CBI has been clamped.    Objective   Vital Signs  Temp:  [98 °F (36.7 °C)-98.3 °F (36.8 °C)] 98.3 °F (36.8 °C)  Heart Rate:  [55-70] 62  Resp:  [14-18] 18  BP: (115-129)/(61-84) 116/61  Body mass index is 26.63 kg/m².    Objective:  General Appearance:  Comfortable and in no acute distress.    Vital signs: (most recent): Blood pressure 116/61, pulse 62, temperature 98.3 °F (36.8 °C), temperature source Oral, resp. rate 18, weight 81.8 kg (180 lb 5.4 oz), SpO2 99%.    Lungs:  Normal effort and normal respiratory rate.  He is not in respiratory distress.  No decreased breath sounds.    Heart: Normal rate.  Regular rhythm.    Abdomen: Abdomen is soft.  Bowel sounds are normal.   There is no abdominal tenderness.     Extremities: There is no dependent edema or local swelling.    Neurological: Patient is alert and oriented to person, place and time.    Skin:  Warm and dry.                Results Review:       I reviewed the patient's new clinical results.    Results from last 7 days   Lab Units 02/11/25  0525 02/10/25  03225  0534 25  2203   WBC 10*3/mm3 10.76 10.86* 8.43 15.44*   HEMOGLOBIN g/dL 11.1* 11.6* 11.6* 13.2   PLATELETS 10*3/mm3 241 268 210 259     Results from last 7 days   Lab Units 25  0525 02/10/25  03225  0533 25  2203   SODIUM mmol/L 136 135* 136 139   POTASSIUM mmol/L 4.1 5.1 3.6 3.9   CHLORIDE mmol/L 106 102 103 103   CO2 mmol/L 22.3 23.9 23.0 24.0   BUN mg/dL 22 19 12 13   CREATININE mg/dL 0.89 1.24 0.90 0.96   CALCIUM mg/dL 8.5* 8.7 8.8 9.1   GLUCOSE mg/dL 114* 170* 183* 119*     Results from last 7 days   Lab Units 25  1214 25  2242   INR  1.12* 1.14*             Scheduled Meds:   lisinopril, 40 mg, Oral, Daily  pravastatin, 40 mg,  Oral, Nightly      PRN Meds:     acetaminophen    senna-docusate sodium **AND** polyethylene glycol **AND** bisacodyl **AND** bisacodyl    heparin (porcine)    nitroglycerin    ondansetron ODT **OR** ondansetron    [COMPLETED] Insert Peripheral IV **AND** sodium chloride  Continuous Infusions:  heparin, 18 Units/kg/hr, Last Rate: 20 Units/kg/hr (02/11/25 0636)        Assessment & Plan   Active Hospital Problems    Diagnosis  POA    **Gross hematuria [R31.0]  Yes    Radiation cystitis [N30.40]  Unknown    Pulmonary emboli [I26.99]  Yes    Paroxysmal atrial fibrillation [I48.0]  Yes    Bladder cancer [C67.9]  Yes    Prostate cancer [C61]  Yes    Hypertension [I10]  Yes    Hyperlipidemia [E78.5]  Yes      Resolved Hospital Problems   No resolved problems to display.       Assessment & Plan    -POD 2 from cystoscopy with clot evacuation  -Plans noted from urology today to potentially remove catheter and if voiding trial successful he would be cleared for discharge  -Continue heparin drip with Lantus to transition back to Eliquis upon discharge  -Blood pressure stable     Dispo  Possible discharge date of today      Expected Discharge Date: 2/12/2025; Expected Discharge Time:      Mekhi Ly MD  [facility]  02/11/25  10:53 EST

## 2025-02-11 NOTE — PLAN OF CARE
Goal Outcome Evaluation:              Outcome Evaluation: vss,a/o x4, ambulated 2500 ft this shift, no c/o pain or discomfort this shift.,heparin gtt titrated per md order. CBI continuing, output clear to light raffi color, Plan of care ongoing this shift.

## 2025-02-12 ENCOUNTER — READMISSION MANAGEMENT (OUTPATIENT)
Dept: CALL CENTER | Facility: HOSPITAL | Age: 75
End: 2025-02-12
Payer: MEDICARE

## 2025-02-12 VITALS
BODY MASS INDEX: 26.49 KG/M2 | TEMPERATURE: 97.5 F | OXYGEN SATURATION: 99 % | WEIGHT: 179.4 LBS | SYSTOLIC BLOOD PRESSURE: 110 MMHG | DIASTOLIC BLOOD PRESSURE: 74 MMHG | RESPIRATION RATE: 18 BRPM | HEART RATE: 85 BPM

## 2025-02-12 LAB
APTT PPP: 68 SECONDS (ref 22.7–35.4)
APTT PPP: 96.4 SECONDS (ref 22.7–35.4)
BASOPHILS # BLD AUTO: 0.04 10*3/MM3 (ref 0–0.2)
BASOPHILS NFR BLD AUTO: 0.4 % (ref 0–1.5)
DEPRECATED RDW RBC AUTO: 43.3 FL (ref 37–54)
EOSINOPHIL # BLD AUTO: 0.16 10*3/MM3 (ref 0–0.4)
EOSINOPHIL NFR BLD AUTO: 1.7 % (ref 0.3–6.2)
ERYTHROCYTE [DISTWIDTH] IN BLOOD BY AUTOMATED COUNT: 12.6 % (ref 12.3–15.4)
HCT VFR BLD AUTO: 33 % (ref 37.5–51)
HGB BLD-MCNC: 10.9 G/DL (ref 13–17.7)
IMM GRANULOCYTES # BLD AUTO: 0.08 10*3/MM3 (ref 0–0.05)
IMM GRANULOCYTES NFR BLD AUTO: 0.9 % (ref 0–0.5)
LYMPHOCYTES # BLD AUTO: 1.42 10*3/MM3 (ref 0.7–3.1)
LYMPHOCYTES NFR BLD AUTO: 15.3 % (ref 19.6–45.3)
MCH RBC QN AUTO: 31.1 PG (ref 26.6–33)
MCHC RBC AUTO-ENTMCNC: 33 G/DL (ref 31.5–35.7)
MCV RBC AUTO: 94.3 FL (ref 79–97)
MONOCYTES # BLD AUTO: 1.08 10*3/MM3 (ref 0.1–0.9)
MONOCYTES NFR BLD AUTO: 11.7 % (ref 5–12)
NEUTROPHILS NFR BLD AUTO: 6.49 10*3/MM3 (ref 1.7–7)
NEUTROPHILS NFR BLD AUTO: 70 % (ref 42.7–76)
NRBC BLD AUTO-RTO: 0 /100 WBC (ref 0–0.2)
PLATELET # BLD AUTO: 237 10*3/MM3 (ref 140–450)
PMV BLD AUTO: 11.2 FL (ref 6–12)
RBC # BLD AUTO: 3.5 10*6/MM3 (ref 4.14–5.8)
WBC NRBC COR # BLD AUTO: 9.27 10*3/MM3 (ref 3.4–10.8)

## 2025-02-12 PROCEDURE — 25010000002 HEPARIN (PORCINE) 25000-0.45 UT/250ML-% SOLUTION: Performed by: UROLOGY

## 2025-02-12 PROCEDURE — 85025 COMPLETE CBC W/AUTO DIFF WBC: CPT | Performed by: UROLOGY

## 2025-02-12 PROCEDURE — 85730 THROMBOPLASTIN TIME PARTIAL: CPT | Performed by: UROLOGY

## 2025-02-12 PROCEDURE — 85730 THROMBOPLASTIN TIME PARTIAL: CPT | Performed by: HOSPITALIST

## 2025-02-12 RX ORDER — HEPARIN SODIUM 10000 [USP'U]/100ML
18 INJECTION, SOLUTION INTRAVENOUS
Status: DISCONTINUED | OUTPATIENT
Start: 2025-02-12 | End: 2025-02-12 | Stop reason: HOSPADM

## 2025-02-12 RX ADMIN — LISINOPRIL 40 MG: 20 TABLET ORAL at 08:35

## 2025-02-12 RX ADMIN — HEPARIN SODIUM 18 UNITS/KG/HR: 10000 INJECTION, SOLUTION INTRAVENOUS at 06:34

## 2025-02-12 NOTE — PROGRESS NOTES
LOS: 2 days   Patient Care Team:  Chloe Arriaga MD as PCP - General (Internal Medicine)  Brennen Burroughs MD as Consulting Physician (Cardiology)  Armando Alvarez MD as Consulting Physician (Hematology and Oncology)    Chief Complaint:  Hematuria.    Subjective     Interval History:     Hematuria resolved. Voiding well. No pain.      Review of Systems:    All systems were reviewed and negative except for:  Genitourinary: postivie for  blood in urine    Objective     Vital Signs  Temp:  [97.7 °F (36.5 °C)-98.4 °F (36.9 °C)] 98.4 °F (36.9 °C)  Heart Rate:  [60-83] 83  Resp:  [18] 18  BP: (124-139)/(63-80) 125/80    Physical Exam:   No exam performed today,     Results Review:     I reviewed the patient's new clinical results.    Medication Review:     Assessment & Plan       Gross hematuria    Hyperlipidemia    Hypertension    Prostate cancer    Bladder cancer    Paroxysmal atrial fibrillation    Pulmonary emboli    Radiation cystitis      XRT cystitis.  Hematuria.    Hematuria - resolved. Voiding without difficulty. To restart hyperbaric O2 tomorrow. Restart Eliquis on d/c.   F/u Dr. Russell 2 weeks.    Plan for disposition:    Doe Daly MD  02/12/25  07:33 EST      Time:

## 2025-02-12 NOTE — PLAN OF CARE
Goal Outcome Evaluation:  Plan of Care Reviewed With: patient        Progress: improving  Outcome Evaluation: SR, RA, A/Ox4, standby assist. Heparin gtt. Possible d/c home today.

## 2025-02-12 NOTE — OUTREACH NOTE
Prep Survey      Flowsheet Row Responses   [facility] facility patient discharged from? [place]   Is LACE score < 7 ? No   Eligibility [facility]   Date of Admission 02/08/25   Date of Discharge 02/12/25   Discharge Disposition Home or Self Care   Discharge diagnosis Gross hematuria-Cystoscopy   Does the patient have one of the following disease processes/diagnoses(primary or secondary)? Other   Does the patient have Home health ordered? No   Is there a DME ordered? No   Prep survey completed? Yes            HIRAM A - Registered Nurse

## 2025-02-12 NOTE — PLAN OF CARE
Goal Outcome Evaluation:  Plan of Care Reviewed With: patient           Outcome Evaluation: vss,a/ox4,room air, ambulated independently around nurses station, heparin gtt contined, voided spontaneously, clear yellow urine, Plan of care ongoing this shift.

## 2025-02-12 NOTE — DISCHARGE SUMMARY
Date of Discharge:  2/12/2025    PCP: Chloe Arriaga MD    Discharge Diagnosis:   Active Hospital Problems    Diagnosis  POA    **Gross hematuria [R31.0]  Yes    Radiation cystitis [N30.40]  Unknown    Pulmonary emboli [I26.99]  Yes    Paroxysmal atrial fibrillation [I48.0]  Yes    Bladder cancer [C67.9]  Yes    Prostate cancer [C61]  Yes    Hypertension [I10]  Yes    Hyperlipidemia [E78.5]  Yes      Resolved Hospital Problems   No resolved problems to display.     Procedure(s):  CYSTOSCOPY WITH CLOT EVACUATION     Consults       Date and Time Order Name Status Description    2/9/2025  6:19 AM Inpatient Urology Consult Completed     2/8/2025 11:29 PM [facility] (on-call MD unless specified) Details      1/15/2025  3:26 PM Hematology & Oncology Inpatient Consult Completed           Hospital Course  Patient is a 74 y.o. male with history of prostate cancer, bladder cancer, radiation cystitis, recent diagnosis of PE/DVT about 3 weeks ago who is on Eliquis at home. He came in with gross hematuria. A three-way catheter was placed and he began continuous bladder irrigation. Urology took him for urgent cystoscopy with clot evacuation. His hematuria has now subsided. Urology has cleared him to transition back to Eliquis upon discharge (while here he was anticoagulated with heparin). He will resume hyperbaric oxygen treatment as an outpatient.    I discussed the patient's findings and my recommendations with patient, family, and nursing staff. Patient very much would like to go home today.    Temp:  [98.1 °F (36.7 °C)-98.4 °F (36.9 °C)] 98.1 °F (36.7 °C)  Heart Rate:  [60-83] 66  Resp:  [18] 18  BP: (116-139)/(65-80) 116/65  Body mass index is 26.49 kg/m².    Physical Exam  Constitutional:       General: He is not in acute distress.     Appearance: He is not toxic-appearing.   HENT:      Head: Normocephalic and atraumatic.   Cardiovascular:      Rate and Rhythm: Normal rate and regular rhythm.   Pulmonary:      Effort: Pulmonary  effort is normal. No respiratory distress.      Breath sounds: Normal breath sounds. No wheezing.   Abdominal:      General: Bowel sounds are normal.      Palpations: Abdomen is soft.      Tenderness: There is no abdominal tenderness. There is no guarding or rebound.   Musculoskeletal:         General: No swelling.   Skin:     General: Skin is warm and dry.   Neurological:      General: No focal deficit present.      Mental Status: He is alert and oriented to person, place, and time.   Psychiatric:         Mood and Affect: Mood normal.         Behavior: Behavior normal.       Disposition: Home or Self Care       Discharge Medications        New Medications        Instructions Start Date   dapagliflozin Propanediol 10 MG tablet  Commonly known as: Farxiga   10 mg, Oral, Daily             Continue These Medications        Instructions Start Date   CALCIUM 600+D PO   1 tablet, 2 Times Daily      DAILY MULTIVITAMIN PO   1 tablet, Daily      Eliquis 5 MG tablet tablet  Generic drug: apixaban   5 mg, Oral, Every 12 Hours Scheduled      fluticasone 50 MCG/ACT nasal spray  Commonly known as: FLONASE   2 sprays, Daily PRN      lisinopril 40 MG tablet  Commonly known as: PRINIVIL,ZESTRIL   40 mg, Oral, Daily      pravastatin 40 MG tablet  Commonly known as: PRAVACHOL   40 mg, Oral, Nightly              Diet Instructions       Diet: Regular/House Diet      Discharge Diet: Regular/House Diet    Texture: Regular Texture (IDDSI 7)    Fluid Consistency: Thin (IDDSI 0)           Activity Instructions       Activity as Tolerated             Additional Instructions for the Follow-ups that You Need to Schedule       Call MD for problems / concerns.   As directed             Follow-up Information       Chloe Arriaga MD .    Specialty: Internal Medicine  Contact information:  [address] [facility] [address]  [phone]                            Future Appointments   Date Time Provider Department Center   4/18/2025 10:50  AM LAB CHAIR 4 [facility]  [facility]   4/18/2025 11:20 AM Armando Alvarez MD [facility]   6/27/2025  9:00 AM [facility]   7/3/2025 10:45 AM Chloe Arriaga MD [facility]   9/4/2025 10:30 AM Brennen Burroughs MD [facility] CD [id number] None        Osvaldo Rudy Lomeli MD  [facility]  02/12/25 14:46 EST    Discharge time spent greater than 30 minutes.

## 2025-02-13 ENCOUNTER — OFFICE VISIT (OUTPATIENT)
Dept: WOUND CARE | Facility: HOSPITAL | Age: 75
End: 2025-02-13
Payer: MEDICARE

## 2025-02-13 ENCOUNTER — TRANSITIONAL CARE MANAGEMENT TELEPHONE ENCOUNTER (OUTPATIENT)
Dept: CALL CENTER | Facility: HOSPITAL | Age: 75
End: 2025-02-13
Payer: MEDICARE

## 2025-02-13 PROCEDURE — G0277 HBOT, FULL BODY CHAMBER, 30M: HCPCS

## 2025-02-13 PROCEDURE — G0463 HOSPITAL OUTPT CLINIC VISIT: HCPCS

## 2025-02-13 NOTE — OUTREACH NOTE
Call Center TCM Note      Flowsheet Row Responses   [facility] patient discharged from? [facility]   Does the patient have one of the following disease processes/diagnoses(primary or secondary)? Other   TCM attempt successful? Yes   Call start time 0853   Call end time 0857   Discharge diagnosis Gross hematuria-Cystoscopy   Meds reviewed with patient/caregiver? Yes   Is the patient having any side effects they believe may be caused by any medication additions or changes? No   Does the patient have all medications ordered at discharge? Yes   Is the patient taking all medications as directed (includes completed medication regime)? Yes   Comments Patient wishes to follow up with Urology only at this time and will reach out to Dr. Ng if he has any concerns.   Does the patient have an appointment with their PCP within 7-14 days of discharge? No   Nursing Interventions Patient desires to follow up with specialty only   Has home health visited the patient within 72 hours of discharge? N/A   Psychosocial issues? No   Did the patient receive a copy of their discharge instructions? Yes   Nursing interventions Reviewed instructions with patient   What is the patient's perception of their health status since discharge? Improving   Is the patient/caregiver able to teach back signs and symptoms related to disease process for when to call PCP? Yes   Is the patient/caregiver able to teach back signs and symptoms related to disease process for when to call 911? Yes   Is the patient/caregiver able to teach back the hierarchy of who to call/visit for symptoms/problems? PCP, Specialist, Home health nurse, Urgent Care, ED, 911 Yes   If the patient is a current smoker, are they able to teach back resources for cessation? Not a smoker   TCM call completed? Yes   Wrap up additional comments Patient doing well, urinating without difficulty.   Call end time 0857   Would this patient benefit from a Referral to [facility]? No   Is  the patient interested in additional calls from an ambulatory ? No            Joy Almeida RN    2/13/2025, 08:57 EST

## 2025-02-14 ENCOUNTER — OFFICE VISIT (OUTPATIENT)
Dept: WOUND CARE | Facility: HOSPITAL | Age: 75
End: 2025-02-14
Payer: MEDICARE

## 2025-02-14 PROCEDURE — G0277 HBOT, FULL BODY CHAMBER, 30M: HCPCS

## 2025-02-14 NOTE — SIGNIFICANT NOTE
02/14/25 1024   Plan   Final Discharge Disposition Code 01 - home or self-care   Final Note Home

## 2025-02-18 ENCOUNTER — OFFICE VISIT (OUTPATIENT)
Dept: WOUND CARE | Facility: HOSPITAL | Age: 75
End: 2025-02-18
Payer: MEDICARE

## 2025-02-18 PROCEDURE — G0277 HBOT, FULL BODY CHAMBER, 30M: HCPCS

## 2025-02-19 ENCOUNTER — OFFICE VISIT (OUTPATIENT)
Dept: WOUND CARE | Facility: HOSPITAL | Age: 75
End: 2025-02-19
Payer: MEDICARE

## 2025-02-19 PROCEDURE — G0277 HBOT, FULL BODY CHAMBER, 30M: HCPCS

## 2025-02-20 ENCOUNTER — OFFICE VISIT (OUTPATIENT)
Dept: WOUND CARE | Facility: HOSPITAL | Age: 75
End: 2025-02-20
Payer: MEDICARE

## 2025-02-20 PROCEDURE — G0277 HBOT, FULL BODY CHAMBER, 30M: HCPCS

## 2025-02-24 ENCOUNTER — OFFICE VISIT (OUTPATIENT)
Dept: WOUND CARE | Facility: HOSPITAL | Age: 75
End: 2025-02-24
Payer: MEDICARE

## 2025-02-24 PROCEDURE — G0277 HBOT, FULL BODY CHAMBER, 30M: HCPCS

## 2025-02-25 ENCOUNTER — OFFICE VISIT (OUTPATIENT)
Dept: WOUND CARE | Facility: HOSPITAL | Age: 75
End: 2025-02-25
Payer: MEDICARE

## 2025-02-25 PROCEDURE — G0277 HBOT, FULL BODY CHAMBER, 30M: HCPCS

## 2025-02-26 ENCOUNTER — OFFICE VISIT (OUTPATIENT)
Dept: WOUND CARE | Facility: HOSPITAL | Age: 75
End: 2025-02-26
Payer: MEDICARE

## 2025-02-26 PROCEDURE — G0277 HBOT, FULL BODY CHAMBER, 30M: HCPCS

## 2025-02-27 ENCOUNTER — OFFICE VISIT (OUTPATIENT)
Dept: WOUND CARE | Facility: HOSPITAL | Age: 75
End: 2025-02-27
Payer: MEDICARE

## 2025-02-27 PROCEDURE — G0277 HBOT, FULL BODY CHAMBER, 30M: HCPCS

## 2025-02-28 ENCOUNTER — OFFICE VISIT (OUTPATIENT)
Dept: WOUND CARE | Facility: HOSPITAL | Age: 75
End: 2025-02-28
Payer: MEDICARE

## 2025-02-28 ENCOUNTER — HOSPITAL ENCOUNTER (OUTPATIENT)
Facility: HOSPITAL | Age: 75
Setting detail: OBSERVATION
Discharge: HOME | End: 2025-03-01
Attending: EMERGENCY MEDICINE | Admitting: EMERGENCY MEDICINE
Payer: MEDICARE

## 2025-02-28 DIAGNOSIS — R33.8 ACUTE URINARY RETENTION: Primary | ICD-10-CM

## 2025-02-28 DIAGNOSIS — R31.0 GROSS HEMATURIA: ICD-10-CM

## 2025-02-28 LAB
ALBUMIN SERPL-MCNC: 4.1 G/DL (ref 3.5–5.2)
ALBUMIN/GLOB SERPL: 1.4 G/DL
ALP SERPL-CCNC: 95 U/L (ref 39–117)
ALT SERPL W P-5'-P-CCNC: 14 U/L (ref 1–41)
ANION GAP SERPL CALCULATED.3IONS-SCNC: 12 MMOL/L (ref 5–15)
AST SERPL-CCNC: 15 U/L (ref 1–40)
BACTERIA UR QL AUTO: ABNORMAL /HPF
BASOPHILS # BLD AUTO: 0.03 10*3/MM3 (ref 0–0.2)
BASOPHILS NFR BLD AUTO: 0.2 % (ref 0–1.5)
BILIRUB SERPL-MCNC: 0.6 MG/DL (ref 0–1.2)
BILIRUB UR QL STRIP: ABNORMAL
BUN SERPL-MCNC: 14 MG/DL (ref 8–23)
BUN/CREAT SERPL: 13 (ref 7–25)
CALCIUM SPEC-SCNC: 9.4 MG/DL (ref 8.6–10.5)
CHLORIDE SERPL-SCNC: 104 MMOL/L (ref 98–107)
CLARITY UR: ABNORMAL
CO2 SERPL-SCNC: 21 MMOL/L (ref 22–29)
COLOR UR: ABNORMAL
CREAT SERPL-MCNC: 1.08 MG/DL (ref 0.76–1.27)
DEPRECATED RDW RBC AUTO: 41.4 FL (ref 37–54)
EGFRCR SERPLBLD CKD-EPI 2021: 71.1 ML/MIN/1.73
EOSINOPHIL # BLD AUTO: 0.01 10*3/MM3 (ref 0–0.4)
EOSINOPHIL NFR BLD AUTO: 0.1 % (ref 0.3–6.2)
ERYTHROCYTE [DISTWIDTH] IN BLOOD BY AUTOMATED COUNT: 12.2 % (ref 12.3–15.4)
GLOBULIN UR ELPH-MCNC: 3 GM/DL
GLUCOSE SERPL-MCNC: 136 MG/DL (ref 65–99)
GLUCOSE UR STRIP-MCNC: NEGATIVE MG/DL
HCT VFR BLD AUTO: 37.2 % (ref 37.5–51)
HGB BLD-MCNC: 12.9 G/DL (ref 13–17.7)
HGB UR QL STRIP.AUTO: ABNORMAL
HOLD SPECIMEN: NORMAL
HOLD SPECIMEN: NORMAL
HYALINE CASTS UR QL AUTO: ABNORMAL /LPF
IMM GRANULOCYTES # BLD AUTO: 0.06 10*3/MM3 (ref 0–0.05)
IMM GRANULOCYTES NFR BLD AUTO: 0.5 % (ref 0–0.5)
KETONES UR QL STRIP: ABNORMAL
LEUKOCYTE ESTERASE UR QL STRIP.AUTO: ABNORMAL
LYMPHOCYTES # BLD AUTO: 0.83 10*3/MM3 (ref 0.7–3.1)
LYMPHOCYTES NFR BLD AUTO: 6.3 % (ref 19.6–45.3)
MCH RBC QN AUTO: 32.2 PG (ref 26.6–33)
MCHC RBC AUTO-ENTMCNC: 34.7 G/DL (ref 31.5–35.7)
MCV RBC AUTO: 92.8 FL (ref 79–97)
MONOCYTES # BLD AUTO: 1.12 10*3/MM3 (ref 0.1–0.9)
MONOCYTES NFR BLD AUTO: 8.4 % (ref 5–12)
NEUTROPHILS NFR BLD AUTO: 11.23 10*3/MM3 (ref 1.7–7)
NEUTROPHILS NFR BLD AUTO: 84.5 % (ref 42.7–76)
NITRITE UR QL STRIP: POSITIVE
NRBC BLD AUTO-RTO: 0 /100 WBC (ref 0–0.2)
PH UR STRIP.AUTO: 7 [PH] (ref 5–8)
PLATELET # BLD AUTO: 311 10*3/MM3 (ref 140–450)
PMV BLD AUTO: 10.6 FL (ref 6–12)
POTASSIUM SERPL-SCNC: 4.3 MMOL/L (ref 3.5–5.2)
PROT SERPL-MCNC: 7.1 G/DL (ref 6–8.5)
PROT UR QL STRIP: ABNORMAL
RBC # BLD AUTO: 4.01 10*6/MM3 (ref 4.14–5.8)
RBC # UR STRIP: ABNORMAL /HPF
REF LAB TEST METHOD: ABNORMAL
SODIUM SERPL-SCNC: 137 MMOL/L (ref 136–145)
SP GR UR STRIP: 1.02 (ref 1–1.03)
SQUAMOUS #/AREA URNS HPF: ABNORMAL /HPF
UROBILINOGEN UR QL STRIP: ABNORMAL
WBC # UR STRIP: ABNORMAL /HPF
WBC NRBC COR # BLD AUTO: 13.28 10*3/MM3 (ref 3.4–10.8)
WHOLE BLOOD HOLD COAG: NORMAL
WHOLE BLOOD HOLD SPECIMEN: NORMAL

## 2025-02-28 PROCEDURE — G0277 HBOT, FULL BODY CHAMBER, 30M: HCPCS

## 2025-02-28 PROCEDURE — 99285 EMERGENCY DEPT VISIT HI MDM: CPT

## 2025-02-28 PROCEDURE — 36415 COLL VENOUS BLD VENIPUNCTURE: CPT

## 2025-02-28 PROCEDURE — G0378 HOSPITAL OBSERVATION PER HR: HCPCS

## 2025-02-28 PROCEDURE — 85025 COMPLETE CBC W/AUTO DIFF WBC: CPT

## 2025-02-28 PROCEDURE — 81001 URINALYSIS AUTO W/SCOPE: CPT | Performed by: EMERGENCY MEDICINE

## 2025-02-28 PROCEDURE — 51798 US URINE CAPACITY MEASURE: CPT

## 2025-02-28 PROCEDURE — 80053 COMPREHEN METABOLIC PANEL: CPT

## 2025-02-28 RX ORDER — LISINOPRIL 20 MG/1
40 TABLET ORAL DAILY
Status: DISCONTINUED | OUTPATIENT
Start: 2025-03-01 | End: 2025-03-01 | Stop reason: HOSPADM

## 2025-02-28 RX ORDER — SODIUM CHLORIDE 9 MG/ML
40 INJECTION, SOLUTION INTRAVENOUS AS NEEDED
Status: DISCONTINUED | OUTPATIENT
Start: 2025-02-28 | End: 2025-03-01 | Stop reason: HOSPADM

## 2025-02-28 RX ORDER — SODIUM CHLORIDE 0.9 % (FLUSH) 0.9 %
10 SYRINGE (ML) INJECTION EVERY 12 HOURS SCHEDULED
Status: DISCONTINUED | OUTPATIENT
Start: 2025-02-28 | End: 2025-03-01 | Stop reason: HOSPADM

## 2025-02-28 RX ORDER — SODIUM CHLORIDE 0.9 % (FLUSH) 0.9 %
10 SYRINGE (ML) INJECTION AS NEEDED
Status: DISCONTINUED | OUTPATIENT
Start: 2025-02-28 | End: 2025-03-01 | Stop reason: HOSPADM

## 2025-02-28 RX ORDER — ONDANSETRON 4 MG/1
4 TABLET, ORALLY DISINTEGRATING ORAL EVERY 6 HOURS PRN
Status: DISCONTINUED | OUTPATIENT
Start: 2025-02-28 | End: 2025-03-01 | Stop reason: HOSPADM

## 2025-02-28 RX ORDER — PRAVASTATIN SODIUM 40 MG
40 TABLET ORAL NIGHTLY
Status: DISCONTINUED | OUTPATIENT
Start: 2025-02-28 | End: 2025-03-01 | Stop reason: HOSPADM

## 2025-02-28 RX ORDER — ONDANSETRON 2 MG/ML
4 INJECTION INTRAMUSCULAR; INTRAVENOUS EVERY 6 HOURS PRN
Status: DISCONTINUED | OUTPATIENT
Start: 2025-02-28 | End: 2025-03-01 | Stop reason: HOSPADM

## 2025-02-28 RX ORDER — FLUTICASONE PROPIONATE 50 MCG
2 SPRAY, SUSPENSION (ML) NASAL DAILY PRN
Status: DISCONTINUED | OUTPATIENT
Start: 2025-02-28 | End: 2025-03-01 | Stop reason: HOSPADM

## 2025-02-28 RX ADMIN — PRAVASTATIN SODIUM 40 MG: 40 TABLET ORAL at 23:40

## 2025-03-01 ENCOUNTER — READMISSION MANAGEMENT (OUTPATIENT)
Dept: CALL CENTER | Facility: HOSPITAL | Age: 75
End: 2025-03-01
Payer: MEDICARE

## 2025-03-01 VITALS
BODY MASS INDEX: 26.36 KG/M2 | SYSTOLIC BLOOD PRESSURE: 128 MMHG | WEIGHT: 178 LBS | DIASTOLIC BLOOD PRESSURE: 71 MMHG | HEART RATE: 75 BPM | TEMPERATURE: 98.5 F | HEIGHT: 69 IN | RESPIRATION RATE: 18 BRPM | OXYGEN SATURATION: 98 %

## 2025-03-01 PROBLEM — R31.9 HEMATURIA: Status: RESOLVED | Noted: 2024-10-01 | Resolved: 2025-03-01

## 2025-03-01 LAB
DEPRECATED RDW RBC AUTO: 41 FL (ref 37–54)
ERYTHROCYTE [DISTWIDTH] IN BLOOD BY AUTOMATED COUNT: 11.9 % (ref 12.3–15.4)
HCT VFR BLD AUTO: 31.6 % (ref 37.5–51)
HGB BLD-MCNC: 10.6 G/DL (ref 13–17.7)
MCH RBC QN AUTO: 31.5 PG (ref 26.6–33)
MCHC RBC AUTO-ENTMCNC: 33.5 G/DL (ref 31.5–35.7)
MCV RBC AUTO: 94 FL (ref 79–97)
PLATELET # BLD AUTO: 245 10*3/MM3 (ref 140–450)
PMV BLD AUTO: 11 FL (ref 6–12)
RBC # BLD AUTO: 3.36 10*6/MM3 (ref 4.14–5.8)
WBC NRBC COR # BLD AUTO: 8.91 10*3/MM3 (ref 3.4–10.8)

## 2025-03-01 PROCEDURE — G0378 HOSPITAL OBSERVATION PER HR: HCPCS

## 2025-03-01 PROCEDURE — 25010000002 CEFTRIAXONE PER 250 MG: Performed by: NURSE PRACTITIONER

## 2025-03-01 PROCEDURE — 85027 COMPLETE CBC AUTOMATED: CPT | Performed by: NURSE PRACTITIONER

## 2025-03-01 PROCEDURE — 51702 INSERT TEMP BLADDER CATH: CPT

## 2025-03-01 RX ORDER — LIDOCAINE HYDROCHLORIDE 20 MG/ML
JELLY TOPICAL ONCE
Status: DISCONTINUED | OUTPATIENT
Start: 2025-03-01 | End: 2025-03-01 | Stop reason: HOSPADM

## 2025-03-01 RX ORDER — TAMSULOSIN HYDROCHLORIDE 0.4 MG/1
0.4 CAPSULE ORAL DAILY
Status: DISCONTINUED | OUTPATIENT
Start: 2025-03-01 | End: 2025-03-01 | Stop reason: HOSPADM

## 2025-03-01 RX ADMIN — Medication 10 ML: at 04:05

## 2025-03-01 RX ADMIN — LISINOPRIL 40 MG: 20 TABLET ORAL at 08:00

## 2025-03-01 RX ADMIN — CEFTRIAXONE 2000 MG: 2 INJECTION, POWDER, FOR SOLUTION INTRAMUSCULAR; INTRAVENOUS at 07:54

## 2025-03-01 RX ADMIN — Medication 10 ML: at 08:00

## 2025-03-01 RX ADMIN — TAMSULOSIN HYDROCHLORIDE 0.4 MG: 0.4 CAPSULE ORAL at 14:44

## 2025-03-01 NOTE — PLAN OF CARE
Goal Outcome Evaluation:   Patient meets discharge criteria. Patient discharged home with urinary catheter in place. Patient to follow up with urology outpatient. Patient verbalizes understanding of discharge instruction.

## 2025-03-01 NOTE — PROGRESS NOTES
MD ATTESTATION NOTE    The SINDI and I have discussed this patient's history, physical exam, and treatment plan.  I have reviewed the documentation and personally had a face to face interaction with the patient. I affirm the documentation and agree with the treatment and plan.  The attached note describes my personal findings.      I provided a substantive portion of the care of the patient.  I personally performed the physical exam in its entirety, and below are my findings.      Brief HPI: This a pleasant 74-year-old male who has a past history of prostate cancer, bladder cancer with history of recurrent radiation cystitis and hematuria.  There was a period of time where he had brief atrial fibrillation a couple years ago and was on anticoagulation consist of Eliquis very briefly.  Also history of hypertension and hyperlipidemia.  He was off the Eliquis and then he had some shortness of breath presented and was discovered to have a pulmonary embolism January 15th 2025.  He was discovered to have lower extremity DVTs.  He has been compliant with the Eliquis.  He has had problems with recurrent hematuria and urinary retention from clot retention.  He is needed to have irrigation of his bladder.  He has been off Eliquis for several days due to this problem and has been tolerating it well without any complication.  He has had no fevers or chills.  No chest pain or shortness of breath.  He started having some hematuria yesterday consistent with past episodes and the hematuria worsened and eventually started having clots and urinary retention and presented to the emergency department.  When I am seeing in the observation unit he is feeling much better.  He no longer has the obstruction he is undergoing bladder irrigation and has significant improvement in hematuria.  Denies any complaint at this time.  Denies any pain or swelling to his lower extremities.      General : Pleasant elderly male.  He is in no acute  cardiovascular respiratory distress.  Patient is awake alert and oriented.  Vitals are unremarkable  HEENT: NCAT  CV: Heart is regular rate and rhythm no murmurs  Respiratory: CTA bilaterally  Abd: Soft and nontender.  No urinary retention.  Patient has a catheter and placed with bladder irrigation continuously.  There is a small amount of hematuria and very small amount of clots in the catheter tubing.  Ext: No acute abnormalities.  No swelling or pain.  Intact distal pulses to upper and lower extremities are equal strong and symmetric.  Skin: No rash  Neuro: Cranial nerves II through XII grossly intact as tested.  No acute lateralizing deficits.  Psych: Normal mood and affect    I have reviewed the patient's vital signs, lab work, EKG and imaging.    Plan:  1.  Acute urinary retention with hematuria and clots: Very likely from previous radiation from bladder cancer.  History of multiple episodes like this before.  This is complicated by his anticoagulation with Eliquis.  We have held the Eliquis.  His last dose of Eliquis was yesterday morning.  He is tolerated holding Eliquis in the past for similar problems without any complications.  Bladder irrigation has significantly improved his symptoms.  Urology has seen the patient and their consult in the chart is pending.  His white blood cell count was 13 yesterday and improved to 8.9 today.  No fever.  We have treated with antibiotics and culture of the urine is pending.  Patient's hemoglobin also dropped to 10.6 this morning.  Continue to trend hemoglobin.  Anticipate this gentleman will be here in the observation unit overnight.  Continue with irrigation continue with holding Eliquis at this time.  Continue to trend hemoglobin.  Urology again has been consulted.  Hopefully if he continues to improve we able to do a trial without the catheter likely tomorrow.  Discussed the results of the test with the patient and treatment plan at length.  All questions answered at  this time.        Note Disclaimer: At [facility], we believe that sharing information builds trust and better relationships. You are receiving this note because you recently visited [facility]. It is possible you will see health information before a provider has talked with you about it. This kind of information can be easy to misunderstand. To help you fully understand what it means for your health, we urge you to discuss this note with your provider.

## 2025-03-01 NOTE — ED PROVIDER NOTES
EMERGENCY DEPARTMENT ENCOUNTER  Room Number:  114/1  PCP: Chloe Arriaga MD  Independent Historians: Patient      HPI:  Chief Complaint: had concerns including Blood in Urine.    A complete HPI/ROS/PMH/PSH/SH/FH are unobtainable due to: None    Chronic or social conditions impacting patient care (Social Determinants of Health): None      Context: [place] is a 74 y.o. male with a medical history of patient's cystitis, history of bladder and prostate cancer, hyperlipidemia, paroxysmal A-fib, pulmonary embolism anticoagulated on Eliquis presents emergency department today with acute urinary retention secondary to hematuria.  This is a chronic issue for this patient and he has had intermittent catheterization over the last several months but frequently requires catheter exchanges and CBI.  He currently has not had a catheter for the last several weeks and has been urinating normally but today started with hematuria and has not urinated since early this afternoon.  He reports he is extremely uncomfortable.  He denies fever or chills.  He denies nausea or vomiting.  [facility]  He is currently undergoing hyperbaric treatment to try to treat this issue as he cannot go without anticoagulants because last time they were discontinued he developed a pulmonary embolism      Review of prior external notes (non-ED) -and- Review of prior external test results outside of this encounter:   Patient was last admitted to the hospital on 2/8/2025 and discharged on 2/12/2025 for gross hematuria and urinary retention.  Patient underwent CBI and urology took him for cystoscopy with clot evacuation.  Hematuria subsided.  Urology cleared him to restart the Eliquis upon discharge.    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 coli pneumonia 08/01/2023    History of prostate cancer 2002    History of recent hospitalization 08/01/2023    Kidney stone 2019    On anticoagulant therapy          PAST SURGICAL HISTORY  Past Surgical History:   Procedure Laterality Date    CATARACT EXTRACTION      WITH LENS IMPLANTS    COLONOSCOPY N/A 09/13/2023    Procedure: COLONOSCOPY TO CECUM AND TERMINAL ILEUM WITH COLD POLYPECTOMY;  Surgeon: Humberto Peña MD;  Location: [facility] ENDOSCOPY;  Service: Gastroenterology;  Laterality: N/A;  SCREENING  DIVERTICULOSIS, COLON POLYP, HEMORRHOIDS    CYSTOSCOPY BLADDER BIOPSY N/A 06/12/2019    Procedure: CYSTOSCOPY TUR BLADDER TUMOR;  Surgeon: Joel Russell MD;  Location: [facility] OR;  Service: Urology    CYSTOSCOPY BLADDER BIOPSY N/A 05/31/2024    Procedure: CYSTOSCOPY BLADDER BIOPSY FULGURATION;  Surgeon: Joel Russell MD;  Location: [facility] OR;  Service: Urology;  Laterality: N/A;    CYSTOSCOPY WITH CLOT EVACUATION N/A 10/02/2024    Procedure: CYSTOSCOPY WITH CLOT EVACUATION, CAUTERY OF BLEEDING, DENG INSERTION;  Surgeon: Joel Russell MD;  Location: [facility] MAIN OR;  Service: Urology;  Laterality: N/A;    CYSTOSCOPY WITH CLOT EVACUATION N/A 2/9/2025    Procedure: CYSTOSCOPY WITH CLOT EVACUATION;  Surgeon: Lebron Mosqueda MD;  Location: [facility] OR;  Service: Urology;   Laterality: N/A;    ELBOW PROCEDURE Left     FX    EYE SURGERY  03/2024    Cataract    PROSTATECTOMY      PROSTATECTOMY  03/01/2003    TONSILLECTOMY  1957    VASECTOMY           FAMILY HISTORY  Family History   Problem Relation Age of Onset    Hypertension Mother     Hypertension Father     Prostate cancer Father     Malig Hyperthermia Neg Hx          SOCIAL HISTORY  Social History     Socioeconomic History    Marital status:    Tobacco Use    Smoking status: Never     Passive exposure: Never    Smokeless tobacco: Never   Vaping Use    Vaping status: Never Used   Substance and Sexual Activity    Alcohol use: Yes     Alcohol/week: 1.0 standard drink of alcohol     Types: 1 Drinks containing 0.5 oz of alcohol per week     Comment: Occasional beer or cocktail    Drug use: No    Sexual activity: Yes     Partners: Female     Birth control/protection: Vasectomy         ALLERGIES  Penicillins      REVIEW OF SYSTEMS  Included in HPI  All systems reviewed and negative except for those discussed in HPI.      PHYSICAL EXAM    I have reviewed the triage vital signs and nursing notes.    ED Triage Vitals   Temp Heart Rate Resp BP SpO2   02/28/25 1643 02/28/25 1643 02/28/25 1647 02/28/25 1647 02/28/25 1643   97.7 °F (36.5 °C) 116 16 (!) 174/110 96 %      Temp src Heart Rate Source Patient Position BP Location FiO2 (%)   02/28/25 1643 02/28/25 1643 02/28/25 1647 02/28/25 1647 --   Tympanic Monitor Sitting Right arm        Physical Exam  Constitutional:       Comments: Uncomfortable appearing but nontoxic   HENT:      Head: Normocephalic and atraumatic.      Mouth/Throat:      Mouth: Mucous membranes are moist.   Eyes:      Extraocular Movements: Extraocular movements intact.      Pupils: Pupils are equal, round, and reactive to light.   Cardiovascular:      Rate and Rhythm: Normal rate and regular rhythm.      Pulses: Normal pulses.      Heart sounds: Normal heart sounds.   Pulmonary:      Effort: Pulmonary effort is  normal. No respiratory distress.      Breath sounds: Normal breath sounds.   Abdominal:      General: There is distension.      Tenderness: There is abdominal tenderness. There is no guarding or rebound.      Comments: Suprapubic distention and tenderness to palpation.    Musculoskeletal:         General: Normal range of motion.      Cervical back: Normal range of motion and neck supple.   Skin:     General: Skin is warm and dry.      Capillary Refill: Capillary refill takes less than 2 seconds.   Neurological:      General: No focal deficit present.      Mental Status: He is alert and oriented to person, place, and time.   Psychiatric:         Mood and Affect: Mood normal.         Behavior: Behavior normal.         LAB RESULTS  Recent Results (from the past 24 hours)   Green Top (Gel)    Collection Time: 02/28/25  6:47 PM   Result Value Ref Range    Extra Tube Hold for add-ons.    Lavender Top    Collection Time: 02/28/25  6:47 PM   Result Value Ref Range    Extra Tube hold for add-on    Gold Top - SST    Collection Time: 02/28/25  6:47 PM   Result Value Ref Range    Extra Tube Hold for add-ons.    Light Blue Top    Collection Time: 02/28/25  6:47 PM   Result Value Ref Range    Extra Tube Hold for add-ons.    Comprehensive Metabolic Panel    Collection Time: 02/28/25  6:47 PM    Specimen: Arm, Left; Blood   Result Value Ref Range    Glucose 136 (H) 65 - 99 mg/dL    BUN 14 8 - 23 mg/dL    Creatinine 1.08 0.76 - 1.27 mg/dL    Sodium 137 136 - 145 mmol/L    Potassium 4.3 3.5 - 5.2 mmol/L    Chloride 104 98 - 107 mmol/L    CO2 21.0 (L) 22.0 - 29.0 mmol/L    Calcium 9.4 8.6 - 10.5 mg/dL    Total Protein 7.1 6.0 - 8.5 g/dL    Albumin 4.1 3.5 - 5.2 g/dL    ALT (SGPT) 14 1 - 41 U/L    AST (SGOT) 15 1 - 40 U/L    Alkaline Phosphatase 95 39 - 117 U/L    Total Bilirubin 0.6 0.0 - 1.2 mg/dL    Globulin 3.0 gm/dL    A/G Ratio 1.4 g/dL    BUN/Creatinine Ratio 13.0 7.0 - 25.0    Anion Gap 12.0 5.0 - 15.0 mmol/L    eGFR 71.1 >60.0  mL/min/1.73   CBC Auto Differential    Collection Time: 02/28/25  6:47 PM    Specimen: Arm, Left; Blood   Result Value Ref Range    WBC 13.28 (H) 3.40 - 10.80 10*3/mm3    RBC 4.01 (L) 4.14 - 5.80 10*6/mm3    Hemoglobin 12.9 (L) 13.0 - 17.7 g/dL    Hematocrit 37.2 (L) 37.5 - 51.0 %    MCV 92.8 79.0 - 97.0 fL    MCH 32.2 26.6 - 33.0 pg    MCHC 34.7 31.5 - 35.7 g/dL    RDW 12.2 (L) 12.3 - 15.4 %    RDW-SD 41.4 37.0 - 54.0 fl    MPV 10.6 6.0 - 12.0 fL    Platelets 311 140 - 450 10*3/mm3    Neutrophil % 84.5 (H) 42.7 - 76.0 %    Lymphocyte % 6.3 (L) 19.6 - 45.3 %    Monocyte % 8.4 5.0 - 12.0 %    Eosinophil % 0.1 (L) 0.3 - 6.2 %    Basophil % 0.2 0.0 - 1.5 %    Immature Grans % 0.5 0.0 - 0.5 %    Neutrophils, Absolute 11.23 (H) 1.70 - 7.00 10*3/mm3    Lymphocytes, Absolute 0.83 0.70 - 3.10 10*3/mm3    Monocytes, Absolute 1.12 (H) 0.10 - 0.90 10*3/mm3    Eosinophils, Absolute 0.01 0.00 - 0.40 10*3/mm3    Basophils, Absolute 0.03 0.00 - 0.20 10*3/mm3    Immature Grans, Absolute 0.06 (H) 0.00 - 0.05 10*3/mm3    nRBC 0.0 0.0 - 0.2 /100 WBC   Urinalysis With Microscopic If Indicated (No Culture) - Indwelling Urethral Catheter    Collection Time: 02/28/25  8:51 PM    Specimen: Indwelling Urethral Catheter; Urine   Result Value Ref Range    Color, UA Red (A) Yellow, Straw    Appearance, UA Turbid (A) Clear    pH, UA 7.0 5.0 - 8.0    Specific Gravity, UA 1.025 1.005 - 1.030    Glucose, UA Negative Negative    Ketones, UA Trace (A) Negative    Bilirubin, UA Small (1+) (A) Negative    Blood, UA Large (3+) (A) Negative    Protein, UA >=300 mg/dL (3+) (A) Negative    Leuk Esterase, UA Large (3+) (A) Negative    Nitrite, UA Positive (A) Negative    Urobilinogen, UA 0.2 E.U./dL 0.2 - 1.0 E.U./dL   Urinalysis, Microscopic Only - Indwelling Urethral Catheter    Collection Time: 02/28/25  8:51 PM    Specimen: Indwelling Urethral Catheter; Urine   Result Value Ref Range    RBC, UA Too Numerous to Count (A) None Seen, 0-2 /HPF    WBC, UA  Unable to determine due to loaded field (A) None Seen, 0-2 /HPF    Bacteria, UA Unable to determine due to loaded field (A) None Seen /HPF    Squamous Epithelial Cells, UA Unable to determine due to loaded field (A) None Seen, 0-2 /HPF    Hyaline Casts, UA Unable to determine due to loaded field None Seen /LPF    Methodology Manual Light Microscopy            MEDICATIONS GIVEN IN ER  Medications   sodium chloride 0.9 % flush 10 mL (has no administration in time range)   sodium chloride 0.9 % flush 10 mL (has no administration in time range)   sodium chloride 0.9 % infusion 40 mL (has no administration in time range)   ondansetron ODT (ZOFRAN-ODT) disintegrating tablet 4 mg (has no administration in time range)     Or   ondansetron (ZOFRAN) injection 4 mg (has no administration in time range)   apixaban (ELIQUIS) tablet 5 mg (has no administration in time range)   pravastatin (PRAVACHOL) tablet 40 mg (has no administration in time range)   lisinopril (PRINIVIL,ZESTRIL) tablet 40 mg (has no administration in time range)   fluticasone (FLONASE) 50 MCG/ACT nasal spray 2 spray (has no administration in time range)           OUTPATIENT MEDICATION MANAGEMENT:  Current Facility-Administered Medications Ordered in Epic   Medication Dose Route Frequency Provider Last Rate Last Admin    apixaban (ELIQUIS) tablet 5 mg  5 mg Oral Q12H Sara Sow APRN        fluticasone (FLONASE) 50 MCG/ACT nasal spray 2 spray  2 spray Nasal Daily PRN Sara Sow APRN        [START ON 3/1/2025] lisinopril (PRINIVIL,ZESTRIL) tablet 40 mg  40 mg Oral Daily Sara Sow APRN        ondansetron ODT (ZOFRAN-ODT) disintegrating tablet 4 mg  4 mg Oral Q6H PRN Sara Sow APRN        Or    ondansetron (ZOFRAN) injection 4 mg  4 mg Intravenous Q6H PRN Sara Sow APRN        pravastatin (PRAVACHOL) tablet 40 mg  40 mg Oral Nightly Sara Sow APRN        sodium chloride 0.9 % flush 10 mL  10 mL Intravenous Q12H Sara Sow  ARJUN FRASER        sodium chloride 0.9 % flush 10 mL  10 mL Intravenous PRN Sara Sow APRN        sodium chloride 0.9 % infusion 40 mL  40 mL Intravenous PRN Sara Sow APRN         No current [facility]-ordered outpatient medications on file.             PROGRESS, DATA ANALYSIS, CONSULTS, AND MEDICAL DECISION MAKING  ORDERS PLACED DURING THIS VISIT:  Orders Placed This Encounter   Procedures    [facility]son Draw    Comprehensive Metabolic Panel    CBC Auto Differential    Urinalysis With Microscopic If Indicated (No Culture) - Urine, Clean Catch    Urinalysis, Microscopic Only - Urine, Clean Catch    CBC (No Diff)    Diet: Cardiac; Healthy Heart (2-3 Na+); Fluid Consistency: Thin (IDDSI 0)    Bladder scan    Continuous Bladder Irrigation    Insert 3-Way Urinary Catheter    Urinary Catheter Care    Assess Need for Indwelling Urinary Catheter - Follow Removal Protocol    May Irrigate Catheter    Intake & Output    Weigh Patient    Oral Care    Saline Lock & Maintain IV Access    Vital Signs (Specify Frequency)    Up With Assistance    Code Status and Medical Interventions: CPR (Attempt to Resuscitate); Full Support    Inpatient Urology Consult    Insert Peripheral IV    Initiate Emergency Department Observation Status    Green Top (Gel)    Lavender Top    Gold Top - SST    Light Blue Top    CBC & Differential       All labs have been independently interpreted by me.  All radiology studies have been reviewed by me. All EKG's have been independently viewed and interpreted by me.  Discussion below represents my analysis of pertinent findings related to patient's condition, differential diagnosis, treatment plan and final disposition.    Differential diagnosis includes but is not limited to:   Differential diagnosis for hematuria includes but is not limited to:  - BPH  - hereditary hematuria  - DIC  - coagulopathy  - infection  - infarction  - instrumentation  - tumor  - trauma  - TB  - transient hematuria  - ureteral  stones  - sickle cell  - scurvy  - hemorrhagic cystitis       ED Course:  ED Course as of 02/28/25 2307   Fri Feb 28, 2025 2015 WBC(!): 13.28 [CC]   2015 Hemoglobin(!): 12.9 [CC]   2019 I discussed the case with Dr. Horn and he agrees to evaluate the patient at the bedside.    [CC]   2025 Nursing staff at bedside to place three-way catheter [CC]   2136 Nursing staff was able to place three-way Paz catheter which is currently irrigating with CBI.  Will plan to admit for observation [CC]   2137 BP: 134/71  Normalized after catheter placement. [CC]   2137 I rechecked the patient.  I discussed the patient's labs, radiology findings (including all incidental findings), diagnosis, and plan for admission. The patient understands and agrees with the plan.   [CC]   2139 Spoke with ARJUN Ruiz with Observation Unit.  Reviewed history, exam, results, treatments.  She agrees admit the patient.      [CC]      ED Course User Index  [CC] Rhianna Malin PA-C           AS OF 23:07 EST VITALS:    BP - 124/63  HR - 85  TEMP - 98.2 °F (36.8 °C) (Oral)  O2 SATS - 97%      MDM:  Patient is a 74-year-old male who presents emergency department today with hematuria.  This is an ongoing and chronic issue for this patient.  I personally have seen this patient multiple times for the same complaint.  On arrival here in the emergency department today patient was hypertensive but vitals otherwise reassuring, he is afebrile.  Hypertension improved with placement of catheter.  Suspect this was secondary to pain.  On my exam patient initially appeared uncomfortable, abdomen is distended.  This improved with placement of catheter.  A three-way Paz catheter was placed and patient was placed on CBI.  Urine drained and patient was irrigated clear.  Labs were generally reassuring with send hemoglobin of 12.9 but given the amount of clot here today will plan to admit for CBI discharge as patient is at high risk of clotting off the catheter if  he is discharged home tonight.  Patient is stable at time of admission.      COMPLEXITY OF CARE  The patient requires admission.        DIAGNOSIS  Final diagnoses:   Acute urinary retention   Gross hematuria         DISPOSITION  ED Disposition       ED Disposition   Decision to Admit    Condition   --    Comment   --                      Please note that portions of this document were completed with a voice recognition program.    Note Disclaimer: At [facility], we believe that sharing information builds trust and better relationships. You are receiving this note because you recently visited [facility]. It is possible you will see health information before a provider has talked with you about it. This kind of information can be easy to misunderstand. To help you fully understand what it means for your health, we urge you to discuss this note with your provider.     Rhianna Malin PA-C  02/28/25 1904

## 2025-03-01 NOTE — CONSULTS
First Urology Consult  Patient Identification:  NAME:  North Carcamo  Age:  74 y.o.   Sex:  male   :  1950   MRN:  [id number]     Chief complaint: Urinary retention and gross hematuria    History of present illness:    Pt is a 74 y.o. male with a history of prostate cancer RP and XRT who presented to the ED on 25 with complaints of acute urinary retention and gross hematuria.      Urology was consulted for evaluation. Patient reports noticing hematuria prior to his HBO treatments on Friday. He urinated a small clot prior to treatment, however, during treatment he noticed a significant urge to urinate and noticed a large amount of blood with clots. He was advised to go to ER. He has a 3-way indwelling catheter in place with CBI. CBI is now clamped and urine output appears pale yellow. He is on Eliquis but his dose yesterday was held. He reports no suprapubic pain.     In hospital:  -AVSS, good UOP  -WBC - 8.91 (13.28)  -Creat - 1.08  - Hgb - 10.6 (12.9)  -UA - Large LE, positive nitrites, 3+ nlood, TNTC RBC, UTD WBC and bacteria    Asked to see    Past medical history:  Past Medical History:   Diagnosis Date    Allergic     Penicillin    Cataract 2022    Diverticulosis     E. coli pneumonia 2023    HOSPITALIZED    History of prostate cancer 2002    History of recent hospitalization 2023    Hyperglycemia     Hyperlipidemia     Hypertension     Kidney stone 2019    Has not been a problem.    On anticoagulant therapy     Paroxysmal atrial fibrillation        Past surgical history:  Past Surgical History:   Procedure Laterality Date    CATARACT EXTRACTION      WITH LENS IMPLANTS    COLONOSCOPY N/A 2023    Procedure: COLONOSCOPY TO CECUM AND TERMINAL ILEUM WITH COLD POLYPECTOMY;  Surgeon: Humberto Peña MD;  Location: [facility] ENDOSCOPY;  Service: Gastroenterology;  Laterality: N/A;  SCREENING  DIVERTICULOSIS, COLON POLYP, HEMORRHOIDS    CYSTOSCOPY BLADDER BIOPSY N/A  06/12/2019    Procedure: CYSTOSCOPY TUR BLADDER TUMOR;  Surgeon: Joel Russell MD;  Location: [facility] OR;  Service: Urology    CYSTOSCOPY BLADDER BIOPSY N/A 05/31/2024    Procedure: CYSTOSCOPY BLADDER BIOPSY FULGURATION;  Surgeon: Joel Russell MD;  Location: [facility] OR;  Service: Urology;  Laterality: N/A;    CYSTOSCOPY WITH CLOT EVACUATION N/A 10/02/2024    Procedure: CYSTOSCOPY WITH CLOT EVACUATION, CAUTERY OF BLEEDING, DENG INSERTION;  Surgeon: Joel Russell MD;  Location: [facility] MAIN OR;  Service: Urology;  Laterality: N/A;    CYSTOSCOPY WITH CLOT EVACUATION N/A 2/9/2025    Procedure: CYSTOSCOPY WITH CLOT EVACUATION;  Surgeon: Lebron Mosqueda MD;  Location: [facility] OR;  Service: Urology;  Laterality: N/A;    ELBOW PROCEDURE Left     FX    EYE SURGERY  03/2024    Cataract    PROSTATECTOMY      PROSTATECTOMY  03/01/2003    TONSILLECTOMY  1957    VASECTOMY         Allergies:  Penicillins    Home medications:  Medications Prior to Admission   Medication Sig Dispense Refill Last Dose/Taking    Calcium Carbonate-Vitamin D (CALCIUM 600+D PO) Take 1 tablet by mouth 2 (Two) Times a Day. PT HOLDING FOR SURGERY  Indications: Calcium Deficiency   Taking    Eliquis 5 MG tablet tablet Take 1 tablet by mouth Every 12 (Twelve) Hours. 60 tablet 11 Taking    lisinopril (PRINIVIL,ZESTRIL) 40 MG tablet Take 1 tablet by mouth Daily. 90 tablet 1 Taking    Multiple Vitamins-Minerals (DAILY MULTIVITAMIN PO) Take 1 tablet by mouth Daily.   Taking    pravastatin (PRAVACHOL) 40 MG tablet Take 1 tablet by mouth Every Night. Indications: High Amount of Fats in the Blood 90 tablet 3 Taking    dapagliflozin Propanediol (Farxiga) 10 MG tablet Take 10 mg by mouth Daily. 30 tablet 0     fluticasone (FLONASE) 50 MCG/ACT nasal spray Administer 2 sprays into the nostril(s) as directed by provider Daily As Needed for Rhinitis.           Hospital medications:  [Held by provider] apixaban, 5 mg, Oral, Q12H  cefTRIAXone,  2,000 mg, Intravenous, Q24H  lisinopril, 40 mg, Oral, Daily  pravastatin, 40 mg, Oral, Nightly  sodium chloride, 10 mL, Intravenous, Q12H           fluticasone    ondansetron ODT **OR** ondansetron    sodium chloride    sodium chloride    Family history:  Family History   Problem Relation Age of Onset    Hypertension Mother     Hypertension Father     Prostate cancer Father     Malig Hyperthermia Neg Hx        Social history:  Social History     Tobacco Use    Smoking status: Never     Passive exposure: Never    Smokeless tobacco: Never   Vaping Use    Vaping status: Never Used   Substance Use Topics    Alcohol use: Yes     Alcohol/week: 1.0 standard drink of alcohol     Types: 1 Drinks containing 0.5 oz of alcohol per week     Comment: Occasional beer or cocktail    Drug use: No       Review of Systems:     12 point negative except as in HPI    Objective:  TMax 24 hours:   Temp (24hrs), Av.2 °F (36.8 °C), Min:97.7 °F (36.5 °C), Max:98.6 °F (37 °C)      Vitals Ranges:   Temp:  [97.7 °F (36.5 °C)-98.6 °F (37 °C)] 98.6 °F (37 °C)  Heart Rate:  [] 61  Resp:  [16] 16  BP: (102-178)/() 102/68    Intake/Output Last 3 shifts:  I/O last 3 completed shifts:  In: -   Out:  [Urine:]     Physical Exam:    General Appearance:    Alert, cooperative, NAD   Abdomen:  :      Soft, NDNT, no palpable bladder distension, nontender    3-way mcclellan catheter in place draining pale yellow urine    Neuro/Psych:   Orientation intact, mood/affect pleasant       Results review:   I reviewed the patient's new clinical results.    Data review:  Lab Results (last 24 hours)       Procedure Component Value Units Date/Time    CBC (No Diff) [[id number]]  (Abnormal) Collected: 25 0403    Specimen: Blood from Arm, Right Updated: 25 0453     WBC 8.91 10*3/mm3      RBC 3.36 10*6/mm3      Hemoglobin 10.6 g/dL      Hematocrit 31.6 %      MCV 94.0 fL      MCH 31.5 pg      MCHC 33.5 g/dL      RDW 11.9 %      RDW-SD 41.0 fl       MPV 11.0 fL      Platelets 245 10*3/mm3     Urinalysis, Microscopic Only - Indwelling Urethral Catheter [[id number]]  (Abnormal) Collected: 02/28/25 2051    Specimen: Urine from Indwelling Urethral Catheter Updated: 02/28/25 2135     RBC, UA Too Numerous to Count /HPF      WBC, UA       Unable to determine due to loaded field     /HPF     Bacteria, UA       Unable to determine due to loaded field     /HPF     Squamous Epithelial Cells, UA       Unable to determine due to loaded field     /HPF     Hyaline Casts, UA       Unable to determine due to loaded field     /LPF     Methodology Manual Light Microscopy    Urinalysis With Microscopic If Indicated (No Culture) - Indwelling Urethral Catheter [[id number]]  (Abnormal) Collected: 02/28/25 2051    Specimen: Urine from Indwelling Urethral Catheter Updated: 02/28/25 2134     Color, UA Red     Comment: Any Substance that causes an abnormal urine color can alter the accuracy of the chemical reactions.        Appearance, UA Turbid     pH, UA 7.0     Specific Gravity, UA 1.025     Glucose, UA Negative     Ketones, UA Trace     Bilirubin, UA Small (1+)     Blood, UA Large (3+)     Protein, UA >=300 mg/dL (3+)     Leuk Esterase, UA Large (3+)     Nitrite, UA Positive     Urobilinogen, UA 0.2 E.U./dL    Narrative:      Chemical analysis performed on supernatant due to grossly bloody specimen. Results may be affected.    Comprehensive Metabolic Panel [[id number]]  (Abnormal) Collected: 02/28/25 1847    Specimen: Blood from Arm, Left Updated: 02/28/25 1920     Glucose 136 mg/dL      BUN 14 mg/dL      Creatinine 1.08 mg/dL      Sodium 137 mmol/L      Potassium 4.3 mmol/L      Chloride 104 mmol/L      CO2 21.0 mmol/L      Calcium 9.4 mg/dL      Total Protein 7.1 g/dL      Albumin 4.1 g/dL      ALT (SGPT) 14 U/L      AST (SGOT) 15 U/L      Alkaline Phosphatase 95 U/L      Total Bilirubin 0.6 mg/dL      Globulin 3.0 gm/dL      A/G Ratio 1.4 g/dL      BUN/Creatinine Ratio 13.0      Anion Gap 12.0 mmol/L      eGFR 71.1 mL/min/1.73     Narrative:      GFR Categories in Chronic Kidney Disease (CKD)      GFR Category          GFR (mL/min/1.73)    Interpretation  G1                     90 or greater         Normal or high (1)  G2                      60-89                Mild decrease (1)  G3a                   45-59                Mild to moderate decrease  G3b                   30-44                Moderate to severe decrease  G4                    15-29                Severe decrease  G5                    14 or less           Kidney failure          (1)In the absence of evidence of kidney disease, neither GFR category G1 or G2 fulfill the criteria for CKD.    eGFR calculation 2021 CKD-EPI creatinine equation, which does not include race as a factor    CBC & Differential [[id number]]  (Abnormal) Collected: 02/28/25 1847    Specimen: Blood from Arm, Left Updated: 02/28/25 1907    Narrative:      The following orders were created for panel order CBC & Differential.  Procedure                               Abnormality         Status                     ---------                               -----------         ------                     CBC Auto Differential[[id number]]        Abnormal            Final result                 Please view results for these tests on the individual orders.    CBC Auto Differential [[id number]]  (Abnormal) Collected: 02/28/25 1847    Specimen: Blood from Arm, Left Updated: 02/28/25 1907     WBC 13.28 10*3/mm3      RBC 4.01 10*6/mm3      Hemoglobin 12.9 g/dL      Hematocrit 37.2 %      MCV 92.8 fL      MCH 32.2 pg      MCHC 34.7 g/dL      RDW 12.2 %      RDW-SD 41.4 fl      MPV 10.6 fL      Platelets 311 10*3/mm3      Neutrophil % 84.5 %      Lymphocyte % 6.3 %      Monocyte % 8.4 %      Eosinophil % 0.1 %      Basophil % 0.2 %      Immature Grans % 0.5 %      Neutrophils, Absolute 11.23 10*3/mm3      Lymphocytes, Absolute 0.83 10*3/mm3      Monocytes, Absolute 1.12  10*3/mm3      Eosinophils, Absolute 0.01 10*3/mm3      Basophils, Absolute 0.03 10*3/mm3      Immature Grans, Absolute 0.06 10*3/mm3      nRBC 0.0 /100 WBC     Moore Draw [[id number]] Collected: 02/28/25 1847    Specimen: Blood from Arm, Left Updated: 02/28/25 1900    Narrative:      The following orders were created for panel order Moore Draw.  Procedure                               Abnormality         Status                     ---------                               -----------         ------                     Green Top (Gel)[[id number]]                                  Final result               Lavender Top[[id number]]                                     Final result               Gold Top - SST[[id number]]                                   Final result               Light Blue Top[[id number]]                                   Final result                 Please view results for these tests on the individual orders.    Green Top (Gel) [[id number]] Collected: 02/28/25 1847    Specimen: Blood from Arm, Left Updated: 02/28/25 1900     Extra Tube Hold for add-ons.     Comment: Auto resulted.       Lavender Top [[id number]] Collected: 02/28/25 1847    Specimen: Blood from Arm, Left Updated: 02/28/25 1900     Extra Tube hold for add-on     Comment: Auto resulted       Gold Top - SST [[id number]] Collected: 02/28/25 1847    Specimen: Blood from Arm, Left Updated: 02/28/25 1900     Extra Tube Hold for add-ons.     Comment: Auto resulted.       Light Blue Top [[id number]] Collected: 02/28/25 1847    Specimen: Blood from Arm, Left Updated: 02/28/25 1900     Extra Tube Hold for add-ons.     Comment: Auto resulted                Imaging:  Imaging Results (Last 24 Hours)       ** No results found for the last 24 hours. **             Assessment:     Gross hematuria   Hx prostate cancer s/p RP and XRT    Plan:   - Discontinue CBI. Okay for VT.   - Start Tamsulosin 0.4 mg QD  - Can discharge without mcclellan if PVR < 200  Julieta Light, APRN  03/01/25    Plan reviewed and discussed with Dr. José

## 2025-03-01 NOTE — NURSING NOTE
Patient unable to void. Requested reinserting mcclellan for discharge. 400 out upon mcclellan insertion. Discharged w/ mcclellan in place. Sent home with instructions and cath care wipes.

## 2025-03-01 NOTE — PLAN OF CARE
Goal Outcome Evaluation:      Pt admitted to observation for further treatment of his hematuria. CBI continued overnight. Output currently clear yellow. Urology consult.

## 2025-03-01 NOTE — DISCHARGE INSTRUCTIONS
Follow-up with urology outpatient  Return to the emergency department if you develop fever greater than 101 that is uncontrolled with Tylenol, chest pain or shortness of breath

## 2025-03-01 NOTE — H&P
[facility]   HISTORY AND PHYSICAL    Patient Name: North Carcamo  : 1950  MRN: [id number]  Primary Care Physician:  Chloe Arriaga MD  Date of admission: 2025    Subjective   Subjective     Chief Complaint:   Chief Complaint   Patient presents with    Blood in Urine         HPI:    North Carcamo is a 74 y.o. male with a past medical history significant for prostate cancer, bladder cancer, radiation cystitis, A-fib, hypertension, hyperlipidemia, and recent diagnosis of PE/DVT and anticoagulated on Eliquis, who presented to the emergency department with a complaint of urinary retention secondary to hematuria and was admitted to the ED observation unit for further evaluation and workup.  Patient states he had a cystoscopy to remove clots on 2025 and had been without a catheter, urinating normally, until today when he started with hematuria and was unable to urinate. Patient receives hyperbaric oxygen treatment for this issue and cannot go without anticoagulants due to the development of a recent PE.     ED work-up: WBC 13.28, Hgb 12.9, Hct 37.2, Platelets 311.  Glucose 136. Urinalysis reported 1+ Ketones, 3+ Blood, Positive Nitrite, +3 Leukocytes, TNTC RBC's, TNTC WBC's, TNTC Bacteria, TNTC Squamous Epithelial Cells.     On admission to the ED observation unit, patient is alert and oriented x 4.  Denies any complaints at this time.  States he had immediate relief upon insertion of the Paz catheter in the emergency department.  CBI infusing with urine clear in the tubing.  Vital signs stable.    Review of Systems   All systems were reviewed and negative except for: As documented in HPI.    Personal History     Past Medical History:   Diagnosis Date    Allergic     Penicillin    Cataract 2022    Diverticulosis     E. coli pneumonia 2023    HOSPITALIZED    History of prostate cancer     History of recent hospitalization 2023    Hyperglycemia     Hyperlipidemia      Hypertension     Kidney stone 2019    Has not been a problem.    On anticoagulant therapy     Paroxysmal atrial fibrillation        Past Surgical History:   Procedure Laterality Date    CATARACT EXTRACTION      WITH LENS IMPLANTS    COLONOSCOPY N/A 09/13/2023    Procedure: COLONOSCOPY TO CECUM AND TERMINAL ILEUM WITH COLD POLYPECTOMY;  Surgeon: Humberto Peña MD;  Location: [facility] ENDOSCOPY;  Service: Gastroenterology;  Laterality: N/A;  SCREENING  DIVERTICULOSIS, COLON POLYP, HEMORRHOIDS    CYSTOSCOPY BLADDER BIOPSY N/A 06/12/2019    Procedure: CYSTOSCOPY TUR BLADDER TUMOR;  Surgeon: Joel Russell MD;  Location: [facility] OR;  Service: Urology    CYSTOSCOPY BLADDER BIOPSY N/A 05/31/2024    Procedure: CYSTOSCOPY BLADDER BIOPSY FULGURATION;  Surgeon: Joel Russell MD;  Location: [facility] MAIN OR;  Service: Urology;  Laterality: N/A;    CYSTOSCOPY WITH CLOT EVACUATION N/A 10/02/2024    Procedure: CYSTOSCOPY WITH CLOT EVACUATION, CAUTERY OF BLEEDING, DENG INSERTION;  Surgeon: Joel Russell MD;  Location: [facility] MAIN OR;  Service: Urology;  Laterality: N/A;    CYSTOSCOPY WITH CLOT EVACUATION N/A 2/9/2025    Procedure: CYSTOSCOPY WITH CLOT EVACUATION;  Surgeon: Lebron Mosqueda MD;  Location: [facility] MAIN OR;  Service: Urology;  Laterality: N/A;    ELBOW PROCEDURE Left     FX    EYE SURGERY  03/2024    Cataract    PROSTATECTOMY      PROSTATECTOMY  03/01/2003    TONSILLECTOMY  1957    VASECTOMY         Family History: family history includes Hypertension in his father and mother; Prostate cancer in his father. Otherwise pertinent FHx was reviewed and not pertinent to current issue.    Social History:  reports that he has never smoked. He has never been exposed to tobacco smoke. He has never used smokeless tobacco. He reports current alcohol use of about 1.0 standard drink of alcohol per week. He reports that he does not use drugs.    Home Medications:  Calcium Carbonate-Vitamin D, Multiple  Vitamins-Minerals, apixaban, dapagliflozin Propanediol, fluticasone, lisinopril, and pravastatin    Allergies:  Allergies   Allergen Reactions    Penicillins Rash     Thinks childhood reaction of rash        Objective   Objective     Vitals:   Temp:  [97.7 °F (36.5 °C)] 97.7 °F (36.5 °C)  Heart Rate:  [] 95  Resp:  [16] 16  BP: (134-178)/() 134/71  Physical Exam    Constitutional: Awake, alert   Eyes: PERRLA, sclerae anicteric, no conjunctival injection   HENT: NCAT, mucous membranes moist   Neck: Supple, no thyromegaly, no lymphadenopathy, trachea midline   Respiratory: Clear to auscultation bilaterally, nonlabored respirations    Cardiovascular: Apical irregular, no murmurs, rubs, or gallops,   Gastrointestinal: Soft, nontender, nondistended   Musculoskeletal: No bilateral ankle edema, no clubbing or cyanosis to extremities   Psychiatric: Appropriate affect, cooperative  Neurologic: Oriented x 3, strength symmetric in all extremities, Cranial Nerves grossly intact to confrontation, speech clear   Skin: No rashes     Result Review    Result Review:  I have personally reviewed the results from the time of this admission to 2/28/2025 21:40 EST and agree with these findings:  [x]  Laboratory list / accordion  []  Microbiology  []  Radiology  []  EKG/Telemetry   []  Cardiology/Vascular   []  Pathology  []  Old records  []  Other:  Most notable findings include: WBC 13.28, Hgb 12.9, Hct 37.2, Platelets 311.  Glucose 136. Urinalysis reported 1+ Ketones, 3+ Blood, Positive Nitrite, +3 Leukocytes, TNTC RBC's, TNTC WBC's, TNTC Bacteria, TNTC Squamous Epithelial Cells.         Assessment & Plan   The 10-year ASCVD risk score (Mirta DK, et al., 2019) is: 29.4%    Values used to calculate the score:      Age: 74 years      Sex: Male      Is Non- : No      Diabetic: No      Tobacco smoker: No      Systolic Blood Pressure: 134 mmHg      Is BP treated: Yes      HDL Cholesterol: 55 mg/dL       Total Cholesterol: 156 mg/dL    Assessment / Plan     Brief Patient Summary:  North Carcamo is a 74 y.o. male who was admitted to the ED observation unit for further evaluation and workup with a complaint of urinary retention related to hematuria.  This has been a chronic issue for patient related to his prostate cancer, bladder cancer, and need for anticoagulation due to development of a PE.  Three-way Paz catheter was placed and CBI was initiated.  Urology consult in a.m.    Active Hospital Problems:  Active Hospital Problems    Diagnosis     **Hematuria      Plan:     Hematuria  Urinary retention  History of prostate and bladder cancer  Anticoagulated on Eliquis for PE  Three-way Paz catheter placed in ED  Continue with CBI  Recheck CBC in am  Hold Eliquis tonight  Urology consult in a.m.    Hypertension  Hyperlipidemia  Continue home medications lisinopril and Pravachol      VTE Prophylaxis:  No VTE prophylaxis order currently exists.      CODE STATUS:       Admission Status:  I believe this patient meets observation status.    Electronically signed by ARJUN Chin, 02/28/25, 9:40 PM EST.        75 minutes has been spent by [facility] providers in the care of this patient while under observation status      I have worn appropriate PPE during this patient encounter, sanitized my hands both with entering and exiting patient's room.    I have discussed plan of care with patient including advance care plan and/or surrogate decision maker.  Patient advises that their wife will be their primary surrogate decision maker

## 2025-03-01 NOTE — ED PROVIDER NOTES
SHARED VISIT: This visit was performed by BOTH a physician and an APC. The substantive portion of the medical decision making was performed by this attesting physician who made or approved the management plan and takes responsibility for patient management. All studies in the APC note (if performed) were independently interpreted by me.     The SINDI and I have discussed this patient's history, physical exam, and treatment plan.  I have reviewed the documentation and personally had a face to face interaction with the patient. I affirm the documentation and agree with the treatment and plan.  The attached note describes my personal findings.      I provided a substantive portion of the care of the patient.  I personally performed the physical exam in its entirety, and below are my findings.     Brief history of present illness: 74-year-old male with a history of radiation cystitis returns today with gross hematuria.  He is chronically anticoagulated for history of PE.  He is unable to empty his bladder at this point.  He has had a Paz catheter in the past but it has been out for 3 weeks.    Physical exam:    BP (!) 178/113   Pulse 98   Temp 97.7 °F (36.5 °C) (Tympanic)   Resp 16   SpO2 97%       Physical Exam   Constitutional: No distress.  Nontoxic  HENT:  Head: Normocephalic and atraumatic.   Oropharynx: Mucous membranes are moist.   Eyes: . No scleral icterus.   Neck: Normal range of motion. Neck supple.   Cardiovascular: Pink warm and well perfused throughout.    Pulmonary/Chest: No respiratory distress.    Abdominal: Soft. There is no rebound or rigidity.  Distended and uncomfortable consistent with distended bladder  Musculoskeletal: Moves all extremities equally.    Neurological: Alert and oriented.  Speech fluent and easily intelligible  Skin: Skin is pink, warm, and dry.   Psychiatric: Mood and affect normal.   Nursing note and vitals reviewed.        MDM:  My differential diagnosis for abdominal pain  includes but is not limited to:  Gastritis, gastroenteritis, peptic ulcer disease, GERD, esophageal perforation, acute appendicitis, mesenteric adenitis, Meckel’s diverticulum, epiploic appendagitis, diverticulitis, colon cancer, ulcerative colitis, Crohn’s disease, intussusception, small bowel obstruction, adhesions, ischemic bowel, perforated viscus, ileus, obstipation, biliary colic, cholecystitis, cholelithiasis, Tray-Ranjeet Shin, hepatitis, pancreatitis, common bile duct obstruction, cholangitis, bile leak, splenic trauma, splenic rupture, splenic infarction, splenic abscess, abdominal abscess, ascites, spontaneous bacterial peritonitis, hernia, UTI, cystitis, prostatitis, ureterolithiasis, urinary obstruction, AAA, myocardial infarction, pneumonia, cancer, porphyria, DKA, medications, sickle cell, viral syndrome, zoster      Please note that portions of this were completed with a voice recognition program.       Note Disclaimer: At [facility], we believe that sharing information builds trust and better relationships. You are receiving this note because you are receiving care at [facility] or recently visited. It is possible you will see health information before a provider has talked with you about it. This kind of information can be easy to misunderstand. To help you fully understand what it means for your health, we urge you to discuss this note with your provider.     Meng Horn MD  02/28/25 2026

## 2025-03-01 NOTE — DISCHARGE SUMMARY
ED OBSERVATION PROGRESS/DISCHARGE SUMMARY    Date of Admission: 2/28/2025   LOS: 0 days   PCP: Chloe Arriaga MD          Subjective     Hospital Outcome:   74 y.o. male with a past medical history significant for prostate cancer, bladder cancer, radiation cystitis, A-fib, hypertension, hyperlipidemia, and recent diagnosis of PE/DVT and anticoagulated on Eliquis, who presented to the emergency department with a complaint of urinary retention secondary to hematuria and was admitted to the ED observation unit for further evaluation and workup.  Patient states he had a cystoscopy to remove clots on 2/9/2025 and had been without a catheter, urinating normally, until yesterday when he started with hematuria and was unable to urinate. Patient receives hyperbaric oxygen treatment for this issue and cannot go without anticoagulants due to the development of a recent PE.     Laboratory evaluation showed initial globin 12.9 and this a.m. down to 10.6.  UA nitrate positive, large leukocytes, protein >300 and RBC TNTC and patient was started on IV Rocephin.  Three-way Paz catheter was placed and patient was started on CBI and urology has been consulted.    3/1/2025  11:30 Urology evaluated patient and had discontinued CBI and Paz catheter and started patient on tamsulosin 0.4 mg daily.  Patient will undergo voiding trial however was unable to empty bladder and postvoid residual was greater than 300 cc therefore Paz catheter was placed and patient instructed to follow-up with urology outpatient.          ROS:  General: no fevers, chills  Respiratory: no cough, dyspnea  Cardiovascular: no chest pain, palpitations  Abdomen: No abdominal pain, nausea, vomiting, or diarrhea  Neurologic: No focal weakness    Objective   Physical Exam:  I have reviewed the vital signs.  Temp:  [97.7 °F (36.5 °C)-98.6 °F (37 °C)] 98.6 °F (37 °C)  Heart Rate:  [] 61  Resp:  [16] 16  BP: (102-178)/() 102/68  General Appearance:     Alert, cooperative, no distress  Head:    Normocephalic, atraumatic  Eyes:    Sclerae anicteric  Neck:   Supple, no mass  Lungs: Clear to auscultation bilaterally, respirations unlabored  Heart: Regular rate and rhythm, S1 and S2 normal, no murmur, rub or gallop  Abdomen:  Soft, nontender, bowel sounds active, nondistended  Extremities: No clubbing, cyanosis, or edema to lower extremities  Pulses:  2+ and symmetric in distal lower extremities  Skin: No rashes   Neurologic: Oriented x3, Normal strength to extremities    Results Review:    I have reviewed the labs, radiology results and diagnostic studies.    Results from last 7 days   Lab Units 03/01/25  0403   WBC 10*3/mm3 8.91   HEMOGLOBIN g/dL 10.6*   HEMATOCRIT % 31.6*   PLATELETS 10*3/mm3 245     Results from last 7 days   Lab Units 02/28/25  1847   SODIUM mmol/L 137   POTASSIUM mmol/L 4.3   CHLORIDE mmol/L 104   CO2 mmol/L 21.0*   BUN mg/dL 14   CREATININE mg/dL 1.08   CALCIUM mg/dL 9.4   BILIRUBIN mg/dL 0.6   ALK PHOS U/L 95   ALT (SGPT) U/L 14   AST (SGOT) U/L 15   GLUCOSE mg/dL 136*     Imaging Results (Last 24 Hours)       ** No results found for the last 24 hours. **            I have reviewed the medications.     Discharge Medications        ASK your doctor about these medications        Instructions Start Date   CALCIUM 600+D PO   1 tablet, 2 Times Daily      DAILY MULTIVITAMIN PO   1 tablet, Daily      dapagliflozin Propanediol 10 MG tablet  Commonly known as: Farxiga   10 mg, Oral, Daily      Eliquis 5 MG tablet tablet  Generic drug: apixaban   5 mg, Oral, Every 12 Hours Scheduled      fluticasone 50 MCG/ACT nasal spray  Commonly known as: FLONASE   2 sprays, Daily PRN      lisinopril 40 MG tablet  Commonly known as: PRINIVIL,ZESTRIL   40 mg, Oral, Daily      pravastatin 40 MG tablet  Commonly known as: PRAVACHOL   40 mg, Oral, Nightly              ---------------------------------------------------------------------------------------------  Assessment  & Plan   Assessment/Problem List    Hematuria      Plan:    Hematuria  Urinary retention  History of prostate and bladder cancer  Anticoagulated on Eliquis for PE  Three-way Paz catheter placed in ED  Continue with CBI  Recheck CBC in am  Hold Eliquis tonight  Urology consult in a.m.     Hypertension  Hyperlipidemia  Continue home medications lisinopril and Pravachol    Disposition: Home    Follow-up after Discharge: Urology    This note will serve as a discharge summary    ARJUN Sy 03/01/25 07:57 EST    I have worn appropriate PPE during this patient encounter, sanitized my hands both with entering and exiting patient's room.      30 minutes has been spent by [facility] providers in the care of this patient while under observation status

## 2025-03-02 NOTE — OUTREACH NOTE
Prep Survey      Flowsheet Row Responses   [facility] patient discharged from? [place]   Is LACE score < 7 ? No   Eligibility Psychiatric   Date of Admission 02/28/25   Date of Discharge 03/01/25   Discharge Disposition Home or Self Care   Discharge diagnosis Hematuria  Urinary retention  History of prostate and bladder cancer  Anticoagulated on Eliquis for PE   Does the patient have one of the following disease processes/diagnoses(primary or secondary)? Other   Does the patient have Home health ordered? No   Is there a DME ordered? No   Prep survey completed? Yes            VANESSA TOTH - Registered Nurse

## 2025-03-03 ENCOUNTER — OFFICE VISIT (OUTPATIENT)
Dept: WOUND CARE | Facility: HOSPITAL | Age: 75
End: 2025-03-03
Payer: MEDICARE

## 2025-03-03 ENCOUNTER — TRANSITIONAL CARE MANAGEMENT TELEPHONE ENCOUNTER (OUTPATIENT)
Dept: CALL CENTER | Facility: HOSPITAL | Age: 75
End: 2025-03-03
Payer: MEDICARE

## 2025-03-03 PROCEDURE — G0277 HBOT, FULL BODY CHAMBER, 30M: HCPCS

## 2025-03-03 NOTE — OUTREACH NOTE
Call Center TCM Note      Flowsheet Row Responses   [facility] patient discharged from? [place]   Does the patient have one of the following disease processes/diagnoses(primary or secondary)? Other   TCM attempt successful? Yes   Call start time 1523   Call end time 1530   Discharge diagnosis Hematuria  Urinary retention  History of prostate and bladder cancer  Anticoagulated on Eliquis for PE   Meds reviewed with patient/caregiver? Yes   Is the patient having any side effects they believe may be caused by any medication additions or changes? No   Does the patient have all medications ordered at discharge? N/A   Is the patient taking all medications as directed (includes completed medication regime)? Yes   Comments Urology appt on 3/6/25   Does the patient have an appointment with their PCP within 7-14 days of discharge? No   Nursing Interventions Patient declined scheduling/rescheduling appointment at this time, Patient desires to follow up with specialty only   Has home health visited the patient within 72 hours of discharge? N/A   Psychosocial issues? No   Comments Pt reports his F/C is dry and intact and has good output. He has an urology appt coming up and is hopeful he may have it taken out.   Did the patient receive a copy of their discharge instructions? Yes   Nursing interventions Reviewed instructions with patient   What is the patient's perception of their health status since discharge? Improving   Is the patient/caregiver able to teach back the hierarchy of who to call/visit for symptoms/problems? PCP, Specialist, Home health nurse, Urgent Care, ED, 911 Yes   TCM call completed? Yes   Call end time 1530   Would this patient benefit from a Referral to Amb Social Work? No   Is the patient interested in additional calls from an ambulatory ? No            Rosanna Kasper RN    3/3/2025, 15:32 EST

## 2025-03-03 NOTE — OUTREACH NOTE
Call Center TCM Note      Flowsheet Row Responses   [facility] patient discharged from? [place]   Does the patient have one of the following disease processes/diagnoses(primary or secondary)? Other   TCM attempt successful? No   Unsuccessful attempts Attempt 1   Call Status Left message            Rosanna Kasper RN    3/3/2025, 11:34 EST

## 2025-03-04 ENCOUNTER — OFFICE VISIT (OUTPATIENT)
Dept: WOUND CARE | Facility: HOSPITAL | Age: 75
End: 2025-03-04
Payer: MEDICARE

## 2025-03-04 PROCEDURE — G0277 HBOT, FULL BODY CHAMBER, 30M: HCPCS

## 2025-03-05 ENCOUNTER — OFFICE VISIT (OUTPATIENT)
Dept: WOUND CARE | Facility: HOSPITAL | Age: 75
End: 2025-03-05
Payer: MEDICARE

## 2025-03-05 ENCOUNTER — HOSPITAL ENCOUNTER (OUTPATIENT)
Facility: HOSPITAL | Age: 75
Setting detail: OBSERVATION
Discharge: HOME | End: 2025-03-06
Attending: STUDENT IN AN ORGANIZED HEALTH CARE EDUCATION/TRAINING PROGRAM | Admitting: EMERGENCY MEDICINE
Payer: MEDICARE

## 2025-03-05 DIAGNOSIS — R31.9 HEMATURIA, UNSPECIFIED TYPE: Primary | ICD-10-CM

## 2025-03-05 LAB
BASOPHILS # BLD AUTO: 0.04 10*3/MM3 (ref 0–0.2)
BASOPHILS NFR BLD AUTO: 0.5 % (ref 0–1.5)
DEPRECATED RDW RBC AUTO: 41.2 FL (ref 37–54)
EOSINOPHIL # BLD AUTO: 0.12 10*3/MM3 (ref 0–0.4)
EOSINOPHIL NFR BLD AUTO: 1.5 % (ref 0.3–6.2)
ERYTHROCYTE [DISTWIDTH] IN BLOOD BY AUTOMATED COUNT: 12.2 % (ref 12.3–15.4)
HCT VFR BLD AUTO: 31.9 % (ref 37.5–51)
HGB BLD-MCNC: 11.1 G/DL (ref 13–17.7)
IMM GRANULOCYTES # BLD AUTO: 0.02 10*3/MM3 (ref 0–0.05)
IMM GRANULOCYTES NFR BLD AUTO: 0.3 % (ref 0–0.5)
LYMPHOCYTES # BLD AUTO: 0.93 10*3/MM3 (ref 0.7–3.1)
LYMPHOCYTES NFR BLD AUTO: 11.7 % (ref 19.6–45.3)
MCH RBC QN AUTO: 32.3 PG (ref 26.6–33)
MCHC RBC AUTO-ENTMCNC: 34.8 G/DL (ref 31.5–35.7)
MCV RBC AUTO: 92.7 FL (ref 79–97)
MONOCYTES # BLD AUTO: 1.03 10*3/MM3 (ref 0.1–0.9)
MONOCYTES NFR BLD AUTO: 12.9 % (ref 5–12)
NEUTROPHILS NFR BLD AUTO: 5.83 10*3/MM3 (ref 1.7–7)
NEUTROPHILS NFR BLD AUTO: 73.1 % (ref 42.7–76)
NRBC BLD AUTO-RTO: 0 /100 WBC (ref 0–0.2)
PLATELET # BLD AUTO: 255 10*3/MM3 (ref 140–450)
PMV BLD AUTO: 10.8 FL (ref 6–12)
RBC # BLD AUTO: 3.44 10*6/MM3 (ref 4.14–5.8)
WBC NRBC COR # BLD AUTO: 7.97 10*3/MM3 (ref 3.4–10.8)

## 2025-03-05 PROCEDURE — 99285 EMERGENCY DEPT VISIT HI MDM: CPT

## 2025-03-05 PROCEDURE — 36415 COLL VENOUS BLD VENIPUNCTURE: CPT

## 2025-03-05 PROCEDURE — G0277 HBOT, FULL BODY CHAMBER, 30M: HCPCS

## 2025-03-05 PROCEDURE — 80053 COMPREHEN METABOLIC PANEL: CPT | Performed by: STUDENT IN AN ORGANIZED HEALTH CARE EDUCATION/TRAINING PROGRAM

## 2025-03-05 PROCEDURE — 85025 COMPLETE CBC W/AUTO DIFF WBC: CPT | Performed by: STUDENT IN AN ORGANIZED HEALTH CARE EDUCATION/TRAINING PROGRAM

## 2025-03-05 NOTE — SIGNIFICANT NOTE
Case Management Discharge Note      Final Note: (P) Home         Selected Continued Care - Discharged on 3/1/2025 Admission date: 2/28/2025 - Discharge disposition: Home or Self Care      Destination    No services have been selected for the patient.                Durable Medical Equipment    No services have been selected for the patient.                Dialysis/Infusion    No services have been selected for the patient.                Home Medical Care    No services have been selected for the patient.                Therapy    No services have been selected for the patient.                Community Resources    No services have been selected for the patient.                Community & DME    No services have been selected for the patient.                         Final Discharge Disposition Code: (P) 01 - home or self-care

## 2025-03-06 ENCOUNTER — READMISSION MANAGEMENT (OUTPATIENT)
Dept: CALL CENTER | Facility: HOSPITAL | Age: 75
End: 2025-03-06
Payer: MEDICARE

## 2025-03-06 VITALS
SYSTOLIC BLOOD PRESSURE: 117 MMHG | WEIGHT: 178 LBS | BODY MASS INDEX: 26.36 KG/M2 | TEMPERATURE: 98.4 F | HEART RATE: 67 BPM | DIASTOLIC BLOOD PRESSURE: 65 MMHG | RESPIRATION RATE: 18 BRPM | OXYGEN SATURATION: 99 % | HEIGHT: 69 IN

## 2025-03-06 PROBLEM — N30.41 HEMATURIA DUE TO IRRADIATION CYSTITIS: Status: ACTIVE | Noted: 2025-03-06

## 2025-03-06 LAB
ALBUMIN SERPL-MCNC: 3.6 G/DL (ref 3.5–5.2)
ALBUMIN/GLOB SERPL: 1.1 G/DL
ALP SERPL-CCNC: 88 U/L (ref 39–117)
ALT SERPL W P-5'-P-CCNC: 11 U/L (ref 1–41)
ANION GAP SERPL CALCULATED.3IONS-SCNC: 8.3 MMOL/L (ref 5–15)
ANION GAP SERPL CALCULATED.3IONS-SCNC: 8.4 MMOL/L (ref 5–15)
AST SERPL-CCNC: 16 U/L (ref 1–40)
BILIRUB SERPL-MCNC: 0.5 MG/DL (ref 0–1.2)
BUN SERPL-MCNC: 10 MG/DL (ref 8–23)
BUN SERPL-MCNC: 8 MG/DL (ref 8–23)
BUN/CREAT SERPL: 10.5 (ref 7–25)
BUN/CREAT SERPL: 10.7 (ref 7–25)
CALCIUM SPEC-SCNC: 9 MG/DL (ref 8.6–10.5)
CALCIUM SPEC-SCNC: 9.6 MG/DL (ref 8.6–10.5)
CHLORIDE SERPL-SCNC: 101 MMOL/L (ref 98–107)
CHLORIDE SERPL-SCNC: 103 MMOL/L (ref 98–107)
CO2 SERPL-SCNC: 26.6 MMOL/L (ref 22–29)
CO2 SERPL-SCNC: 27.7 MMOL/L (ref 22–29)
CREAT SERPL-MCNC: 0.75 MG/DL (ref 0.76–1.27)
CREAT SERPL-MCNC: 0.95 MG/DL (ref 0.76–1.27)
DEPRECATED RDW RBC AUTO: 41.7 FL (ref 37–54)
EGFRCR SERPLBLD CKD-EPI 2021: 83 ML/MIN/1.73
EGFRCR SERPLBLD CKD-EPI 2021: 93.5 ML/MIN/1.73
ERYTHROCYTE [DISTWIDTH] IN BLOOD BY AUTOMATED COUNT: 12.2 % (ref 12.3–15.4)
GLOBULIN UR ELPH-MCNC: 3.2 GM/DL
GLUCOSE SERPL-MCNC: 122 MG/DL (ref 65–99)
GLUCOSE SERPL-MCNC: 143 MG/DL (ref 65–99)
HCT VFR BLD AUTO: 33.1 % (ref 37.5–51)
HGB BLD-MCNC: 11.1 G/DL (ref 13–17.7)
MCH RBC QN AUTO: 31.7 PG (ref 26.6–33)
MCHC RBC AUTO-ENTMCNC: 33.5 G/DL (ref 31.5–35.7)
MCV RBC AUTO: 94.6 FL (ref 79–97)
PLATELET # BLD AUTO: 256 10*3/MM3 (ref 140–450)
PMV BLD AUTO: 10.7 FL (ref 6–12)
POTASSIUM SERPL-SCNC: 4 MMOL/L (ref 3.5–5.2)
POTASSIUM SERPL-SCNC: 4.1 MMOL/L (ref 3.5–5.2)
PROT SERPL-MCNC: 6.8 G/DL (ref 6–8.5)
RBC # BLD AUTO: 3.5 10*6/MM3 (ref 4.14–5.8)
SODIUM SERPL-SCNC: 137 MMOL/L (ref 136–145)
SODIUM SERPL-SCNC: 138 MMOL/L (ref 136–145)
WBC NRBC COR # BLD AUTO: 6.7 10*3/MM3 (ref 3.4–10.8)

## 2025-03-06 PROCEDURE — G0378 HOSPITAL OBSERVATION PER HR: HCPCS

## 2025-03-06 PROCEDURE — 80048 BASIC METABOLIC PNL TOTAL CA: CPT | Performed by: STUDENT IN AN ORGANIZED HEALTH CARE EDUCATION/TRAINING PROGRAM

## 2025-03-06 PROCEDURE — 99214 OFFICE O/P EST MOD 30 MIN: CPT | Performed by: INTERNAL MEDICINE

## 2025-03-06 PROCEDURE — 85027 COMPLETE CBC AUTOMATED: CPT | Performed by: STUDENT IN AN ORGANIZED HEALTH CARE EDUCATION/TRAINING PROGRAM

## 2025-03-06 PROCEDURE — 96372 THER/PROPH/DIAG INJ SC/IM: CPT

## 2025-03-06 PROCEDURE — 25010000002 ENOXAPARIN PER 10 MG: Performed by: INTERNAL MEDICINE

## 2025-03-06 RX ORDER — SODIUM CHLORIDE 0.9 % (FLUSH) 0.9 %
10 SYRINGE (ML) INJECTION AS NEEDED
Status: DISCONTINUED | OUTPATIENT
Start: 2025-03-06 | End: 2025-03-06 | Stop reason: HOSPADM

## 2025-03-06 RX ORDER — ONDANSETRON 2 MG/ML
4 INJECTION INTRAMUSCULAR; INTRAVENOUS EVERY 6 HOURS PRN
Status: DISCONTINUED | OUTPATIENT
Start: 2025-03-06 | End: 2025-03-06 | Stop reason: HOSPADM

## 2025-03-06 RX ORDER — PRAVASTATIN SODIUM 40 MG
40 TABLET ORAL NIGHTLY
Status: DISCONTINUED | OUTPATIENT
Start: 2025-03-06 | End: 2025-03-06 | Stop reason: HOSPADM

## 2025-03-06 RX ORDER — ACETAMINOPHEN 650 MG/1
650 SUPPOSITORY RECTAL EVERY 4 HOURS PRN
Status: DISCONTINUED | OUTPATIENT
Start: 2025-03-06 | End: 2025-03-06 | Stop reason: HOSPADM

## 2025-03-06 RX ORDER — ENOXAPARIN SODIUM 100 MG/ML
1 INJECTION SUBCUTANEOUS EVERY 12 HOURS SCHEDULED
Qty: 48 ML | Refills: 0 | Status: SHIPPED | OUTPATIENT
Start: 2025-03-06 | End: 2025-04-05

## 2025-03-06 RX ORDER — ACETAMINOPHEN 160 MG/5ML
650 SOLUTION ORAL EVERY 4 HOURS PRN
Status: DISCONTINUED | OUTPATIENT
Start: 2025-03-06 | End: 2025-03-06 | Stop reason: HOSPADM

## 2025-03-06 RX ORDER — ENOXAPARIN SODIUM 100 MG/ML
1 INJECTION SUBCUTANEOUS EVERY 12 HOURS
Status: DISCONTINUED | OUTPATIENT
Start: 2025-03-06 | End: 2025-03-06 | Stop reason: HOSPADM

## 2025-03-06 RX ORDER — ONDANSETRON 4 MG/1
4 TABLET, ORALLY DISINTEGRATING ORAL EVERY 6 HOURS PRN
Status: DISCONTINUED | OUTPATIENT
Start: 2025-03-06 | End: 2025-03-06 | Stop reason: HOSPADM

## 2025-03-06 RX ORDER — LISINOPRIL 20 MG/1
40 TABLET ORAL DAILY
Status: DISCONTINUED | OUTPATIENT
Start: 2025-03-07 | End: 2025-03-06 | Stop reason: HOSPADM

## 2025-03-06 RX ORDER — ACETAMINOPHEN 325 MG/1
650 TABLET ORAL EVERY 4 HOURS PRN
Status: DISCONTINUED | OUTPATIENT
Start: 2025-03-06 | End: 2025-03-06 | Stop reason: HOSPADM

## 2025-03-06 RX ORDER — SODIUM CHLORIDE 0.9 % (FLUSH) 0.9 %
10 SYRINGE (ML) INJECTION EVERY 12 HOURS SCHEDULED
Status: DISCONTINUED | OUTPATIENT
Start: 2025-03-06 | End: 2025-03-06 | Stop reason: HOSPADM

## 2025-03-06 RX ORDER — SODIUM CHLORIDE 9 MG/ML
40 INJECTION, SOLUTION INTRAVENOUS AS NEEDED
Status: DISCONTINUED | OUTPATIENT
Start: 2025-03-06 | End: 2025-03-06 | Stop reason: HOSPADM

## 2025-03-06 RX ADMIN — ENOXAPARIN SODIUM 80 MG: 100 INJECTION SUBCUTANEOUS at 17:19

## 2025-03-06 RX ADMIN — PRAVASTATIN SODIUM 40 MG: 40 TABLET ORAL at 03:11

## 2025-03-06 RX ADMIN — Medication 10 ML: at 08:22

## 2025-03-06 RX ADMIN — Medication 10 ML: at 01:56

## 2025-03-06 NOTE — DISCHARGE SUMMARY
ED OBSERVATION PROGRESS/DISCHARGE SUMMARY    Date of Admission: 3/5/2025   LOS: 0 days   PCP: Chloe Arriaga MD    Subjective patient denies symptoms today, voices no complaints.  In agreement to switch Eliquis to Lovenox.    Hospital Outcome: [place] is a 74 y.o. male who is being admitted to the observation unit with hematuria and urinary retention due to radiation cystitis.  In the ED, lab work showed a stable hemoglobin 11.1.  Patient's vital signs stable.  Patient's catheter was changed out and three-way catheter placed and started on CBI.  We will continue CBI overnight.  Urology to follow in the morning.     3/6/2025: Patient was seen by urology, they discontinued CBI and plug irrigation port.  Okay for discharge from urology standpoint with catheter, patient will have voiding trial next week in their office.  We will also send patient home with supplies to manually irrigate catheter at home.    Patient was allegedly told he could come off his Eliquis due to recurrent hematuria.  He was discharged from observation unit on 3/1 and there are no notes to back up this claim.  We consulted hematology oncology, and they do not agree with stopping anticoagulation, however they recommend switching to Lovenox as it is easily reversed.  Patient is in agreement, patient will be discharged with Lovenox 80 mg subcutaneous every 12 hours.  Patient reports his daughter is an RN and can help with manual irrigation and Lovenox shots if needed.  Usual return to ER precautions advised, patient expresses understanding and is in agreement with plan.    ROS:  General: no fevers, chills  Respiratory: no cough, dyspnea  Cardiovascular: no chest pain, palpitations  Abdomen: No abdominal pain, nausea, vomiting, or diarrhea  Neurologic: No focal weakness    Objective   Physical Exam:  I have reviewed the vital signs.  Temp:  [97.2 °F (36.2 °C)-98.1 °F (36.7 °C)] 98.1 °F (36.7 °C)  Heart Rate:  [56-92] 58  Resp:  [18-20]  18  BP: (127-146)/(78-88) 131/83  General Appearance:    Alert, cooperative, no distress  Head:    Normocephalic, atraumatic  Eyes:    Sclerae anicteric  Neck:   Supple, no mass  Lungs: Clear to auscultation bilaterally, respirations unlabored  Heart: Regular rate and rhythm, S1 and S2 normal, no murmur, rub or gallop  Abdomen:  Soft, nontender, bowel sounds active, nondistended  : Paz catheter draining very light pink urine  Extremities: No clubbing, cyanosis, or edema to lower extremities  Pulses:  2+ and symmetric in distal lower extremities  Skin: No rashes   Neurologic: Oriented x3, Normal strength to extremities    Results Review:    I have reviewed the labs, radiology results and diagnostic studies.    Results from last 7 days   Lab Units 03/06/25  0643   WBC 10*3/mm3 6.70   HEMOGLOBIN g/dL 11.1*   HEMATOCRIT % 33.1*   PLATELETS 10*3/mm3 256     Results from last 7 days   Lab Units 03/06/25  0643 03/05/25  2342 02/28/25  1847   SODIUM mmol/L 138 137 137   POTASSIUM mmol/L 4.1 4.0 4.3   CHLORIDE mmol/L 103 101 104   CO2 mmol/L 26.6 27.7 21.0*   BUN mg/dL 8 10 14   CREATININE mg/dL 0.75* 0.95 1.08   CALCIUM mg/dL 9.0 9.6 9.4   BILIRUBIN mg/dL  --  0.5 0.6   ALK PHOS U/L  --  88 95   ALT (SGPT) U/L  --  11 14   AST (SGOT) U/L  --  16 15   GLUCOSE mg/dL 143* 122* 136*     Imaging Results (Last 24 Hours)       ** No results found for the last 24 hours. **            I have reviewed the medications.  ---------------------------------------------------------------------------------------------  Assessment & Plan   Assessment/Problem List    Hematuria due to irradiation cystitis      Plan:    Hematuria due to radiation cystitis  Urinary retention  -Paz catheter exchanged in ED  -Continue CBI  -Hemoglobin stable 11.1  -Patient states he was advised during his last admission to come off of the Eliquis and has been off of since March 1, chart review shows discharge instructions to continue Eliquis  -Urology  consulted, home with Paz catheter and leg bag and manual irrigation supplies  -Follow-up outpatient with urology  -Stop Eliquis, begin Lovenox 80 mg subcutaneous every 12 hours     Paroxysmal atrial fibrillation  History of PE/DVT  -Patient states he had 1 episode of PAF back in 2019 when he was in the hospital for pneumonia but no further recorded episodes of A-fib since, follows with   Outpatient  -Patient was found to have bilateral segmental and subsegmental PE in January 2025  -Discussed with urology further risk versus benefits anticoagulation  -Consult hematology oncology, Dr. De Santiago  -Stop Eliquis, begin Lovenox 80 mg subcutaneous every 12 hours     Hypertension  -Continue lisinopril  -Monitor vital signs every 4 hours    Disposition: Home    Follow-up after Discharge: Urology    35 minutes has been spent by [facility] providers in the care of this patient while under observation status     This note will serve as discharge summary    WALT Garcia 03/06/25 16:44 EST    I have worn appropriate PPE during this patient encounter and sanitized my hands with entering and exiting patient room.

## 2025-03-06 NOTE — CONSULTS
FIRST UROLOGY CONSULT      Patient Identification:  NAME:  [place]  Age:  74 y.o.   Sex:  male   :  1950   MRN:  [id number]     Chief complaint: Catheter complication    History of present illness:      Pt is a 74 y.o. male with a history of prostate cancer s/p RP and XRT that presented to the ED on 3/5/25 with complaints of acute hematuria and catheter complication. Patient was discharged x 6 days ago after presenting with gross hematuria and urinary retention. He failed a voiding trial and was discharged home with catheter. Urology was consulted for evaluation and treatment of gross hematuria and urinary retention. Patient reports that after HBO treatment he noticed a small amount of blood in his urine. As the day progressed he noticed that his catheter wasn't draining well. He was able to evacuate a couple small clots from catheter. He exchanged his mcclellan bag and decided to come to the ER. Once he arrived the noticed his leg bag was full of urine and his suprapubic discomfort was resolved. His catheter was exchange for a 3-way in the ED and he was started on CBI. Patient was schedule to come into [facility] today for a voiding trial.     In hospital:  -AVSS, good UOP  -WBC - 6.70 (7.97)  -Hgb - 11.1 (11.1)  -Creat - 0.75 (0.95)    Asked to see    Past medical history:  Past Medical History:   Diagnosis Date    Allergic     Penicillin    Cataract 2022    Diverticulosis     E. coli pneumonia 2023    HOSPITALIZED    History of prostate cancer     History of recent hospitalization 2023    Hyperglycemia     Hyperlipidemia     Hypertension     Kidney stone 2019    Has not been a problem.    On anticoagulant therapy     Paroxysmal atrial fibrillation        Past surgical history:  Past Surgical History:   Procedure Laterality Date    CATARACT EXTRACTION      WITH LENS IMPLANTS    COLONOSCOPY N/A 2023    Procedure: COLONOSCOPY TO CECUM AND TERMINAL ILEUM WITH  COLD POLYPECTOMY;  Surgeon: Humberto Peña MD;  Location: [facility] ENDOSCOPY;  Service: Gastroenterology;  Laterality: N/A;  SCREENING  DIVERTICULOSIS, COLON POLYP, HEMORRHOIDS    CYSTOSCOPY BLADDER BIOPSY N/A 06/12/2019    Procedure: CYSTOSCOPY TUR BLADDER TUMOR;  Surgeon: Joel Russell MD;  Location: [facility] MAIN OR;  Service: Urology    CYSTOSCOPY BLADDER BIOPSY N/A 05/31/2024    Procedure: CYSTOSCOPY BLADDER BIOPSY FULGURATION;  Surgeon: Joel Russell MD;  Location: [facility] MAIN OR;  Service: Urology;  Laterality: N/A;    CYSTOSCOPY WITH CLOT EVACUATION N/A 10/02/2024    Procedure: CYSTOSCOPY WITH CLOT EVACUATION, CAUTERY OF BLEEDING, DENG INSERTION;  Surgeon: Joel Russell MD;  Location: [facility] MAIN OR;  Service: Urology;  Laterality: N/A;    CYSTOSCOPY WITH CLOT EVACUATION N/A 2/9/2025    Procedure: CYSTOSCOPY WITH CLOT EVACUATION;  Surgeon: Lebron Mosqueda MD;  Location: [facility] MAIN OR;  Service: Urology;  Laterality: N/A;    ELBOW PROCEDURE Left     FX    EYE SURGERY  03/2024    Cataract    PROSTATECTOMY      PROSTATECTOMY  03/01/2003    TONSILLECTOMY  1957    VASECTOMY         Allergies:  Penicillins    Home medications:  Medications Prior to Admission   Medication Sig Dispense Refill Last Dose/Taking    Calcium Carbonate-Vitamin D (CALCIUM 600+D PO) Take 1 tablet by mouth 2 (Two) Times a Day. PT HOLDING FOR SURGERY  Indications: Calcium Deficiency   3/6/2025    Eliquis 5 MG tablet tablet Take 1 tablet by mouth Every 12 (Twelve) Hours. 60 tablet 11 Past Week Morning    lisinopril (PRINIVIL,ZESTRIL) 40 MG tablet Take 1 tablet by mouth Daily. 90 tablet 1 3/6/2025 Morning    Multiple Vitamins-Minerals (DAILY MULTIVITAMIN PO) Take 1 tablet by mouth Daily.   3/6/2025 Morning    pravastatin (PRAVACHOL) 40 MG tablet Take 1 tablet by mouth Every Night. Indications: High Amount of Fats in the Blood 90 tablet 3 3/5/2025 Evening        Hospital medications:  [Held by provider] apixaban, 5 mg,  Oral, Q12H  [START ON 3/7/2025] lisinopril, 40 mg, Oral, Daily  pravastatin, 40 mg, Oral, Nightly  sodium chloride, 10 mL, Intravenous, Q12H           acetaminophen **OR** acetaminophen **OR** acetaminophen    ondansetron ODT **OR** ondansetron    sodium chloride    sodium chloride    Family history:  Family History   Problem Relation Age of Onset    Hypertension Mother     Hypertension Father     Prostate cancer Father     Malig Hyperthermia Neg Hx        Social history:  Social History     Tobacco Use    Smoking status: Never     Passive exposure: Never    Smokeless tobacco: Never   Vaping Use    Vaping status: Never Used   Substance Use Topics    Alcohol use: Yes     Alcohol/week: 1.0 standard drink of alcohol     Types: 1 Drinks containing 0.5 oz of alcohol per week     Comment: Occasional beer or cocktail    Drug use: No       Review of systems:      12 point negative except as in HPI    Objective:  TMax 24 hours:   Temp (24hrs), Av.8 °F (36.6 °C), Min:97.2 °F (36.2 °C), Max:98.1 °F (36.7 °C)      Vitals Ranges:   Temp:  [97.2 °F (36.2 °C)-98.1 °F (36.7 °C)] 98.1 °F (36.7 °C)  Heart Rate:  [56-92] 58  Resp:  [18-20] 18  BP: (127-146)/(78-88) 131/83    Intake/Output Last 3 shifts:  No intake/output data recorded.     Physical Exam:    General Appearance:    Alert, cooperative, NAD   Abdomen:  :      Soft, NDNT, no palpable bladder distension, nontender    3-way Paz catheter in place with CBI clamped draining pale, yellow urine, no visible hematuria   Neuro/Psych:   Orientation intact, mood/affect pleasant       Results review:   I reviewed the patient's new clinical results.    Data review:  Lab Results (last 24 hours)       Procedure Component Value Units Date/Time    Basic Metabolic Panel [[id number]]  (Abnormal) Collected: 25 0643    Specimen: Blood Updated: 25 0735     Glucose 143 mg/dL      BUN 8 mg/dL      Creatinine 0.75 mg/dL      Sodium 138 mmol/L      Potassium 4.1 mmol/L       Chloride 103 mmol/L      CO2 26.6 mmol/L      Calcium 9.0 mg/dL      BUN/Creatinine Ratio 10.7     Anion Gap 8.4 mmol/L      eGFR 93.5 mL/min/1.73     Narrative:      GFR Categories in Chronic Kidney Disease (CKD)      GFR Category          GFR (mL/min/1.73)    Interpretation  G1                     90 or greater         Normal or high (1)  G2                      60-89                Mild decrease (1)  G3a                   45-59                Mild to moderate decrease  G3b                   30-44                Moderate to severe decrease  G4                    15-29                Severe decrease  G5                    14 or less           Kidney failure          (1)In the absence of evidence of kidney disease, neither GFR category G1 or G2 fulfill the criteria for CKD.    eGFR calculation 2021 CKD-EPI creatinine equation, which does not include race as a factor    CBC (No Diff) [[id number]]  (Abnormal) Collected: 03/06/25 0643    Specimen: Blood Updated: 03/06/25 0716     WBC 6.70 10*3/mm3      RBC 3.50 10*6/mm3      Hemoglobin 11.1 g/dL      Hematocrit 33.1 %      MCV 94.6 fL      MCH 31.7 pg      MCHC 33.5 g/dL      RDW 12.2 %      RDW-SD 41.7 fl      MPV 10.7 fL      Platelets 256 10*3/mm3     Comprehensive Metabolic Panel [[id number]]  (Abnormal) Collected: 03/05/25 2342    Specimen: Blood Updated: 03/06/25 0016     Glucose 122 mg/dL      BUN 10 mg/dL      Creatinine 0.95 mg/dL      Sodium 137 mmol/L      Potassium 4.0 mmol/L      Chloride 101 mmol/L      CO2 27.7 mmol/L      Calcium 9.6 mg/dL      Total Protein 6.8 g/dL      Albumin 3.6 g/dL      ALT (SGPT) 11 U/L      AST (SGOT) 16 U/L      Alkaline Phosphatase 88 U/L      Total Bilirubin 0.5 mg/dL      Globulin 3.2 gm/dL      A/G Ratio 1.1 g/dL      BUN/Creatinine Ratio 10.5     Anion Gap 8.3 mmol/L      eGFR 83.0 mL/min/1.73     Narrative:      GFR Categories in Chronic Kidney Disease (CKD)      GFR Category          GFR (mL/min/1.73)     Interpretation  G1                     90 or greater         Normal or high (1)  G2                      60-89                Mild decrease (1)  G3a                   45-59                Mild to moderate decrease  G3b                   30-44                Moderate to severe decrease  G4                    15-29                Severe decrease  G5                    14 or less           Kidney failure          (1)In the absence of evidence of kidney disease, neither GFR category G1 or G2 fulfill the criteria for CKD.    eGFR calculation 2021 CKD-EPI creatinine equation, which does not include race as a factor    CBC & Differential [[id number]]  (Abnormal) Collected: 03/05/25 2342    Specimen: Blood Updated: 03/05/25 2355    Narrative:      The following orders were created for panel order CBC & Differential.  Procedure                               Abnormality         Status                     ---------                               -----------         ------                     CBC Auto Differential[[id number]]        Abnormal            Final result                 Please view results for these tests on the individual orders.    CBC Auto Differential [[id number]]  (Abnormal) Collected: 03/05/25 2342    Specimen: Blood Updated: 03/05/25 2355     WBC 7.97 10*3/mm3      RBC 3.44 10*6/mm3      Hemoglobin 11.1 g/dL      Hematocrit 31.9 %      MCV 92.7 fL      MCH 32.3 pg      MCHC 34.8 g/dL      RDW 12.2 %      RDW-SD 41.2 fl      MPV 10.8 fL      Platelets 255 10*3/mm3      Neutrophil % 73.1 %      Lymphocyte % 11.7 %      Monocyte % 12.9 %      Eosinophil % 1.5 %      Basophil % 0.5 %      Immature Grans % 0.3 %      Neutrophils, Absolute 5.83 10*3/mm3      Lymphocytes, Absolute 0.93 10*3/mm3      Monocytes, Absolute 1.03 10*3/mm3      Eosinophils, Absolute 0.12 10*3/mm3      Basophils, Absolute 0.04 10*3/mm3      Immature Grans, Absolute 0.02 10*3/mm3      nRBC 0.0 /100 WBC              Imaging:  Imaging Results (Last  24 Hours)       ** No results found for the last 24 hours. **               Assessment:     Gross hematuria   Hx prostate cancer s/p RP and XRT    Plan:   - No acute urologic intervention. CBI clamped. Urine output appears pale,yellow.   - Discontinue CBI. Plug irrigation port.   - Okay for discharge from urology standpoint with catheter. Discussed inpatient voiding trial vs outpatient in our office. Patient would prefer to have a voiding trial in our office.   - Send patient home with supplies to manually irrigate catheter at home including a joey syringe/ irrigation tray and sterile saline. Please send home with a leg bag.  - We will get patient into the office next week for a voiding trial. We will call patient directly to schedule.   - Urology will sign off; please call with any questions/concerns or clinical change     Argenis Light, APRN  03/06/25

## 2025-03-06 NOTE — CONSULTS
.     REASON FOR CONSULTATION:     Provide an opinion on any further workup or treatment  Recommendations on anticoagulation  Bilateral pulmonary embolism  Bilateral lower extremity DVT                             REQUESTING PHYSICIAN: Orlando Kruger MD  RECORDS OBTAINED:  Records of the patient's history including those obtained from the referring provider were reviewed and summarized in detail.    HISTORY OF PRESENT ILLNESS:  The patient is a 74 y.o. year old male  who is here for follow-up with the above-mentioned history.    The patient is a 74 y.o. year old male with medical history significant for prostate cancer , Mariam 6 (2003) s/p RP, local recurrent disease (4/2019) s/p IMRT + ADT x 18 months, history of superficial bladder cancer, radiation cystitis and A-fib presents to the emergency room with gross hematuria and urinary retention.  Several recent hospital visits due to hematuria.  With the most recent one being in January 2025.  Patient has a Paz catheter and he noticed that the catheter was not draining and had some hematuria.  Started on CBI.  Urology consulted.    Hematuria secondary to radiation cystitis.    Patient with recent diagnosis of PE as well as DVT in January 2025.  He has bilateral segmental and subsegmental pulmonary emboli.  No evidence of right heart strain.  Bilateral lower extremity Dopplers with DVT of the right peroneal, left posterior tibial and peroneal veins.    Patient does not have any active malignancy although with previous history of prostate and bladder cancers.  No family history of DVT or PE, no provoking events.  It was thought that the DVT and PE were due to frequent ER visits with prolonged immobilization.    He was initially started on Lovenox with subsequent switch to Eliquis at the time of discharge.  Plan was to anticoagulate for 3 to 6 months.    Patient has baseline hemoglobin of 10-11.  On presentation hemoglobin 11.1 on 3/5/2025  Hemoglobin remained    Problem: Safety  Goal: Will remain free from falls  Outcome: PROGRESSING AS EXPECTED  Note: Remind the patient to use the call light for assistance.     Problem: Fluid Volume:  Goal: Will maintain balanced intake and output  Outcome: PROGRESSING AS EXPECTED  Note: Encourage the patient to increase fluid intake.     Problem: Skin Integrity  Goal: Risk for impaired skin integrity will decrease  Outcome: PROGRESSING AS EXPECTED  Note: Reposition the patient every 2 hours.      stable this morning at 11.1.  WBC count and platelets are stable.      Past Medical History:   Diagnosis Date    Allergic     Penicillin    Cataract March 2022    Diverticulosis     E. coli pneumonia 08/01/2023    HOSPITALIZED    History of prostate cancer 2002    History of recent hospitalization 08/01/2023    Hyperglycemia     Hyperlipidemia     Hypertension     Kidney stone 2019    Has not been a problem.    On anticoagulant therapy     Paroxysmal atrial fibrillation      Past Surgical History:   Procedure Laterality Date    CATARACT EXTRACTION      WITH LENS IMPLANTS    COLONOSCOPY N/A 09/13/2023    Procedure: COLONOSCOPY TO CECUM AND TERMINAL ILEUM WITH COLD POLYPECTOMY;  Surgeon: Humberto Peña MD;  Location: [facility] ENDOSCOPY;  Service: Gastroenterology;  Laterality: N/A;  SCREENING  DIVERTICULOSIS, COLON POLYP, HEMORRHOIDS    CYSTOSCOPY BLADDER BIOPSY N/A 06/12/2019    Procedure: CYSTOSCOPY TUR BLADDER TUMOR;  Surgeon: Joel Russell MD;  Location: [facility] OR;  Service: Urology    CYSTOSCOPY BLADDER BIOPSY N/A 05/31/2024    Procedure: CYSTOSCOPY BLADDER BIOPSY FULGURATION;  Surgeon: Joel Russell MD;  Location: [facility] OR;  Service: Urology;  Laterality: N/A;    CYSTOSCOPY WITH CLOT EVACUATION N/A 10/02/2024    Procedure: CYSTOSCOPY WITH CLOT EVACUATION, CAUTERY OF BLEEDING, DENG INSERTION;  Surgeon: Joel Russell MD;  Location: [facility] OR;  Service: Urology;  Laterality: N/A;    CYSTOSCOPY WITH CLOT EVACUATION N/A 2/9/2025    Procedure: CYSTOSCOPY WITH CLOT EVACUATION;  Surgeon: Lebron Mosqueda MD;  Location: [facility] OR;  Service: Urology;  Laterality: N/A;    ELBOW PROCEDURE Left     FX    EYE SURGERY  03/2024    Cataract    PROSTATECTOMY      PROSTATECTOMY  03/01/2003    TONSILLECTOMY  1957    VASECTOMY         MEDICATIONS    Current Facility-Administered Medications:     acetaminophen (TYLENOL) tablet 650 mg, 650 mg, Oral, Q4H PRN **OR** acetaminophen (TYLENOL) 160  MG/5ML oral solution 650 mg, 650 mg, Oral, Q4H PRN **OR** acetaminophen (TYLENOL) suppository 650 mg, 650 mg, Rectal, Q4H PRN, Claudia Caldera PA-C    [Held by provider] apixaban (ELIQUIS) tablet 5 mg, 5 mg, Oral, Q12H, Mindi Calderalin EUGENIO, PA-C    [START ON 3/7/2025] lisinopril (PRINIVIL,ZESTRIL) tablet 40 mg, 40 mg, Oral, Daily, Mindi Calderalin EUGENIO, PA-C    ondansetron ODT (ZOFRAN-ODT) disintegrating tablet 4 mg, 4 mg, Oral, Q6H PRN **OR** ondansetron (ZOFRAN) injection 4 mg, 4 mg, Intravenous, Q6H PRN, Claudia Caldera R, PA-C    pravastatin (PRAVACHOL) tablet 40 mg, 40 mg, Oral, Nightly, Claudia Caldera PA-C, 40 mg at 03/06/25 0311    sodium chloride 0.9 % flush 10 mL, 10 mL, Intravenous, Q12H, Mindi Calderalin R, PA-C, 10 mL at 03/06/25 0822    sodium chloride 0.9 % flush 10 mL, 10 mL, Intravenous, PRN, Mindi Calderalin R, PA-C    sodium chloride 0.9 % infusion 40 mL, 40 mL, Intravenous, PRN, Mindi Calderalin R, PA-C    ALLERGIES:     Allergies   Allergen Reactions    Penicillins Rash     Thinks childhood reaction of rash        SOCIAL HISTORY:       Social History     Socioeconomic History    Marital status:    Tobacco Use    Smoking status: Never     Passive exposure: Never    Smokeless tobacco: Never   Vaping Use    Vaping status: Never Used   Substance and Sexual Activity    Alcohol use: Yes     Alcohol/week: 1.0 standard drink of alcohol     Types: 1 Drinks containing 0.5 oz of alcohol per week     Comment: Occasional beer or cocktail    Drug use: No    Sexual activity: Yes     Partners: Female     Birth control/protection: Vasectomy         FAMILY HISTORY:  Family History   Problem Relation Age of Onset    Hypertension Mother     Hypertension Father     Prostate cancer Father     Malig Hyperthermia Neg Hx        REVIEW OF SYSTEMS:  Review of Systems   Constitutional: Negative.    HENT: Negative.     Eyes: Negative.    Respiratory: Negative.     Cardiovascular: Negative.   "  Gastrointestinal: Negative.    Endocrine: Negative.    Genitourinary:  Positive for difficulty urinating and hematuria.   Musculoskeletal: Negative.    Skin: Negative.    Allergic/Immunologic: Negative.    Neurological: Negative.    Hematological: Negative.    Psychiatric/Behavioral: Negative.                Vitals:    03/06/25 0037 03/06/25 0139 03/06/25 0323 03/06/25 0700   BP:  127/88 146/78 131/83   BP Location:  Right arm Right arm Right arm   Patient Position:  Lying Lying Lying   Pulse:  71 56 58   Resp:  18 20 18   Temp:  97.2 °F (36.2 °C) 98 °F (36.7 °C) 98.1 °F (36.7 °C)   TempSrc:  Oral  Oral   SpO2:  97%  99%   Weight: 80.7 kg (178 lb) 80.7 kg (178 lb)     Height:  175.3 cm (69\")            No data to display               PHYSICAL EXAM:      CONSTITUTIONAL:  Vital signs reviewed.  No distress, looks comfortable.  EYES:  Conjunctivae and lids unremarkable.  PERRLA  EARS,NOSE,MOUTH,THROAT:  Ears and nose appear unremarkable.  Lips, teeth, gums appear unremarkable.  RESPIRATORY:  Normal respiratory effort.  Lungs clear to auscultation bilaterally.  CARDIOVASCULAR:  Normal S1, S2.  No murmurs rubs or gallops.  No significant lower extremity edema.  GASTROINTESTINAL: Abdomen appears unremarkable.  Nontender.  No hepatomegaly.  No splenomegaly.        RECENT LABS:        WBC   Date Value Ref Range Status   03/06/2025 6.70 3.40 - 10.80 10*3/mm3 Final   03/05/2025 7.97 3.40 - 10.80 10*3/mm3 Final     Hemoglobin   Date Value Ref Range Status   03/06/2025 11.1 (L) 13.0 - 17.7 g/dL Final   03/05/2025 11.1 (L) 13.0 - 17.7 g/dL Final     Platelets   Date Value Ref Range Status   03/06/2025 256 140 - 450 10*3/mm3 Final   03/05/2025 255 140 - 450 10*3/mm3 Final       Assessment & Plan   [unfilled]      [place]     74-year-old male with bilateral segmental and subsegmental pulmonary emboli and bilateral lower extremity DVT diagnosed in January 2015 and previous history is of bladder and prostate cancer " with radiation cystitis and chronic hematuria.      *Hematuria  Ongoing problem.  He has an indwelling Paz catheter.  Presented with urinary retention and hematuria  Hemoglobin has remained stable  CBI performed by urology and now that has been discontinued  Given that the hemoglobin is stable hematuria might not be severe.    *Bilateral lower extremity DVT and bilateral pulmonary embolism  Diagnosed in January 2025.  Given that the diagnosis is well within 3 months I would strongly recommend continuing anticoagulation particularly because there was a fair amount of clot burden.  We could consider Lovenox instead of Eliquis which could be reversed faster in the event of a severe bleed.  Patient remains at risk for bleeding due to radiation cystitis.  Discussed the options of Lovenox, Eliquis versus Coumadin with the patient.  He wishes to proceed with Lovenox.  Start patient on Lovenox 1 mg/kg twice daily.    *History of prostate and bladder cancers  Mariam 6 (2003) s/p RP, local recurrent disease (4/2019) s/p IMRT + ADT x 18 months   Remains in remission    *Anemia  Hemoglobin stable at 11.1  Most likely secondary to ongoing hematuria.    Recommendations  Eliquis has been held since admission due to ongoing hematuria.  Patient will remain at risk for bleeding.  Due to ease of reversibility could consider Lovenox or Coumadin as patient will remain at risk for recurrent bleeding.  Patient opted to proceed with Lovenox.  Hematuria management per urology.  He has a follow-up with Dr. Alvarez on 4/18/2025.  Recommend that he keep this appointment.  Plan to obtain Dopplers prior to his follow-up with Dr. Alvarez.  If Lovenox is not covered by insurance then start patient on Coumadin for a target INR of 2-3.

## 2025-03-06 NOTE — ED PROVIDER NOTES
EMERGENCY DEPARTMENT ENCOUNTER  Room Number:  20/20  PCP: Chloe Arriaga MD  Independent Historians: Patient and Family      HPI:  Chief Complaint: had concerns including Catheter Issue .     Context: North Carcamo is a 74 y.o. male with a medical history of radiation cystitis, HLD, HTN who presents to the ED c/o acute hematuria and catheter complication.  Patient has indwelling Paz catheter and states it was not draining earlier today.  Patient was able to irrigate and noticed several small clots but had trouble getting it to flow.  Patient first noticed some hematuria earlier in the evening today.  Patient does have intermittent episodes with hematuria but has not had any problems since last hospitalization.  Patient states upon arrival catheter began flowing but he did note multiple clots within the catheter and gross hematuria.  Patient denies abdominal pain.      Review of prior external notes (non-ED) -and- Review of prior external test results outside of this encounter: Discharge summary from 3/1/2025 reviewed and notable for urinary retention.  Three-way Paz been placed and patient underwent CBI.  Urology saw patient and ultimately discharged home.    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 coli pneumonia 08/01/2023    History of prostate cancer 2002    History of recent hospitalization 08/01/2023    Kidney stone 2019    On anticoagulant therapy          PAST SURGICAL HISTORY  Past Surgical History:   Procedure Laterality Date    CATARACT EXTRACTION      WITH LENS IMPLANTS    COLONOSCOPY N/A 09/13/2023    Procedure: COLONOSCOPY TO CECUM AND TERMINAL ILEUM WITH COLD POLYPECTOMY;  Surgeon: Humberto Peña MD;  Location: [facility] ENDOSCOPY;  Service: Gastroenterology;  Laterality: N/A;  SCREENING  DIVERTICULOSIS, COLON POLYP, HEMORRHOIDS    CYSTOSCOPY BLADDER BIOPSY N/A 06/12/2019    Procedure: CYSTOSCOPY TUR BLADDER TUMOR;  Surgeon: Joel Russell MD;  Location: [facility] OR;  Service: Urology    CYSTOSCOPY BLADDER BIOPSY N/A 05/31/2024    Procedure: CYSTOSCOPY BLADDER BIOPSY FULGURATION;  Surgeon: Joel Russell MD;  Location: [facility] MAIN OR;  Service: Urology;  Laterality: N/A;    CYSTOSCOPY WITH CLOT EVACUATION N/A 10/02/2024    Procedure: CYSTOSCOPY WITH CLOT EVACUATION, CAUTERY OF BLEEDING, DENG INSERTION;  Surgeon: Joel Russell MD;  Location: [facility] MAIN OR;  Service: Urology;  Laterality: N/A;    CYSTOSCOPY WITH CLOT EVACUATION N/A 2/9/2025    Procedure: CYSTOSCOPY WITH CLOT EVACUATION;  Surgeon: Lebron Mosqueda MD;  Location: [facility] OR;  Service: Urology;  Laterality: N/A;    ELBOW PROCEDURE Left     FX    EYE SURGERY  03/2024    Cataract    PROSTATECTOMY      PROSTATECTOMY  03/01/2003    TONSILLECTOMY  1957    VASECTOMY           FAMILY HISTORY  Family History   Problem Relation Age of Onset    Hypertension Mother     Hypertension Father     Prostate cancer Father     Malig Hyperthermia Neg Hx          SOCIAL HISTORY  Social History     Socioeconomic History    Marital status:    Tobacco Use    Smoking status: Never     Passive exposure: Never    Smokeless tobacco: Never   Vaping Use     Vaping status: Never Used   Substance and Sexual Activity    Alcohol use: Yes     Alcohol/week: 1.0 standard drink of alcohol     Types: 1 Drinks containing 0.5 oz of alcohol per week     Comment: Occasional beer or cocktail    Drug use: No    Sexual activity: Yes     Partners: Female     Birth control/protection: Vasectomy         ALLERGIES  Penicillins      REVIEW OF SYSTEMS  Review of Systems  Included in HPI  All systems reviewed and negative except for those discussed in HPI.      PHYSICAL EXAM    I have reviewed the triage vital signs and nursing notes.    ED Triage Vitals   Temp Heart Rate Resp BP SpO2   03/05/25 2224 03/05/25 2224 03/05/25 2224 03/05/25 2229 03/05/25 2224   97.8 °F (36.6 °C) 92 18 143/88 97 %      Temp src Heart Rate Source Patient Position BP Location FiO2 (%)   03/05/25 2224 03/05/25 2224 -- -- --   Tympanic Monitor          Physical Exam  GENERAL: alert, no acute distress  SKIN: Warm, dry  HENT: Normocephalic, atraumatic  EYES: no scleral icterus  CV: regular rhythm, regular rate  RESPIRATORY: normal effort, lungs clear  ABDOMEN: soft, nontender, nondistended  MUSCULOSKELETAL: no deformity  NEURO: alert, moves all extremities, follows commands  : Paz catheter in place with gross hematuria            LAB RESULTS  Recent Results (from the past 24 hours)   Comprehensive Metabolic Panel    Collection Time: 03/05/25 11:42 PM    Specimen: Blood   Result Value Ref Range    Glucose 122 (H) 65 - 99 mg/dL    BUN 10 8 - 23 mg/dL    Creatinine 0.95 0.76 - 1.27 mg/dL    Sodium 137 136 - 145 mmol/L    Potassium 4.0 3.5 - 5.2 mmol/L    Chloride 101 98 - 107 mmol/L    CO2 27.7 22.0 - 29.0 mmol/L    Calcium 9.6 8.6 - 10.5 mg/dL    Total Protein 6.8 6.0 - 8.5 g/dL    Albumin 3.6 3.5 - 5.2 g/dL    ALT (SGPT) 11 1 - 41 U/L    AST (SGOT) 16 1 - 40 U/L    Alkaline Phosphatase 88 39 - 117 U/L    Total Bilirubin 0.5 0.0 - 1.2 mg/dL    Globulin 3.2 gm/dL    A/G Ratio 1.1 g/dL    BUN/Creatinine Ratio 10.5  7.0 - 25.0    Anion Gap 8.3 5.0 - 15.0 mmol/L    eGFR 83.0 >60.0 mL/min/1.73   CBC Auto Differential    Collection Time: 03/05/25 11:42 PM    Specimen: Blood   Result Value Ref Range    WBC 7.97 3.40 - 10.80 10*3/mm3    RBC 3.44 (L) 4.14 - 5.80 10*6/mm3    Hemoglobin 11.1 (L) 13.0 - 17.7 g/dL    Hematocrit 31.9 (L) 37.5 - 51.0 %    MCV 92.7 79.0 - 97.0 fL    MCH 32.3 26.6 - 33.0 pg    MCHC 34.8 31.5 - 35.7 g/dL    RDW 12.2 (L) 12.3 - 15.4 %    RDW-SD 41.2 37.0 - 54.0 fl    MPV 10.8 6.0 - 12.0 fL    Platelets 255 140 - 450 10*3/mm3    Neutrophil % 73.1 42.7 - 76.0 %    Lymphocyte % 11.7 (L) 19.6 - 45.3 %    Monocyte % 12.9 (H) 5.0 - 12.0 %    Eosinophil % 1.5 0.3 - 6.2 %    Basophil % 0.5 0.0 - 1.5 %    Immature Grans % 0.3 0.0 - 0.5 %    Neutrophils, Absolute 5.83 1.70 - 7.00 10*3/mm3    Lymphocytes, Absolute 0.93 0.70 - 3.10 10*3/mm3    Monocytes, Absolute 1.03 (H) 0.10 - 0.90 10*3/mm3    Eosinophils, Absolute 0.12 0.00 - 0.40 10*3/mm3    Basophils, Absolute 0.04 0.00 - 0.20 10*3/mm3    Immature Grans, Absolute 0.02 0.00 - 0.05 10*3/mm3    nRBC 0.0 0.0 - 0.2 /100 WBC         RADIOLOGY  No Radiology Exams Resulted Within Past 24 Hours      MEDICATIONS GIVEN IN ER  Medications - No data to display      ORDERS PLACED DURING THIS VISIT:  Orders Placed This Encounter   Procedures    Comprehensive Metabolic Panel    CBC Auto Differential    Continuous Bladder Irrigation    Insert 3-Way Urinary Catheter    Urinary Catheter Care    Assess Need for Indwelling Urinary Catheter - Follow Removal Protocol    May Irrigate Catheter    CBC & Differential         OUTPATIENT MEDICATION MANAGEMENT:  No current Epic-ordered facility-administered medications on file.     Current Outpatient Medications Ordered in Epic   Medication Sig Dispense Refill    Calcium Carbonate-Vitamin D (CALCIUM 600+D PO) Take 1 tablet by mouth 2 (Two) Times a Day. PT HOLDING FOR SURGERY  Indications: Calcium Deficiency      dapagliflozin Propanediol  (Farxiga) 10 MG tablet Take 10 mg by mouth Daily. 30 tablet 0    Eliquis 5 MG tablet tablet Take 1 tablet by mouth Every 12 (Twelve) Hours. 60 tablet 11    fluticasone (FLONASE) 50 MCG/ACT nasal spray Administer 2 sprays into the nostril(s) as directed by provider Daily As Needed for Rhinitis.      lisinopril (PRINIVIL,ZESTRIL) 40 MG tablet Take 1 tablet by mouth Daily. 90 tablet 1    Multiple Vitamins-Minerals (DAILY MULTIVITAMIN PO) Take 1 tablet by mouth Daily.      pravastatin (PRAVACHOL) 40 MG tablet Take 1 tablet by mouth Every Night. Indications: High Amount of Fats in the Blood 90 tablet 3         PROCEDURES  Procedures            PROGRESS, DATA ANALYSIS, CONSULTS, AND MEDICAL DECISION MAKING  All labs have been independently interpreted by me.  All radiology studies have been reviewed by me. All EKG's have been independently viewed and interpreted by me.  Discussion below represents my analysis of pertinent findings related to patient's condition, differential diagnosis, treatment plan and final disposition.    Differential diagnosis includes but is not limited to hematuria, urinary retention, catheter complication.    Clinical Scores:                                       ED Course as of 03/06/25 0025   Wed Mar 05, 2025   2356 Patient with gross hematuria, stable hemoglobin.  Had considered CT abdomen and pelvis however patient has no abdominal pain and catheter is flowing freely.  Will insert three-way catheter and initiate continuous bladder irrigation.  Plan for admission for urology consultation and irrigation. [MW]   Thu Mar 06, 2025   0025 Discussed with Erica DELONG with [facility] who agrees to admit [MW]      ED Course User Index  [MW] Orlando Kruger MD             AS OF 00:25 EST VITALS:    BP - 143/88  HR - 92  TEMP - 97.8 °F (36.6 °C) (Tympanic)  O2 SATS - 97%    COMPLEXITY OF CARE  The patient requires admission.      DIAGNOSIS  Final diagnoses:   Hematuria, unspecified type         DISPOSITION  ED  Disposition       ED Disposition   Decision to Admit    Condition   --    Comment   --                Please note that portions of this document were completed with a voice recognition program.    Note Disclaimer: At [facility], we believe that sharing information builds trust and better relationships. You are receiving this note because you recently visited [facility]. It is possible you will see health information before a provider has talked with you about it. This kind of information can be easy to misunderstand. To help you fully understand what it means for your health, we urge you to discuss this note with your provider.         Orlando Kruger MD  03/06/25 0025

## 2025-03-06 NOTE — DISCHARGE INSTRUCTIONS
Stop Eliquis, begin Lovenox 80 mg subcutaneous every 12 hours.  Manually irrigate catheter at home if it becomes clogged.  Return to the emergency department with worsening symptoms, uncontrolled pain, inability to tolerate oral liquids, fever greater than 101°F not controlled by Tylenol or as needed with emergent concerns.

## 2025-03-06 NOTE — ED NOTES
"Nursing report ED to floor  North Carcamo  74 y.o.  male    HPI :  HPI  Stated Reason for Visit: has \"radiation cystitis\"; patient states his urinary catheter is leaking and he believes his catheter has a clot  History Obtained From: patient    Chief Complaint  Chief Complaint   Patient presents with    Catheter Issue        Admitting doctor:   North Lunsford MD    Admitting diagnosis:   The encounter diagnosis was Hematuria, unspecified type.    Code status:   Current Code Status       Date Active Code Status Order ID Comments User Context       3/6/2025 0037 CPR (Attempt to Resuscitate) [id number]  Claudia Caldera, [facility]        Question Answer    Code Status (Patient has no pulse and is not breathing) CPR (Attempt to Resuscitate)    Medical Interventions (Patient has pulse or is breathing) Full Support                    Allergies:   Penicillins    Isolation:   No active isolations    Intake and Output  No intake or output data in the 24 hours ending 03/06/25 0059    Weight:       03/05/25 2224   Weight: 80.7 kg (178 lb)       Most recent vitals:   Vitals:    03/05/25 2224 03/05/25 2229   BP:  143/88   Pulse: 92    Resp: 18    Temp: 97.8 °F (36.6 °C)    TempSrc: Tympanic    SpO2: 97%    Weight: 80.7 kg (178 lb)    Height: 175.3 cm (69\")        Active LDAs/IV Access:   Lines, Drains & Airways       Active LDAs       Name Placement date Placement time Site Days    Peripheral IV 03/01/25 0404 Right Antecubital 03/01/25  0404  Antecubital  4                    Labs (abnormal labs have a star):   Labs Reviewed   COMPREHENSIVE METABOLIC PANEL - Abnormal; Notable for the following components:       Result Value    Glucose 122 (*)     All other components within normal limits    Narrative:     GFR Categories in Chronic Kidney Disease (CKD)      GFR Category          GFR (mL/min/1.73)    Interpretation  G1                     90 or greater         Normal or high (1)  G2                      60-89              "   Mild decrease (1)  G3a                   45-59                Mild to moderate decrease  G3b                   30-44                Moderate to severe decrease  G4                    15-29                Severe decrease  G5                    14 or less           Kidney failure          (1)In the absence of evidence of kidney disease, neither GFR category G1 or G2 fulfill the criteria for CKD.    eGFR calculation 2021 CKD-EPI creatinine equation, which does not include race as a factor   CBC WITH AUTO DIFFERENTIAL - Abnormal; Notable for the following components:    RBC 3.44 (*)     Hemoglobin 11.1 (*)     Hematocrit 31.9 (*)     RDW 12.2 (*)     Lymphocyte % 11.7 (*)     Monocyte % 12.9 (*)     Monocytes, Absolute 1.03 (*)     All other components within normal limits   CBC (NO DIFF)   BASIC METABOLIC PANEL   CBC AND DIFFERENTIAL    Narrative:     The following orders were created for panel order CBC & Differential.  Procedure                               Abnormality         Status                     ---------                               -----------         ------                     CBC Auto Differential[[id number]]        Abnormal            Final result                 Please view results for these tests on the individual orders.       EKG:   No orders to display       Meds given in ED:   Medications   sodium chloride 0.9 % flush 10 mL (has no administration in time range)   sodium chloride 0.9 % flush 10 mL (has no administration in time range)   sodium chloride 0.9 % infusion 40 mL (has no administration in time range)   ondansetron ODT (ZOFRAN-ODT) disintegrating tablet 4 mg (has no administration in time range)     Or   ondansetron (ZOFRAN) injection 4 mg (has no administration in time range)   acetaminophen (TYLENOL) tablet 650 mg (has no administration in time range)     Or   acetaminophen (TYLENOL) 160 MG/5ML oral solution 650 mg (has no administration in time range)     Or   acetaminophen (TYLENOL)  suppository 650 mg (has no administration in time range)       Imaging results:  No radiology results for the last day    Ambulatory status:   - assist    Social issues:   Social History     Socioeconomic History    Marital status:    Tobacco Use    Smoking status: Never     Passive exposure: Never    Smokeless tobacco: Never   Vaping Use    Vaping status: Never Used   Substance and Sexual Activity    Alcohol use: Yes     Alcohol/week: 1.0 standard drink of alcohol     Types: 1 Drinks containing 0.5 oz of alcohol per week     Comment: Occasional beer or cocktail    Drug use: No    Sexual activity: Yes     Partners: Female     Birth control/protection: Vasectomy       Peripheral Neurovascular  Peripheral Neurovascular (Adult)  Peripheral Neurovascular WDL: WDL    Neuro Cognitive  Neuro Cognitive (Adult)  Cognitive/Neuro/Behavioral WDL: WDL    Learning  Learning Assessment  Learning Readiness and Ability: no barriers identified    Respiratory  Respiratory WDL  Respiratory WDL: WDL    Abdominal Pain       Pain Assessments  Pain (Adult)  (0-10) Pain Rating: Rest: 0    NIH Stroke Scale       Alaina Zapien RN  03/06/25 00:59 EST

## 2025-03-06 NOTE — H&P
[facility]   HISTORY AND PHYSICAL    Patient Name: North Carcamo  : 1950  MRN: [id number]  Primary Care Physician:  Chloe Arriaga MD  Date of admission: 3/5/2025    Subjective   Subjective     Chief Complaint:   Chief Complaint   Patient presents with    Catheter Issue          HPI:    North Carcamo is a 74 y.o. male with a history of radiation cystitis, paroxysmal atrial fibrillation history of DVT, hypertension, hyperlipidemia who presents to [facility] ER with urinary retention and hematuria.  Patient states he noticed last night he was having a bit of some increased pressure in the lower abdomen.  He states today he noted that the catheter was not draining normally.  He irrigated and noticed several small clots but it still did not seem to be draining properly.  He said when he changed his bag out to come to the ER, he did get the catheter to start draining more and noticed several blood clots with urine being a dark red color.  He denies any discomfort at this time.  He denies any nausea or vomiting.  Denies any lower extremity swelling.  Denies fevers and chills.  Denies chest pain shortness of breath.  He states he was directed to come off the Eliquis when he was discharged from the observation unit 3/1/2025, 5 days ago and has been off since.    Review of Systems   All systems were reviewed and negative except for: What is mentioned above in the HPI.    Personal History     Past Medical History:   Diagnosis Date    Allergic     Penicillin    Cataract 2022    Diverticulosis     E. coli pneumonia 2023    HOSPITALIZED    History of prostate cancer     History of recent hospitalization 2023    Hyperglycemia     Hyperlipidemia     Hypertension     Kidney stone 2019    Has not been a problem.    On anticoagulant therapy     Paroxysmal atrial fibrillation        Past Surgical History:   Procedure Laterality Date    CATARACT EXTRACTION      WITH LENS IMPLANTS     COLONOSCOPY N/A 09/13/2023    Procedure: COLONOSCOPY TO CECUM AND TERMINAL ILEUM WITH COLD POLYPECTOMY;  Surgeon: Humberto Peña MD;  Location: [facility] ENDOSCOPY;  Service: Gastroenterology;  Laterality: N/A;  SCREENING  DIVERTICULOSIS, COLON POLYP, HEMORRHOIDS    CYSTOSCOPY BLADDER BIOPSY N/A 06/12/2019    Procedure: CYSTOSCOPY TUR BLADDER TUMOR;  Surgeon: Joel Russell MD;  Location: [facility] MAIN OR;  Service: Urology    CYSTOSCOPY BLADDER BIOPSY N/A 05/31/2024    Procedure: CYSTOSCOPY BLADDER BIOPSY FULGURATION;  Surgeon: Joel Russell MD;  Location: [facility] MAIN OR;  Service: Urology;  Laterality: N/A;    CYSTOSCOPY WITH CLOT EVACUATION N/A 10/02/2024    Procedure: CYSTOSCOPY WITH CLOT EVACUATION, CAUTERY OF BLEEDING, DENG INSERTION;  Surgeon: Joel Russell MD;  Location: [facility] MAIN OR;  Service: Urology;  Laterality: N/A;    CYSTOSCOPY WITH CLOT EVACUATION N/A 2/9/2025    Procedure: CYSTOSCOPY WITH CLOT EVACUATION;  Surgeon: Lebron Mosqueda MD;  Location: [facility] MAIN OR;  Service: Urology;  Laterality: N/A;    ELBOW PROCEDURE Left     FX    EYE SURGERY  03/2024    Cataract    PROSTATECTOMY      PROSTATECTOMY  03/01/2003    TONSILLECTOMY  1957    VASECTOMY         Family History: family history includes Hypertension in his father and mother; Prostate cancer in his father. Otherwise pertinent FHx was reviewed and not pertinent to current issue.    Social History:  reports that he has never smoked. He has never been exposed to tobacco smoke. He has never used smokeless tobacco. He reports current alcohol use of about 1.0 standard drink of alcohol per week. He reports that he does not use drugs.    Home Medications:  Calcium Carbonate-Vitamin D, Multiple Vitamins-Minerals, apixaban, dapagliflozin Propanediol, fluticasone, lisinopril, and pravastatin    Allergies:  Allergies   Allergen Reactions    Penicillins Rash     Thinks childhood reaction of rash        Objective   Objective      Vitals:   Temp:  [97.8 °F (36.6 °C)] 97.8 °F (36.6 °C)  Heart Rate:  [92] 92  Resp:  [18] 18  BP: (143)/(88) 143/88  Physical Exam    Constitutional: 74-year-old male in no acute distress on room air   Eyes: PERRLA, sclerae anicteric, no conjunctival injection   HENT: NCAT, mucous membranes moist   Neck: Supple, no thyromegaly, no lymphadenopathy, trachea midline   Respiratory: Clear to auscultation bilaterally, nonlabored respirations    Cardiovascular: RRR, no murmurs, rubs, or gallops, palpable pedal pulses bilaterally   Gastrointestinal: Positive bowel sounds, soft, nontender, nondistended   : Three-way catheter in place, urine clear   Musculoskeletal: No bilateral ankle edema, no clubbing or cyanosis to extremities   Psychiatric: Appropriate affect, cooperative   Neurologic: Oriented x 3, strength symmetric in all extremities, Cranial Nerves grossly intact to confrontation, speech clear   Skin: No rashes     Result Review    Result Review:  I have personally reviewed the results from the time of this admission to 3/6/2025 01:35 EST and agree with these findings:  [x]  Laboratory list / accordion  []  Microbiology  []  Radiology  []  EKG/Telemetry   []  Cardiology/Vascular   []  Pathology  [x]  Old records  []  Other:  Most notable findings include: Hemoglobin 11.1      Assessment & Plan   Assessment / Plan     Brief Patient Summary:  North Carcamo is a 74 y.o. male who is being admitted to the observation unit with hematuria and urinary retention due to radiation cystitis.  In the ED, lab work showed a stable hemoglobin 11.1.  Patient's vital signs stable.  Patient's catheter was changed out and three-way catheter placed and started on CBI.  We will continue CBI overnight.  Urology to follow in the morning.    Active Hospital Problems:  Active Hospital Problems    Diagnosis     **Hematuria due to irradiation cystitis      Plan:     Hematuria due to radiation cystitis  Urinary retention  -Paz catheter  exchanged in ED  -Continue CBI  -Hemoglobin stable 11.1  -Patient states he was advised during his last admission to come off of the Eliquis and has been off of since March 1, chart review shows discharge instructions to continue Eliquis  -Urology consulted  -N.p.o.  -Analgesics as needed    Paroxysmal atrial fibrillation  History of PE/DVT  -Patient states he had 1 episode of PAF back in 2019 when he was in the hospital for pneumonia but no further recorded episodes of A-fib since, follows with Dr. Clarke  -Patient was found to have bilateral segmental and subsegmental PE in January 2025  -Discussed with urology further risk versus benefits anticoagulation    Hypertension  -Continue lisinopril  -Monitor vital signs every 4 hours      VTE Prophylaxis:  No VTE prophylaxis order currently exists.        CODE STATUS:    Code Status (Patient has no pulse and is not breathing): CPR (Attempt to Resuscitate)  Medical Interventions (Patient has pulse or is breathing): Full Support    Admission Status:  I believe this patient meets observation status.    76 minutes have been spent by [facility] providers in the care of this patient while under observation status.      Appropriate PPE worn during patient encounter.  Hand hygeine performed before and after seeing the patient.      Electronically signed by Claudia Caldera PA-C, 03/06/25, 12:38 AM EST.

## 2025-03-06 NOTE — OUTREACH NOTE
Medical Week 2 Survey      Flowsheet Row Responses   [facility] patient discharged from? [place]   Does the patient have one of the following disease processes/diagnoses(primary or secondary)? Other   Week 2 attempt successful? No   Unsuccessful attempts Attempt 1   Revoke Readmitted            Adelita NIETO - Registered Nurse

## 2025-03-06 NOTE — PLAN OF CARE
Goal Outcome Evaluation:  Plan of Care Reviewed With: patient, spouse        Progress: improving  Outcome Evaluation: Pt. D/C today. Pt. to follow up with PCP, woundcare, urology outpatient and continue hyperbaric oxygen treatment. At this time VSS, pt. A&Ox4, and all questions answered.       Problem: Adult Inpatient Plan of Care  Goal: Plan of Care Review  Outcome: Met  Flowsheets (Taken 3/6/2025 1646)  Progress: improving  Outcome Evaluation: Pt. D/C today. Pt. to follow up with PCP, woundcare, urology outpatient and continue hyperbaric oxygen treatment. At this time VSS, pt. A&Ox4, and all questions answered.  Plan of Care Reviewed With:   patient   spouse  Goal: Patient-Specific Goal (Individualized)  Outcome: Met  Goal: Absence of Hospital-Acquired Illness or Injury  Outcome: Met  Intervention: Identify and Manage Fall Risk  Recent Flowsheet Documentation  Taken 3/6/2025 1600 by Marcelo Sears RN  Safety Promotion/Fall Prevention:   activity supervised   fall prevention program maintained   lighting adjusted   safety round/check completed  Taken 3/6/2025 1400 by Marcelo Sears RN  Safety Promotion/Fall Prevention:   activity supervised   fall prevention program maintained   lighting adjusted   safety round/check completed  Taken 3/6/2025 1215 by Marcelo Sears RN  Safety Promotion/Fall Prevention:   activity supervised   fall prevention program maintained   lighting adjusted   safety round/check completed  Taken 3/6/2025 1005 by Marcelo Sears RN  Safety Promotion/Fall Prevention:   activity supervised   fall prevention program maintained   lighting adjusted   safety round/check completed  Taken 3/6/2025 0830 by Marcelo Sears RN  Safety Promotion/Fall Prevention:   activity supervised   fall prevention program maintained   lighting adjusted   safety round/check completed  Intervention: Prevent Skin Injury  Recent Flowsheet Documentation  Taken 3/6/2025 0830 by Marcelo Sears, RN  Body Position: position  changed independently  Intervention: Prevent and Manage VTE (Venous Thromboembolism) Risk  Recent Flowsheet Documentation  Taken 3/6/2025 0830 by Marcelo Sears RN  VTE Prevention/Management:   SCDs (sequential compression devices) off   patient refused intervention  Intervention: Prevent Infection  Recent Flowsheet Documentation  Taken 3/6/2025 1600 by Marcelo Sears RN  Infection Prevention:   hand hygiene promoted   single patient room provided   rest/sleep promoted  Taken 3/6/2025 1400 by Marcelo Sears RN  Infection Prevention:   hand hygiene promoted   single patient room provided   rest/sleep promoted  Taken 3/6/2025 1215 by Marcelo Sears RN  Infection Prevention:   hand hygiene promoted   single patient room provided   rest/sleep promoted  Taken 3/6/2025 1005 by Marcelo Sears RN  Infection Prevention:   hand hygiene promoted   single patient room provided   rest/sleep promoted  Taken 3/6/2025 0830 by Marcelo Sears RN  Infection Prevention:   hand hygiene promoted   single patient room provided   rest/sleep promoted  Goal: Optimal Comfort and Wellbeing  Outcome: Met  Intervention: Provide Person-Centered Care  Recent Flowsheet Documentation  Taken 3/6/2025 0830 by Marcelo Sears RN  Trust Relationship/Rapport:   care explained   questions answered   questions encouraged  Goal: Readiness for Transition of Care  Outcome: Met     Problem: Skin Injury Risk Increased  Goal: Skin Health and Integrity  Outcome: Met  Intervention: Optimize Skin Protection  Recent Flowsheet Documentation  Taken 3/6/2025 1600 by Marcelo Sears RN  Activity Management:   activity encouraged   bedrest  Taken 3/6/2025 1400 by Marcelo Sears, RN  Activity Management:   activity encouraged   back to bed  Taken 3/6/2025 1215 by Marcelo Sears, RN  Activity Management:   activity encouraged   bedrest  Taken 3/6/2025 1005 by Marcelo Sears, RN  Activity Management:   activity encouraged   bedrest  Taken 3/6/2025 0830 by Marcelo Sears,  RN  Activity Management:   activity encouraged   bedrest  Pressure Reduction Techniques: frequent weight shift encouraged  Head of Bed (HOB) Positioning: HOB elevated     Problem: Hemorrhagic Cystitis  Goal: Absence of Hematuria  Outcome: Met     Problem: Pain Acute  Goal: Optimal Pain Control and Function  Outcome: Met  Intervention: Optimize Psychosocial Wellbeing  Recent Flowsheet Documentation  Taken 3/6/2025 0830 by Marcelo Sears RN  Supportive Measures: active listening utilized  Diversional Activities: smartphone  Intervention: Prevent or Manage Pain  Recent Flowsheet Documentation  Taken 3/6/2025 1600 by Marcelo Sears RN  Medication Review/Management: medications reviewed  Taken 3/6/2025 1400 by Marcelo Sears RN  Medication Review/Management: medications reviewed  Taken 3/6/2025 1215 by Marcelo Sears RN  Medication Review/Management: medications reviewed  Taken 3/6/2025 1005 by Marcelo Sears, RN  Medication Review/Management: medications reviewed  Taken 3/6/2025 0830 by Marcelo Sears, RN  Sensory Stimulation Regulation: care clustered  Bowel Elimination Promotion: privacy promoted  Sleep/Rest Enhancement: consistent schedule promoted  Medication Review/Management: medications reviewed

## 2025-03-06 NOTE — PLAN OF CARE
Goal Outcome Evaluation:    Patient admitted for hematuria.  CBI running clear, no signs of bleeding or discomfort.  Patient sleeping between care.  Urology consult.

## 2025-03-07 ENCOUNTER — TRANSITIONAL CARE MANAGEMENT TELEPHONE ENCOUNTER (OUTPATIENT)
Dept: CALL CENTER | Facility: HOSPITAL | Age: 75
End: 2025-03-07
Payer: MEDICARE

## 2025-03-07 ENCOUNTER — TELEPHONE (OUTPATIENT)
Dept: ONCOLOGY | Facility: CLINIC | Age: 75
End: 2025-03-07
Payer: MEDICARE

## 2025-03-07 ENCOUNTER — OFFICE VISIT (OUTPATIENT)
Dept: WOUND CARE | Facility: HOSPITAL | Age: 75
End: 2025-03-07
Payer: MEDICARE

## 2025-03-07 PROCEDURE — G0277 HBOT, FULL BODY CHAMBER, 30M: HCPCS

## 2025-03-07 NOTE — CASE MANAGEMENT/SOCIAL WORK
Case Management Discharge Note      Final Note: Home         Selected Continued Care - Discharged on 3/6/2025 Admission date: 3/5/2025 - Discharge disposition: Home or Self Care      Destination    No services have been selected for the patient.                Durable Medical Equipment    No services have been selected for the patient.                Dialysis/Infusion    No services have been selected for the patient.                Home Medical Care    No services have been selected for the patient.                Therapy    No services have been selected for the patient.                Community Resources    No services have been selected for the patient.                Community & DME    No services have been selected for the patient.                         Final Discharge Disposition Code: 01 - home or self-care

## 2025-03-07 NOTE — TELEPHONE ENCOUNTER
Provider: Dr. Alvarez  Caller: North Carcamo  Relationship to Patient: Self  Call Back Phone Number: [phone]  Reason for Call: Pt was told to call the nurse, over at ER at [facility]

## 2025-03-07 NOTE — OUTREACH NOTE
Call Center TCM Note      Flowsheet Row Responses   [facility] patient discharged from? [place]   Does the patient have one of the following disease processes/diagnoses(primary or secondary)? Other   TCM attempt successful? 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 Yes   Is the patient having any side effects they believe may be caused by any medication additions or changes? No   Does the patient have all medications ordered at discharge? Yes   Is the patient taking all medications as directed (includes completed medication regime)? Yes   Comments Patient will followup with [facility] Next week.   Does the patient have an appointment with their PCP within 7-14 days of discharge? Other   Nursing Interventions Patient desires to follow up with specialty only   Psychosocial issues? No   Did the patient receive a copy of their discharge instructions? Yes   Nursing interventions Reviewed instructions with patient   What is the patient's perception of their health status since discharge? Improving   Is the patient/caregiver able to teach back signs and symptoms related to disease process for when to call PCP? Yes   Is the patient/caregiver able to teach back signs and symptoms related to disease process for when to call 911? Yes   Is the patient/caregiver able to teach back the hierarchy of who to call/visit for symptoms/problems? PCP, Specialist, Home health nurse, Urgent Care, ED, 911 Yes   If the patient is a current smoker, are they able to teach back resources for cessation? Not a smoker   TCM call completed? Yes   Wrap up additional comments Patient is overall doing well. He had to have his catheter irrigated, removed,  and replaced today due to a small clot obstructing urine flow. Since that time, he has good flow and output. He  also had a Hyperbaric Treatment today.   Call end time 1410   Would this patient benefit from a Referral to [facility] Social Work? No   Is the patient interested in additional calls from an ambulatory ? No            Trent Chew RN    3/7/2025, 14:10 EST

## 2025-03-07 NOTE — TELEPHONE ENCOUNTER
Call back to Mr. Carcamo, and he states he has had a long history of hematuria with cystitis from radiation treatments, and it has flared up again, therefore he was in the ER yesterday, and had seen a doctor from this practice.  Patient was changed from Eliquis to Lovenox injections, and said he thinks he remembers the doctor telling him to not give the injections if he is having some bleeding, and he has noticed some blood in his catheter.  Let him know this RN will clarify instructions given him, as it seems logical, since he has the hematuria so frequently, that he would continue the Lovenox injections unless the bleeding becomes more excessive.  Let him know RN will reach out to Dr. De Santiago and get back with him.  He states he will be in  hyperbaric oxygen treatment from 10:00-12:00.      RN called back at 9:45 and message left, patient called back and left message when RN away from desk and states he will call back when he is out of his treatment.

## 2025-03-07 NOTE — OUTREACH NOTE
Prep Survey      Flowsheet Row Responses   [facility] patient discharged from? [place]   Is LACE score < 7 ? 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 Other   Does the patient have Home health ordered? No   Is there a DME ordered? No   Prep survey completed? Yes            VANESSA TOTH - Registered Nurse

## 2025-03-07 NOTE — TELEPHONE ENCOUNTER
Mr. Carcamo called back after his treatment, and RN able to let him know RN had talked with Dr. De Santiago, and she means for him to take the Lovenox injections twice daily, and only stop if the bleeding gets worse, as it would be expected he will continue to have some hematuria with what he has going on.  Let him know if the bleeding gets bad, he would need to go back to the ER, and Dr. De Santiago said the Lovenox would be reversed more easily.  Patient verbalized understanding.

## 2025-03-08 ENCOUNTER — HOSPITAL ENCOUNTER (EMERGENCY)
Facility: HOSPITAL | Age: 75
Discharge: HOME | End: 2025-03-08
Attending: EMERGENCY MEDICINE
Payer: MEDICARE

## 2025-03-08 VITALS
OXYGEN SATURATION: 97 % | BODY MASS INDEX: 26.36 KG/M2 | DIASTOLIC BLOOD PRESSURE: 78 MMHG | WEIGHT: 178 LBS | SYSTOLIC BLOOD PRESSURE: 135 MMHG | HEART RATE: 58 BPM | HEIGHT: 69 IN | TEMPERATURE: 97 F | RESPIRATION RATE: 16 BRPM

## 2025-03-08 DIAGNOSIS — R33.9 URINARY RETENTION: Primary | ICD-10-CM

## 2025-03-08 DIAGNOSIS — N30.40 RADIATION CYSTITIS: ICD-10-CM

## 2025-03-08 PROCEDURE — 99282 EMERGENCY DEPT VISIT SF MDM: CPT

## 2025-03-08 PROCEDURE — 51798 US URINE CAPACITY MEASURE: CPT

## 2025-03-08 NOTE — ED NOTES
RN manually irrigated mcclellan catheter.  RN put 120ml in each port and got 120ml out of each port. After that the catheter drained and RN got 320ml of pink tinged color urine

## 2025-03-08 NOTE — ED PROVIDER NOTES
EMERGENCY DEPARTMENT ENCOUNTER    Room Number:  03/03  Date of encounter:  3/8/2025  PCP: Chloe Arriaga MD  Historian: Patient  Chronic or social conditions impacting care (social determinants of health): None    HPI:  Chief Complaint: Urinary retention  A complete HPI/ROS/PMH/PSH/SH/FH are unobtainable due to: Nothing    Context: North Carcamo is a 74 y.o. male with a history of prostate cancer, A-fib, PEs, who presents to the ED c/o acute urinary retention, hematuria.  Patient has had a previous history of bladder cancer with subsequent radiation treatment.  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  He has had difficulty over the past several months due to recurrent urinary retention secondary to clots.  He has had multiple admissions for urinary retention.  He follows with Dr. Russell in urology.  Patient states that this morning when he woke up he noticed that his catheter was not draining as well as normally.  He denies any significant abdominal pain at this time.    Review of prior external notes (non-ED):   Reviewed most recent admission from 3/5/2025 through 3/6/2025.  Patient admitted for hematuria, urinary retention.  Of note patient was taken off of his Eliquis at that time and started on Lovenox.    Review of prior external test results outside of this encounter:  I reviewed a BMP dated 3/6/2025.  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 coli pneumonia 08/01/2023   • History of prostate cancer 2002   • History of recent hospitalization 08/01/2023   • Kidney stone 2019   • On anticoagulant therapy          PAST SURGICAL HISTORY  Past Surgical History:   Procedure Laterality Date   • CATARACT EXTRACTION      WITH LENS IMPLANTS   • COLONOSCOPY N/A 09/13/2023    Procedure: COLONOSCOPY TO CECUM AND TERMINAL ILEUM WITH COLD POLYPECTOMY;  Surgeon: Humberto Peña MD;  Location: [facility] ENDOSCOPY;  Service: Gastroenterology;  Laterality: N/A;  SCREENING  DIVERTICULOSIS, COLON POLYP, HEMORRHOIDS   • CYSTOSCOPY BLADDER BIOPSY N/A 06/12/2019    Procedure: CYSTOSCOPY TUR BLADDER TUMOR;  Surgeon: Joel Russell MD;  Location: [facility] OR;  Service: Urology   • CYSTOSCOPY BLADDER BIOPSY N/A 05/31/2024    Procedure: CYSTOSCOPY BLADDER BIOPSY FULGURATION;  Surgeon: Joel Russell MD;  Location: [facility] OR;  Service: Urology;  Laterality: N/A;   • CYSTOSCOPY WITH CLOT EVACUATION N/A 10/02/2024    Procedure: CYSTOSCOPY WITH CLOT EVACUATION, CAUTERY OF BLEEDING, DENG INSERTION;  Surgeon: Joel Russell MD;  Location: [facility] OR;  Service: Urology;  Laterality: N/A;   • CYSTOSCOPY WITH CLOT EVACUATION N/A 2/9/2025    Procedure: CYSTOSCOPY WITH CLOT EVACUATION;  Surgeon: Lebron Mosqueda MD;  Location: [facility] OR;  Service: Urology;  Laterality: N/A;   • ELBOW PROCEDURE Left     FX   • EYE SURGERY  03/2024    Cataract   • PROSTATECTOMY     • PROSTATECTOMY  03/01/2003   • TONSILLECTOMY  1957   • VASECTOMY           FAMILY HISTORY  Family History   Problem Relation Age of Onset   • Hypertension Mother    • Hypertension Father    • Prostate cancer Father    • Malig  Hyperthermia Neg Hx          SOCIAL HISTORY  Social History     Socioeconomic History   • Marital status:    Tobacco Use   • Smoking status: Never     Passive exposure: Never   • Smokeless tobacco: Never   Vaping Use   • Vaping status: Never Used   Substance and Sexual Activity   • Alcohol use: Yes     Alcohol/week: 1.0 standard drink of alcohol     Types: 1 Drinks containing 0.5 oz of alcohol per week     Comment: Occasional beer or cocktail   • Drug use: No   • Sexual activity: Yes     Partners: Female     Birth control/protection: Vasectomy         ALLERGIES  Penicillins        REVIEW OF SYSTEMS  All systems reviewed and negative except for those discussed in HPI.       PHYSICAL EXAM    I have reviewed the triage vital signs and nursing notes.    ED Triage Vitals [03/08/25 0513]   Temp Heart Rate Resp BP SpO2   97 °F (36.1 °C) 101 16 150/93 99 %      Temp src Heart Rate Source Patient Position BP Location FiO2 (%)   Tympanic -- Sitting Right arm --       Physical Exam  GENERAL: Alert, oriented, not distressed  HENT: head atraumatic, no nuchal rigidity  EYES: no scleral icterus, EOMI  CV: regular rhythm, regular rate, no murmur  RESPIRATORY: normal effort, CTA  ABDOMEN: soft, nontender  : Penis normal.  Paz catheter in good position  MUSCULOSKELETAL: no deformity, FROM, no calf swelling or tenderness  NEURO: alert, moves all extremities, follows commands  SKIN: warm, dry    ADDITIONAL ORDERS CONSIDERED BUT NOT ORDERED:  I considered exchanging the patient's catheter however the catheter seems to be draining well.    PROGRESS, DATA ANALYSIS, CONSULTS, AND MEDICAL DECISION MAKING    All labs have been independently interpreted by myself.  All radiology studies have been independently interpreted by myself and discussed with radiologist dictating the report.   EKGs independently interpreted by myself.  Discussion below represents my analysis of pertinent findings related to patient's condition,  differential diagnosis, treatment plan and final disposition.    I have discussed case with Dr. Mcdermott, emergency room physician.  She has performed her own bedside examination and agrees with treatment plan.    ED Course as of 03/08/25 1458   Sat Mar 08, 2025   0546 First look: Patient presents to emergency department with urinary retention.  Patient has chronic Paz catheter secondary to radiation cystitis.  States he emptied the bag around midnight prior to going to bed.  When he awoke just prior to arrival here in the emergency department, noticed that there was only minimal urine in the bag.  Reports associated suprapubic pressure and pain. States he had the catheter exchanged last week here in the emergency department.  [facility] is [facility] for voiding trial.    Will initiate workup with bladder scan and attempt manual irrigation of current indwelling catheter.  If unable to irrigate, will plan for catheter exchange.  Will defer additional workup to oncoming provider. [MP]   0622 Patient's bladder has been irrigated and appears to be draining at this time.  We will check a bladder scan. [EE]   0708 Bladder scan showed 0 mL [EE]   0720 Patient's catheter seems to be working properly now.  His urine is still mildly pink.  He does have an appointment next Wednesday with urology.  We will discharge. [EE]   0924 The patient has had about 48 ounces of water.  His urine is now straw-colored.  No evidence of bleeding or clots.  We will discharge. [EE]      ED Course User Index  [EE] Viraj Morales PA  [MP] Katya Spencer PA-C       AS OF 07:31 EST VITALS:    BP - 131/80  HR - 101  TEMP - 97 °F (36.1 °C) (Tympanic)  O2 SATS - 99%        DIAGNOSIS  Final diagnoses:   Urinary retention   Radiation cystitis         DISPOSITION  Discharged    Admission was considered but after careful review of the patient's presentation, physical examination, diagnostic results, and response to treatment the  patient may be safely discharged with outpatient follow-up.         Dictated utilizing Dragon dictation     Viraj Morales PA  03/08/25 3106

## 2025-03-08 NOTE — ED PROVIDER NOTES
The SINDI and I have discussed this patient's history, physical exam and treatment plan.  I provided a substantive portion of the care of this patient.  I have reviewed the documentation and personally had a face to face interaction with the patient and personally performed the physical exam, in its entirety.  I affirm the documentation and agree with the treatment and plan.  The following describes my personal findings.  SHARED VISIT: This visit was performed by BOTH a physician and an APC. The substantive portion of the medical decision making was performed by this attesting physician who made or approved the management plan and takes responsibility for patient management. All studies in the APC note (if performed) were independently interpreted by me.       The patient presents complaining of urinary retention, moderate fullness, with decreased urine output in the Paz catheter bag.  Patient reports history of hematuria, status post an irrigation, getting it bladder instillation therapy per Dr. Brian Russell.  Patient reports she he has been taking his Eliquis as previously directed.    Comprehensive Physical exam:  Patient is nontoxic appearing, conversant awake, alert  HEENT: normocephalic, atraumatic  Neck: no goiter, no pain with ROM  Pulmonary: Nontachypneic  cardiovascular: Nontachycardic  Abdomen: Soft, nontender,.  Very light pink urine without clots noted in Paz bag  musculoskeletal: Good range of motion x 4  Neuro/psychiatric:calm, appropriate, cooperative  Skin:warm, dry      Anticipate outpatient follow-up with Dr. Brian Russell for further testing, treatment as needed.  Patient voices understanding of need to drink a minimum of 2 L of fluids a day to keep his urine flushed.  He agrees to return to the emergency department immediately with poor urine output, increasing abdominal discomfort, fevers, vomiting or other concerns.     Nicole Mcdermott MD  03/08/25 6062

## 2025-03-09 ENCOUNTER — HOSPITAL ENCOUNTER (EMERGENCY)
Facility: HOSPITAL | Age: 75
Discharge: HOME | End: 2025-03-10
Attending: EMERGENCY MEDICINE | Admitting: EMERGENCY MEDICINE
Payer: MEDICARE

## 2025-03-09 VITALS
WEIGHT: 178 LBS | TEMPERATURE: 98.2 F | OXYGEN SATURATION: 98 % | HEIGHT: 69 IN | BODY MASS INDEX: 26.36 KG/M2 | RESPIRATION RATE: 14 BRPM | SYSTOLIC BLOOD PRESSURE: 164 MMHG | HEART RATE: 96 BPM | DIASTOLIC BLOOD PRESSURE: 91 MMHG

## 2025-03-09 DIAGNOSIS — R33.9 URINE RETENTION: Primary | ICD-10-CM

## 2025-03-09 DIAGNOSIS — N30.40 RADIATION CYSTITIS: ICD-10-CM

## 2025-03-09 DIAGNOSIS — Z46.6 ENCOUNTER FOR REPLACEMENT OF URINARY CATHETER: ICD-10-CM

## 2025-03-09 PROCEDURE — 99282 EMERGENCY DEPT VISIT SF MDM: CPT

## 2025-03-10 ENCOUNTER — OFFICE VISIT (OUTPATIENT)
Dept: WOUND CARE | Facility: HOSPITAL | Age: 75
End: 2025-03-10
Payer: MEDICARE

## 2025-03-10 PROCEDURE — G0277 HBOT, FULL BODY CHAMBER, 30M: HCPCS

## 2025-03-10 PROCEDURE — 51702 INSERT TEMP BLADDER CATH: CPT

## 2025-03-10 NOTE — DISCHARGE INSTRUCTIONS
[facility]-up with [facility] on Wednesday.  Take all of your prescribed medications.  Return to emergency department for any worsening symptoms.

## 2025-03-10 NOTE — ED PROVIDER NOTES
I have personally performed a face-to-face diagnostic evaluation of the patient.  I have reviewed and agree with the care plan as outlined by NP/PA.  My findings are as follows:    HPI:  Patient is a 74 y.o. male who presents with complaints of leaking around his chronic indwelling urinary catheter.  He has had limited drainage from the catheter today.  Feels like his bladder is distended.  Typically has hematuria which is unchanged due to radiation cystitis      PE:  Physical Exam  Constitutional:       Appearance: He is not ill-appearing.   Eyes:      Conjunctiva/sclera: Conjunctivae normal.   Pulmonary:      Effort: Pulmonary effort is normal. No respiratory distress.   Genitourinary:     Comments: There is a 16 [place] Island[place] indwelling Paz catheter, there is leakage of clear yellow urine around the catheter tubing, no significant bladder distention, small amount of red-tinged urine in the bag  Neurological:      Mental Status: He is alert and oriented to person, place, and time.           MDM:  I provided a substantiate portion of the care of this patient. I personally performed the medical decision making.    Medical records are reviewed in [facility] and Care Everywhere, if applicable    Catheter placed at bedside by nursing staff with improvement in symptoms.  No other signs or symptoms of infection.  Indwelling urethral catheter will remain in place and patient continues to follow with urology.      Impression:  Final diagnoses:   Urine retention   Encounter for replacement of urinary catheter   Radiation cystitis         Disposition:  ED Disposition       ED Disposition   Discharge    Condition   Stable    Comment   --                Lavern Lugo MD  03/10/25 0054

## 2025-03-10 NOTE — ED PROVIDER NOTES
EMERGENCY DEPARTMENT ENCOUNTER  Room Number:  04/04  PCP: Chloe Arriaga MD  Independent Historians: Patient      HPI:  Chief Complaint: had concerns including catheter problem.     A complete HPI/ROS/PMH/PSH/SH/FH are unobtainable due to: None    Chronic or social conditions impacting patient care (Social Determinants of Health): None      Context: [place] is a 74 y.o. male with a medical history of hypertension, hyperlipidemia, prostate cancer, bladder cancer, paroxysmal atrial fibrillation, PE, and radiation cystitis who presents to the ED c/o acute catheter problem.  Patient reports just prior to arrival he noticed his catheter was not draining.  He subsequently developed some pressure in his bladder.  Reports this is a frequent problem secondary to radiation cystitis.  Is scheduled to follow-up with urology on Wednesday.  He is anticoagulated on Lovenox.  Patient denies fever, nausea, vomiting.  No fall or injury.  No other systemic complaints at this time.      Review of prior external notes (non-ED) -and- Review of prior external test results outside of this encounter:  Patient seen in office by PCP on 1/23/2025 for PE.  Reviewed assessment and plan.  Patient will continue Eliquis and follow-up as scheduled.  Reviewed labs collected on 3/6/2025.  CBC with hemoglobin 11.1, BMP with creatinine 0.75.    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 coli pneumonia 08/01/2023    History of prostate cancer 2002    History of recent hospitalization 08/01/2023    Kidney stone 2019    On anticoagulant therapy          PAST SURGICAL HISTORY  Past Surgical History:   Procedure Laterality Date    CATARACT EXTRACTION      WITH LENS IMPLANTS    COLONOSCOPY N/A 09/13/2023    Procedure: COLONOSCOPY TO CECUM AND TERMINAL ILEUM WITH COLD POLYPECTOMY;  Surgeon: Humberto Peña MD;  Location: [facility] ENDOSCOPY;  Service: Gastroenterology;  Laterality: N/A;  SCREENING  DIVERTICULOSIS, COLON POLYP, HEMORRHOIDS    CYSTOSCOPY BLADDER BIOPSY N/A 06/12/2019    Procedure: CYSTOSCOPY TUR BLADDER TUMOR;  Surgeon: Joel Russell MD;  Location: [facility] OR;  Service: Urology    CYSTOSCOPY BLADDER BIOPSY N/A 05/31/2024    Procedure: CYSTOSCOPY BLADDER BIOPSY FULGURATION;  Surgeon: Joel Russell MD;  Location: [facility] OR;  Service: Urology;  Laterality: N/A;    CYSTOSCOPY WITH CLOT EVACUATION N/A 10/02/2024    Procedure: CYSTOSCOPY WITH CLOT EVACUATION, CAUTERY OF BLEEDING, DENG INSERTION;  Surgeon: Joel Russell MD;  Location: [facility] OR;  Service: Urology;  Laterality: N/A;    CYSTOSCOPY WITH CLOT EVACUATION N/A 2/9/2025    Procedure: CYSTOSCOPY WITH CLOT EVACUATION;  Surgeon: Lebron Mosqueda MD;  Location: [facility] OR;  Service: Urology;  Laterality: N/A;    ELBOW PROCEDURE Left     FX    EYE SURGERY  03/2024    Cataract    PROSTATECTOMY      PROSTATECTOMY  03/01/2003    TONSILLECTOMY  1957    VASECTOMY           FAMILY HISTORY  Family History   Problem Relation Age of Onset    Hypertension Mother     Hypertension Father     Prostate cancer Father     Malig Hyperthermia Neg  Hx          SOCIAL HISTORY  Social History     Socioeconomic History    Marital status:    Tobacco Use    Smoking status: Never     Passive exposure: Never    Smokeless tobacco: Never   Vaping Use    Vaping status: Never Used   Substance and Sexual Activity    Alcohol use: Yes     Alcohol/week: 1.0 standard drink of alcohol     Types: 1 Drinks containing 0.5 oz of alcohol per week     Comment: Occasional beer or cocktail    Drug use: No    Sexual activity: Yes     Partners: Female     Birth control/protection: Vasectomy         ALLERGIES  Penicillins      REVIEW OF SYSTEMS  Included in HPI  All systems reviewed and negative except for those discussed in HPI.      PHYSICAL EXAM    I have reviewed the triage vital signs and nursing notes.    ED Triage Vitals   Temp Heart Rate Resp BP SpO2   03/09/25 2311 03/09/25 2311 03/09/25 2313 03/09/25 2313 03/09/25 2311   98.2 °F (36.8 °C) 96 14 164/91 98 %      Temp src Heart Rate Source Patient Position BP Location FiO2 (%)   -- -- 03/09/25 2313 03/09/25 2313 --     Sitting Right arm        Physical Exam  Constitutional:       General: He is not in acute distress.     Appearance: Normal appearance.   HENT:      Head: Normocephalic and atraumatic.      Nose: Nose normal.      Mouth/Throat:      Mouth: Mucous membranes are moist.   Eyes:      Conjunctiva/sclera: Conjunctivae normal.      Pupils: Pupils are equal, round, and reactive to light.   Cardiovascular:      Rate and Rhythm: Normal rate and regular rhythm.      Pulses: Normal pulses.      Heart sounds: Normal heart sounds.   Pulmonary:      Effort: Pulmonary effort is normal.      Breath sounds: Normal breath sounds.   Abdominal:      General: There is no distension.   Genitourinary:     Comments: Paz catheter exiting urethra.  No urine in the leg bag  Musculoskeletal:         General: Normal range of motion.      Cervical back: Normal range of motion and neck supple.   Skin:     General: Skin is warm.       Capillary Refill: Capillary refill takes less than 2 seconds.   Neurological:      General: No focal deficit present.      Mental Status: He is alert and oriented to person, place, and time.   Psychiatric:         Mood and Affect: Mood normal.             MEDICATIONS GIVEN IN ER  Medications - No data to display      ORDERS PLACED DURING THIS VISIT:  Orders Placed This Encounter   Procedures    Replace Paz Catheter    Assess Need for Indwelling Urinary Catheter - Follow Removal Protocol    Urinary Catheter Care         OUTPATIENT MEDICATION MANAGEMENT:  No current Epic-ordered facility-administered medications on file.     Current Outpatient Medications Ordered in Epic   Medication Sig Dispense Refill    Calcium Carbonate-Vitamin D (CALCIUM 600+D PO) Take 1 tablet by mouth 2 (Two) Times a Day. PT HOLDING FOR SURGERY  Indications: Calcium Deficiency      enoxaparin sodium (LOVENOX) 80 MG/0.8ML solution prefilled syringe syringe Inject 0.8 mL under the skin into the appropriate area as directed Every 12 (Twelve) Hours for 30 days. Indications: DVT/PE (active thrombosis) 48 mL 0    lisinopril (PRINIVIL,ZESTRIL) 40 MG tablet Take 1 tablet by mouth Daily. 90 tablet 1    Multiple Vitamins-Minerals (DAILY MULTIVITAMIN PO) Take 1 tablet by mouth Daily.      pravastatin (PRAVACHOL) 40 MG tablet Take 1 tablet by mouth Every Night. Indications: High Amount of Fats in the Blood 90 tablet 3           PROGRESS, DATA ANALYSIS, CONSULTS, AND MEDICAL DECISION MAKING  All labs have been independently interpreted by me.  All radiology studies have been reviewed by me. All EKG's have been independently viewed and interpreted by me.  Discussion below represents my analysis of pertinent findings related to patient's condition, differential diagnosis, treatment plan and final disposition.    Differential diagnosis includes but is not limited to acute urinary retention, blood clots, renal failure.        ED Course as of 03/10/25 0054    Shannon Mar 09, 2025   2348 Bladder scan 220-280 ml [MP]   Mon Mar 10, 2025   0015 Plan to replace three-way Paz catheter and irrigate [MP]   0053 Patient presents to emergency department with urine retention with Paz catheter in place.  Bladder scan performed did not show significant retention.  Catheter was exchanged with good output of urine.  Patient reports relief.  He is scheduled to see urology in 3 days.  Discussed ED return precautions.  He is otherwise well-appearing, hemodynamically stable, and therefore appropriate for discharge. [MP]      ED Course User Index  [MP] Katya Spencer PA-C             AS OF 00:53 EDT VITALS:    BP - 164/91  HR - 96  TEMP - 98.2 °F (36.8 °C)  O2 SATS - 98%    COMPLEXITY OF CARE  Admission was considered but after careful review of the patient's presentation, physical examination, diagnostic results, and response to treatment the patient may be safely discharged with outpatient follow-up.      DIAGNOSIS  Final diagnoses:   Urine retention   Encounter for replacement of urinary catheter   Radiation cystitis         DISPOSITION  ED Disposition       ED Disposition   Discharge    Condition   Stable    Comment   --                Please note that portions of this document were completed with a voice recognition program.    Note Disclaimer: At [facility], we believe that sharing information builds trust and better relationships. You are receiving this note because you recently visited [facility]. It is possible you will see health information before a provider has talked with you about it. This kind of information can be easy to misunderstand. To help you fully understand what it means for your health, we urge you to discuss this note with your provider.     Katya Spencer PA-C  03/10/25 0054

## 2025-03-11 ENCOUNTER — OFFICE VISIT (OUTPATIENT)
Dept: WOUND CARE | Facility: HOSPITAL | Age: 75
End: 2025-03-11
Payer: MEDICARE

## 2025-03-11 ENCOUNTER — HOSPITAL ENCOUNTER (EMERGENCY)
Facility: HOSPITAL | Age: 75
Discharge: HOME | End: 2025-03-12
Attending: EMERGENCY MEDICINE
Payer: MEDICARE

## 2025-03-11 DIAGNOSIS — N30.40 RADIATION CYSTITIS: Primary | ICD-10-CM

## 2025-03-11 DIAGNOSIS — R33.8 ACUTE URINARY RETENTION: ICD-10-CM

## 2025-03-11 PROCEDURE — 51702 INSERT TEMP BLADDER CATH: CPT

## 2025-03-11 PROCEDURE — 99282 EMERGENCY DEPT VISIT SF MDM: CPT

## 2025-03-11 PROCEDURE — G0277 HBOT, FULL BODY CHAMBER, 30M: HCPCS

## 2025-03-12 ENCOUNTER — OFFICE VISIT (OUTPATIENT)
Dept: WOUND CARE | Facility: HOSPITAL | Age: 75
End: 2025-03-12
Payer: MEDICARE

## 2025-03-12 VITALS
OXYGEN SATURATION: 95 % | WEIGHT: 178 LBS | HEART RATE: 60 BPM | DIASTOLIC BLOOD PRESSURE: 88 MMHG | HEIGHT: 69 IN | BODY MASS INDEX: 26.36 KG/M2 | TEMPERATURE: 97.5 F | SYSTOLIC BLOOD PRESSURE: 130 MMHG | RESPIRATION RATE: 16 BRPM

## 2025-03-12 PROCEDURE — G0277 HBOT, FULL BODY CHAMBER, 30M: HCPCS

## 2025-03-12 NOTE — ED NOTES
Bladder manually irrigated at this time, pink colored fluid returned with several large clots.  Once manual irrigation stopped showing signs of clots, CBI reconnected and fluid began draining freely into FC bag.  Pt stated he felt instant relief.  Pt given his call light at this time, this RN instructed pt that if he began feeling any pain or pressure in his bladder to call out so we could manually irrigate his bladder again.  Pt voiced understanding to this RN, spouse at bedside also voiced understanding at this time.

## 2025-03-12 NOTE — ED NOTES
"Pt arrives ambulatory to triage desk via PV.    Pt states \"I am being treated for radiation cystitis. I have manually irrigated my catheter 2 times today, and I still can't get it to flow properly. I had a hyper baric O2 treatment this morning.\"   "

## 2025-03-12 NOTE — ED PROVIDER NOTES
EMERGENCY DEPARTMENT ENCOUNTER    Room Number:  30/30  PCP: Chloe Arriaga MD  Historian: Patient      HPI:  Chief Complaint: Paz catheter clogged  A complete HPI/ROS/PMH/PSH/SH/FH are unobtainable due to: None   Context: [place] is a 74 y.o. male who presents to the ED c/o Paz catheter clotted.  Patient has history of radiation cystitis.  Patient has had problems with this catheter being clotted multiple times this week.  Patient states tried irrigating it at home.  Patient states he cannot get it to work.  Has had some lower abdominal pain.            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 coli pneumonia 08/01/2023    History of prostate cancer 2002    History of recent hospitalization 08/01/2023    Kidney stone 2019    On anticoagulant therapy          PAST SURGICAL HISTORY  Past Surgical History:   Procedure Laterality Date    CATARACT EXTRACTION      WITH LENS IMPLANTS    COLONOSCOPY N/A 09/13/2023    Procedure: COLONOSCOPY TO CECUM AND TERMINAL ILEUM WITH COLD POLYPECTOMY;  Surgeon: Humberto Peña MD;  Location: [facility] ENDOSCOPY;  Service: Gastroenterology;  Laterality: N/A;  SCREENING  DIVERTICULOSIS, COLON  POLYP, HEMORRHOIDS    CYSTOSCOPY BLADDER BIOPSY N/A 06/12/2019    Procedure: CYSTOSCOPY TUR BLADDER TUMOR;  Surgeon: Joel Russell MD;  Location[facility] MA[facility] OR;  Service: Urology    CYSTOSCOPY BLADDER BIOPSY N/A 05/31/2024    Procedure: CYSTOSCOPY BLADDER BIOPSY FULGURATION;  Surgeon: Joel Russell MD;  [facility];  Service: Urology;  Laterality: N/A;    CYSTOSCOPY WITH CLOT EVACUATION N/A 10/02/2024    Procedure: CYSTOSCOPY WITH CLOT EVACUATION, CAUTERY OF BLEEDING, DENG INSERTION;  Surgeon: Joel Russell MD;  [facility];  Service: Urology;  Laterality: N/A;    CYSTOSCOPY WITH CLOT EVACUATION N/A 2/9/2025    Procedure: CYSTOSCOPY WITH CLOT EVACUATION;  Surgeon: Lebron Mosqueda MD;  Location: [facility] MAIN OR;  Service: Urology;  Laterality: N/A;    ELBOW PROCEDURE Left     FX    EYE SURGERY  03/2024    Cataract    PROSTATECTOMY      PROSTATECTOMY  03/01/2003    TONSILLECTOMY  1957    VASECTOMY           FAMILY HISTORY  Family History   Problem Relation Age of Onset    Hypertension Mother     Hypertension Father     Prostate cancer Father     Malig Hyperthermia Neg Hx          SOCIAL HISTORY  Social History     Socioeconomic History    Marital status:    Tobacco Use    Smoking status: Never     Passive exposure: Never    Smokeless tobacco: Never   Vaping Use    Vaping status: Never Used   Substance and Sexual Activity    Alcohol use: Yes     Alcohol/week: 1.0 standard drink of alcohol     Types: 1 Drinks containing 0.5 oz of alcohol per week     Comment: Occasional beer or cocktail    Drug use: No    Sexual activity: Yes     Partners: Female     Birth control/protection: Vasectomy         ALLERGIES  Penicillins        REVIEW OF SYSTEMS  Review of Systems   Urinary retention      PHYSICAL EXAM  ED Triage Vitals   Temp Heart Rate Resp BP SpO2   03/11/25 2130 03/11/25 2130 03/11/25 2130 03/11/25 2134 03/11/25 2130   97.5 °F (36.4 °C) 93 16 153/75 97 %      Temp src  Heart Rate Source Patient Position BP Location FiO2 (%)   03/11/25 2130 03/11/25 2130 03/11/25 2134 03/11/25 2134 --   Tympanic Monitor Sitting Right arm        Physical Exam      GENERAL: no acute distress  HENT: nares patent  EYES: no scleral icterus  CV: regular rhythm, normal rate  RESPIRATORY: normal effort  ABDOMEN: soft.  Mild suprapubic tenderness  MUSCULOSKELETAL: no deformity  NEURO: alert, moves all extremities, follows commands  PSYCH:  calm, cooperative  SKIN: warm, dry    Vital signs and nursing notes reviewed.          LAB RESULTS  No results found for this or any previous visit (from the past 24 hours).            RADIOLOGY  No Radiology Exams Resulted Within Past 24 Hours              PROCEDURES  Procedures            MEDICATIONS GIVEN IN ER  Medications - No data to display                MEDICAL DECISION MAKING, PROGRESS, and CONSULTS    All labs have been independently reviewed by me.  All radiology studies have been reviewed by me and I have also reviewed the radiology report.   EKG's independently viewed and interpreted by me.  Discussion below represents my analysis of pertinent findings related to patient's condition, differential diagnosis, treatment plan and final disposition.      Additional sources:  - Discussed/ obtained information from independent historians: None    - External (non-ED) record review: Epic reviewed patient seen wound clinic yesterday for hyperbaric oxygen    - Chronic or social conditions impacting care: None    - Shared decision making: None      Orders placed during this visit:  Orders Placed This Encounter   Procedures    Replace Paz Catheter    Continuous Bladder Irrigation    Urinary Catheter Care    Assess Need for Indwelling Urinary Catheter - Follow Removal Protocol    May Irrigate Catheter         Additional orders considered but not ordered:  None        Differential diagnosis includes but is not limited to:    Radiation cystitis versus UTI      Independent  interpretation of labs, radiology studies, and discussions with consultants:  ED Course as of 03/11/25 2355   Tue Mar 11, 2025   2351 23:52 EDT  Replaced patient did have irrigation to clear.  Patient has appointment with urology tomorrow.  Will be discharged home. [SL]      ED Course User Index  [SL] Jarrod Castillo MD                 DIAGNOSIS  Final diagnoses:   Radiation cystitis   Acute urinary retention         DISPOSITION  DISCHARGE    Patient discharged in stable condition.    Reviewed implications of results, diagnosis, meds, responsibility to follow up, warning signs and symptoms of possible worsening, potential complications and reasons to return to ER, including worsening symptoms    Patient/Family voiced understanding of above instructions.    Discussed plan for discharge, as there is no emergent indication for admission. Patient referred to primary care provider for BP management due to today's BP. Pt/family is agreeable and understands need for follow up and repeat testing.  Pt is aware that discharge does not mean that nothing is wrong but it indicates no emergency is present that requires admission and they must continue care with follow-up as given below or physician of their choice.     FOLLOW-UP  Joel Russell MD  [address] [address]  [phone]    Schedule an appointment as soon as possible for a visit            Medication List      No changes were made to your prescriptions during this visit.                  Latest Documented Vital Signs:  As of 23:55 EDT  BP- 132/91 HR- 76 Temp- 97.5 °F (36.4 °C) (Tympanic) O2 sat- 98%              --    Please note that portions of this were completed with a voice recognition program.       Note Disclaimer: At [facility], we believe that sharing information builds trust and better relationships. You are receiving this note because you are receiving care at [facility] or recently visited. It is possible you will see [facility]  information before a provider has talked with you about it. This kind of information can be easy to misunderstand. To help you fully understand what it means for your health, we urge you to discuss this note with your provider.            Jarrod Castillo MD  03/11/25 [id number]

## 2025-03-12 NOTE — ED NOTES
Indwelling mcclellan ordered to stay in per ER MD and urology... Clamped the Three way port and placed a standard drainage bag to the out put source.. patient requests a new leg bag to bring home.. he is well versed on mcclellan care, drainage care and irrigation... he already has an appointment with urology tomorrow... Patient got dressed without assistance...

## 2025-03-13 ENCOUNTER — OFFICE VISIT (OUTPATIENT)
Dept: WOUND CARE | Facility: HOSPITAL | Age: 75
End: 2025-03-13
Payer: MEDICARE

## 2025-03-13 PROCEDURE — G0463 HOSPITAL OUTPT CLINIC VISIT: HCPCS

## 2025-03-13 PROCEDURE — G0277 HBOT, FULL BODY CHAMBER, 30M: HCPCS

## 2025-03-18 ENCOUNTER — READMISSION MANAGEMENT (OUTPATIENT)
Dept: CALL CENTER | Facility: HOSPITAL | Age: 75
End: 2025-03-18
Payer: MEDICARE

## 2025-03-18 ENCOUNTER — HOSPITAL ENCOUNTER (EMERGENCY)
Facility: HOSPITAL | Age: 75
Discharge: HOME | End: 2025-03-18
Attending: EMERGENCY MEDICINE | Admitting: EMERGENCY MEDICINE
Payer: MEDICARE

## 2025-03-18 ENCOUNTER — OFFICE VISIT (OUTPATIENT)
Dept: WOUND CARE | Facility: HOSPITAL | Age: 75
End: 2025-03-18
Payer: MEDICARE

## 2025-03-18 VITALS
HEIGHT: 69 IN | BODY MASS INDEX: 26.07 KG/M2 | DIASTOLIC BLOOD PRESSURE: 92 MMHG | SYSTOLIC BLOOD PRESSURE: 153 MMHG | WEIGHT: 176 LBS | HEART RATE: 92 BPM | TEMPERATURE: 97.4 F | RESPIRATION RATE: 18 BRPM | OXYGEN SATURATION: 99 %

## 2025-03-18 DIAGNOSIS — R33.9 ACUTE ON CHRONIC URINARY RETENTION: Primary | ICD-10-CM

## 2025-03-18 PROCEDURE — 99283 EMERGENCY DEPT VISIT LOW MDM: CPT

## 2025-03-18 PROCEDURE — 51798 US URINE CAPACITY MEASURE: CPT

## 2025-03-18 NOTE — ED PROVIDER NOTES
EMERGENCY DEPARTMENT ENCOUNTER  Room Number:  07/07  PCP: Chloe Arriaga MD  Independent Historians: Patient      HPI:  Chief Complaint: had concerns including Paz Catheter Problem.     A complete HPI/ROS/PMH/PSH/SH/FH are unobtainable due to: None    Chronic or social conditions impacting patient care (Social Determinants of Health): None      Context: [place] is a 74 y.o. male with a medical history of hyperlipidemia, hypertension, paroxysmal atrial fibrillation, prostate cancer, bladder cancer, and chronic radiation cystitis who presents to the ED c/o acute urinary retention.  Patient reports he emptied his leg bag prior to going to sleep.  When he awoke, he noticed bladder pain and no urine in his leg bag.  Reports he was supposed to see urology last week but they canceled his appointment and it is now scheduled for this upcoming week.  He is anticoagulated on Lovenox.  Patient denies chest pain, dyspnea, vomiting, or any other systemic complaints.      Review of prior external notes (non-ED) -and- Review of prior external test results outside of this encounter:  Patient seen in office by PCP on 12/13/2024 for annual wellness visit.  Reviewed assessment and plan.  Patient will continue hyperbaric oxygen therapy.  Reviewed labs collected on 3/6/2025.  CBC with hemoglobin 11.1, BMP with creatinine 0.75.    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 coli pneumonia 08/01/2023    History of prostate cancer 2002    History of recent hospitalization 08/01/2023    Kidney stone 2019    On anticoagulant therapy          PAST SURGICAL HISTORY  Past Surgical History:   Procedure Laterality Date    CATARACT EXTRACTION      WITH LENS IMPLANTS    COLONOSCOPY N/A 09/13/2023    Procedure: COLONOSCOPY TO CECUM AND TERMINAL ILEUM WITH COLD POLYPECTOMY;  Surgeon: Humberto Peña MD;  Location: [facility] ENDOSCOPY;  Service: Gastroenterology;  Laterality: N/A;  SCREENING  DIVERTICULOSIS, COLON POLYP, HEMORRHOIDS    CYSTOSCOPY BLADDER BIOPSY N/A 06/12/2019    Procedure: CYSTOSCOPY TUR BLADDER TUMOR;  Surgeon: Joel Russell MD;  Location: [facility] OR;  Service: Urology    CYSTOSCOPY BLADDER BIOPSY N/A 05/31/2024    Procedure: CYSTOSCOPY BLADDER BIOPSY FULGURATION;  Surgeon: Joel Russell MD;  Location: [facility] OR;  Service: Urology;  Laterality: N/A;    CYSTOSCOPY WITH CLOT EVACUATION N/A 10/02/2024    Procedure: CYSTOSCOPY WITH CLOT EVACUATION, CAUTERY OF BLEEDING, DENG INSERTION;  Surgeon: Joel Russell MD;  Location: [facility] OR;  Service: Urology;  Laterality: N/A;    CYSTOSCOPY WITH CLOT EVACUATION N/A 2/9/2025    Procedure: CYSTOSCOPY WITH CLOT EVACUATION;  Surgeon: Lebron Mosqueda MD;  Location: [facility] OR;  Service: Urology;  Laterality: N/A;    ELBOW PROCEDURE Left     FX    EYE SURGERY  03/2024    Cataract    PROSTATECTOMY      PROSTATECTOMY  03/01/2003    TONSILLECTOMY  1957    VASECTOMY           FAMILY HISTORY  Family History   Problem Relation Age of Onset    Hypertension Mother     Hypertension Father     Prostate cancer Father     Geo  Hyperthermia Neg Hx          SOCIAL HISTORY  Social History     Socioeconomic History    Marital status:    Tobacco Use    Smoking status: Never     Passive exposure: Never    Smokeless tobacco: Never   Vaping Use    Vaping status: Never Used   Substance and Sexual Activity    Alcohol use: Yes     Alcohol/week: 1.0 standard drink of alcohol     Types: 1 Drinks containing 0.5 oz of alcohol per week     Comment: Occasional beer or cocktail    Drug use: No    Sexual activity: Yes     Partners: Female     Birth control/protection: Vasectomy         ALLERGIES  Penicillins      REVIEW OF SYSTEMS  Included in HPI  All systems reviewed and negative except for those discussed in HPI.      PHYSICAL EXAM    I have reviewed the triage vital signs and nursing notes.    ED Triage Vitals   Temp Heart Rate Resp BP SpO2   03/18/25 0230 03/18/25 0230 03/18/25 0230 03/18/25 0234 03/18/25 0230   97.4 °F (36.3 °C) 92 18 153/92 99 %      Temp src Heart Rate Source Patient Position BP Location FiO2 (%)   03/18/25 0230 03/18/25 0230 03/18/25 0234 03/18/25 0234 --   Tympanic Monitor Sitting Right arm        Physical Exam  Constitutional:       General: He is not in acute distress.     Appearance: Normal appearance.   HENT:      Head: Normocephalic and atraumatic.      Nose: Nose normal.      Mouth/Throat:      Mouth: Mucous membranes are moist.   Eyes:      Conjunctiva/sclera: Conjunctivae normal.      Pupils: Pupils are equal, round, and reactive to light.   Cardiovascular:      Rate and Rhythm: Normal rate and regular rhythm.      Pulses: Normal pulses.      Heart sounds: Normal heart sounds.   Pulmonary:      Effort: Pulmonary effort is normal.      Breath sounds: Normal breath sounds.   Abdominal:      General: There is no distension.      Comments: Palpable bladder above the pubic bone with mild tenderness   Musculoskeletal:         General: Normal range of motion.      Cervical back: Normal range of motion and neck supple.    Skin:     General: Skin is warm.      Capillary Refill: Capillary refill takes less than 2 seconds.   Neurological:      General: No focal deficit present.      Mental Status: He is alert and oriented to person, place, and time.   Psychiatric:         Mood and Affect: Mood normal.             ORDERS PLACED DURING THIS VISIT:  Orders Placed This Encounter   Procedures    Irrigate Catheter    Bladder scan         OUTPATIENT MEDICATION MANAGEMENT:  No current Epic-ordered facility-administered medications on file.     Current Outpatient Medications Ordered in Epic   Medication Sig Dispense Refill    Calcium Carbonate-Vitamin D (CALCIUM 600+D PO) Take 1 tablet by mouth 2 (Two) Times a Day. PT HOLDING FOR SURGERY  Indications: Calcium Deficiency      enoxaparin sodium (LOVENOX) 80 MG/0.8ML solution prefilled syringe syringe Inject 0.8 mL under the skin into the appropriate area as directed Every 12 (Twelve) Hours for 30 days. Indications: DVT/PE (active thrombosis) 48 mL 0    lisinopril (PRINIVIL,ZESTRIL) 40 MG tablet Take 1 tablet by mouth Daily. 90 tablet 1    Multiple Vitamins-Minerals (DAILY MULTIVITAMIN PO) Take 1 tablet by mouth Daily.      pravastatin (PRAVACHOL) 40 MG tablet Take 1 tablet by mouth Every Night. Indications: High Amount of Fats in the Blood 90 tablet 3           PROGRESS, DATA ANALYSIS, CONSULTS, AND MEDICAL DECISION MAKING  All labs have been independently interpreted by me.  All radiology studies have been reviewed by me. All EKG's have been independently viewed and interpreted by me.  Discussion below represents my analysis of pertinent findings related to patient's condition, differential diagnosis, treatment plan and final disposition.    Differential diagnosis includes but is not limited to urinary retention, UTI, renal failure.        ED Course as of 03/18/25 0635   Tue Mar 18, 2025   0300 Initial bladder scan showing 474 ml. [MP]   0635 Patient presents to emergency department with  urinary retention.  Noted to be retaining urine on bladder scan.  Catheter was manually irrigated with removal of multiple clots.  Urine is now flowing freely.  Encouraged him to keep his follow-up appointment with urology this week.  Discussed ED return precautions.  He is otherwise well-appearing, hemodynamically stable, and therefore appropriate for discharge. [MP]      ED Course User Index  [MP] Katya Spencer PA-C             AS OF 06:34 EDT VITALS:    BP - 153/92  HR - 92  TEMP - 97.4 °F (36.3 °C)  O2 SATS - 99%    COMPLEXITY OF CARE  Admission was considered but after careful review of the patient's presentation, physical examination, diagnostic results, and response to treatment the patient may be safely discharged with outpatient follow-up.      DIAGNOSIS  Final diagnoses:   Acute on chronic urinary retention         DISPOSITION  ED Disposition       ED Disposition   Discharge    Condition   Stable    Comment   --                Please note that portions of this document were completed with a voice recognition program.    Note Disclaimer: At [facility], we believe that sharing information builds trust and better relationships. You are receiving this note because you recently visited [facility]. It is possible you will see health information before a provider has talked with you about it. This kind of information can be easy to misunderstand. To help you fully understand what it means for your health, we urge you to discuss this note with your provider.     Katya Spencer PA-C  03/18/25 0635

## 2025-03-18 NOTE — ED NOTES
Pt presents to Ed with c/o urinary catheter not draining. Pt FC irrigated manually and clots retrieved. Pt FC is now draining. Appr. 400 ml emptied from catheter.

## 2025-03-18 NOTE — DISCHARGE INSTRUCTIONS
Follow-up with [facility] this week.  Follow-up with primary care provider as needed.  Return to emergency department for any worsening symptoms.

## 2025-03-18 NOTE — ED PROVIDER NOTES
I have personally performed a face-to-face diagnostic evaluation of the patient.  I have reviewed and agree with the care plan as outlined by NP/PA.  My findings are as follows:    HPI:  Patient is a 74 y.o. male who presents with concern for nondraining urinary catheter.      PE:  Physical Exam  Constitutional:       Appearance: He is not ill-appearing.   Eyes:      Conjunctiva/sclera: Conjunctivae normal.   Pulmonary:      Effort: Pulmonary effort is normal. No respiratory distress.   Abdominal:      Tenderness: There is abdominal tenderness (Suprapubic with associated bladder distention). There is no guarding or rebound.   Neurological:      Mental Status: He is alert and oriented to person, place, and time.           MDM:  I provided a substantiate portion of the care of this patient. I personally performed the medical decision making.    Medical records are reviewed in [facility] and Care Everywhere, if applicable    Urinary catheter flushed and now functioning normally.  Patient symptoms relieved.  He will continue to follow-up with urology for management of his indwelling catheter    Impression:  Final diagnoses:   Acute on chronic urinary retention         Disposition:  ED Disposition       ED Disposition   Discharge    Condition   Stable    Comment   --                Lavern Lugo MD  03/18/25 5796

## 2025-03-18 NOTE — ED TRIAGE NOTES
Pt ambulatory to triage from home with c/o mcclellan catheter not draining - pt has indwelling due to radiation cystitis, states bloody urine, which is part of the cystitis.  Pt due to have hyperbaric oxygen treatment later today, but states mcclellan is no longer draining.

## 2025-03-19 ENCOUNTER — OFFICE VISIT (OUTPATIENT)
Dept: WOUND CARE | Facility: HOSPITAL | Age: 75
End: 2025-03-19
Payer: MEDICARE

## 2025-03-20 ENCOUNTER — OFFICE VISIT (OUTPATIENT)
Dept: WOUND CARE | Facility: HOSPITAL | Age: 75
End: 2025-03-20
Payer: MEDICARE

## 2025-03-20 PROCEDURE — G0277 HBOT, FULL BODY CHAMBER, 30M: HCPCS

## 2025-03-20 NOTE — H&P
[facility] History and Physical    Patient Care Team:  Chloe Arriaga MD as PCP - General (Internal Medicine)  Brennen Burroughs MD as Consulting Physician (Cardiology)  Armando Alvarez MD as Consulting Physician (Hematology and Oncology)    Chief complaint blood my urine    Subjective     Patient is a 74 y.o. male presents with history of prostate cancer post radical prostatectomy with local recurrence and radiation therapy developing radiation cystitis who is currently on hyperbaric oxygen therapy and has had multiple clot evacuation as a cautery bleeding for recurrent gross hematuria representing with the same no fever and chills was placed on Bactrim which he is taking perioperatively.       Review of Systems   Pertinent items are noted in HPI    Past Medical History:   Diagnosis Date    Allergic     Penicillin    Cataract March 2022    Diverticulosis     E. coli pneumonia 08/01/2023    HOSPITALIZED    History of prostate cancer 2002    History of recent hospitalization 08/01/2023    Hyperglycemia     Hyperlipidemia     Hypertension     Kidney stone 2019    Has not been a problem.    On anticoagulant therapy     Paroxysmal atrial fibrillation      Past Surgical History:   Procedure Laterality Date    CATARACT EXTRACTION      WITH LENS IMPLANTS    COLONOSCOPY N/A 09/13/2023    Procedure: COLONOSCOPY TO CECUM AND TERMINAL ILEUM WITH COLD POLYPECTOMY;  Surgeon: Humberto Peña MD;  Location: [facility] ENDOSCOPY;  Service: Gastroenterology;  Laterality: N/A;  SCREENING  DIVERTICULOSIS, COLON POLYP, HEMORRHOIDS    CYSTOSCOPY BLADDER BIOPSY N/A 06/12/2019    Procedure: CYSTOSCOPY TUR BLADDER TUMOR;  Surgeon: Joel Russell MD;  Location: [facility] OR;  Service: Urology    CYSTOSCOPY BLADDER BIOPSY N/A 05/31/2024    Procedure: CYSTOSCOPY BLADDER BIOPSY FULGURATION;  Surgeon: Joel Russell MD;  Location: [facility] OR;  Service: Urology;  Laterality: N/A;    CYSTOSCOPY WITH CLOT  EVACUATION N/A 10/02/2024    Procedure: CYSTOSCOPY WITH CLOT EVACUATION, CAUTERY OF BLEEDING, DENG INSERTION;  Surgeon: Joel Russell MD;  Location: [facility] MAIN OR;  Service: Urology;  Laterality: N/A;    CYSTOSCOPY WITH CLOT EVACUATION N/A 2/9/2025    Procedure: CYSTOSCOPY WITH CLOT EVACUATION;  Surgeon: Lebron Mosqueda MD;  Location: [facility] MAIN OR;  Service: Urology;  Laterality: N/A;    ELBOW PROCEDURE Left     FX    EYE SURGERY  03/2024    Cataract    PROSTATECTOMY      PROSTATECTOMY  03/01/2003    TONSILLECTOMY  1957    VASECTOMY       Family History   Problem Relation Age of Onset    Hypertension Mother     Hypertension Father     Prostate cancer Father     Malig Hyperthermia Neg Hx      Social History     Tobacco Use    Smoking status: Never     Passive exposure: Never    Smokeless tobacco: Never   Vaping Use    Vaping status: Never Used   Substance Use Topics    Alcohol use: Yes     Alcohol/week: 1.0 standard drink of alcohol     Types: 1 Drinks containing 0.5 oz of alcohol per week     Comment: Occasional beer or cocktail    Drug use: No       Meds:  No medications prior to admission.       Allergies:  Penicillins    Debilities:      Objective     Vital Signs     No intake or output data in the 24 hours ending 03/20/25 0908       Physical Exam:      General Appearance:  Alert, cooperative, in no acute distress   Head:  Normocephalic, without obvious abnormality, atraumatic   Eyes:  Lids and lashes normal, conjunctivae and sclerae normal, no icterus, no pallor, corneas clear   Ears:  Ears appear intact with no abnormalities noted   Throat:     Neck:  No adenopathy, supple, trachea midline, no thyromegaly, no JVD   Back:  No kyphosis present, no scoliosis present, no skin lesions, erythema or scars, no tenderness to percussion or palpation, range of motion normal   Lungs:  respirations regular, even and unlabored    Heart:  Regular rhythm and normal rate   Chest Wall:  No abnormalities observed    Abdomen:  no masses, no organomegaly, soft non-tender, non-distended, no guarding, no rebound tenderness   Rectal:  No masses                                                                           Results Review:    I reviewed the patient's new clinical results.  Results for orders placed or performed during the hospital encounter of 03/05/25   Comprehensive Metabolic Panel    Collection Time: 03/05/25 11:42 PM    Specimen: Blood   Result Value Ref Range    Glucose 122 (H) 65 - 99 mg/dL    BUN 10 8 - 23 mg/dL    Creatinine 0.95 0.76 - 1.27 mg/dL    Sodium 137 136 - 145 mmol/L    Potassium 4.0 3.5 - 5.2 mmol/L    Chloride 101 98 - 107 mmol/L    CO2 27.7 22.0 - 29.0 mmol/L    Calcium 9.6 8.6 - 10.5 mg/dL    Total Protein 6.8 6.0 - 8.5 g/dL    Albumin 3.6 3.5 - 5.2 g/dL    ALT (SGPT) 11 1 - 41 U/L    AST (SGOT) 16 1 - 40 U/L    Alkaline Phosphatase 88 39 - 117 U/L    Total Bilirubin 0.5 0.0 - 1.2 mg/dL    Globulin 3.2 gm/dL    A/G Ratio 1.1 g/dL    BUN/Creatinine Ratio 10.5 7.0 - 25.0    Anion Gap 8.3 5.0 - 15.0 mmol/L    eGFR 83.0 >60.0 mL/min/1.73   CBC Auto Differential    Collection Time: 03/05/25 11:42 PM    Specimen: Blood   Result Value Ref Range    WBC 7.97 3.40 - 10.80 10*3/mm3    RBC 3.44 (L) 4.14 - 5.80 10*6/mm3    Hemoglobin 11.1 (L) 13.0 - 17.7 g/dL    Hematocrit 31.9 (L) 37.5 - 51.0 %    MCV 92.7 79.0 - 97.0 fL    MCH 32.3 26.6 - 33.0 pg    MCHC 34.8 31.5 - 35.7 g/dL    RDW 12.2 (L) 12.3 - 15.4 %    RDW-SD 41.2 37.0 - 54.0 fl    MPV 10.8 6.0 - 12.0 fL    Platelets 255 140 - 450 10*3/mm3    Neutrophil % 73.1 42.7 - 76.0 %    Lymphocyte % 11.7 (L) 19.6 - 45.3 %    Monocyte % 12.9 (H) 5.0 - 12.0 %    Eosinophil % 1.5 0.3 - 6.2 %    Basophil % 0.5 0.0 - 1.5 %    Immature Grans % 0.3 0.0 - 0.5 %    Neutrophils, Absolute 5.83 1.70 - 7.00 10*3/mm3    Lymphocytes, Absolute 0.93 0.70 - 3.10 10*3/mm3    Monocytes, Absolute 1.03 (H) 0.10 - 0.90 10*3/mm3    Eosinophils, Absolute 0.12 0.00 - 0.40 10*3/mm3     Basophils, Absolute 0.04 0.00 - 0.20 10*3/mm3    Immature Grans, Absolute 0.02 0.00 - 0.05 10*3/mm3    nRBC 0.0 0.0 - 0.2 /100 WBC   CBC (No Diff)    Collection Time: 03/06/25  6:43 AM    Specimen: Blood   Result Value Ref Range    WBC 6.70 3.40 - 10.80 10*3/mm3    RBC 3.50 (L) 4.14 - 5.80 10*6/mm3    Hemoglobin 11.1 (L) 13.0 - 17.7 g/dL    Hematocrit 33.1 (L) 37.5 - 51.0 %    MCV 94.6 79.0 - 97.0 fL    MCH 31.7 26.6 - 33.0 pg    MCHC 33.5 31.5 - 35.7 g/dL    RDW 12.2 (L) 12.3 - 15.4 %    RDW-SD 41.7 37.0 - 54.0 fl    MPV 10.7 6.0 - 12.0 fL    Platelets 256 140 - 450 10*3/mm3   Basic Metabolic Panel    Collection Time: 03/06/25  6:43 AM    Specimen: Blood   Result Value Ref Range    Glucose 143 (H) 65 - 99 mg/dL    BUN 8 8 - 23 mg/dL    Creatinine 0.75 (L) 0.76 - 1.27 mg/dL    Sodium 138 136 - 145 mmol/L    Potassium 4.1 3.5 - 5.2 mmol/L    Chloride 103 98 - 107 mmol/L    CO2 26.6 22.0 - 29.0 mmol/L    Calcium 9.0 8.6 - 10.5 mg/dL    BUN/Creatinine Ratio 10.7 7.0 - 25.0    Anion Gap 8.4 5.0 - 15.0 mmol/L    eGFR 93.5 >60.0 mL/min/1.73        Assessment:  Radiation cystitis gross hematuria history of prostate cancer    Plan:    Cystoscopy cauterization of bleeding clot evacuation R-B-O discussed with patient limited to infection bleeding recurrence of bleeding use of a catheter he understands agrees to proceed    I discussed the patient's findings and my recommendations with patient.     Joel Russell MD  03/20/25  09:08 EDT

## 2025-03-20 NOTE — PROGRESS NOTES
[facility] Clinical Pharmacy Services: Dose Adjustment    Levofloxacin 500 mg -->750 mg IV once preop (CrCl >50 ml/min)  has been appropriately dose adjusted based on our System P&T approved policy. Pharmacy will continue to monitor patient peripherally while in-house.     Bonita Dominguez [facility]  Clinical Pharmacist

## 2025-03-21 ENCOUNTER — HOSPITAL ENCOUNTER (OUTPATIENT)
Facility: HOSPITAL | Age: 75
Setting detail: HOSPITAL OUTPATIENT SURGERY
Discharge: HOME | End: 2025-03-21
Attending: UROLOGY | Admitting: UROLOGY
Payer: MEDICARE

## 2025-03-21 ENCOUNTER — ANESTHESIA EVENT (OUTPATIENT)
Dept: PERIOP | Facility: HOSPITAL | Age: 75
End: 2025-03-21
Payer: MEDICARE

## 2025-03-21 ENCOUNTER — ANESTHESIA (OUTPATIENT)
Dept: PERIOP | Facility: HOSPITAL | Age: 75
End: 2025-03-21
Payer: MEDICARE

## 2025-03-21 VITALS
TEMPERATURE: 97.9 F | RESPIRATION RATE: 16 BRPM | OXYGEN SATURATION: 99 % | SYSTOLIC BLOOD PRESSURE: 130 MMHG | DIASTOLIC BLOOD PRESSURE: 77 MMHG | HEART RATE: 62 BPM

## 2025-03-21 DIAGNOSIS — N30.41 HEMATURIA DUE TO IRRADIATION CYSTITIS: Primary | ICD-10-CM

## 2025-03-21 DIAGNOSIS — R31.0 GROSS HEMATURIA: ICD-10-CM

## 2025-03-21 PROCEDURE — 25010000002 LEVOFLOXACIN PER 250 MG: Performed by: UROLOGY

## 2025-03-21 PROCEDURE — 25010000002 FENTANYL CITRATE (PF) 50 MCG/ML SOLUTION: Performed by: NURSE ANESTHETIST, CERTIFIED REGISTERED

## 2025-03-21 PROCEDURE — 25010000002 MIDAZOLAM PER 1 MG: Performed by: ANESTHESIOLOGY

## 2025-03-21 PROCEDURE — 25010000002 PROPOFOL 10 MG/ML EMULSION: Performed by: NURSE ANESTHETIST, CERTIFIED REGISTERED

## 2025-03-21 PROCEDURE — 25010000002 PHENYLEPHRINE 10 MG/ML SOLUTION: Performed by: NURSE ANESTHETIST, CERTIFIED REGISTERED

## 2025-03-21 PROCEDURE — 25810000003 LACTATED RINGERS PER 1000 ML: Performed by: ANESTHESIOLOGY

## 2025-03-21 PROCEDURE — 25010000002 LIDOCAINE 2% SOLUTION: Performed by: NURSE ANESTHETIST, CERTIFIED REGISTERED

## 2025-03-21 PROCEDURE — 25010000002 ONDANSETRON PER 1 MG: Performed by: NURSE ANESTHETIST, CERTIFIED REGISTERED

## 2025-03-21 PROCEDURE — 25010000002 DEXAMETHASONE SODIUM PHOSPHATE 20 MG/5ML SOLUTION: Performed by: NURSE ANESTHETIST, CERTIFIED REGISTERED

## 2025-03-21 RX ORDER — FLUMAZENIL 0.1 MG/ML
0.2 INJECTION INTRAVENOUS AS NEEDED
Status: DISCONTINUED | OUTPATIENT
Start: 2025-03-21 | End: 2025-03-21 | Stop reason: HOSPADM

## 2025-03-21 RX ORDER — LIDOCAINE HYDROCHLORIDE 20 MG/ML
INJECTION, SOLUTION INFILTRATION; PERINEURAL AS NEEDED
Status: DISCONTINUED | OUTPATIENT
Start: 2025-03-21 | End: 2025-03-21 | Stop reason: SURG

## 2025-03-21 RX ORDER — PROMETHAZINE HYDROCHLORIDE 25 MG/1
25 TABLET ORAL ONCE AS NEEDED
Status: DISCONTINUED | OUTPATIENT
Start: 2025-03-21 | End: 2025-03-21 | Stop reason: HOSPADM

## 2025-03-21 RX ORDER — FENTANYL CITRATE 50 UG/ML
INJECTION, SOLUTION INTRAMUSCULAR; INTRAVENOUS AS NEEDED
Status: DISCONTINUED | OUTPATIENT
Start: 2025-03-21 | End: 2025-03-21 | Stop reason: SURG

## 2025-03-21 RX ORDER — ONDANSETRON 2 MG/ML
4 INJECTION INTRAMUSCULAR; INTRAVENOUS ONCE AS NEEDED
Status: DISCONTINUED | OUTPATIENT
Start: 2025-03-21 | End: 2025-03-21 | Stop reason: HOSPADM

## 2025-03-21 RX ORDER — HYDROCODONE BITARTRATE AND ACETAMINOPHEN 7.5; 325 MG/1; MG/1
1 TABLET ORAL EVERY 4 HOURS PRN
Status: DISCONTINUED | OUTPATIENT
Start: 2025-03-21 | End: 2025-03-21 | Stop reason: HOSPADM

## 2025-03-21 RX ORDER — HYDROCODONE BITARTRATE AND ACETAMINOPHEN 5; 325 MG/1; MG/1
1 TABLET ORAL ONCE AS NEEDED
Status: DISCONTINUED | OUTPATIENT
Start: 2025-03-21 | End: 2025-03-21 | Stop reason: HOSPADM

## 2025-03-21 RX ORDER — DROPERIDOL 2.5 MG/ML
0.62 INJECTION, SOLUTION INTRAMUSCULAR; INTRAVENOUS
Status: DISCONTINUED | OUTPATIENT
Start: 2025-03-21 | End: 2025-03-21 | Stop reason: HOSPADM

## 2025-03-21 RX ORDER — SODIUM CHLORIDE 0.9 % (FLUSH) 0.9 %
3 SYRINGE (ML) INJECTION EVERY 12 HOURS SCHEDULED
Status: DISCONTINUED | OUTPATIENT
Start: 2025-03-21 | End: 2025-03-21 | Stop reason: HOSPADM

## 2025-03-21 RX ORDER — HYDRALAZINE HYDROCHLORIDE 20 MG/ML
5 INJECTION INTRAMUSCULAR; INTRAVENOUS
Status: DISCONTINUED | OUTPATIENT
Start: 2025-03-21 | End: 2025-03-21 | Stop reason: HOSPADM

## 2025-03-21 RX ORDER — HYDROMORPHONE HYDROCHLORIDE 1 MG/ML
0.25 INJECTION, SOLUTION INTRAMUSCULAR; INTRAVENOUS; SUBCUTANEOUS
Status: DISCONTINUED | OUTPATIENT
Start: 2025-03-21 | End: 2025-03-21 | Stop reason: HOSPADM

## 2025-03-21 RX ORDER — HYDROCODONE BITARTRATE AND ACETAMINOPHEN 7.5; 325 MG/1; MG/1
1 TABLET ORAL EVERY 6 HOURS PRN
Qty: 20 TABLET | Refills: 0 | Status: SHIPPED | OUTPATIENT
Start: 2025-03-21

## 2025-03-21 RX ORDER — PROMETHAZINE HYDROCHLORIDE 25 MG/1
25 SUPPOSITORY RECTAL ONCE AS NEEDED
Status: DISCONTINUED | OUTPATIENT
Start: 2025-03-21 | End: 2025-03-21 | Stop reason: HOSPADM

## 2025-03-21 RX ORDER — ONDANSETRON 4 MG/1
4 TABLET, ORALLY DISINTEGRATING ORAL EVERY 8 HOURS PRN
Qty: 10 TABLET | Refills: 0 | Status: SHIPPED | OUTPATIENT
Start: 2025-03-21

## 2025-03-21 RX ORDER — ONDANSETRON 2 MG/ML
INJECTION INTRAMUSCULAR; INTRAVENOUS AS NEEDED
Status: DISCONTINUED | OUTPATIENT
Start: 2025-03-21 | End: 2025-03-21 | Stop reason: SURG

## 2025-03-21 RX ORDER — SODIUM CHLORIDE, SODIUM LACTATE, POTASSIUM CHLORIDE, CALCIUM CHLORIDE 600; 310; 30; 20 MG/100ML; MG/100ML; MG/100ML; MG/100ML
9 INJECTION, SOLUTION INTRAVENOUS CONTINUOUS
Status: DISCONTINUED | OUTPATIENT
Start: 2025-03-21 | End: 2025-03-21 | Stop reason: HOSPADM

## 2025-03-21 RX ORDER — FENTANYL CITRATE 50 UG/ML
25 INJECTION, SOLUTION INTRAMUSCULAR; INTRAVENOUS
Status: DISCONTINUED | OUTPATIENT
Start: 2025-03-21 | End: 2025-03-21 | Stop reason: HOSPADM

## 2025-03-21 RX ORDER — PROPOFOL 10 MG/ML
VIAL (ML) INTRAVENOUS AS NEEDED
Status: DISCONTINUED | OUTPATIENT
Start: 2025-03-21 | End: 2025-03-21 | Stop reason: SURG

## 2025-03-21 RX ORDER — NALOXONE HCL 0.4 MG/ML
0.2 VIAL (ML) INJECTION AS NEEDED
Status: DISCONTINUED | OUTPATIENT
Start: 2025-03-21 | End: 2025-03-21 | Stop reason: HOSPADM

## 2025-03-21 RX ORDER — LIDOCAINE HYDROCHLORIDE 10 MG/ML
0.5 INJECTION, SOLUTION INFILTRATION; PERINEURAL ONCE AS NEEDED
Status: DISCONTINUED | OUTPATIENT
Start: 2025-03-21 | End: 2025-03-21 | Stop reason: HOSPADM

## 2025-03-21 RX ORDER — MIDAZOLAM HYDROCHLORIDE 1 MG/ML
0.5 INJECTION, SOLUTION INTRAMUSCULAR; INTRAVENOUS
Status: DISCONTINUED | OUTPATIENT
Start: 2025-03-21 | End: 2025-03-21 | Stop reason: HOSPADM

## 2025-03-21 RX ORDER — DEXAMETHASONE SODIUM PHOSPHATE 4 MG/ML
INJECTION, SOLUTION INTRA-ARTICULAR; INTRALESIONAL; INTRAMUSCULAR; INTRAVENOUS; SOFT TISSUE AS NEEDED
Status: DISCONTINUED | OUTPATIENT
Start: 2025-03-21 | End: 2025-03-21 | Stop reason: SURG

## 2025-03-21 RX ORDER — EPHEDRINE SULFATE 50 MG/ML
5 INJECTION, SOLUTION INTRAVENOUS ONCE AS NEEDED
Status: DISCONTINUED | OUTPATIENT
Start: 2025-03-21 | End: 2025-03-21 | Stop reason: HOSPADM

## 2025-03-21 RX ORDER — FENTANYL CITRATE 50 UG/ML
50 INJECTION, SOLUTION INTRAMUSCULAR; INTRAVENOUS ONCE AS NEEDED
Status: DISCONTINUED | OUTPATIENT
Start: 2025-03-21 | End: 2025-03-21 | Stop reason: HOSPADM

## 2025-03-21 RX ORDER — SODIUM CHLORIDE 0.9 % (FLUSH) 0.9 %
3-10 SYRINGE (ML) INJECTION AS NEEDED
Status: DISCONTINUED | OUTPATIENT
Start: 2025-03-21 | End: 2025-03-21 | Stop reason: HOSPADM

## 2025-03-21 RX ORDER — SULFAMETHOXAZOLE AND TRIMETHOPRIM 800; 160 MG/1; MG/1
1 TABLET ORAL 2 TIMES DAILY
COMMUNITY

## 2025-03-21 RX ORDER — ATROPINE SULFATE 0.4 MG/ML
0.4 INJECTION, SOLUTION INTRAMUSCULAR; INTRAVENOUS; SUBCUTANEOUS ONCE AS NEEDED
Status: DISCONTINUED | OUTPATIENT
Start: 2025-03-21 | End: 2025-03-21 | Stop reason: HOSPADM

## 2025-03-21 RX ORDER — FAMOTIDINE 10 MG/ML
20 INJECTION, SOLUTION INTRAVENOUS ONCE
Status: COMPLETED | OUTPATIENT
Start: 2025-03-21 | End: 2025-03-21

## 2025-03-21 RX ORDER — PHENYLEPHRINE HYDROCHLORIDE 10 MG/ML
INJECTION INTRAVENOUS AS NEEDED
Status: DISCONTINUED | OUTPATIENT
Start: 2025-03-21 | End: 2025-03-21 | Stop reason: SURG

## 2025-03-21 RX ORDER — LEVOFLOXACIN 5 MG/ML
750 INJECTION, SOLUTION INTRAVENOUS ONCE
Status: COMPLETED | OUTPATIENT
Start: 2025-03-21 | End: 2025-03-21

## 2025-03-21 RX ORDER — DIPHENHYDRAMINE HYDROCHLORIDE 50 MG/ML
12.5 INJECTION, SOLUTION INTRAMUSCULAR; INTRAVENOUS
Status: DISCONTINUED | OUTPATIENT
Start: 2025-03-21 | End: 2025-03-21 | Stop reason: HOSPADM

## 2025-03-21 RX ORDER — IPRATROPIUM BROMIDE AND ALBUTEROL SULFATE 2.5; .5 MG/3ML; MG/3ML
3 SOLUTION RESPIRATORY (INHALATION) ONCE AS NEEDED
Status: DISCONTINUED | OUTPATIENT
Start: 2025-03-21 | End: 2025-03-21 | Stop reason: HOSPADM

## 2025-03-21 RX ORDER — LABETALOL HYDROCHLORIDE 5 MG/ML
5 INJECTION, SOLUTION INTRAVENOUS
Status: DISCONTINUED | OUTPATIENT
Start: 2025-03-21 | End: 2025-03-21 | Stop reason: HOSPADM

## 2025-03-21 RX ADMIN — LIDOCAINE HYDROCHLORIDE 100 MG: 20 INJECTION, SOLUTION INFILTRATION; PERINEURAL at 14:22

## 2025-03-21 RX ADMIN — LEVOFLOXACIN 750 MG: 750 INJECTION, SOLUTION INTRAVENOUS at 14:08

## 2025-03-21 RX ADMIN — PROPOFOL 170 MG: 10 INJECTION, EMULSION INTRAVENOUS at 14:22

## 2025-03-21 RX ADMIN — ONDANSETRON 4 MG: 2 INJECTION, SOLUTION INTRAMUSCULAR; INTRAVENOUS at 14:26

## 2025-03-21 RX ADMIN — PHENYLEPHRINE HYDROCHLORIDE 50 MCG: 10 INJECTION INTRAVENOUS at 14:31

## 2025-03-21 RX ADMIN — SODIUM CHLORIDE, SODIUM LACTATE, POTASSIUM CHLORIDE, CALCIUM CHLORIDE 9 ML/HR: 20; 30; 600; 310 INJECTION, SOLUTION INTRAVENOUS at 13:20

## 2025-03-21 RX ADMIN — FAMOTIDINE 20 MG: 10 INJECTION INTRAVENOUS at 13:20

## 2025-03-21 RX ADMIN — MIDAZOLAM 0.5 MG: 1 INJECTION INTRAMUSCULAR; INTRAVENOUS at 13:19

## 2025-03-21 RX ADMIN — FENTANYL CITRATE 25 MCG: 50 INJECTION, SOLUTION INTRAMUSCULAR; INTRAVENOUS at 14:32

## 2025-03-21 RX ADMIN — DEXAMETHASONE SODIUM PHOSPHATE 8 MG: 4 INJECTION, SOLUTION INTRAMUSCULAR; INTRAVENOUS at 14:26

## 2025-03-21 RX ADMIN — PHENYLEPHRINE HYDROCHLORIDE 100 MCG: 10 INJECTION INTRAVENOUS at 14:38

## 2025-03-21 RX ADMIN — FENTANYL CITRATE 25 MCG: 50 INJECTION, SOLUTION INTRAMUSCULAR; INTRAVENOUS at 14:30

## 2025-03-21 NOTE — OP NOTE
Prep diagnosis history of radiation cystitis with current gross hematuria and clots enjoining Paz catheter on hyperbaric oxygen therapy    Postoperative diagnosis bleeding coming from around the bladder neck trigone and mildly posterior trigone area hypervascularity blanched appearance consistent with radiation cystitis clots evacuated catheter not replaced as it appears the catheter is causing or contributing to the bleeding    Operative procedure cystoscopy clot evacuation cautery of bleeding removal of Paz without replacement    Surgeon Drew    Anesthesia General    Procedure note 74-year-old male who has a history of prostate cancer in the remote past with PSA recurrence undergoing radiation therapy consequently developed radiation cystitis with intermittent gross hematuria and clot retention he is undergoing hyperbaric oxygen therapy still remains with gross hematuria and intermittent bleeding with a intermittently placed Paz catheter he is on Bactrim therapy preoperatively to sterilize his urine    Timeout was taken antibiotics were given as mentioned allergies reviewed after adequate anesthesia placed very carefully modified dorsolithotomy position with all pressure points lead prepped draped sterile fashion genitalia 2% intraurethral lidocaine jelly is administered after catheter been removed scope was advanced into the bladder there is clots approximately about 100 cc or less of clot was removed areas of hypervascularity consistent with radiation cystitis involving the trigone bladder neck and posterior bladder wall these areas were then cauterized are actively bleeding this was checked with the patient was normotensive remaining hypervascular areas were not using I did not cauterize these areas after no visualization of active bleeding I felt compelled to not to place a Paz catheter as a believe the catheter in the balloon of the catheter is rubbing against these areas promoting the bleeding  further the bladder is a partially filled the patient procedure well and was sent to the recovery in satisfactory condition findings were discussed with his wife Nanette Garcia prescription has been written for Norco 7.5 mg and Zofran continuation of home meds with avoidance on the instruction sheet of aspirin nonsteroidal inflammatories until the urine is clear for 1 week follow the instruction sheet with phone numbers given concerns or questions to contact us accordingly I will see the patient in the office in 1 week you will be called

## 2025-03-21 NOTE — ANESTHESIA PROCEDURE NOTES
Airway  Reason: elective    Date/Time: 3/21/2025 2:23 PM  Airway not difficult    General Information and Staff    Patient location during procedure: OR  Anesthesiologist: Robbi Valerio MD  CRNA/CAA: Ludy Crystal CRNA    Indications and Patient Condition  Indications for airway management: airway protection    Preoxygenated: yes  MILS not maintained throughout    Mask difficulty assessment: 0 - not attempted    Final Airway Details    Final airway type: supraglottic airway      Successful airway: LMA and unique  Size: 5   Number of attempts at approach: 1  Assessment: lips, teeth, and gum same as pre-op    Additional Comments  Airway exam prior to airway device insertion. Teeth/lips inspected. Preoxygenated with 100% O2; sniffing position, IV induction, eyes taped. Atraumatic device insertion. Airway device connected to anesthesia machine. Confirmed EBBS, +EtCO2. Lips and teeth inspected, intact, no damage.

## 2025-03-21 NOTE — INTERVAL H&P NOTE
H&P reviewed. The patient was examined and there are no changes to the H&P.      /77 (BP Location: Right arm, Patient Position: Sitting)   Pulse 70   Temp 97.9 °F (36.6 °C) (Oral)   Resp 16   SpO2 99%

## 2025-03-21 NOTE — ANESTHESIA POSTPROCEDURE EVALUATION
Patient: North Carcamo    Procedure Summary       Date: 03/21/25 Room / Location: [facility] OR 01 / [facility] MAIN OR    Anesthesia Start: 1412 Anesthesia Stop: 1509    Procedure: CYSTOSCOPY CLOT EVACUATION CAUTERY OF BLEEDING WITH CATHETER REMOVAL (Urethra) Diagnosis:     Surgeons: Joel Russell MD Provider: Robbi Valerio MD    Anesthesia Type: general ASA Status: 3            Anesthesia Type: general    Vitals  Vitals Value Taken Time   /75 03/21/25 15:30   Temp 36.4 °C (97.6 °F) 03/21/25 15:05   Pulse 65 03/21/25 15:35   Resp 14 03/21/25 15:30   SpO2 100 % 03/21/25 15:35   Vitals shown include unfiled device data.        Post Anesthesia Care and Evaluation    Patient location during evaluation: PACU  Patient participation: complete - patient participated  Level of consciousness: awake and alert  Pain management: adequate    Airway patency: patent  Anesthetic complications: No anesthetic complications  PONV Status: controlled  Cardiovascular status: acceptable and hemodynamically stable  Respiratory status: acceptable  Hydration status: acceptable    Comments: /77 (BP Location: Left arm, Patient Position: Lying)   Pulse 62   Temp 36.6 °C (97.9 °F) (Oral)   Resp 16   SpO2 99%

## 2025-03-21 NOTE — BRIEF OP NOTE
CYSTOSCOPY WITH CLOT EVACUATION  Progress Note    North Carcamo  3/21/2025    Pre-op Diagnosis:   Radiation cystitis refractory gross hematuria       Post-Op Diagnosis Codes:  Same diffuse bleeding around bladder neck trigone cauterized    Procedure(s):      Procedure(s):  CYSTOSCOPY CLOT EVACUATION CAUTERY BLEEDING              Surgeon(s):  Joel Russell MD    Anesthesia: General    Staff:   * No surgical staff found *       Estimated Blood Loss: Less than 20 cc    Urine Voided: * No values recorded between 3/21/2025 12:00 AM and 3/21/2025  1:56 PM *    Specimens:                None      Drains:   Urethral Catheter 24 Fr. (Active)   Daily Indications Chronic Urinary Retention related to Obstructive, Infectious/Inflammatory, Neurologic or Traumatic Causes 03/21/25 1235   Site Assessment Clean;Skin intact 03/11/25 2234   Collection Container Leg bag 03/21/25 1235   Securement Method Securing device 03/21/25 1235   Catheter care complete Yes 03/11/25 2234       [REMOVED] Urethral Catheter Coude;Other (Comment) 16 Fr. (Removed)       [REMOVED] Urethral Catheter Other (Comment) 22 Fr. (Removed)       [REMOVED] Urethral Catheter Silicone 20 Fr. (Removed)       [REMOVED] Urethral Catheter Silicone 16 Fr. (Removed)       [REMOVED] Urethral Catheter Silicone 20 Fr. (Removed)       [REMOVED] Urethral Catheter Silicone 24 Fr. (Removed)       [REMOVED] Urethral Catheter 18 Fr. (Removed)       [REMOVED] Urethral Catheter 16 Fr. (Removed)   Daily Indications Acute Obstruction 03/06/25 0830   Site Assessment Clean;Skin intact 03/06/25 0830   Collection Container Standard drainage bag 03/06/25 0830   Securement Method Securing device 03/06/25 0830   Output (mL) 300 mL 03/06/25 0830       [REMOVED] Urethral Catheter Other (Comment) 20 Fr. (Removed)   Site Assessment Clean;Skin intact 03/10/25 0047   Collection Container Standard drainage bag 03/10/25 0047   Securement Method Tape;Leg strap 03/10/25 0047   Catheter care  complete Yes 03/10/25 0047   Intake (mL) 1000 mL 03/10/25 0047       [REMOVED] Continuous Bladder Irrigation Triple-lumen 22 Fr (Removed)       [REMOVED] Continuous Bladder Irrigation Triple-lumen 24 Fr (Removed)       [REMOVED] Continuous Bladder Irrigation (Removed)       [REMOVED] Continuous Bladder Irrigation Triple-lumen 22 Fr (Removed)       [REMOVED] Continuous Bladder Irrigation Triple-lumen 22 Fr (Removed)   Daily Indications Continuous Bladder Irrigation 03/01/25 0800   Site Assessment Clean;Skin intact;Other (Comment) 03/01/25 0800   Collection Container Standard drainage bag 03/01/25 0800   Securement Method Securing device 03/01/25 0800   Catheter care complete Yes 03/01/25 0800   Patient Tolerance of Continuous Bladder Irrigation Tolerating well 02/28/25 2236   CBI Urine Appearance clear 03/01/25 0800   Rate Slow 03/01/25 0800   Irrigant Normal saline 03/01/25 0800   CBI Irrigation Intake (mL) 400 mL 02/28/25 2216   CBI Paz Output (mL) 1000 mL 02/28/25 2216   CBI Net Output (mL) 600 mL 02/28/25 2216   Intake Running Total 1000 mL 02/28/25 2216   Output Running Total 4000 mL 02/28/25 2216       Findings: As above      Complications: Findings discussed with wife Nanette catheter to be left out as it appears the catheter balloon is rubbing against the bladder neck creating greater bleeding          Joel Russell MD     Date: 3/21/2025  Time: 13:56 EDT

## 2025-03-21 NOTE — ANESTHESIA PREPROCEDURE EVALUATION
Anesthesia Evaluation     Patient summary reviewed and Nursing notes reviewed   NPO Solid Status: > 8 hours  NPO Liquid Status: > 4 hours           Airway   Mallampati: II  Neck ROM: full  No difficulty expected  Dental - normal exam     Pulmonary     breath sounds clear to auscultation  (+) pneumonia , pulmonary embolism,  Cardiovascular     Rhythm: regular    (+) hypertension, dysrhythmias Paroxysmal Atrial Fib, hyperlipidemia    ROS comment: RBBB and LAFB    Neuro/Psych  GI/Hepatic/Renal/Endo    (+) renal disease-    Musculoskeletal     Abdominal    Substance History      OB/GYN          Other      history of cancer                  Anesthesia Plan    ASA 3     general     intravenous induction     Anesthetic plan, risks, benefits, and alternatives have been provided, discussed and informed consent has been obtained with: patient.    CODE STATUS:

## 2025-03-24 ENCOUNTER — OFFICE VISIT (OUTPATIENT)
Dept: WOUND CARE | Facility: HOSPITAL | Age: 75
End: 2025-03-24
Payer: MEDICARE

## 2025-03-25 ENCOUNTER — OFFICE VISIT (OUTPATIENT)
Dept: WOUND CARE | Facility: HOSPITAL | Age: 75
End: 2025-03-25
Payer: MEDICARE

## 2025-03-26 ENCOUNTER — OFFICE VISIT (OUTPATIENT)
Dept: WOUND CARE | Facility: HOSPITAL | Age: 75
End: 2025-03-26
Payer: MEDICARE

## 2025-03-27 ENCOUNTER — OFFICE VISIT (OUTPATIENT)
Dept: WOUND CARE | Facility: HOSPITAL | Age: 75
End: 2025-03-27
Payer: MEDICARE

## 2025-03-27 PROCEDURE — G0277 HBOT, FULL BODY CHAMBER, 30M: HCPCS

## 2025-03-31 ENCOUNTER — OFFICE VISIT (OUTPATIENT)
Dept: WOUND CARE | Facility: HOSPITAL | Age: 75
End: 2025-03-31
Payer: MEDICARE

## 2025-03-31 PROCEDURE — G0277 HBOT, FULL BODY CHAMBER, 30M: HCPCS

## 2025-04-01 ENCOUNTER — OFFICE VISIT (OUTPATIENT)
Dept: WOUND CARE | Facility: HOSPITAL | Age: 75
End: 2025-04-01
Payer: MEDICARE

## 2025-04-01 PROCEDURE — G0277 HBOT, FULL BODY CHAMBER, 30M: HCPCS

## 2025-04-02 ENCOUNTER — OFFICE VISIT (OUTPATIENT)
Dept: WOUND CARE | Facility: HOSPITAL | Age: 75
End: 2025-04-02
Payer: MEDICARE

## 2025-04-02 ENCOUNTER — HOSPITAL ENCOUNTER (EMERGENCY)
Facility: HOSPITAL | Age: 75
Discharge: HOME | End: 2025-04-02
Attending: EMERGENCY MEDICINE
Payer: MEDICARE

## 2025-04-02 VITALS
TEMPERATURE: 96.8 F | HEART RATE: 95 BPM | OXYGEN SATURATION: 100 % | DIASTOLIC BLOOD PRESSURE: 88 MMHG | HEIGHT: 69 IN | WEIGHT: 177 LBS | SYSTOLIC BLOOD PRESSURE: 169 MMHG | RESPIRATION RATE: 18 BRPM | BODY MASS INDEX: 26.22 KG/M2

## 2025-04-02 DIAGNOSIS — N30.41 HEMATURIA DUE TO IRRADIATION CYSTITIS: Primary | ICD-10-CM

## 2025-04-02 DIAGNOSIS — R33.9 URINARY RETENTION: ICD-10-CM

## 2025-04-02 PROCEDURE — 99282 EMERGENCY DEPT VISIT SF MDM: CPT

## 2025-04-02 PROCEDURE — 99283 EMERGENCY DEPT VISIT LOW MDM: CPT

## 2025-04-02 PROCEDURE — 51798 US URINE CAPACITY MEASURE: CPT

## 2025-04-02 NOTE — ED PROVIDER NOTES
EMERGENCY DEPARTMENT ENCOUNTER  Room Number:  06/06  PCP: Chloe Arriaga MD  Independent Historians: Patient      HPI:  Chief Complaint: had concerns including Urinary Retention (Pt has indwelling mcclellan, reduced output today).     A complete HPI/ROS/PMH/PSH/SH/FH are unobtainable due to: None    Chronic or social conditions impacting patient care (Social Determinants of Health): None      Context: North Carcamo is a 74 y.o. male with a medical history of radiation cystitis, paroxysmal A-fib, and pulmonary embolism anticoagulated on Eliquis presents emergency department today with urinary retention.  Patient has Mcclellan catheter in place and says it was draining up until midnight and now he is passing clots and says it is not draining.  He does report pressure in the suprapubic region of his abdomen.   patient is very well-known to this emergency department as he is seen here often for similar complaints but it is actually been several weeks since he has been seen in the emergency department which is a massive improvement from the past several months.  He reports that he actually had the catheter taken out for quite a while and just had it placed again on Monday.  He says he has a follow-up on Thursday with urology for a voiding trial.  Patient is doing hyperbaric treatment for radiation cystitis and feels that he is having good success.  Patient denies fever or chills.  He denies chest pain or shortness of breath.  He denies lightheadedness or dizziness.      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 coli pneumonia 08/01/2023    History of prostate cancer 2002    History of recent hospitalization 08/01/2023    Kidney stone 2019    On anticoagulant therapy          PAST SURGICAL HISTORY  Past Surgical History:   Procedure Laterality Date    CATARACT EXTRACTION      WITH LENS IMPLANTS    COLONOSCOPY N/A 09/13/2023    Procedure: COLONOSCOPY TO CECUM AND TERMINAL ILEUM WITH COLD POLYPECTOMY;  Surgeon: Humberto Peña MD;  Location: [facility] ENDOSCOPY;  Service: Gastroenterology;  Laterality: N/A;  SCREENING  DIVERTICULOSIS, COLON POLYP, HEMORRHOIDS    CYSTOSCOPY BLADDER BIOPSY N/A 06/12/2019    Procedure: CYSTOSCOPY TUR BLADDER TUMOR;  Surgeon: Joel Russell MD;  Location: [facility] OR;  Service: Urology    CYSTOSCOPY BLADDER BIOPSY N/A 05/31/2024    Procedure: CYSTOSCOPY BLADDER BIOPSY FULGURATION;  Surgeon: Joel Russell MD;  Location: [facility] OR;  Service: Urology;  Laterality: N/A;    CYSTOSCOPY WITH CLOT EVACUATION N/A 10/02/2024    Procedure: CYSTOSCOPY WITH CLOT EVACUATION, CAUTERY OF BLEEDING, DENG INSERTION;  Surgeon: Joel Russell MD;  Location: [facility] OR;  Service: Urology;  Laterality: N/A;    CYSTOSCOPY WITH CLOT EVACUATION N/A 2/9/2025    Procedure: CYSTOSCOPY WITH CLOT EVACUATION;  Surgeon: Lebron Mosqueda MD;  Location: [facility] OR;  Service: Urology;   Laterality: N/A;    CYSTOSCOPY WITH CLOT EVACUATION N/A 3/21/2025    Procedure: CYSTOSCOPY CLOT EVACUATION CAUTERY OF BLEEDING WITH CATHETER REMOVAL;  Surgeon: Joel Russell MD;  Location: [facility] OR;  Service: Urology;  Laterality: N/A;    ELBOW PROCEDURE Left     FX    EYE SURGERY  03/2024    Cataract    PROSTATECTOMY      PROSTATECTOMY  03/01/2003    TONSILLECTOMY  1957    VASECTOMY           FAMILY HISTORY  Family History   Problem Relation Age of Onset    Hypertension Mother     Hypertension Father     Prostate cancer Father     Malig Hyperthermia Neg Hx          SOCIAL HISTORY  Social History     Socioeconomic History    Marital status:    Tobacco Use    Smoking status: Never     Passive exposure: Never    Smokeless tobacco: Never   Vaping Use    Vaping status: Never Used   Substance and Sexual Activity    Alcohol use: Yes     Alcohol/week: 1.0 standard drink of alcohol     Types: 1 Drinks containing 0.5 oz of alcohol per week     Comment: Occasional beer or cocktail    Drug use: No    Sexual activity: Yes     Partners: Female     Birth control/protection: Vasectomy         ALLERGIES  Penicillins      REVIEW OF SYSTEMS  Included in HPI  All systems reviewed and negative except for those discussed in HPI.      PHYSICAL EXAM    I have reviewed the triage vital signs and nursing notes.    ED Triage Vitals   Temp Heart Rate Resp BP SpO2   04/02/25 0319 04/02/25 0319 04/02/25 0319 04/02/25 0323 04/02/25 0319   96.8 °F (36 °C) 95 18 169/88 100 %      Temp src Heart Rate Source Patient Position BP Location FiO2 (%)   04/02/25 0319 04/02/25 0319 04/02/25 0323 04/02/25 0323 --   Tympanic Monitor Sitting Right arm        Physical Exam  Constitutional:       Appearance: Normal appearance.   HENT:      Head: Normocephalic and atraumatic.      Mouth/Throat:      Mouth: Mucous membranes are moist.   Eyes:      Extraocular Movements: Extraocular movements intact.      Pupils: Pupils are equal, round, and  reactive to light.   Cardiovascular:      Rate and Rhythm: Normal rate and regular rhythm.      Pulses: Normal pulses.      Heart sounds: Normal heart sounds.   Pulmonary:      Effort: Pulmonary effort is normal. No respiratory distress.      Breath sounds: Normal breath sounds.   Abdominal:      General: There is distension.      Tenderness: There is abdominal tenderness.      Comments: Mild distention and tenderness in the suprapubic region   Genitourinary:     Comments: Catheter in place.  There is some clot in the bag.  Musculoskeletal:         General: Normal range of motion.      Cervical back: Normal range of motion and neck supple.   Skin:     General: Skin is warm and dry.      Capillary Refill: Capillary refill takes less than 2 seconds.   Neurological:      General: No focal deficit present.      Mental Status: He is alert and oriented to person, place, and time.   Psychiatric:         Mood and Affect: Mood normal.         Behavior: Behavior normal.         OUTPATIENT MEDICATION MANAGEMENT:  No current Epic-ordered facility-administered medications on file.     Current Outpatient Medications Ordered in Epic   Medication Sig Dispense Refill    Calcium Carbonate-Vitamin D (CALCIUM 600+D PO) Take 1 tablet by mouth 2 (Two) Times a Day. PT HOLDING FOR SURGERY  Indications: Calcium Deficiency      HYDROcodone-acetaminophen (NORCO) 7.5-325 MG per tablet Take 1 tablet by mouth Every 6 (Six) Hours As Needed for Moderate Pain (Pain). 20 tablet 0    lisinopril (PRINIVIL,ZESTRIL) 40 MG tablet Take 1 tablet by mouth Daily. 90 tablet 1    Multiple Vitamins-Minerals (DAILY MULTIVITAMIN PO) Take 1 tablet by mouth Daily.      ondansetron ODT (ZOFRAN-ODT) 4 MG disintegrating tablet Place 1 tablet on the tongue Every 8 (Eight) Hours As Needed for Nausea. 10 tablet 0    pravastatin (PRAVACHOL) 40 MG tablet Take 1 tablet by mouth Every Night. Indications: High Amount of Fats in the Blood 90 tablet 3     sulfamethoxazole-trimethoprim (BACTRIM DS,SEPTRA DS) 800-160 MG per tablet Take 1 tablet by mouth 2 (Two) Times a Day.             PROGRESS, DATA ANALYSIS, CONSULTS, AND MEDICAL DECISION MAKING  ORDERS PLACED DURING THIS VISIT:  Orders Placed This Encounter   Procedures    Bladder scan    Irrigate Catheter       All labs have been independently interpreted by me.  All radiology studies have been reviewed by me. All EKG's have been independently viewed and interpreted by me.  Discussion below represents my analysis of pertinent findings related to patient's condition, differential diagnosis, treatment plan and final disposition.    Differential diagnosis includes but is not limited to:   Differential diagnosis for hematuria includes but is not limited to:  - BPH  - hereditary hematuria  - DIC  - coagulopathy  - infection  - infarction  - instrumentation  - tumor  - trauma  - TB  - transient hematuria  - ureteral stones  - sickle cell  - scurvy  - hemorrhagic cystitis       ED Course:  ED Course as of 04/02/25 0541   Wed Apr 02, 2025   0328 I discussed the case with Dr. Woodruff and he agrees to evaluate the patient at the bedside.    [CC]   0407 ED RN was able to manually irrigate Paz and was able to get it to drain about 300 cc of red bloody urine.  There were several medium size clots that drained into the Paz bag.  Bladder scan post void was 0.  Patient has immediate relief.  Will continue to monitor make sure the catheter continues to flow.  Patient is agreeable with plan. [CC]   0502 Rechecked on patient: Catheter is still flowing.  He said about 200 cc of red output.  Does still appear that there is blood mixed with urine.  No large blood clots visualized in the Paz bag.  Patient would like to give it a little more time to make sure it continues to drain prior to discharge.  Will manually irrigate 1 more time and bladder scan prior to making disposition decisions. [CC]   0538 Urine in the Paz bag is now  pink.   It is no longer dark red.  Patient feels comfortable being discharged.  He has an appointment tomorrow to see urology.  Patient has been dealing with this for quite some time.  He understands the return precautions.  He was  reeducated on manual irrigation at home.  He will be discharged with outpatient follow-up. 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    Note Disclaimer: At [facility], we believe that sharing information builds trust and better relationships. You are receiving this note because you recently visited [facility]. It is possible you will see health information before a provider has talked with you about it. This kind of information can be easy to misunderstand. To help you fully understand what it means for your health, we urge you to discuss this note with your provider.     Rhianna Malin PA-C  04/02/25 0541

## 2025-04-02 NOTE — ED PROVIDER NOTES
MD ATTESTATION NOTE  I supervised care provided by the APC. We have discussed this patient's history, physical exam, and treatment plan. I have reviewed the APC's note and I agree with the APC's findings and plan of care.   SHARED VISIT: This visit was performed by BOTH a physician and an APC. The substantive portion of the medical decision making was performed by this attesting physician who made or approved the management plan and takes responsibility for patient management. All studies in the APC note (if performed) were independently interpreted by me.   I have personally had a face to face encounter with the patient.     PCP: Chloe Arriaga MD  Patient Care Team:  Chloe Arriaga MD as PCP - General (Internal Medicine)  Brennen Burroughs MD as Consulting Physician (Cardiology)  Armando Alvarez MD as Consulting Physician (Hematology and Oncology)     North Carcamo is a 74 y.o. male who presents to the ED c/o radiation cystitis who presents with Paz catheter occlusion.  [facility] P[facility]  He states that he was having issues draining earlier this evening.  No rechange the bag to his nighttime back, he did state that it began to drain some.  He denies any nausea or vomiting.  No fevers or chills.    On exam:  General: NAD.  Head: NCAT.  ENT: nares patent, no scleral icterus  Neck: Supple, trachea midline.  Cardiac: regular rate and rhythm.  Lungs: normal effort, clear to auscultation bilaterally  Abdomen: Soft, nondistended, NTTP, no rebound tenderness, no guarding or rigidity.   : Paz catheter in place with pink-tinged urine in bag  Extremities: Moves all extremities well, no peripheral edema  Neuro: alert, MAEW, follows commands  Psych: calm, cooperative  Skin: Warm, dry.    Medical Decision Making:  After the initial H&P, I discussed pertinent information from history and physical exam with patient/family.  Discussed differential diagnosis.   Discussed plan for ED evaluation/work-up/treatment.  All questions answered.  Patient/family is agreeable with plan.    ED Course as of 04/02/25 0600   Wed Apr 02, 2025 0328 I discussed the case with Dr. Woodruff and he agrees to evaluate the patient at the bedside.    [CC]   0407 ED RN was able to manually irrigate Paz and was able to get it to drain about 300 cc of red bloody urine.  There were several medium size clots that drained into the Paz bag.  Bladder scan post void was 0.  Patient has immediate relief.  Will continue to monitor make sure the catheter continues to flow.  Patient is agreeable with plan. [CC]   0502 Rechecked on patient: Catheter is still flowing.  He said about 200 cc of red output.  Does still appear that there is blood mixed with urine.  No large blood clots visualized in the Paz bag.  Patient would like to give it a little more time to make sure it continues to drain prior to discharge.  Will manually irrigate 1 more time and bladder scan prior to making disposition decisions. [CC]   0538 Urine in the Paz bag is now pink.   It is no longer dark red.  Patient feels comfortable being discharged.  He has an appointment tomorrow to see urology.  Patient has been dealing with this for quite some time.  He understands the return precautions.  He was  reeducated on manual irrigation at home.  He will be discharged with outpatient follow-up. 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

## 2025-04-02 NOTE — DISCHARGE INSTRUCTIONS
PCP records received, sent to scan.    Please follow-up with your PCP and Urology    Rest.  Increase fluid intake.     Although you are being discharged in the ED today, I encouraged her to return for worsening symptoms.  Things can, and do, change such a treatment at home with medication may not be adequate.  Specifically I recommend returning for chest pain or discomfort, difficulty breathing, persistent vomiting or difficulty holding down liquids or medications, fever > 102.0 F,  or any other worsening or alarming symptoms.     You have been evaluated in the emergency department for your presenting symptoms and deemed appropriate for discharge home.  Understand that your health care does not end here today.  It is important that your primary care physician be made aware of your visit.  I recommend calling your primary care provider in the next 48 hours to arrange follow-up care.  Your primary care provider needs to review your treatment and recovery to ensure that no further treatment is necessary.  Additional testing or procedures may be necessary as an outpatient at the discretion of your primary care provider.    I also recommend following up with your PCP for recheck of your blood pressure and treatment as needed.

## 2025-04-03 ENCOUNTER — OFFICE VISIT (OUTPATIENT)
Dept: WOUND CARE | Facility: HOSPITAL | Age: 75
End: 2025-04-03
Payer: MEDICARE

## 2025-04-03 PROCEDURE — G0277 HBOT, FULL BODY CHAMBER, 30M: HCPCS

## 2025-04-04 ENCOUNTER — OFFICE VISIT (OUTPATIENT)
Dept: WOUND CARE | Facility: HOSPITAL | Age: 75
End: 2025-04-04
Payer: MEDICARE

## 2025-04-04 PROCEDURE — G0277 HBOT, FULL BODY CHAMBER, 30M: HCPCS

## 2025-04-05 ENCOUNTER — HOSPITAL ENCOUNTER (EMERGENCY)
Facility: HOSPITAL | Age: 75
Discharge: HOME | End: 2025-04-05
Attending: EMERGENCY MEDICINE
Payer: MEDICARE

## 2025-04-05 VITALS
RESPIRATION RATE: 18 BRPM | HEART RATE: 61 BPM | SYSTOLIC BLOOD PRESSURE: 138 MMHG | TEMPERATURE: 97.5 F | DIASTOLIC BLOOD PRESSURE: 81 MMHG | OXYGEN SATURATION: 98 %

## 2025-04-05 VITALS
BODY MASS INDEX: 26.22 KG/M2 | SYSTOLIC BLOOD PRESSURE: 145 MMHG | WEIGHT: 177.03 LBS | HEIGHT: 69 IN | OXYGEN SATURATION: 100 % | TEMPERATURE: 98.4 F | HEART RATE: 72 BPM | DIASTOLIC BLOOD PRESSURE: 85 MMHG | RESPIRATION RATE: 18 BRPM

## 2025-04-05 DIAGNOSIS — R31.9 HEMATURIA, UNSPECIFIED TYPE: Primary | ICD-10-CM

## 2025-04-05 DIAGNOSIS — R33.8 ACUTE URINARY RETENTION: ICD-10-CM

## 2025-04-05 DIAGNOSIS — T83.091A OBSTRUCTION OF FOLEY CATHETER, INITIAL ENCOUNTER: Primary | ICD-10-CM

## 2025-04-05 PROCEDURE — 51702 INSERT TEMP BLADDER CATH: CPT

## 2025-04-05 PROCEDURE — 99283 EMERGENCY DEPT VISIT LOW MDM: CPT

## 2025-04-05 PROCEDURE — 51798 US URINE CAPACITY MEASURE: CPT

## 2025-04-05 PROCEDURE — 99282 EMERGENCY DEPT VISIT SF MDM: CPT

## 2025-04-05 NOTE — ED NOTES
Pt arrives for urinary catheter pain. Pt reports it is draining blood   Pt had the catheter placed this morning.   Pt has radiation cystitis.

## 2025-04-05 NOTE — ED PROVIDER NOTES
MD ATTESTATION NOTE    SHARED VISIT: This visit was performed by BOTH a physician and an APC. The substantive portion of the medical decision making was performed by this attesting physician who made or approved the management plan and takes responsibility for patient management. All studies documented in the APC note (if performed) were independently interpreted by me.    The SINDI and I have discussed this patient's history, physical exam, MDM, and treatment plan.  I have reviewed the documentation and personally had a face to face interaction with the patient. The attached note describes my personal findings.      North Carcamo is a 74 y.o. male who presents to the ED c/o acute hematuria.  Reports that has been present for the past day or so    On exam:  GENERAL: not distressed  HENT: nares patent  EYES: no scleral icterus  CV: regular rhythm, regular rate  RESPIRATORY: normal effort  ABDOMEN: soft  MUSCULOSKELETAL: no deformity  NEURO: alert, moves all extremities, follows commands  SKIN: warm, dry    Labs  No results found for this or any previous visit (from the past 24 hours).    Radiology  No Radiology Exams Resulted Within Past 24 Hours    Medications given in the ED:  Medications - No data to display    Orders placed during this visit:  Orders Placed This Encounter   Procedures    Bladder scan    Insert Indwelling Urinary Catheter    Assess Need for Indwelling Urinary Catheter - Follow Removal Protocol    Urinary Catheter Care       Medical Decision Making:  ED Course as of 04/05/25 0533   Sat Apr 05, 2025   5542 I discussed the case with Dr. Randall and he agrees to evaluate the patient at the bedside.    [CC]   0588 Patient has had over 600 cc of pink-tinged output but no large clots.  It is flowing appropriately.  Will leave catheter in place as patient has follow-up with urology next week.  Encouraged him to continue to take his antibiotics as prescribed by urology.  Return precautions were given.   He will be discharged with outpatient follow-up. [CC]      ED Course User Index  [CC] Rhianna Malin, MARCI       Clinical Scores:                                   Differential diagnosis:  Differential diagnosis for hematuria includes but is not limited to:  - BPH  - hereditary hematuria  - DIC  - coagulopathy  - infection  - infarction  - instrumentation  - tumor  - trauma  - TB  - transient hematuria  - ureteral stones  - sickle cell  - scurvy  - hemorrhagic cystitis       Diagnosis  Final diagnoses:   Hematuria, unspecified type   Acute urinary retention          Juan Pablo Randall MD  04/05/25 0593

## 2025-04-05 NOTE — ED PROVIDER NOTES
EMERGENCY DEPARTMENT ENCOUNTER  Room Number:  34/34  PCP: Chloe Arriaga MD  Independent Historians: Patient  Date of encounter:  4/5/2025  Patient Care Team:  Chloe Arriaga MD as PCP - General (Internal Medicine)  Brennen Burroughs MD as Consulting Physician (Cardiology)  Armando Alvarez MD as Consulting Physician (Hematology and Oncology)     HPI:  Chief Complaint: had concerns including urinary catheter issue.     A complete HPI/ROS/PMH/PSH/SH/FH are unobtainable due to: None    Chronic or social conditions impacting patient care (Social Determinants of Health): None    Context: North Carcamo is a 74 y.o. male with a medical history of radiation cystitis who presents to the ED c/o acute Paz catheter issue.  Patient had Paz catheter placed at this emergency department last night.  He has issues with retention and receiving hyperbaric therapy for radiation cystitis.  He states that after leaving the hospital, his catheter felt irritated.  He states that it is draining normally.  Symptoms have since improved since being in the department.  No nausea, vomiting, fever or chills.  Patient is already on antibiotics prescribed by his urologist.    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 coli pneumonia 08/01/2023    History of prostate cancer 2002    History of recent hospitalization 08/01/2023    Kidney stone 2019    On anticoagulant therapy        PAST SURGICAL HISTORY  Past Surgical History:   Procedure Laterality Date    CATARACT EXTRACTION      WITH LENS IMPLANTS    COLONOSCOPY N/A 09/13/2023    Procedure: COLONOSCOPY TO CECUM AND TERMINAL ILEUM WITH COLD POLYPECTOMY;  Surgeon: Humberto Peña MD;  Location: [facility] ENDOSCOPY;  Service: Gastroenterology;  Laterality: N/A;  SCREENING  DIVERTICULOSIS, COLON POLYP, HEMORRHOIDS    CYSTOSCOPY BLADDER BIOPSY N/A 06/12/2019    Procedure: CYSTOSCOPY TUR BLADDER TUMOR;  Surgeon: Joel Russell MD;  Location: [facility] OR;  Service: Urology    CYSTOSCOPY BLADDER BIOPSY N/A 05/31/2024    Procedure: CYSTOSCOPY BLADDER BIOPSY FULGURATION;  Surgeon: Joel Russell MD;  Location: [facility] MAIN OR;  Service: Urology;  Laterality: N/A;    CYSTOSCOPY WITH CLOT EVACUATION N/A 10/02/2024    Procedure: CYSTOSCOPY WITH CLOT EVACUATION, CAUTERY OF BLEEDING, DENG INSERTION;  Surgeon: Joel Russell MD;  Location: [facility] OR;  Service: Urology;  Laterality: N/A;    CYSTOSCOPY WITH CLOT EVACUATION N/A 2/9/2025    Procedure: CYSTOSCOPY WITH CLOT EVACUATION;  Surgeon: Lebron Mosqueda MD;  Location: [facility] OR;  Service: Urology;  Laterality: N/A;    CYSTOSCOPY WITH CLOT EVACUATION N/A 3/21/2025    Procedure: CYSTOSCOPY CLOT EVACUATION CAUTERY OF BLEEDING WITH CATHETER REMOVAL;  Surgeon: Joel Russell MD;  Location: [facility] MAIN OR;  Service: Urology;  Laterality: N/A;    ELBOW PROCEDURE Left     FX    EYE SURGERY  03/2024    Cataract    PROSTATECTOMY      PROSTATECTOMY  03/01/2003    TONSILLECTOMY  1957    VASECTOMY         FAMILY HISTORY  Family History   Problem Relation Age of Onset    Hypertension Mother     Hypertension Father     Prostate cancer Father     Geo  Hyperthermia Neg Hx        SOCIAL HISTORY  Social History     Socioeconomic History    Marital status:    Tobacco Use    Smoking status: Never     Passive exposure: Never    Smokeless tobacco: Never   Vaping Use    Vaping status: Never Used   Substance and Sexual Activity    Alcohol use: Yes     Alcohol/week: 1.0 standard drink of alcohol     Types: 1 Drinks containing 0.5 oz of alcohol per week     Comment: Occasional beer or cocktail    Drug use: No    Sexual activity: Yes     Partners: Female     Birth control/protection: Vasectomy       ALLERGIES  Penicillins    REVIEW OF SYSTEMS  Review of Systems  Included in HPI  All systems reviewed and negative except for those discussed in HPI.    PHYSICAL EXAM    I have reviewed the triage vital signs and nursing notes.    ED Triage Vitals   Temp Heart Rate Resp BP SpO2   04/05/25 1059 04/05/25 1056 04/05/25 1056 04/05/25 1059 04/05/25 1056   98.4 °F (36.9 °C) 101 18 145/89 99 %      Temp src Heart Rate Source Patient Position BP Location FiO2 (%)   04/05/25 1059 -- 04/05/25 1059 04/05/25 1059 --   Tympanic  Sitting Right arm        Physical Exam  Constitutional:       General: He is not in acute distress.     Appearance: Normal appearance. He is not ill-appearing, toxic-appearing or diaphoretic.   HENT:      Head: Normocephalic and atraumatic.      Nose: Nose normal.      Mouth/Throat:      Mouth: Mucous membranes are moist.      Pharynx: Oropharynx is clear.   Eyes:      Conjunctiva/sclera: Conjunctivae normal.      Pupils: Pupils are equal, round, and reactive to light.   Cardiovascular:      Rate and Rhythm: Normal rate and regular rhythm.      Pulses: Normal pulses.   Pulmonary:      Effort: Pulmonary effort is normal.   Abdominal:      General: Abdomen is flat. There is no distension.      Palpations: Abdomen is soft.      Tenderness: There is no abdominal tenderness. There is no guarding or rebound.   Genitourinary:     Comments: Paz catheter in place with  pink-tinged urine within the lumen  Skin:     General: Skin is warm and dry.   Neurological:      General: No focal deficit present.      Mental Status: He is alert and oriented to person, place, and time. Mental status is at baseline.   Psychiatric:         Mood and Affect: Mood normal.         Behavior: Behavior normal.         Thought Content: Thought content normal.         Judgment: Judgment normal.         LAB RESULTS  No results found for this or any previous visit (from the past 24 hours).    RADIOLOGY  No Radiology Exams Resulted Within Past 24 Hours    MEDICATIONS GIVEN IN ER  Medications - No data to display    ORDERS PLACED DURING THIS VISIT:  Orders Placed This Encounter   Procedures    Bladder scan    Please attempt to flush or irrigate mcclellan if we can  Misc Nursing Order (Specify)    Replace Mcclellan Catheter    Assess Need for Indwelling Urinary Catheter - Follow Removal Protocol    Urinary Catheter Care       OUTPATIENT MEDICATION MANAGEMENT:  No current Epic-ordered facility-administered medications on file.     Current Outpatient Medications Ordered in Epic   Medication Sig Dispense Refill    Calcium Carbonate-Vitamin D (CALCIUM 600+D PO) Take 1 tablet by mouth 2 (Two) Times a Day. PT HOLDING FOR SURGERY  Indications: Calcium Deficiency      HYDROcodone-acetaminophen (NORCO) 7.5-325 MG per tablet Take 1 tablet by mouth Every 6 (Six) Hours As Needed for Moderate Pain (Pain). 20 tablet 0    lisinopril (PRINIVIL,ZESTRIL) 40 MG tablet Take 1 tablet by mouth Daily. 90 tablet 1    Multiple Vitamins-Minerals (DAILY MULTIVITAMIN PO) Take 1 tablet by mouth Daily.      ondansetron ODT (ZOFRAN-ODT) 4 MG disintegrating tablet Place 1 tablet on the tongue Every 8 (Eight) Hours As Needed for Nausea. 10 tablet 0    pravastatin (PRAVACHOL) 40 MG tablet Take 1 tablet by mouth Every Night. Indications: High Amount of Fats in the Blood 90 tablet 3    sulfamethoxazole-trimethoprim (BACTRIM DS,SEPTRA DS) 800-160 MG per  tablet Take 1 tablet by mouth 2 (Two) Times a Day.         PROCEDURES  Procedures    PROGRESS, DATA ANALYSIS, CONSULTS, AND MEDICAL DECISION MAKING  All labs have been independently interpreted by me.  All radiology studies have been reviewed by me. All EKG's have been independently viewed and interpreted by me.  Discussion below represents my analysis of pertinent findings related to patient's condition, differential diagnosis, treatment plan and final disposition.    Differential diagnosis includes but is not limited to clogged catheter, UTI.    Clinical Scores:                   ED Course as of 04/05/25 1343   Sat Apr 05, 2025   1202 Bladder scan shows 65ccs of urine [AB]   1244 Nursing unable to irrigate bladder.  Seems to have returned.  Catheter. [AB]   1323 Paz catheter placed.  There was noted to clot at the end of the prior Paz catheter.  It is now draining normally.  Patient stable for discharge home.  Encouraged to continue antibiotics as previously prescribed. [AB]      ED Course User Index  [AB] Pierre Woodruff MD             AS OF 13:43 EDT VITALS:    BP - 145/89  HR - 101  TEMP - 98.4 °F (36.9 °C) (Tympanic)  O2 SATS - 99%    COMPLEXITY OF CARE  Admission was considered but after careful review of the patient's presentation, physical examination, diagnostic results, and response to treatment the patient may be safely discharged with outpatient follow-up.      DIAGNOSIS  Final diagnoses:   Obstruction of Paz catheter, initial encounter         DISPOSITION  ED Disposition       ED Disposition   Discharge    Condition   Stable    Comment   --                Please note that portions of this document were completed with a voice recognition program.    Note Disclaimer: At [facility], we believe that sharing information builds trust and better relationships. You are receiving this note because you recently visited [facility]. It is possible you will see health information before a provider  has talked with you about it. This kind of information can be easy to misunderstand. To help you fully understand what it means for your health, we urge you to discuss this note with your provider.         Pierre Woodruff MD  04/05/25 1590

## 2025-04-05 NOTE — ED PROVIDER NOTES
EMERGENCY DEPARTMENT ENCOUNTER  Room Number:  04/04  PCP: Chloe Arriaga MD  Independent Historians: Patient      HPI:  Chief Complaint: had concerns including Blood in Urine.     A complete HPI/ROS/PMH/PSH/SH/FH are unobtainable due to: None    Chronic or social conditions impacting patient care (Social Determinants of Health): None      Context: North Carcamo is a 74 y.o. male with a medical history of patient's cystitis, hypertension, hyperlipidemia, and pulmonary embolism anticoagulated on Eliquis presents the emergency department today with urinary retention.  Patient is very well-known to me in this emergency department as he is seen here frequently for similar complaints.  Actually just saw him earlier this week.  His catheter has since been removed but tonight he tried to urinate and was only dribbling small amounts of blood but was unable to pass any urine.  He reports that he is having some fullness in the suprapubic region and feels like his bladder is filling up.  He otherwise reports no symptoms.  He is currently on antibiotics as prescribed by the urologist for UTI.        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 coli pneumonia 08/01/2023    History of prostate cancer 2002    History of recent hospitalization 08/01/2023    Kidney stone 2019    On anticoagulant therapy          PAST SURGICAL HISTORY  Past Surgical History:   Procedure Laterality Date    CATARACT EXTRACTION      WITH LENS IMPLANTS    COLONOSCOPY N/A 09/13/2023    Procedure: COLONOSCOPY TO CECUM AND TERMINAL ILEUM WITH COLD POLYPECTOMY;  Surgeon: Humberto Peña MD;  Location: [facility] ENDOSCOPY;  Service: Gastroenterology;  Laterality: N/A;  SCREENING  DIVERTICULOSIS, COLON POLYP, HEMORRHOIDS    CYSTOSCOPY BLADDER BIOPSY N/A 06/12/2019    Procedure: CYSTOSCOPY TUR BLADDER TUMOR;  Surgeon: Joel Russell MD;  Location: [facility] OR;  Service: Urology    CYSTOSCOPY BLADDER BIOPSY N/A 05/31/2024    Procedure: CYSTOSCOPY BLADDER BIOPSY FULGURATION;  Surgeon: Joel Russell MD;  Location: [facility] OR;  Service: Urology;  Laterality: N/A;    CYSTOSCOPY WITH CLOT EVACUATION N/A 10/02/2024    Procedure: CYSTOSCOPY WITH CLOT EVACUATION, CAUTERY OF BLEEDING, DENG INSERTION;  Surgeon: Joel Russell MD;  Location: [facility] MAIN OR;  Service: Urology;  Laterality: N/A;    CYSTOSCOPY WITH CLOT EVACUATION N/A 2/9/2025    Procedure: CYSTOSCOPY WITH CLOT EVACUATION;  Surgeon: Lebron Mosqueda MD;  Location: [facility] OR;  Service: Urology;  Laterality: N/A;    CYSTOSCOPY WITH CLOT EVACUATION N/A 3/21/2025    Procedure: CYSTOSCOPY CLOT EVACUATION CAUTERY OF BLEEDING WITH CATHETER REMOVAL;  Surgeon: Joel Russell MD;  Location: [facility] MAIN OR;  Service: Urology;  Laterality: N/A;    ELBOW PROCEDURE Left     FX    EYE SURGERY  03/2024    Cataract    PROSTATECTOMY      PROSTATECTOMY  03/01/2003    TONSILLECTOMY  1957    VASECTOMY           FAMILY HISTORY  Family History    Problem Relation Age of Onset    Hypertension Mother     Hypertension Father     Prostate cancer Father     Malig Hyperthermia Neg Hx          SOCIAL HISTORY  Social History     Socioeconomic History    Marital status:    Tobacco Use    Smoking status: Never     Passive exposure: Never    Smokeless tobacco: Never   Vaping Use    Vaping status: Never Used   Substance and Sexual Activity    Alcohol use: Yes     Alcohol/week: 1.0 standard drink of alcohol     Types: 1 Drinks containing 0.5 oz of alcohol per week     Comment: Occasional beer or cocktail    Drug use: No    Sexual activity: Yes     Partners: Female     Birth control/protection: Vasectomy         ALLERGIES  Penicillins      REVIEW OF SYSTEMS  Included in HPI  All systems reviewed and negative except for those discussed in HPI.      PHYSICAL EXAM    I have reviewed the triage vital signs and nursing notes.    ED Triage Vitals [04/05/25 0206]   Temp Heart Rate Resp BP SpO2   97.5 °F (36.4 °C) 79 18 157/94 98 %      Temp src Heart Rate Source Patient Position BP Location FiO2 (%)   -- -- -- -- --       Physical Exam  Constitutional:       Appearance: Normal appearance.   HENT:      Head: Normocephalic and atraumatic.      Mouth/Throat:      Mouth: Mucous membranes are moist.   Eyes:      Extraocular Movements: Extraocular movements intact.      Pupils: Pupils are equal, round, and reactive to light.   Cardiovascular:      Rate and Rhythm: Normal rate and regular rhythm.      Pulses: Normal pulses.      Heart sounds: Normal heart sounds.   Pulmonary:      Effort: Pulmonary effort is normal. No respiratory distress.      Breath sounds: Normal breath sounds.   Abdominal:      General: Abdomen is flat. There is no distension.      Palpations: Abdomen is soft.      Tenderness: There is no abdominal tenderness. There is no guarding or rebound.   Musculoskeletal:         General: Normal range of motion.      Cervical back: Normal range of motion and neck  supple.   Skin:     General: Skin is warm and dry.      Capillary Refill: Capillary refill takes less than 2 seconds.   Neurological:      General: No focal deficit present.      Mental Status: He is alert and oriented to person, place, and time.   Psychiatric:         Mood and Affect: Mood normal.         Behavior: Behavior normal.           OUTPATIENT MEDICATION MANAGEMENT:  No current Epic-ordered facility-administered medications on file.     Current Outpatient Medications Ordered in Epic   Medication Sig Dispense Refill    Calcium Carbonate-Vitamin D (CALCIUM 600+D PO) Take 1 tablet by mouth 2 (Two) Times a Day. PT HOLDING FOR SURGERY  Indications: Calcium Deficiency      HYDROcodone-acetaminophen (NORCO) 7.5-325 MG per tablet Take 1 tablet by mouth Every 6 (Six) Hours As Needed for Moderate Pain (Pain). 20 tablet 0    lisinopril (PRINIVIL,ZESTRIL) 40 MG tablet Take 1 tablet by mouth Daily. 90 tablet 1    Multiple Vitamins-Minerals (DAILY MULTIVITAMIN PO) Take 1 tablet by mouth Daily.      ondansetron ODT (ZOFRAN-ODT) 4 MG disintegrating tablet Place 1 tablet on the tongue Every 8 (Eight) Hours As Needed for Nausea. 10 tablet 0    pravastatin (PRAVACHOL) 40 MG tablet Take 1 tablet by mouth Every Night. Indications: High Amount of Fats in the Blood 90 tablet 3    sulfamethoxazole-trimethoprim (BACTRIM DS,SEPTRA DS) 800-160 MG per tablet Take 1 tablet by mouth 2 (Two) Times a Day.           PROGRESS, DATA ANALYSIS, CONSULTS, AND MEDICAL DECISION MAKING  ORDERS PLACED DURING THIS VISIT:  Orders Placed This Encounter   Procedures    Bladder scan    Insert Indwelling Urinary Catheter    Assess Need for Indwelling Urinary Catheter - Follow Removal Protocol    Urinary Catheter Care       All labs have been independently interpreted by me.  All radiology studies have been reviewed by me. All EKG's have been independently viewed and interpreted by me.  Discussion below represents my analysis of pertinent findings  related to patient's condition, differential diagnosis, treatment plan and final disposition.    Differential diagnosis includes but is not limited to:   Differential diagnosis for hematuria includes but is not limited to:  - BPH  - hereditary hematuria  - DIC  - coagulopathy  - infection  - infarction  - instrumentation  - tumor  - trauma  - TB  - transient hematuria  - ureteral stones  - sickle cell  - scurvy  - hemorrhagic cystitis       ED Course:  ED Course as of 04/05/25 0651   Sat Apr 05, 2025   1982 I discussed the case with Dr. Randall and he agrees to evaluate the patient at the bedside.    [CC]   0526 Patient has had over 600 cc of pink-tinged output but no large clots.  It is flowing appropriately.  Will leave catheter in place as patient has follow-up with urology next week.  Encouraged him to continue to take his antibiotics as prescribed by urology.  Return precautions were given.  He will be discharged with outpatient follow-up. [CC]      ED Course User Index  [CC] Rhianna Malin PA-C           AS OF 06:51 EDT VITALS:    BP - 138/81  HR - 61  TEMP - 97.5 °F (36.4 °C)  O2 SATS - 98%        DIAGNOSIS  Final diagnoses:   Hematuria, unspecified type   Acute urinary retention         DISPOSITION  ED Disposition       ED Disposition   Discharge    Condition   Stable    Comment   --                      Please note that portions of this document were completed with a voice recognition program.    Note Disclaimer: At [facility], we believe that sharing information builds trust and better relationships. You are receiving this note because you recently visited [facility]. It is possible you will see health information before a provider has talked with you about it. This kind of information can be easy to misunderstand. To help you fully understand what it means for your health, we urge you to discuss this note with your provider.     Rhianna Malin PA-C  04/05/25 0651

## 2025-04-07 ENCOUNTER — OFFICE VISIT (OUTPATIENT)
Dept: WOUND CARE | Facility: HOSPITAL | Age: 75
End: 2025-04-07
Payer: MEDICARE

## 2025-04-08 ENCOUNTER — OFFICE VISIT (OUTPATIENT)
Dept: WOUND CARE | Facility: HOSPITAL | Age: 75
End: 2025-04-08
Payer: MEDICARE

## 2025-04-09 ENCOUNTER — OFFICE VISIT (OUTPATIENT)
Dept: WOUND CARE | Facility: HOSPITAL | Age: 75
End: 2025-04-09
Payer: MEDICARE

## 2025-04-10 ENCOUNTER — OFFICE VISIT (OUTPATIENT)
Dept: WOUND CARE | Facility: HOSPITAL | Age: 75
End: 2025-04-10
Payer: MEDICARE

## 2025-04-10 PROCEDURE — G0277 HBOT, FULL BODY CHAMBER, 30M: HCPCS

## 2025-04-10 PROCEDURE — G0463 HOSPITAL OUTPT CLINIC VISIT: HCPCS

## 2025-04-10 RX ORDER — CIPROFLOXACIN 500 MG/1
500 TABLET, FILM COATED ORAL 2 TIMES DAILY
COMMUNITY

## 2025-04-11 ENCOUNTER — ANESTHESIA (OUTPATIENT)
Dept: PERIOP | Facility: HOSPITAL | Age: 75
End: 2025-04-11
Payer: MEDICARE

## 2025-04-11 ENCOUNTER — HOSPITAL ENCOUNTER (OUTPATIENT)
Facility: HOSPITAL | Age: 75
Setting detail: HOSPITAL OUTPATIENT SURGERY
Discharge: HOME | End: 2025-04-11
Attending: UROLOGY | Admitting: UROLOGY
Payer: MEDICARE

## 2025-04-11 ENCOUNTER — ANESTHESIA EVENT (OUTPATIENT)
Dept: PERIOP | Facility: HOSPITAL | Age: 75
End: 2025-04-11
Payer: MEDICARE

## 2025-04-11 VITALS
RESPIRATION RATE: 16 BRPM | OXYGEN SATURATION: 100 % | DIASTOLIC BLOOD PRESSURE: 76 MMHG | HEART RATE: 63 BPM | TEMPERATURE: 97.7 F | SYSTOLIC BLOOD PRESSURE: 133 MMHG

## 2025-04-11 DIAGNOSIS — N30.41 HEMATURIA DUE TO IRRADIATION CYSTITIS: Primary | ICD-10-CM

## 2025-04-11 LAB
ANION GAP SERPL CALCULATED.3IONS-SCNC: 12 MMOL/L (ref 5–15)
BUN SERPL-MCNC: 14 MG/DL (ref 8–23)
BUN/CREAT SERPL: 13.3 (ref 7–25)
CALCIUM SPEC-SCNC: 9.1 MG/DL (ref 8.6–10.5)
CHLORIDE SERPL-SCNC: 101 MMOL/L (ref 98–107)
CO2 SERPL-SCNC: 25 MMOL/L (ref 22–29)
CREAT SERPL-MCNC: 1.05 MG/DL (ref 0.76–1.27)
DEPRECATED RDW RBC AUTO: 39.5 FL (ref 37–54)
EGFRCR SERPLBLD CKD-EPI 2021: 73.6 ML/MIN/1.73
ERYTHROCYTE [DISTWIDTH] IN BLOOD BY AUTOMATED COUNT: 12.2 % (ref 12.3–15.4)
GLUCOSE SERPL-MCNC: 108 MG/DL (ref 65–99)
HCT VFR BLD AUTO: 34.3 % (ref 37.5–51)
HGB BLD-MCNC: 11.2 G/DL (ref 13–17.7)
MCH RBC QN AUTO: 29.1 PG (ref 26.6–33)
MCHC RBC AUTO-ENTMCNC: 32.7 G/DL (ref 31.5–35.7)
MCV RBC AUTO: 89.1 FL (ref 79–97)
PLATELET # BLD AUTO: 321 10*3/MM3 (ref 140–450)
PMV BLD AUTO: 10.5 FL (ref 6–12)
POTASSIUM SERPL-SCNC: 4.1 MMOL/L (ref 3.5–5.2)
RBC # BLD AUTO: 3.85 10*6/MM3 (ref 4.14–5.8)
SODIUM SERPL-SCNC: 138 MMOL/L (ref 136–145)
WBC NRBC COR # BLD AUTO: 5.14 10*3/MM3 (ref 3.4–10.8)

## 2025-04-11 PROCEDURE — 25810000003 LACTATED RINGERS PER 1000 ML: Performed by: ANESTHESIOLOGY

## 2025-04-11 PROCEDURE — 25010000002 FENTANYL CITRATE (PF) 50 MCG/ML SOLUTION: Performed by: NURSE ANESTHETIST, CERTIFIED REGISTERED

## 2025-04-11 PROCEDURE — 25010000002 PROPOFOL 10 MG/ML EMULSION: Performed by: NURSE ANESTHETIST, CERTIFIED REGISTERED

## 2025-04-11 PROCEDURE — 25010000002 DEXAMETHASONE SODIUM PHOSPHATE 20 MG/5ML SOLUTION: Performed by: NURSE ANESTHETIST, CERTIFIED REGISTERED

## 2025-04-11 PROCEDURE — 25010000002 GENTAMICIN PER 80 MG: Performed by: UROLOGY

## 2025-04-11 PROCEDURE — 85027 COMPLETE CBC AUTOMATED: CPT | Performed by: UROLOGY

## 2025-04-11 PROCEDURE — 25010000002 VANCOMYCIN HCL 1.25 G RECONSTITUTED SOLUTION 1 EACH VIAL: Performed by: UROLOGY

## 2025-04-11 PROCEDURE — 25010000002 FAMOTIDINE 10 MG/ML SOLUTION: Performed by: ANESTHESIOLOGY

## 2025-04-11 PROCEDURE — 25010000002 LIDOCAINE 2% SOLUTION: Performed by: NURSE ANESTHETIST, CERTIFIED REGISTERED

## 2025-04-11 PROCEDURE — 25010000002 ONDANSETRON PER 1 MG: Performed by: NURSE ANESTHETIST, CERTIFIED REGISTERED

## 2025-04-11 PROCEDURE — 25010000002 HYDROMORPHONE PER 4 MG: Performed by: NURSE ANESTHETIST, CERTIFIED REGISTERED

## 2025-04-11 PROCEDURE — 25810000003 SODIUM CHLORIDE 0.9 % SOLUTION 250 ML FLEX CONT: Performed by: UROLOGY

## 2025-04-11 PROCEDURE — 80048 BASIC METABOLIC PNL TOTAL CA: CPT | Performed by: UROLOGY

## 2025-04-11 RX ORDER — FAMOTIDINE 10 MG/ML
20 INJECTION, SOLUTION INTRAVENOUS ONCE
Status: COMPLETED | OUTPATIENT
Start: 2025-04-11 | End: 2025-04-11

## 2025-04-11 RX ORDER — NALOXONE HCL 0.4 MG/ML
0.2 VIAL (ML) INJECTION AS NEEDED
Status: DISCONTINUED | OUTPATIENT
Start: 2025-04-11 | End: 2025-04-11 | Stop reason: HOSPADM

## 2025-04-11 RX ORDER — DROPERIDOL 2.5 MG/ML
0.62 INJECTION, SOLUTION INTRAMUSCULAR; INTRAVENOUS
Status: DISCONTINUED | OUTPATIENT
Start: 2025-04-11 | End: 2025-04-11 | Stop reason: HOSPADM

## 2025-04-11 RX ORDER — SODIUM CHLORIDE 0.9 % (FLUSH) 0.9 %
3-10 SYRINGE (ML) INJECTION AS NEEDED
Status: DISCONTINUED | OUTPATIENT
Start: 2025-04-11 | End: 2025-04-11 | Stop reason: HOSPADM

## 2025-04-11 RX ORDER — LIDOCAINE HYDROCHLORIDE 10 MG/ML
0.5 INJECTION, SOLUTION INFILTRATION; PERINEURAL ONCE AS NEEDED
Status: DISCONTINUED | OUTPATIENT
Start: 2025-04-11 | End: 2025-04-11 | Stop reason: HOSPADM

## 2025-04-11 RX ORDER — LABETALOL HYDROCHLORIDE 5 MG/ML
5 INJECTION, SOLUTION INTRAVENOUS
Status: DISCONTINUED | OUTPATIENT
Start: 2025-04-11 | End: 2025-04-11 | Stop reason: HOSPADM

## 2025-04-11 RX ORDER — ONDANSETRON 2 MG/ML
INJECTION INTRAMUSCULAR; INTRAVENOUS AS NEEDED
Status: DISCONTINUED | OUTPATIENT
Start: 2025-04-11 | End: 2025-04-11 | Stop reason: SURG

## 2025-04-11 RX ORDER — LIDOCAINE HYDROCHLORIDE 20 MG/ML
INJECTION, SOLUTION INFILTRATION; PERINEURAL AS NEEDED
Status: DISCONTINUED | OUTPATIENT
Start: 2025-04-11 | End: 2025-04-11 | Stop reason: SURG

## 2025-04-11 RX ORDER — PROMETHAZINE HYDROCHLORIDE 25 MG/1
25 TABLET ORAL ONCE AS NEEDED
Status: DISCONTINUED | OUTPATIENT
Start: 2025-04-11 | End: 2025-04-11 | Stop reason: HOSPADM

## 2025-04-11 RX ORDER — ONDANSETRON 4 MG/1
4 TABLET, ORALLY DISINTEGRATING ORAL EVERY 8 HOURS PRN
Qty: 10 TABLET | Refills: 0 | Status: SHIPPED | OUTPATIENT
Start: 2025-04-11

## 2025-04-11 RX ORDER — HYDROCODONE BITARTRATE AND ACETAMINOPHEN 7.5; 325 MG/1; MG/1
1 TABLET ORAL EVERY 6 HOURS PRN
Qty: 20 TABLET | Refills: 0 | Status: SHIPPED | OUTPATIENT
Start: 2025-04-11

## 2025-04-11 RX ORDER — EPHEDRINE SULFATE 50 MG/ML
5 INJECTION, SOLUTION INTRAVENOUS ONCE AS NEEDED
Status: DISCONTINUED | OUTPATIENT
Start: 2025-04-11 | End: 2025-04-11 | Stop reason: HOSPADM

## 2025-04-11 RX ORDER — IPRATROPIUM BROMIDE AND ALBUTEROL SULFATE 2.5; .5 MG/3ML; MG/3ML
3 SOLUTION RESPIRATORY (INHALATION) ONCE AS NEEDED
Status: DISCONTINUED | OUTPATIENT
Start: 2025-04-11 | End: 2025-04-11 | Stop reason: HOSPADM

## 2025-04-11 RX ORDER — PROMETHAZINE HYDROCHLORIDE 25 MG/1
25 SUPPOSITORY RECTAL ONCE AS NEEDED
Status: DISCONTINUED | OUTPATIENT
Start: 2025-04-11 | End: 2025-04-11 | Stop reason: HOSPADM

## 2025-04-11 RX ORDER — HYDRALAZINE HYDROCHLORIDE 20 MG/ML
5 INJECTION INTRAMUSCULAR; INTRAVENOUS
Status: DISCONTINUED | OUTPATIENT
Start: 2025-04-11 | End: 2025-04-11 | Stop reason: HOSPADM

## 2025-04-11 RX ORDER — SODIUM CHLORIDE, SODIUM LACTATE, POTASSIUM CHLORIDE, CALCIUM CHLORIDE 600; 310; 30; 20 MG/100ML; MG/100ML; MG/100ML; MG/100ML
9 INJECTION, SOLUTION INTRAVENOUS CONTINUOUS
Status: DISCONTINUED | OUTPATIENT
Start: 2025-04-11 | End: 2025-04-11 | Stop reason: HOSPADM

## 2025-04-11 RX ORDER — HYDROCODONE BITARTRATE AND ACETAMINOPHEN 7.5; 325 MG/1; MG/1
1 TABLET ORAL EVERY 4 HOURS PRN
Status: DISCONTINUED | OUTPATIENT
Start: 2025-04-11 | End: 2025-04-11 | Stop reason: HOSPADM

## 2025-04-11 RX ORDER — DEXAMETHASONE SODIUM PHOSPHATE 4 MG/ML
INJECTION, SOLUTION INTRA-ARTICULAR; INTRALESIONAL; INTRAMUSCULAR; INTRAVENOUS; SOFT TISSUE AS NEEDED
Status: DISCONTINUED | OUTPATIENT
Start: 2025-04-11 | End: 2025-04-11 | Stop reason: SURG

## 2025-04-11 RX ORDER — FENTANYL CITRATE 50 UG/ML
INJECTION, SOLUTION INTRAMUSCULAR; INTRAVENOUS AS NEEDED
Status: DISCONTINUED | OUTPATIENT
Start: 2025-04-11 | End: 2025-04-11 | Stop reason: SURG

## 2025-04-11 RX ORDER — ONDANSETRON 2 MG/ML
4 INJECTION INTRAMUSCULAR; INTRAVENOUS ONCE AS NEEDED
Status: DISCONTINUED | OUTPATIENT
Start: 2025-04-11 | End: 2025-04-11 | Stop reason: HOSPADM

## 2025-04-11 RX ORDER — FENTANYL CITRATE 50 UG/ML
25 INJECTION, SOLUTION INTRAMUSCULAR; INTRAVENOUS
Status: DISCONTINUED | OUTPATIENT
Start: 2025-04-11 | End: 2025-04-11 | Stop reason: HOSPADM

## 2025-04-11 RX ORDER — PROPOFOL 10 MG/ML
VIAL (ML) INTRAVENOUS AS NEEDED
Status: DISCONTINUED | OUTPATIENT
Start: 2025-04-11 | End: 2025-04-11 | Stop reason: SURG

## 2025-04-11 RX ORDER — HYDROMORPHONE HYDROCHLORIDE 1 MG/ML
0.25 INJECTION, SOLUTION INTRAMUSCULAR; INTRAVENOUS; SUBCUTANEOUS
Status: DISCONTINUED | OUTPATIENT
Start: 2025-04-11 | End: 2025-04-11 | Stop reason: HOSPADM

## 2025-04-11 RX ORDER — LIDOCAINE HYDROCHLORIDE 20 MG/ML
JELLY TOPICAL AS NEEDED
Status: DISCONTINUED | OUTPATIENT
Start: 2025-04-11 | End: 2025-04-11 | Stop reason: HOSPADM

## 2025-04-11 RX ORDER — ATROPINE SULFATE 0.4 MG/ML
0.4 INJECTION, SOLUTION INTRAMUSCULAR; INTRAVENOUS; SUBCUTANEOUS ONCE AS NEEDED
Status: DISCONTINUED | OUTPATIENT
Start: 2025-04-11 | End: 2025-04-11 | Stop reason: HOSPADM

## 2025-04-11 RX ORDER — PHENAZOPYRIDINE HYDROCHLORIDE 100 MG/1
100 TABLET, FILM COATED ORAL 3 TIMES DAILY PRN
Qty: 15 TABLET | Refills: 1 | Status: SHIPPED | OUTPATIENT
Start: 2025-04-11 | End: 2025-04-21

## 2025-04-11 RX ORDER — FLUMAZENIL 0.1 MG/ML
0.2 INJECTION INTRAVENOUS AS NEEDED
Status: DISCONTINUED | OUTPATIENT
Start: 2025-04-11 | End: 2025-04-11 | Stop reason: HOSPADM

## 2025-04-11 RX ORDER — SODIUM CHLORIDE 0.9 % (FLUSH) 0.9 %
3 SYRINGE (ML) INJECTION EVERY 12 HOURS SCHEDULED
Status: DISCONTINUED | OUTPATIENT
Start: 2025-04-11 | End: 2025-04-11 | Stop reason: HOSPADM

## 2025-04-11 RX ORDER — DIPHENHYDRAMINE HYDROCHLORIDE 50 MG/ML
12.5 INJECTION, SOLUTION INTRAMUSCULAR; INTRAVENOUS
Status: DISCONTINUED | OUTPATIENT
Start: 2025-04-11 | End: 2025-04-11 | Stop reason: HOSPADM

## 2025-04-11 RX ORDER — HYDROCODONE BITARTRATE AND ACETAMINOPHEN 5; 325 MG/1; MG/1
1 TABLET ORAL ONCE AS NEEDED
Status: DISCONTINUED | OUTPATIENT
Start: 2025-04-11 | End: 2025-04-11 | Stop reason: HOSPADM

## 2025-04-11 RX ADMIN — HYDROMORPHONE HYDROCHLORIDE 0.25 MG: 1 INJECTION, SOLUTION INTRAMUSCULAR; INTRAVENOUS; SUBCUTANEOUS at 16:34

## 2025-04-11 RX ADMIN — HYDROMORPHONE HYDROCHLORIDE 0.25 MG: 1 INJECTION, SOLUTION INTRAMUSCULAR; INTRAVENOUS; SUBCUTANEOUS at 16:30

## 2025-04-11 RX ADMIN — GENTAMICIN SULFATE 370 MG: 40 INJECTION, SOLUTION INTRAMUSCULAR; INTRAVENOUS at 14:25

## 2025-04-11 RX ADMIN — DEXAMETHASONE SODIUM PHOSPHATE 6 MG: 4 INJECTION, SOLUTION INTRAMUSCULAR; INTRAVENOUS at 14:45

## 2025-04-11 RX ADMIN — LIDOCAINE HYDROCHLORIDE 80 MG: 20 INJECTION, SOLUTION INFILTRATION; PERINEURAL at 14:39

## 2025-04-11 RX ADMIN — FENTANYL CITRATE 50 MCG: 50 INJECTION, SOLUTION INTRAMUSCULAR; INTRAVENOUS at 14:57

## 2025-04-11 RX ADMIN — HYDROCODONE BITARTRATE AND ACETAMINOPHEN 1 TABLET: 7.5; 325 TABLET ORAL at 15:55

## 2025-04-11 RX ADMIN — ONDANSETRON 4 MG: 2 INJECTION, SOLUTION INTRAMUSCULAR; INTRAVENOUS at 14:45

## 2025-04-11 RX ADMIN — FAMOTIDINE 20 MG: 10 INJECTION, SOLUTION INTRAVENOUS at 13:14

## 2025-04-11 RX ADMIN — PROPOFOL 170 MG: 10 INJECTION, EMULSION INTRAVENOUS at 14:39

## 2025-04-11 RX ADMIN — VANCOMYCIN HYDROCHLORIDE 1250 MG: 1.25 INJECTION, POWDER, LYOPHILIZED, FOR SOLUTION INTRAVENOUS at 13:14

## 2025-04-11 RX ADMIN — SODIUM CHLORIDE, POTASSIUM CHLORIDE, SODIUM LACTATE AND CALCIUM CHLORIDE 9 ML/HR: 600; 310; 30; 20 INJECTION, SOLUTION INTRAVENOUS at 14:25

## 2025-04-11 NOTE — H&P
[facility] History and Physical    Patient Care Team:  Chloe Arriaag MD as PCP - General (Internal Medicine)  Brennen Burroughs MD as Consulting Physician (Cardiology)  Armando Alvarez MD as Consulting Physician (Hematology and Oncology)  Jovita Willard PA as Referring Physician (Emergency Medicine)    Chief complaint bleeding    Subjective     Patient is a 74 y.o. male presents with history of radiation cystitis post clinical recurrence of prostate cancer after radical prostatectomy in the remote past he has been having intermittent gross hematuria is required clot evacuation cautery bleeding on numerous occasions over the past several months currently undergoing conclusion of hyperbaric oxygen treatments be presenting himself recently with urinary retention ability to urinate and clots catheters been placed he is undergo cystoscopy with clot evacuation cautery of bleeding    The area of bleeding along the trigone bladder neck if this should persist I will discuss with him use of formalin pledgets applying to the area of active bleeding.       Review of Systems   No fever and chills    Past Medical History:   Diagnosis Date    Allergic     Penicillin    Cataract March 2022    Diverticulosis     E. coli pneumonia 08/01/2023    HOSPITALIZED    History of hyperbaric oxygen therapy     History of prostate cancer 2002    History of recent hospitalization 08/01/2023    Hyperglycemia     Hyperlipidemia     Hypertension     Kidney stone 2019    Has not been a problem.    On anticoagulant therapy     Paroxysmal atrial fibrillation      Past Surgical History:   Procedure Laterality Date    CATARACT EXTRACTION      WITH LENS IMPLANTS    COLONOSCOPY N/A 09/13/2023    Procedure: COLONOSCOPY TO CECUM AND TERMINAL ILEUM WITH COLD POLYPECTOMY;  Surgeon: Humberto Peña MD;  Location: [facility] ENDOSCOPY;  Service: Gastroenterology;  Laterality: N/A;  SCREENING  DIVERTICULOSIS, COLON POLYP, HEMORRHOIDS     CYSTOSCOPY BLADDER BIOPSY N/A 06/12/2019    Procedure: CYSTOSCOPY TUR BLADDER TUMOR;  Surgeon: Joel Russell MD;  [facility];  Service: Urology    CYSTOSCOPY BLADDER BIOPSY N/A 05/31/2024    Procedure: CYSTOSCOPY BLADDER BIOPSY FULGURATION;  Surgeon: Joel Russell MD;  Location: [facility] MAIN OR;  Service: Urology;  Laterality: N/A;    CYSTOSCOPY WITH CLOT EVACUATION N/A 10/02/2024    Procedure: CYSTOSCOPY WITH CLOT EVACUATION, CAUTERY OF BLEEDING, DENG INSERTION;  Surgeon: Joel Russell MD;  Location: [facility] MAIN OR;  Service: Urology;  Laterality: N/A;    CYSTOSCOPY WITH CLOT EVACUATION N/A 2/9/2025    Procedure: CYSTOSCOPY WITH CLOT EVACUATION;  Surgeon: Lebron Mosqueda MD;  Location: [facility] MAIN OR;  Service: Urology;  Laterality: N/A;    CYSTOSCOPY WITH CLOT EVACUATION N/A 3/21/2025    Procedure: CYSTOSCOPY CLOT EVACUATION CAUTERY OF BLEEDING WITH CATHETER REMOVAL;  Surgeon: Joel Russell MD;  Location: [facility] MAIN OR;  Service: Urology;  Laterality: N/A;    ELBOW PROCEDURE Left     FX    EYE SURGERY  03/2024    Cataract    PROSTATECTOMY      PROSTATECTOMY  03/01/2003    TONSILLECTOMY  1957    VASECTOMY       Family History   Problem Relation Age of Onset    Hypertension Mother     Hypertension Father     Prostate cancer Father     Malig Hyperthermia Neg Hx      Social History     Tobacco Use    Smoking status: Never     Passive exposure: Never    Smokeless tobacco: Never   Vaping Use    Vaping status: Never Used   Substance Use Topics    Alcohol use: Yes     Alcohol/week: 1.0 standard drink of alcohol     Types: 1 Drinks containing 0.5 oz of alcohol per week     Comment: Occasional beer or cocktail    Drug use: No       Meds:  No medications prior to admission.       Allergies:  Penicillins    Debilities:      Objective     Vital Signs     No intake or output data in the 24 hours ending 04/11/25 1131       Physical Exam:      General Appearance:  Alert, cooperative, in no  acute distress   Head:  Normocephalic, without obvious abnormality, atraumatic   Eyes:  Lids and lashes normal, conjunctivae and sclerae normal, no icterus, no pallor, corneas clear,   Ears:  Ears appear intact with no abnormalities noted   Throat:     Neck:  No adenopathy, supple, trachea midline, no thyromegaly,  no JVD   Back:  No kyphosis present, no scoliosis present, no skin lesions, erythema or scars, no tenderness to percussion or palpation, range of motion normal   Lungs:   respirations regular, even and unlabored    Heart:  Regular rhythm and normal rate   Chest Wall:  No abnormalities observed   Abdomen:  no masses, no organomegaly, soft non-tender, non-distended, no guarding, no rebound tenderness   Rectal:  Deferred  exam reveals a Paz catheter in place penoscrotal and testicles are normal urine is blood-tinged with small clots   Extremities:     Pulses:     Skin:     Lymph nodes:     Neurologic:                                                                               Results Review:    I reviewed the patient's new clinical results.  Results for orders placed or performed during the hospital encounter of 03/05/25   Comprehensive Metabolic Panel    Collection Time: 03/05/25 11:42 PM    Specimen: Blood   Result Value Ref Range    Glucose 122 (H) 65 - 99 mg/dL    BUN 10 8 - 23 mg/dL    Creatinine 0.95 0.76 - 1.27 mg/dL    Sodium 137 136 - 145 mmol/L    Potassium 4.0 3.5 - 5.2 mmol/L    Chloride 101 98 - 107 mmol/L    CO2 27.7 22.0 - 29.0 mmol/L    Calcium 9.6 8.6 - 10.5 mg/dL    Total Protein 6.8 6.0 - 8.5 g/dL    Albumin 3.6 3.5 - 5.2 g/dL    ALT (SGPT) 11 1 - 41 U/L    AST (SGOT) 16 1 - 40 U/L    Alkaline Phosphatase 88 39 - 117 U/L    Total Bilirubin 0.5 0.0 - 1.2 mg/dL    Globulin 3.2 gm/dL    A/G Ratio 1.1 g/dL    BUN/Creatinine Ratio 10.5 7.0 - 25.0    Anion Gap 8.3 5.0 - 15.0 mmol/L    eGFR 83.0 >60.0 mL/min/1.73   CBC Auto Differential    Collection Time: 03/05/25 11:42 PM    Specimen:  Blood   Result Value Ref Range    WBC 7.97 3.40 - 10.80 10*3/mm3    RBC 3.44 (L) 4.14 - 5.80 10*6/mm3    Hemoglobin 11.1 (L) 13.0 - 17.7 g/dL    Hematocrit 31.9 (L) 37.5 - 51.0 %    MCV 92.7 79.0 - 97.0 fL    MCH 32.3 26.6 - 33.0 pg    MCHC 34.8 31.5 - 35.7 g/dL    RDW 12.2 (L) 12.3 - 15.4 %    RDW-SD 41.2 37.0 - 54.0 fl    MPV 10.8 6.0 - 12.0 fL    Platelets 255 140 - 450 10*3/mm3    Neutrophil % 73.1 42.7 - 76.0 %    Lymphocyte % 11.7 (L) 19.6 - 45.3 %    Monocyte % 12.9 (H) 5.0 - 12.0 %    Eosinophil % 1.5 0.3 - 6.2 %    Basophil % 0.5 0.0 - 1.5 %    Immature Grans % 0.3 0.0 - 0.5 %    Neutrophils, Absolute 5.83 1.70 - 7.00 10*3/mm3    Lymphocytes, Absolute 0.93 0.70 - 3.10 10*3/mm3    Monocytes, Absolute 1.03 (H) 0.10 - 0.90 10*3/mm3    Eosinophils, Absolute 0.12 0.00 - 0.40 10*3/mm3    Basophils, Absolute 0.04 0.00 - 0.20 10*3/mm3    Immature Grans, Absolute 0.02 0.00 - 0.05 10*3/mm3    nRBC 0.0 0.0 - 0.2 /100 WBC   CBC (No Diff)    Collection Time: 03/06/25  6:43 AM    Specimen: Blood   Result Value Ref Range    WBC 6.70 3.40 - 10.80 10*3/mm3    RBC 3.50 (L) 4.14 - 5.80 10*6/mm3    Hemoglobin 11.1 (L) 13.0 - 17.7 g/dL    Hematocrit 33.1 (L) 37.5 - 51.0 %    MCV 94.6 79.0 - 97.0 fL    MCH 31.7 26.6 - 33.0 pg    MCHC 33.5 31.5 - 35.7 g/dL    RDW 12.2 (L) 12.3 - 15.4 %    RDW-SD 41.7 37.0 - 54.0 fl    MPV 10.7 6.0 - 12.0 fL    Platelets 256 140 - 450 10*3/mm3   Basic Metabolic Panel    Collection Time: 03/06/25  6:43 AM    Specimen: Blood   Result Value Ref Range    Glucose 143 (H) 65 - 99 mg/dL    BUN 8 8 - 23 mg/dL    Creatinine 0.75 (L) 0.76 - 1.27 mg/dL    Sodium 138 136 - 145 mmol/L    Potassium 4.1 3.5 - 5.2 mmol/L    Chloride 103 98 - 107 mmol/L    CO2 26.6 22.0 - 29.0 mmol/L    Calcium 9.0 8.6 - 10.5 mg/dL    BUN/Creatinine Ratio 10.7 7.0 - 25.0    Anion Gap 8.4 5.0 - 15.0 mmol/L    eGFR 93.5 >60.0 mL/min/1.73        Assessment:  Gross hematuria clot retention recurrence secondary to radiation cystitis  for prostate cancer recurrence with previous radical prostatectomy    Plan:    Cystoscopy clot evacuation cautery of bleeding possible replacement of catheter risk benefits options been discussed with the patient #2 infection bleeding recurrence use of a catheter the patient understands agrees to proceed    Also discussed with the patient about the possibility of using formalin pledgets if necessary today or at a later date as this become a recurrent problem    I discussed the patient's findings and my recommendations with patient.     Joel Russell MD  04/11/25  11:31 EDT

## 2025-04-11 NOTE — BRIEF OP NOTE
CYSTOSCOPY WITH CLOT EVACUATION  Progress Note    [place]  4/11/2025    Pre-op Diagnosis:   Radiation cystitis gross hematuria and clot retention recurrent       Post-Op Diagnosis Codes:  Same bleeding along the trigone cauterized and bladder neck cauterized as well no signs of active bleeding at termination of procedure catheter left out    Procedure(s):      Procedure(s):  CYSTOSCOPY CLOT EVACUATION CAUTERY BLEEDING              Surgeon(s):  Joel Russell MD    Anesthesia: General    Staff:   Circulator: Angella James RN  Scrub Person: Lani Sanchez       Estimated Blood Loss: 10 mL    Urine Voided: * No values recorded between 4/11/2025  2:31 PM and 4/11/2025  3:17 PM *    Specimens:                None      Drains:   [REMOVED] Urethral Catheter Coude;Other (Comment) 16 Fr. (Removed)       [REMOVED] Urethral Catheter Other (Comment) 22 Fr. (Removed)       [REMOVED] Urethral Catheter Silicone 20 Fr. (Removed)       [REMOVED] Urethral Catheter Silicone 16 Fr. (Removed)       [REMOVED] Urethral Catheter Silicone 20 Fr. (Removed)       [REMOVED] Urethral Catheter Silicone 24 Fr. (Removed)       [REMOVED] Urethral Catheter 18 Fr. (Removed)       [REMOVED] Urethral Catheter 16 Fr. (Removed)       [REMOVED] Urethral Catheter Other (Comment) 20 Fr. (Removed)       [REMOVED] Urethral Catheter 24 Fr. (Removed)   Daily Indications Chronic Urinary Retention related to Obstructive, Infectious/Inflammatory, Neurologic or Traumatic Causes 03/21/25 1235   Site Assessment Clean;Skin intact 03/11/25 2234   Collection Container Leg bag 03/21/25 1235   Securement Method Securing device 03/21/25 1235   Catheter care complete Yes 03/11/25 2234       [REMOVED] Urethral Catheter Coude;Other (Comment) 20 Fr. (Removed)   Daily Indications Acute Urinary Retention 04/05/25 1147   Site Assessment Clean;Skin intact 04/05/25 1147   Collection Container Standard drainage bag 04/05/25 1147   Catheter care complete  Yes 04/05/25 0545   Irrigation/Instillation Indication urine output decreased;patency maintenance;clot evacuation 04/05/25 1147   Irrigation Ease resistance met;unable to aspirate irrigant;physician notified 04/05/25 1147       [REMOVED] Urethral Catheter 22 Fr. (Removed)   Daily Indications Acute Urinary Retention 04/05/25 1300   Site Assessment Skin intact;Clean 04/05/25 1300   Collection Container Leg bag 04/05/25 1355   Securement Method Securing device 04/05/25 1300   Catheter care complete Yes 04/05/25 1300   Output (mL) 150 mL 04/05/25 1401       [REMOVED] Continuous Bladder Irrigation Triple-lumen 22 Fr (Removed)       [REMOVED] Continuous Bladder Irrigation Triple-lumen 24 Fr (Removed)       [REMOVED] Continuous Bladder Irrigation (Removed)       [REMOVED] Continuous Bladder Irrigation Triple-lumen 22 Fr (Removed)       [REMOVED] Continuous Bladder Irrigation Triple-lumen 22 Fr (Removed)       Findings: Oozing from vascular areas along the trigone post radiation therapy as well as along the left bladder neck      Complications: None          Joel Russell MD     Date: 4/11/2025  Time: 15:26 EDT

## 2025-04-11 NOTE — ANESTHESIA POSTPROCEDURE EVALUATION
Patient: [place]    Procedure Summary       Date: 04/11/25 Room / Location: [facility] OR 01 / [facility] MAIN OR    Anesthesia Start: 1430 Anesthesia Stop: 1521    Procedure: CYSTOSCOPY CLOT EVACUATION CAUTERY BLEEDING (Urethra) Diagnosis:     Surgeons: Joel Russell MD Provider: Jose Geronimo MD    Anesthesia Type: general ASA Status: 3            Anesthesia Type: general    Vitals  Vitals Value Taken Time   /95 04/11/25 16:15   Temp 36.5 °C (97.7 °F) 04/11/25 15:20   Pulse 52 04/11/25 16:16   Resp 16 04/11/25 16:00   SpO2 100 % 04/11/25 16:16   Vitals shown include unfiled device data.        Post Anesthesia Care and Evaluation    Anesthetic complications: No anesthetic complications

## 2025-04-11 NOTE — OP NOTE
Preoperative diagnosis recurrent gross hematuria secondary to radiation cystitis and clot retention    Postoperative diagnosis bleeding along the bladder neck midportion of the trigone    Procedure cystoscopy clot evacuation cautery of bleeding    Surgeon Drew    Anesthesia General    Procedure note 74-year-old male plagued by the above-mentioned findings he is undergoing hyperbaric oxygen is he has also been receiving multiple courses of clot evacuation cautery of bleeding with an indwelling Paz catheter is presenting emergency room with urinary retention on several occasions he is placed on Cipro preoperatively both because of most positive recent urine culture of Enterococcus 3 presenting with the same with retention of clots he is undergo the above-mentioned procedure    Timeout was taken antibiotics were given allergies reviewed after adequate general anesthesia was placed very carefully modified force lithotomy position all pressure points relieved he was prepped and draped sterile fashion and 2% intraurethral lidocaine jelly is administered scope was Mobley into bladder urethra was normal bladder neck anastomosis was widely patent as the patient has ISD areas of bleeding were seen in the left lateral bladder neck as well as the midportion of his trigone ureter orifice ease were difficult to see secondary to edema these areas were subsequently cauterized hemostasis was checked and verified after clots have been evacuated and that once confirmed while patient was normotensive the bladder is a partially filled and he was sent to the recovery in satisfactory condition    (I did not leave a catheter in place as I am concerned that the balloon of the catheter is causing the bleeding to further be aggravated) findings were called to his wife Nanette at [phone] he will stay off his aspirin and the other anticoagulant medications until there is no blood in the urine for 1 week is on the instruction sheet call  for follow-up appointment by the office for approximately 2 weeks sooner if necessary continuation of his Cipro additional meds renewed include the following Zofran 4 mg Norco 7.5 mg and Pyridium phone numbers on the instruction sheet concerns or questions to contact us

## 2025-04-11 NOTE — ANESTHESIA PROCEDURE NOTES
Airway  Reason: elective    Date/Time: 4/11/2025 2:40 PM  Airway not difficult    General Information and Staff    Patient location during procedure: OR  CRNA/CAA: Ludy Crystal CRNA    Indications and Patient Condition  Indications for airway management: airway protection    Preoxygenated: yes  MILS not maintained throughout    Mask difficulty assessment: 0 - not attempted    Final Airway Details    Final airway type: supraglottic airway      Successful airway: LMA and unique  Size: 5   Number of attempts at approach: 1  Assessment: lips, teeth, and gum same as pre-op    Additional Comments  Airway exam prior to airway device insertion. Teeth/lips inspected. Preoxygenated with 100% O2; sniffing position, IV induction, eyes taped. Atraumatic device insertion. Airway device connected to anesthesia machine. Confirmed EBBS, +EtCO2. Lips and teeth inspected, intact, no damage.

## 2025-04-11 NOTE — ANESTHESIA PREPROCEDURE EVALUATION
Anesthesia Evaluation     Patient summary reviewed and Nursing notes reviewed   NPO Solid Status: > 8 hours  NPO Liquid Status: > 2 hours           Airway   Mallampati: II  TM distance: >3 FB  Neck ROM: full  No difficulty expected  Dental    (+) implants        Pulmonary - normal exam   (+) pulmonary embolism,  Cardiovascular - normal exam    ECG reviewed    (+) hypertension, dysrhythmias (RBBB, LAFB, PVCs) Paroxysmal Atrial Fib, PVC, hyperlipidemia      Neuro/Psych  GI/Hepatic/Renal/Endo    (+) renal disease- stones    Musculoskeletal     Abdominal    Substance History      OB/GYN          Other   blood dyscrasia anemia,   history of cancer (bladder, prostate)    ROS/Med Hx Other: Hyperbaric therapy for radiation cystitis                  Anesthesia Plan    ASA 3     general     (I have reviewed the patient's history and chart with the patient, including all pertinent laboratory results and imaging. I have explained the risks of anesthesia including but not limited to dental damage, corneal abrasion, nerve injury, MI, stroke, aspiration, and death. Patient has agreed to proceed.      /89 (BP Location: Right arm, Patient Position: Sitting)   Pulse 58   Temp 36.9 °C (98.4 °F) (Oral)   Resp 16   SpO2 100%   )  intravenous induction     Anesthetic plan, risks, benefits, and alternatives have been provided, discussed and informed consent has been obtained with: patient.    CODE STATUS:

## 2025-04-15 ENCOUNTER — OFFICE VISIT (OUTPATIENT)
Dept: WOUND CARE | Facility: HOSPITAL | Age: 75
End: 2025-04-15
Payer: MEDICARE

## 2025-04-15 PROCEDURE — G0277 HBOT, FULL BODY CHAMBER, 30M: HCPCS

## 2025-04-16 ENCOUNTER — OFFICE VISIT (OUTPATIENT)
Dept: WOUND CARE | Facility: HOSPITAL | Age: 75
End: 2025-04-16
Payer: MEDICARE

## 2025-04-16 PROCEDURE — G0277 HBOT, FULL BODY CHAMBER, 30M: HCPCS

## 2025-04-17 ENCOUNTER — OFFICE VISIT (OUTPATIENT)
Dept: WOUND CARE | Facility: HOSPITAL | Age: 75
End: 2025-04-17
Payer: MEDICARE

## 2025-04-17 PROCEDURE — G0277 HBOT, FULL BODY CHAMBER, 30M: HCPCS

## 2025-04-22 ENCOUNTER — OFFICE VISIT (OUTPATIENT)
Dept: WOUND CARE | Facility: HOSPITAL | Age: 75
End: 2025-04-22
Payer: MEDICARE

## 2025-04-22 PROCEDURE — G0277 HBOT, FULL BODY CHAMBER, 30M: HCPCS

## 2025-04-23 ENCOUNTER — OFFICE VISIT (OUTPATIENT)
Dept: WOUND CARE | Facility: HOSPITAL | Age: 75
End: 2025-04-23
Payer: MEDICARE

## 2025-04-23 PROCEDURE — G0277 HBOT, FULL BODY CHAMBER, 30M: HCPCS

## 2025-04-24 ENCOUNTER — OFFICE VISIT (OUTPATIENT)
Dept: WOUND CARE | Facility: HOSPITAL | Age: 75
End: 2025-04-24
Payer: MEDICARE

## 2025-04-24 PROCEDURE — G0277 HBOT, FULL BODY CHAMBER, 30M: HCPCS

## 2025-04-25 ENCOUNTER — OFFICE VISIT (OUTPATIENT)
Dept: WOUND CARE | Facility: HOSPITAL | Age: 75
End: 2025-04-25
Payer: MEDICARE

## 2025-04-25 PROCEDURE — G0277 HBOT, FULL BODY CHAMBER, 30M: HCPCS

## 2025-04-28 ENCOUNTER — OFFICE VISIT (OUTPATIENT)
Dept: WOUND CARE | Facility: HOSPITAL | Age: 75
End: 2025-04-28
Payer: MEDICARE

## 2025-04-28 PROCEDURE — G0277 HBOT, FULL BODY CHAMBER, 30M: HCPCS

## 2025-04-29 ENCOUNTER — OFFICE VISIT (OUTPATIENT)
Dept: WOUND CARE | Facility: HOSPITAL | Age: 75
End: 2025-04-29
Payer: MEDICARE

## 2025-04-29 PROCEDURE — G0277 HBOT, FULL BODY CHAMBER, 30M: HCPCS

## 2025-05-01 ENCOUNTER — OFFICE VISIT (OUTPATIENT)
Dept: WOUND CARE | Facility: HOSPITAL | Age: 75
End: 2025-05-01
Payer: MEDICARE

## 2025-05-02 ENCOUNTER — OFFICE VISIT (OUTPATIENT)
Dept: WOUND CARE | Facility: HOSPITAL | Age: 75
End: 2025-05-02
Payer: MEDICARE

## 2025-05-05 ENCOUNTER — OFFICE VISIT (OUTPATIENT)
Dept: WOUND CARE | Facility: HOSPITAL | Age: 75
End: 2025-05-05
Payer: MEDICARE

## 2025-05-20 DIAGNOSIS — I10 PRIMARY HYPERTENSION: ICD-10-CM

## 2025-05-20 NOTE — TELEPHONE ENCOUNTER
Caller: [place]    Relationship: Self    Best call back number: [phone]     Requested Prescriptions:   Requested Prescriptions     Pending Prescriptions Disp Refills    lisinopril (PRINIVIL,ZESTRIL) 40 MG tablet 90 tablet 1     Sig: Take 1 tablet by mouth Daily.        Pharmacy where request should be sent: [id number] - [place], [place] - [address] AT [facility] [address] - [phone]  - [phone] FX     Last office visit with prescribing clinician: 1/23/2025   Last telemedicine visit with prescribing clinician: Visit date not found   Next office visit with prescribing clinician: 7/3/2025       Does the patient have less than a 3 day supply:  [x] Yes  [] No

## 2025-05-22 RX ORDER — LISINOPRIL 40 MG/1
40 TABLET ORAL DAILY
Qty: 90 TABLET | Refills: 1 | Status: SHIPPED | OUTPATIENT
Start: 2025-05-22

## 2025-05-22 NOTE — TELEPHONE ENCOUNTER
Caller: [place]    Relationship to patient: Self    Best call back number: [phone]      Patient is needing: HE IS OUT OF MEDICATION

## 2025-06-26 RX ORDER — NITROFURANTOIN 25; 75 MG/1; MG/1
1 CAPSULE ORAL EVERY 12 HOURS SCHEDULED
COMMUNITY
Start: 2025-05-05

## 2025-06-27 ENCOUNTER — LAB (OUTPATIENT)
Dept: FAMILY MEDICINE CLINIC | Facility: CLINIC | Age: 75
End: 2025-06-27
Payer: MEDICARE

## 2025-06-27 DIAGNOSIS — I10 HYPERTENSION, UNSPECIFIED TYPE: ICD-10-CM

## 2025-06-27 DIAGNOSIS — R73.9 HYPERGLYCEMIA: ICD-10-CM

## 2025-06-27 DIAGNOSIS — I48.0 PAROXYSMAL ATRIAL FIBRILLATION: ICD-10-CM

## 2025-06-27 DIAGNOSIS — E78.49 OTHER HYPERLIPIDEMIA: ICD-10-CM

## 2025-06-27 DIAGNOSIS — Z85.9 PERSONAL HISTORY OF MALIGNANT NEOPLASM: Primary | ICD-10-CM

## 2025-06-27 DIAGNOSIS — Z12.5 SPECIAL SCREENING FOR MALIGNANT NEOPLASM OF PROSTATE: ICD-10-CM

## 2025-06-28 LAB
ALBUMIN/CREAT UR: 82 MG/G CREAT (ref 0–29)
APPEARANCE UR: CLEAR
BACTERIA #/AREA URNS HPF: ABNORMAL /[HPF]
BASOPHILS # BLD AUTO: 0.1 X10E3/UL (ref 0–0.2)
BASOPHILS NFR BLD AUTO: 1 %
BILIRUB UR QL STRIP: NEGATIVE
BUN SERPL-MCNC: 13 MG/DL (ref 8–27)
BUN/CREAT SERPL: 11 (ref 10–24)
CALCIUM SERPL-MCNC: 9.8 MG/DL (ref 8.6–10.2)
CASTS URNS QL MICRO: ABNORMAL /LPF
CHLORIDE SERPL-SCNC: 101 MMOL/L (ref 96–106)
CO2 SERPL-SCNC: 22 MMOL/L (ref 20–29)
COLOR UR: YELLOW
CREAT SERPL-MCNC: 1.15 MG/DL (ref 0.76–1.27)
CREAT UR-MCNC: 151.4 MG/DL
EGFRCR SERPLBLD CKD-EPI 2021: 66 ML/MIN/1.73
EOSINOPHIL # BLD AUTO: 0.2 X10E3/UL (ref 0–0.4)
EOSINOPHIL NFR BLD AUTO: 2 %
EPI CELLS #/AREA URNS HPF: ABNORMAL /HPF (ref 0–10)
ERYTHROCYTE [DISTWIDTH] IN BLOOD BY AUTOMATED COUNT: 16.1 % (ref 11.6–15.4)
GLUCOSE SERPL-MCNC: 115 MG/DL (ref 70–99)
GLUCOSE UR QL STRIP: NEGATIVE
HBA1C MFR BLD: 6.2 % (ref 4.8–5.6)
HCT VFR BLD AUTO: 46.4 % (ref 37.5–51)
HGB BLD-MCNC: 13.8 G/DL (ref 13–17.7)
HGB UR QL STRIP: ABNORMAL
IMM GRANULOCYTES # BLD AUTO: 0 X10E3/UL (ref 0–0.1)
IMM GRANULOCYTES NFR BLD AUTO: 0 %
KETONES UR QL STRIP: NEGATIVE
LEUKOCYTE ESTERASE UR QL STRIP: ABNORMAL
LYMPHOCYTES # BLD AUTO: 1.6 X10E3/UL (ref 0.7–3.1)
LYMPHOCYTES NFR BLD AUTO: 18 %
MCH RBC QN AUTO: 25 PG (ref 26.6–33)
MCHC RBC AUTO-ENTMCNC: 29.7 G/DL (ref 31.5–35.7)
MCV RBC AUTO: 84 FL (ref 79–97)
MICRO URNS: ABNORMAL
MICROALBUMIN UR-MCNC: 124.8 UG/ML
MONOCYTES # BLD AUTO: 1 X10E3/UL (ref 0.1–0.9)
MONOCYTES NFR BLD AUTO: 12 %
NEUTROPHILS # BLD AUTO: 5.9 X10E3/UL (ref 1.4–7)
NEUTROPHILS NFR BLD AUTO: 67 %
NITRITE UR QL STRIP: NEGATIVE
PH UR STRIP: 6.5 [PH] (ref 5–7.5)
PLATELET # BLD AUTO: 282 X10E3/UL (ref 150–450)
POTASSIUM SERPL-SCNC: 4.7 MMOL/L (ref 3.5–5.2)
PROT UR QL STRIP: ABNORMAL
RBC # BLD AUTO: 5.53 X10E6/UL (ref 4.14–5.8)
RBC #/AREA URNS HPF: ABNORMAL /HPF (ref 0–2)
SODIUM SERPL-SCNC: 139 MMOL/L (ref 134–144)
SP GR UR STRIP: 1.02 (ref 1–1.03)
UROBILINOGEN UR STRIP-MCNC: 0.2 MG/DL (ref 0.2–1)
WBC # BLD AUTO: 8.8 X10E3/UL (ref 3.4–10.8)
WBC #/AREA URNS HPF: >30 /HPF (ref 0–5)

## 2025-07-03 ENCOUNTER — OFFICE VISIT (OUTPATIENT)
Dept: FAMILY MEDICINE CLINIC | Facility: CLINIC | Age: 75
End: 2025-07-03
Payer: MEDICARE

## 2025-07-03 VITALS
OXYGEN SATURATION: 98 % | BODY MASS INDEX: 26.25 KG/M2 | SYSTOLIC BLOOD PRESSURE: 172 MMHG | WEIGHT: 177.2 LBS | RESPIRATION RATE: 16 BRPM | DIASTOLIC BLOOD PRESSURE: 102 MMHG | HEART RATE: 61 BPM | HEIGHT: 69 IN | TEMPERATURE: 98.6 F

## 2025-07-03 DIAGNOSIS — Z85.51 HISTORY OF BLADDER CANCER: ICD-10-CM

## 2025-07-03 DIAGNOSIS — R80.9 MICROALBUMINURIA: ICD-10-CM

## 2025-07-03 DIAGNOSIS — Z90.79 S/P PROSTATECTOMY: ICD-10-CM

## 2025-07-03 DIAGNOSIS — I10 PRIMARY HYPERTENSION: Primary | ICD-10-CM

## 2025-07-03 DIAGNOSIS — Z85.46 HISTORY OF PROSTATE CANCER: ICD-10-CM

## 2025-07-03 PROCEDURE — 99214 OFFICE O/P EST MOD 30 MIN: CPT | Performed by: STUDENT IN AN ORGANIZED HEALTH CARE EDUCATION/TRAINING PROGRAM

## 2025-07-03 PROCEDURE — 1126F AMNT PAIN NOTED NONE PRSNT: CPT | Performed by: STUDENT IN AN ORGANIZED HEALTH CARE EDUCATION/TRAINING PROGRAM

## 2025-07-03 PROCEDURE — 1160F RVW MEDS BY RX/DR IN RCRD: CPT | Performed by: STUDENT IN AN ORGANIZED HEALTH CARE EDUCATION/TRAINING PROGRAM

## 2025-07-03 PROCEDURE — 1159F MED LIST DOCD IN RCRD: CPT | Performed by: STUDENT IN AN ORGANIZED HEALTH CARE EDUCATION/TRAINING PROGRAM

## 2025-07-03 PROCEDURE — 3077F SYST BP >= 140 MM HG: CPT | Performed by: STUDENT IN AN ORGANIZED HEALTH CARE EDUCATION/TRAINING PROGRAM

## 2025-07-03 PROCEDURE — 3080F DIAST BP >= 90 MM HG: CPT | Performed by: STUDENT IN AN ORGANIZED HEALTH CARE EDUCATION/TRAINING PROGRAM

## 2025-07-21 NOTE — PROGRESS NOTES
"Chief Complaint  Hyperglycemia, Abnormal Lab (DRAWN ON 06/27/2025.), and Hypertension (LEFT; 172/102.)    Subjective    History of Present Illness  The patient is a 75-year-old gentleman here for a follow-up visit.    The primary reason for this visit is to monitor his blood pressure, which has been fluctuating. He reports taking his medication, lisinopril, daily and mentions that his blood pressure was 133/83 during a urologist visit on 06/19/2025. He suspects anxiety may be contributing to the fluctuations.    He has been undergoing bladder scans since 2009, with the next one scheduled for 11/2025. He reports no urinary symptoms such as burning. Occasionally, he notices blood in his urine when straining during bowel movements, but this issue has resolved since the removal of his catheter. His urine is typically pale yellow or clear, with no visible blood. He has discussed this with his urologist, who conducted a urine test that returned normal results.    He has noticed an increase in his glucose levels, which he attributes to consuming sweet tea once a day and eating small amounts of sugar when his wife's blood sugar drops. He has made efforts to reduce his sugar intake and has increased his water consumption.    He has reduced his intake of red meat and increased his consumption of chicken.       [place] Car[place]o presents to [facility]  Hyperglycemia  Abnormal Lab  Hypertension      Objective   Vital Signs:  BP (!) 172/102   Pulse 61   Temp 98.6 °F (37 °C) (Oral)   Resp 16   Ht 175.3 cm (69\")   Wt 80.4 kg (177 lb 3.2 oz)   SpO2 98%   BMI 26.17 kg/m²   Estimated body mass index is 26.17 kg/m² as calculated from the following:    Height as of this encounter: 175.3 cm (69\").    Weight as of this encounter: 80.4 kg (177 lb 3.2 oz).            Physical Exam  Cardiovascular:      Rate and Rhythm: Normal rate.      Pulses: Normal pulses.      Heart sounds: Normal heart sounds. "   Pulmonary:      Effort: Pulmonary effort is normal.      Breath sounds: Normal breath sounds.   Abdominal:      General: Bowel sounds are normal.      Palpations: Abdomen is soft.   Skin:     General: Skin is warm.   Neurological:      Mental Status: He is alert and oriented to person, place, and time.        Result Review :  The following data was reviewed by: Chloe Arriaga MD on 07/03/2025:  CMP          3/6/2025    06:43 4/11/2025    12:55 6/27/2025    08:51   CMP   Glucose 143  108  115    BUN 8  14  13    Creatinine 0.75  1.05  1.15    EGFR 93.5  73.6  66    Sodium 138  138  139    Potassium 4.1  4.1  4.7    Chloride 103  101  101    Calcium 9.0  9.1  9.8    BUN/Creatinine Ratio 10.7  13.3  11    Anion Gap 8.4  12.0       CBC          3/6/2025    06:43 4/11/2025    12:55 6/27/2025    08:51   CBC   WBC 6.70  5.14  8.8    RBC 3.50  3.85  5.53    Hemoglobin 11.1  11.2  13.8    Hematocrit 33.1  34.3  46.4    MCV 94.6  89.1  84    MCH 31.7  29.1  25.0    MCHC 33.5  32.7  29.7    RDW 12.2  12.2  16.1    Platelets 256  321  282      Lipid Panel          10/2/2024    04:12 11/13/2024    08:30   Lipid Panel   Total Cholesterol 136     Total Cholesterol  156    Triglycerides 128  117    HDL Cholesterol 46  55    VLDL Cholesterol 23  21    LDL Cholesterol  67  80    LDL/HDL Ratio 1.40       TSH          11/13/2024    08:30   TSH   TSH 2.630                Assessment and Plan   Diagnoses and all orders for this visit:    1. Primary hypertension (Primary)    2. Microalbuminuria    3. S/P prostatectomy    4. History of prostate cancer    5. History of bladder cancer        Assessment & Plan  1. Hypertension.  - Blood pressure readings have been inconsistent, with a recent reading of 133/83 at the urologist's office on 06/19/2025, but today's reading was elevated at 168/100.  - Currently on lisinopril and is compliant with the medication.  - Advised to monitor blood pressure daily for the next 4 weeks and bring home  readings to the next appointment. If home readings are consistently high, should return sooner, even in 2 weeks.  - Follow-up scheduled in 1 month.    2. Iron deficiency.  - Hemoglobin level is satisfactory at 13.8, indicating adequate red blood cell production.  - Evidence of iron deficiency noted.  - Advised to incorporate iron-rich foods into the diet, such as red meat once a month and chicken.  - No medication prescribed due to potential side effects like constipation and bloating.    3. Elevated glucose levels.  - Glucose levels have been increasing, and A1c has also risen, though not to a level requiring medication.  - Advised to monitor diet closely and reduce sugar intake.  - Encouraged to cut down on sweet tea and other sugary foods.    4. Proteinuria.  - Protein levels in urine have remained stable over the past year, ranging from 89 to 82.  - Not diabetic; advised to maintain a healthy diet and monitor blood pressure, as hypertension can strain the kidneys and increase protein levels in the urine.  - No immediate action required; will continue to monitor.    5. Suspected urinary tract infection.  - Urine microscopy shows some kind of infection, but no urinary symptoms present.  - No action will be taken at this time due to the absence of symptoms.  - Advised to seek medical attention if any abnormal symptoms occur.            Follow Up   No follow-ups on file.  Patient was given instructions and counseling regarding his condition or for health maintenance advice. Please see specific information pulled into the AVS if appropriate.     Patient or patient representative verbalized consent for the use of Ambient Listening during the visit with  Chloe Arriaga MD for chart documentation. 7/21/2025  09:40 EDT

## 2025-08-04 ENCOUNTER — OFFICE VISIT (OUTPATIENT)
Dept: FAMILY MEDICINE CLINIC | Facility: CLINIC | Age: 75
End: 2025-08-04
Payer: MEDICARE

## 2025-08-04 VITALS
OXYGEN SATURATION: 98 % | HEIGHT: 69 IN | DIASTOLIC BLOOD PRESSURE: 90 MMHG | TEMPERATURE: 98.2 F | WEIGHT: 173.5 LBS | BODY MASS INDEX: 25.7 KG/M2 | HEART RATE: 83 BPM | SYSTOLIC BLOOD PRESSURE: 154 MMHG | RESPIRATION RATE: 17 BRPM

## 2025-08-04 DIAGNOSIS — I10 PRIMARY HYPERTENSION: Primary | ICD-10-CM

## 2025-08-04 DIAGNOSIS — F41.9 ANXIETY: ICD-10-CM

## 2025-08-04 PROCEDURE — 1159F MED LIST DOCD IN RCRD: CPT | Performed by: STUDENT IN AN ORGANIZED HEALTH CARE EDUCATION/TRAINING PROGRAM

## 2025-08-04 PROCEDURE — 1126F AMNT PAIN NOTED NONE PRSNT: CPT | Performed by: STUDENT IN AN ORGANIZED HEALTH CARE EDUCATION/TRAINING PROGRAM

## 2025-08-04 PROCEDURE — 3077F SYST BP >= 140 MM HG: CPT | Performed by: STUDENT IN AN ORGANIZED HEALTH CARE EDUCATION/TRAINING PROGRAM

## 2025-08-04 PROCEDURE — 3080F DIAST BP >= 90 MM HG: CPT | Performed by: STUDENT IN AN ORGANIZED HEALTH CARE EDUCATION/TRAINING PROGRAM

## 2025-08-04 PROCEDURE — 1160F RVW MEDS BY RX/DR IN RCRD: CPT | Performed by: STUDENT IN AN ORGANIZED HEALTH CARE EDUCATION/TRAINING PROGRAM

## 2025-08-04 PROCEDURE — 99214 OFFICE O/P EST MOD 30 MIN: CPT | Performed by: STUDENT IN AN ORGANIZED HEALTH CARE EDUCATION/TRAINING PROGRAM

## 2025-08-04 RX ORDER — HYDROCHLOROTHIAZIDE 12.5 MG/1
12.5 TABLET ORAL DAILY
Qty: 90 TABLET | Refills: 0 | Status: SHIPPED | OUTPATIENT
Start: 2025-08-04

## 2025-08-04 RX ORDER — ALPRAZOLAM 0.5 MG
0.5 TABLET ORAL 2 TIMES DAILY PRN
Qty: 30 TABLET | Refills: 0 | Status: SHIPPED | OUTPATIENT
Start: 2025-08-04

## (undated) DEVICE — LOU CYSTO: Brand: MEDLINE INDUSTRIES, INC.

## (undated) DEVICE — SNAR POLYP CAPTIVATOR RND STFF 2.4 240CM 10MM 1P/U

## (undated) DEVICE — DRSNG TELFA PAD NONADH STR 1S 3X4IN

## (undated) DEVICE — TIDISHIELD UROLOGY DRAIN BAGS FROSTY VINYL STERILE FITS SIEMENS UROSKOP ACCESS 20 PER CASE: Brand: TIDISHIELD

## (undated) DEVICE — PATIENT RETURN ELECTRODE, SINGLE-USE, CONTACT QUALITY MONITORING, ADULT, WITH 9FT CORD, FOR PATIENTS WEIGING OVER 33LBS. (15KG): Brand: MEGADYNE

## (undated) DEVICE — TUR/ENDOSCOPIC CABLE, 10' (3.05 M): Brand: CONMED

## (undated) DEVICE — GLV SURG SIGNATURE ESSENTIAL PF LTX SZ8

## (undated) DEVICE — LN SMPL CO2 SHTRM SD STREAM W/M LUER

## (undated) DEVICE — CANN O2 ETCO2 FITS ALL CONN CO2 SMPL A/ 7IN DISP LF

## (undated) DEVICE — NITINOL WIRE WITH HYDROPHILIC TIP: Brand: SENSOR

## (undated) DEVICE — GLV SURG TRIUMPH CLASSIC PF LTX 8 STRL

## (undated) DEVICE — SYRINGE,TOOMEY,IRRIGATION,70CC,STERILE: Brand: MEDLINE

## (undated) DEVICE — CATH FOL BARDEX HEMATURIA 3WY 22F 30CC

## (undated) DEVICE — SENSR O2 OXIMAX FNGR A/ 18IN NONSTR

## (undated) DEVICE — THE SINGLE USE ETRAP – POLYP TRAP IS USED FOR SUCTION RETRIEVAL OF ENDOSCOPICALLY REMOVED POLYPS.: Brand: ETRAP

## (undated) DEVICE — TUBING, SUCTION, 1/4" X 10', STRAIGHT: Brand: MEDLINE

## (undated) DEVICE — TIDISHIELD UROLOGY DRAIN BAGS FROSTY VINYL FITS SIEMENS UROSKOP ACCESS 20 PER CASE: Brand: TIDISHIELD

## (undated) DEVICE — KT ORCA ORCAPOD DISP STRL

## (undated) DEVICE — ELECTRD ROLL BALL RESECTOSCP ANG

## (undated) DEVICE — LP CUT RT/ANGL 26FR

## (undated) DEVICE — ADAPT CLN BIOGUARD AIR/H2O DISP

## (undated) DEVICE — PAD GRND REM POLYHESIVE A/ DISP